# Patient Record
Sex: MALE | Race: WHITE | Employment: OTHER | URBAN - METROPOLITAN AREA
[De-identification: names, ages, dates, MRNs, and addresses within clinical notes are randomized per-mention and may not be internally consistent; named-entity substitution may affect disease eponyms.]

---

## 2018-06-05 ENCOUNTER — APPOINTMENT (OUTPATIENT)
Dept: ULTRASOUND IMAGING | Age: 78
DRG: 554 | End: 2018-06-05
Attending: INTERNAL MEDICINE
Payer: MEDICARE

## 2018-06-05 ENCOUNTER — HOSPITAL ENCOUNTER (INPATIENT)
Age: 78
LOS: 5 days | Discharge: HOME | DRG: 554 | End: 2018-06-10
Attending: EMERGENCY MEDICINE | Admitting: INTERNAL MEDICINE
Payer: MEDICARE

## 2018-06-05 DIAGNOSIS — N17.9 ACUTE RENAL FAILURE, UNSPECIFIED ACUTE RENAL FAILURE TYPE (HCC): Primary | ICD-10-CM

## 2018-06-05 DIAGNOSIS — E86.0 DEHYDRATION: ICD-10-CM

## 2018-06-05 DIAGNOSIS — M10.00 ACUTE IDIOPATHIC GOUT, UNSPECIFIED SITE: ICD-10-CM

## 2018-06-05 DIAGNOSIS — R00.0 TACHYCARDIA: ICD-10-CM

## 2018-06-05 DIAGNOSIS — E87.5 ACUTE HYPERKALEMIA: ICD-10-CM

## 2018-06-05 PROBLEM — M10.9 ACUTE GOUT: Status: ACTIVE | Noted: 2018-06-05

## 2018-06-05 PROBLEM — E87.20 METABOLIC ACIDOSIS: Status: ACTIVE | Noted: 2018-06-05

## 2018-06-05 PROBLEM — E87.29 RESPIRATORY ACIDOSIS: Status: ACTIVE | Noted: 2018-06-05

## 2018-06-05 PROBLEM — N18.4 ACUTE WORSENING OF STAGE 4 CHRONIC KIDNEY DISEASE (HCC): Status: ACTIVE | Noted: 2018-06-05

## 2018-06-05 LAB
ALBUMIN SERPL-MCNC: 3 G/DL (ref 3.5–5)
ALBUMIN/GLOB SERPL: 0.6 {RATIO} (ref 1.1–2.2)
ALP SERPL-CCNC: 72 U/L (ref 45–117)
ALT SERPL-CCNC: 9 U/L (ref 12–78)
ANION GAP SERPL CALC-SCNC: 13 MMOL/L (ref 5–15)
APPEARANCE UR: ABNORMAL
ARTERIAL PATENCY WRIST A: ABNORMAL
AST SERPL-CCNC: 24 U/L (ref 15–37)
BACTERIA URNS QL MICRO: ABNORMAL /HPF
BASE DEFICIT BLD-SCNC: 19 MMOL/L
BASOPHILS # BLD: 0 K/UL (ref 0–0.1)
BASOPHILS NFR BLD: 0 % (ref 0–1)
BDY SITE: ABNORMAL
BILIRUB SERPL-MCNC: 0.8 MG/DL (ref 0.2–1)
BILIRUB UR QL: NEGATIVE
BUN SERPL-MCNC: 50 MG/DL (ref 6–20)
BUN/CREAT SERPL: 13 (ref 12–20)
CALCIUM SERPL-MCNC: 7.2 MG/DL (ref 8.5–10.1)
CHLORIDE SERPL-SCNC: 113 MMOL/L (ref 97–108)
CK SERPL-CCNC: 105 U/L (ref 39–308)
CO2 SERPL-SCNC: 12 MMOL/L (ref 21–32)
COLOR UR: ABNORMAL
CREAT SERPL-MCNC: 3.78 MG/DL (ref 0.7–1.3)
CREAT UR-MCNC: 71.3 MG/DL
DIFFERENTIAL METHOD BLD: ABNORMAL
EOSINOPHIL # BLD: 0.1 K/UL (ref 0–0.4)
EOSINOPHIL #/AREA URNS HPF: 1 /[HPF]
EOSINOPHIL NFR BLD: 1 % (ref 0–7)
EPITH CASTS URNS QL MICRO: ABNORMAL /LPF
ERYTHROCYTE [DISTWIDTH] IN BLOOD BY AUTOMATED COUNT: 15.8 % (ref 11.5–14.5)
GAS FLOW.O2 O2 DELIVERY SYS: ABNORMAL L/MIN
GLOBULIN SER CALC-MCNC: 5 G/DL (ref 2–4)
GLUCOSE BLD STRIP.AUTO-MCNC: 105 MG/DL (ref 65–100)
GLUCOSE BLD STRIP.AUTO-MCNC: 188 MG/DL (ref 65–100)
GLUCOSE SERPL-MCNC: 107 MG/DL (ref 65–100)
GLUCOSE UR STRIP.AUTO-MCNC: NEGATIVE MG/DL
HCO3 BLD-SCNC: 8.4 MMOL/L (ref 22–26)
HCT VFR BLD AUTO: 32.7 % (ref 36.6–50.3)
HGB BLD-MCNC: 10.5 G/DL (ref 12.1–17)
HGB UR QL STRIP: ABNORMAL
IMM GRANULOCYTES # BLD: 0.1 K/UL (ref 0–0.04)
IMM GRANULOCYTES NFR BLD AUTO: 1 % (ref 0–0.5)
KETONES UR QL STRIP.AUTO: NEGATIVE MG/DL
LACTATE SERPL-SCNC: 1.3 MMOL/L (ref 0.4–2)
LEUKOCYTE ESTERASE UR QL STRIP.AUTO: ABNORMAL
LYMPHOCYTES # BLD: 1.3 K/UL (ref 0.8–3.5)
LYMPHOCYTES NFR BLD: 12 % (ref 12–49)
MCH RBC QN AUTO: 32 PG (ref 26–34)
MCHC RBC AUTO-ENTMCNC: 32.1 G/DL (ref 30–36.5)
MCV RBC AUTO: 99.7 FL (ref 80–99)
MONOCYTES # BLD: 1.1 K/UL (ref 0–1)
MONOCYTES NFR BLD: 11 % (ref 5–13)
NEUTS SEG # BLD: 7.9 K/UL (ref 1.8–8)
NEUTS SEG NFR BLD: 76 % (ref 32–75)
NITRITE UR QL STRIP.AUTO: NEGATIVE
NRBC # BLD: 0 K/UL (ref 0–0.01)
NRBC BLD-RTO: 0 PER 100 WBC
PCO2 BLD: 20.3 MMHG (ref 35–45)
PH BLD: 7.23 [PH] (ref 7.35–7.45)
PH UR STRIP: 6 [PH] (ref 5–8)
PLATELET # BLD AUTO: 250 K/UL (ref 150–400)
PMV BLD AUTO: 9.9 FL (ref 8.9–12.9)
PO2 BLD: 101 MMHG (ref 80–100)
POTASSIUM SERPL-SCNC: 5.5 MMOL/L (ref 3.5–5.1)
PROT SERPL-MCNC: 8 G/DL (ref 6.4–8.2)
PROT UR STRIP-MCNC: 100 MG/DL
PROT UR-MCNC: 193 MG/DL (ref 0–11.9)
PROT/CREAT UR-RTO: 2.7
RBC # BLD AUTO: 3.28 M/UL (ref 4.1–5.7)
RBC #/AREA URNS HPF: ABNORMAL /HPF (ref 0–5)
SAO2 % BLD: 97 % (ref 92–97)
SERVICE CMNT-IMP: ABNORMAL
SERVICE CMNT-IMP: ABNORMAL
SODIUM SERPL-SCNC: 138 MMOL/L (ref 136–145)
SODIUM UR-SCNC: 54 MMOL/L
SP GR UR REFRACTOMETRY: 1.01 (ref 1–1.03)
SPECIMEN TYPE: ABNORMAL
TOTAL RESP. RATE, ITRR: 19
UR CULT HOLD, URHOLD: NORMAL
URATE SERPL-MCNC: 7.8 MG/DL (ref 3.5–7.2)
UROBILINOGEN UR QL STRIP.AUTO: 0.2 EU/DL (ref 0.2–1)
WBC # BLD AUTO: 10.4 K/UL (ref 4.1–11.1)
WBC URNS QL MICRO: >100 /HPF (ref 0–4)
YEAST BUDDING URNS QL: PRESENT
YEAST URNS QL MICRO: PRESENT

## 2018-06-05 PROCEDURE — 82962 GLUCOSE BLOOD TEST: CPT

## 2018-06-05 PROCEDURE — 82570 ASSAY OF URINE CREATININE: CPT | Performed by: INTERNAL MEDICINE

## 2018-06-05 PROCEDURE — 85025 COMPLETE CBC W/AUTO DIFF WBC: CPT | Performed by: EMERGENCY MEDICINE

## 2018-06-05 PROCEDURE — 76770 US EXAM ABDO BACK WALL COMP: CPT

## 2018-06-05 PROCEDURE — 36415 COLL VENOUS BLD VENIPUNCTURE: CPT | Performed by: EMERGENCY MEDICINE

## 2018-06-05 PROCEDURE — 99285 EMERGENCY DEPT VISIT HI MDM: CPT

## 2018-06-05 PROCEDURE — 96361 HYDRATE IV INFUSION ADD-ON: CPT

## 2018-06-05 PROCEDURE — 87086 URINE CULTURE/COLONY COUNT: CPT | Performed by: INTERNAL MEDICINE

## 2018-06-05 PROCEDURE — 87186 SC STD MICRODIL/AGAR DIL: CPT | Performed by: INTERNAL MEDICINE

## 2018-06-05 PROCEDURE — 81001 URINALYSIS AUTO W/SCOPE: CPT | Performed by: INTERNAL MEDICINE

## 2018-06-05 PROCEDURE — 82803 BLOOD GASES ANY COMBINATION: CPT

## 2018-06-05 PROCEDURE — 86335 IMMUNFIX E-PHORSIS/URINE/CSF: CPT | Performed by: HOSPITALIST

## 2018-06-05 PROCEDURE — 65660000001 HC RM ICU INTERMED STEPDOWN

## 2018-06-05 PROCEDURE — 36600 WITHDRAWAL OF ARTERIAL BLOOD: CPT

## 2018-06-05 PROCEDURE — 87205 SMEAR GRAM STAIN: CPT | Performed by: INTERNAL MEDICINE

## 2018-06-05 PROCEDURE — 84300 ASSAY OF URINE SODIUM: CPT | Performed by: INTERNAL MEDICINE

## 2018-06-05 PROCEDURE — 74011000250 HC RX REV CODE- 250: Performed by: INTERNAL MEDICINE

## 2018-06-05 PROCEDURE — 93005 ELECTROCARDIOGRAM TRACING: CPT

## 2018-06-05 PROCEDURE — 96375 TX/PRO/DX INJ NEW DRUG ADDON: CPT

## 2018-06-05 PROCEDURE — 96374 THER/PROPH/DIAG INJ IV PUSH: CPT

## 2018-06-05 PROCEDURE — 74011250637 HC RX REV CODE- 250/637: Performed by: EMERGENCY MEDICINE

## 2018-06-05 PROCEDURE — 84550 ASSAY OF BLOOD/URIC ACID: CPT | Performed by: EMERGENCY MEDICINE

## 2018-06-05 PROCEDURE — 83605 ASSAY OF LACTIC ACID: CPT | Performed by: INTERNAL MEDICINE

## 2018-06-05 PROCEDURE — 74011250637 HC RX REV CODE- 250/637: Performed by: INTERNAL MEDICINE

## 2018-06-05 PROCEDURE — 80053 COMPREHEN METABOLIC PANEL: CPT | Performed by: EMERGENCY MEDICINE

## 2018-06-05 PROCEDURE — 84165 PROTEIN E-PHORESIS SERUM: CPT | Performed by: INTERNAL MEDICINE

## 2018-06-05 PROCEDURE — 74011000258 HC RX REV CODE- 258: Performed by: INTERNAL MEDICINE

## 2018-06-05 PROCEDURE — 82550 ASSAY OF CK (CPK): CPT | Performed by: INTERNAL MEDICINE

## 2018-06-05 PROCEDURE — 74011250636 HC RX REV CODE- 250/636: Performed by: INTERNAL MEDICINE

## 2018-06-05 PROCEDURE — 74011250636 HC RX REV CODE- 250/636: Performed by: EMERGENCY MEDICINE

## 2018-06-05 PROCEDURE — 87077 CULTURE AEROBIC IDENTIFY: CPT | Performed by: INTERNAL MEDICINE

## 2018-06-05 RX ORDER — COLCHICINE 0.6 MG/1
0.6 TABLET ORAL DAILY
Status: COMPLETED | OUTPATIENT
Start: 2018-06-06 | End: 2018-06-07

## 2018-06-05 RX ORDER — COLCHICINE 0.6 MG/1
0.6 TABLET ORAL
Status: COMPLETED | OUTPATIENT
Start: 2018-06-05 | End: 2018-06-05

## 2018-06-05 RX ORDER — CALCITRIOL 0.25 UG/1
0.25 CAPSULE ORAL DAILY
Status: DISCONTINUED | OUTPATIENT
Start: 2018-06-06 | End: 2018-06-10 | Stop reason: HOSPADM

## 2018-06-05 RX ORDER — METOPROLOL TARTRATE 50 MG/1
50 TABLET ORAL EVERY 12 HOURS
Status: DISCONTINUED | OUTPATIENT
Start: 2018-06-05 | End: 2018-06-10 | Stop reason: HOSPADM

## 2018-06-05 RX ORDER — KETOROLAC TROMETHAMINE 30 MG/ML
10 INJECTION, SOLUTION INTRAMUSCULAR; INTRAVENOUS
Status: DISCONTINUED | OUTPATIENT
Start: 2018-06-05 | End: 2018-06-05

## 2018-06-05 RX ORDER — METOPROLOL TARTRATE 50 MG/1
50 TABLET ORAL 2 TIMES DAILY
COMMUNITY
End: 2018-08-06 | Stop reason: SDUPTHER

## 2018-06-05 RX ORDER — SODIUM POLYSTYRENE SULFONATE 15 G/60ML
30 SUSPENSION ORAL; RECTAL
Status: COMPLETED | OUTPATIENT
Start: 2018-06-05 | End: 2018-06-05

## 2018-06-05 RX ORDER — LANOLIN ALCOHOL/MO/W.PET/CERES
1000 CREAM (GRAM) TOPICAL DAILY
Status: DISCONTINUED | OUTPATIENT
Start: 2018-06-06 | End: 2018-06-10 | Stop reason: HOSPADM

## 2018-06-05 RX ORDER — ALLOPURINOL 100 MG/1
100 TABLET ORAL
COMMUNITY
End: 2019-02-15 | Stop reason: SDUPTHER

## 2018-06-05 RX ORDER — GUAIFENESIN 100 MG/5ML
81 LIQUID (ML) ORAL DAILY
Status: DISCONTINUED | OUTPATIENT
Start: 2018-06-06 | End: 2018-06-10 | Stop reason: HOSPADM

## 2018-06-05 RX ORDER — ACETAMINOPHEN 325 MG/1
650 TABLET ORAL
Status: DISCONTINUED | OUTPATIENT
Start: 2018-06-05 | End: 2018-06-10 | Stop reason: HOSPADM

## 2018-06-05 RX ORDER — LANOLIN ALCOHOL/MO/W.PET/CERES
325 CREAM (GRAM) TOPICAL
Status: DISCONTINUED | OUTPATIENT
Start: 2018-06-05 | End: 2018-06-10 | Stop reason: HOSPADM

## 2018-06-05 RX ORDER — ALLOPURINOL 100 MG/1
100 TABLET ORAL
Status: DISCONTINUED | OUTPATIENT
Start: 2018-06-05 | End: 2018-06-10 | Stop reason: HOSPADM

## 2018-06-05 RX ORDER — SODIUM CHLORIDE 0.9 % (FLUSH) 0.9 %
5-10 SYRINGE (ML) INJECTION EVERY 8 HOURS
Status: DISCONTINUED | OUTPATIENT
Start: 2018-06-05 | End: 2018-06-10 | Stop reason: HOSPADM

## 2018-06-05 RX ORDER — CALCITRIOL 0.25 UG/1
0.25 CAPSULE ORAL DAILY
COMMUNITY
End: 2018-08-06 | Stop reason: SDUPTHER

## 2018-06-05 RX ORDER — FENTANYL CITRATE 50 UG/ML
50 INJECTION, SOLUTION INTRAMUSCULAR; INTRAVENOUS
Status: COMPLETED | OUTPATIENT
Start: 2018-06-05 | End: 2018-06-05

## 2018-06-05 RX ORDER — SODIUM CHLORIDE 0.9 % (FLUSH) 0.9 %
5-10 SYRINGE (ML) INJECTION AS NEEDED
Status: DISCONTINUED | OUTPATIENT
Start: 2018-06-05 | End: 2018-06-10 | Stop reason: HOSPADM

## 2018-06-05 RX ORDER — LANOLIN ALCOHOL/MO/W.PET/CERES
325 CREAM (GRAM) TOPICAL
COMMUNITY
End: 2018-08-06 | Stop reason: SDUPTHER

## 2018-06-05 RX ADMIN — METHYLPREDNISOLONE SODIUM SUCCINATE 40 MG: 40 INJECTION, POWDER, FOR SOLUTION INTRAMUSCULAR; INTRAVENOUS at 12:09

## 2018-06-05 RX ADMIN — COLCHICINE 0.6 MG: 0.6 TABLET, FILM COATED ORAL at 12:09

## 2018-06-05 RX ADMIN — DEXTROSE MONOHYDRATE: 5 INJECTION, SOLUTION INTRAVENOUS at 14:18

## 2018-06-05 RX ADMIN — FENTANYL CITRATE 50 MCG: 50 INJECTION, SOLUTION INTRAMUSCULAR; INTRAVENOUS at 11:50

## 2018-06-05 RX ADMIN — METHYLPREDNISOLONE SODIUM SUCCINATE 60 MG: 125 INJECTION, POWDER, FOR SOLUTION INTRAMUSCULAR; INTRAVENOUS at 18:06

## 2018-06-05 RX ADMIN — Medication 10 ML: at 18:06

## 2018-06-05 RX ADMIN — FERROUS SULFATE TAB 325 MG (65 MG ELEMENTAL FE) 325 MG: 325 (65 FE) TAB at 18:06

## 2018-06-05 RX ADMIN — METOPROLOL TARTRATE 50 MG: 50 TABLET ORAL at 18:06

## 2018-06-05 RX ADMIN — SODIUM CHLORIDE 1000 ML: 900 INJECTION, SOLUTION INTRAVENOUS at 11:52

## 2018-06-05 RX ADMIN — Medication 10 ML: at 22:00

## 2018-06-05 RX ADMIN — METHYLPREDNISOLONE SODIUM SUCCINATE 60 MG: 125 INJECTION, POWDER, FOR SOLUTION INTRAMUSCULAR; INTRAVENOUS at 23:42

## 2018-06-05 RX ADMIN — CEFTRIAXONE 1 G: 1 INJECTION, POWDER, FOR SOLUTION INTRAMUSCULAR; INTRAVENOUS at 23:43

## 2018-06-05 RX ADMIN — SODIUM POLYSTYRENE SULFONATE 30 G: 15 SUSPENSION ORAL; RECTAL at 12:55

## 2018-06-05 RX ADMIN — ALLOPURINOL 100 MG: 100 TABLET ORAL at 23:41

## 2018-06-05 NOTE — ED TRIAGE NOTES
Pt arrived via EMS from [facility]. Left foot has been hurting since Friday, and right foot pain started last night. His feet are warm and tender, pulses present.

## 2018-06-05 NOTE — PROGRESS NOTES
Admission Medication Reconciliation:    Information obtained from:  Patient/Medication bottles/RxQuery    Comments/Recommendations: Updated PTA meds/reviewed patient's allergies. 1)  Of note, patient was on warfarin for AFib, however patient states this was stopped by his MD, Dr. Andres Aguilar. Also has a history of being on amiodarone. 2)  Medication changes: Added  - Xtandi, allopurinol, ferrous sulfate, calcitriol, B12 1000 mcg/serving drops, \"Neurim\" herbal B-complex -centered supplement)    Adjustment  - Lopressor now 50 mg BID (vs 25 mg BID)    Removed  - Amiodarone, Zofran, Zocor    3)  Colchicine 0.6 mg #30 x 5 days called in to patient's pharmacy however he has not picked up. ADDENDA:    4)  Patient's daughter provided Dexilant 60 mg capsules (physician sample) for inspection. Patient has been taking every AM    5)  Patient on Lupron depot ever 3 months (last injection in May 2018)       Significant PMH/Disease States:   Past Medical History:   Diagnosis Date    Celiac disease     Chronic kidney disease     Hypertension     MI (mitral incompetence)      Chief Complaint for this Admission:    Chief Complaint   Patient presents with    Foot Pain     Allergies:  Morphine    Prior to Admission Medications:   Prior to Admission Medications   Prescriptions Last Dose Informant Patient Reported? Taking? OTHER,NON-FORMULARY, 6/1/2018  Yes Yes   Sig: daily. Neurim  (B-complex with additional supplements) for Memory   allopurinol (ZYLOPRIM) 100 mg tablet 6/4/2018 at HS  Yes Yes   Sig: Take 100 mg by mouth nightly. aspirin 81 mg chewable tablet 6/1/2018  Yes Yes   Sig: Take 81 mg by mouth daily. calcitRIOL (ROCALTROL) 0.25 mcg capsule 6/1/2018  Yes Yes   Sig: Take 0.25 mcg by mouth daily. cyanocobalamin, vitamin B-12, (VITAMIN B-12) 1,000 mcg/mL drop 6/1/2018  Yes Yes   Sig: Take 1,000 mcg by mouth daily.    enzalutamide (XTANDI) 40 mg capsule 6/1/2018 at 2000  Yes Yes   Sig: Take 160 mg by mouth nightly. ferrous sulfate 325 mg (65 mg iron) tablet 6/1/2018 at ACD  Yes Yes   Sig: Take 325 mg by mouth Before breakfast and dinner. metoprolol tartrate (LOPRESSOR) 50 mg tablet 6/1/2018 at PM  Yes Yes   Sig: Take 50 mg by mouth two (2) times a day.       Facility-Administered Medications: None

## 2018-06-05 NOTE — IP AVS SNAPSHOT
[facility] 
[phone] Patient: Joanette Bloch. MRN: [id number] About your hospitalization You were admitted on:  June 5, 2018 You last received care in the:  [id number] You were discharged on:  Jayna 10, 2018 Why you were hospitalized Your primary diagnosis was:  Acute Worsening Of Stage 4 Chronic Kidney Disease (Hcc) Your diagnoses also included:  Metabolic Acidosis, Acute Gout, Hyperkalemia Follow-up Information Follow up With Details Comments Contact Info Amol Balderrama MD On 6/11/2018 Hospital PCP f/u appointment on Monday June 11 @ 1:30 p.m. [address] [facility] [address] [facility] 13 
[phone] Melanie Márquez MD  follow up after your discharge in 1-2 weeks [address] [facility] TREATMENT [facility] [address] [place] [address] 
[phone] Discharge Orders None A check kaylene indicates which time of day the medication should be taken. My Medications START taking these medications Instructions Each Dose to Equal  
 Morning Noon Evening Bedtime  
 calcium carbonate 200 mg calcium (500 mg) Chew Commonly known as:  TUMS Your last dose was: Your next dose is: Take 2 Tabs by mouth three (3) times daily. 400 mg  
    
   
   
   
  
 linezolid 600 mg tablet Commonly known as:  Denise Carrier Your last dose was: Your next dose is: Take 1 Tab by mouth every twelve (12) hours. 600 mg  
    
   
   
   
  
 predniSONE 10 mg tablet Commonly known as:  Amelia [place] Your last dose was: Your next dose is: Take 3 Tabs by mouth daily (with breakfast). 30 mg  
    
   
   
   
  
 sodium bicarbonate 650 mg tablet Your last dose was: Your next dose is: Take 1 Tab by mouth two (2) times a day.   
 650 mg  
    
   
   
   
  
  
 CHANGE how you take these medications Instructions Each Dose to Equal  
 Morning Noon Evening Bedtime  
 metoprolol tartrate 50 mg tablet Commonly known as:  LOPRESSOR What changed:  Another medication with the same name was removed. Continue taking this medication, and follow the directions you see here. Your last dose was: Your next dose is: Take 50 mg by mouth two (2) times a day. 50 mg CONTINUE taking these medications Instructions Each Dose to Equal  
 Morning Noon Evening Bedtime  
 allopurinol 100 mg tablet Commonly known as:  Rosamaria Lalitha Your last dose was: Your next dose is: Take 100 mg by mouth nightly. 100 mg  
    
   
   
   
  
 aspirin 81 mg chewable tablet Your last dose was: Your next dose is: Take 81 mg by mouth daily. 81 mg  
    
   
   
   
  
 calcitRIOL 0.25 mcg capsule Commonly known as:  ROCALTROL Your last dose was: Your next dose is: Take 0.25 mcg by mouth daily. 0.25 mcg  
    
   
   
   
  
 ferrous sulfate 325 mg (65 mg iron) tablet Your last dose was: Your next dose is: Take 325 mg by mouth Before breakfast and dinner. 325 mg  
    
   
   
   
  
 OTHER(NON-FORMULARY) Your last dose was: Your next dose is:    
   
   
 daily. Neurim  (B-complex with additional supplements) for Memory VITAMIN B-12 1,000 mcg/mL Drop Generic drug:  cyanocobalamin (vitamin B-12) Your last dose was: Your next dose is: Take 1,000 mcg by mouth daily. 1000 mcg XTANDI 40 mg capsule Generic drug:  enzalutamide Your last dose was: Your next dose is: Take 160 mg by mouth nightly. 160 mg Where to Get Your Medications Information on where to get these meds will be given to you by the nurse or doctor. ! Ask your nurse or doctor about these medications  
  calcium carbonate 200 mg calcium (500 mg) Chew  
 linezolid 600 mg tablet  
 predniSONE 10 mg tablet  
 sodium bicarbonate 650 mg tablet Discharge Instructions Please bring this form with you to show your primary care provider at your follow-up appointment. Primary care provider:  Dr. Jeanne Wilson MD 
 
Discharging provider:  Jane Torres MD 
 
You have been admitted to the hospital with the following diagnoses: · Acute worsening of stage 4 chronic kidney disease ([facility] [address] FOLLOW-UP CARE RECOMMENDATIONS: 
 
APPOINTMENTS: 
Follow-up Information Follow up With Details Comments Contact Info Jeanne Wilson MD On 6/11/2018 Hospital PCP f/u appointment on Monday June 11 @ 1:30 p.m. [address] [place] [address] 
[phone] Vic Collins MD  follow up after your discharge in 1-2 weeks [address] [place] [address] 
[phone] FOLLOW-UP TESTS recommended:  
Take antibiotics and prednisone as prescribed Follow up with your kidney specialist in 1-2 weeks PENDING TEST RESULTS: 
At the time of your discharge the following test results are still pending: none Please make sure you review these results with your outpatient follow-up provider(s). SYMPTOMS to watch for: chest pain, shortness of breath, fever, chills, nausea, vomiting, diarrhea, change in mentation, falling, weakness, bleeding. DIET/what to eat:  Cardiac Diet ACTIVITY:  Activity as tolerated WOUND CARE: NONE 
 
EQUIPMENT needed:  NONE What to do if new or unexpected symptoms occur? If you experience any of the above symptoms (or should other concerns or questions arise after discharge) please call your primary care physician. Return to the emergency room if you cannot get hold of your doctor. · It is very important that you keep your follow-up appointment(s). · Please bring discharge papers, medication list (and/or medication bottles) to your follow-up appointments for review by your outpatient provider(s). · Please check the list of medications and be sure it includes every medication (even non-prescription medications) that your provider wants you to take. · It is important that you take the medication exactly as they are prescribed. · Keep your medication in the bottles provided by the pharmacist and keep a list of the medication names, dosages, and times to be taken in your wallet. · Do not take other medications without consulting your doctor. · If you have any questions about your medications or other instructions, please talk to your nurse or care provider before you leave the hospital. 
 
I understand that if any problems occur once I am at home I am to contact my physician. These instructions were explained to me and I had the opportunity to ask questions. DISCHARGE SUMMARY from Nurse PATIENT INSTRUCTIONS: 
 
After general anesthesia or intravenous sedation, for 24 hours or while taking prescription Narcotics: · Limit your activities · Do not drive and operate hazardous machinery · Do not make important personal or business decisions · Do  not drink alcoholic beverages · If you have not urinated within 8 hours after discharge, please contact your surgeon on call. Report the following to your surgeon: 
· Excessive pain, swelling, redness or odor of or around the surgical area · Temperature over 100.5 · Nausea and vomiting lasting longer than 4 hours or if unable to take medications · Any signs of decreased circulation or nerve impairment to extremity: change in color, persistent  numbness, tingling, coldness or increase pain · Any questions These are general instructions for a healthy lifestyle: No smoking/ No tobacco products/ Avoid exposure to second hand smoke Surgeon General's Warning:  Quitting smoking now greatly reduces serious risk to your health. Obesity, smoking, and sedentary lifestyle greatly increases your risk for illness A healthy diet, regular physical exercise & weight monitoring are important for maintaining a healthy lifestyle You may be retaining fluid if you have a history of heart failure or if you experience any of the following symptoms:  Weight gain of 3 pounds or more overnight or 5 pounds in a week, increased swelling in our hands or feet or shortness of breath while lying flat in bed. Please call your doctor as soon as you notice any of these symptoms; do not wait until your next office visit. Recognize signs and symptoms of STROKE: 
 
F-face looks uneven A-arms unable to move or move unevenly S-speech slurred or non-existent T-time-call 911 as soon as signs and symptoms begin-DO NOT go Back to bed or wait to see if you get better-TIME IS BRAIN. Warning Signs of HEART ATTACK Call 911 if you have these symptoms: 
? Chest discomfort. Most heart attacks involve discomfort in the center of the chest that lasts more than a few minutes, or that goes away and comes back. It can feel like uncomfortable pressure, squeezing, fullness, or pain. ? Discomfort in other areas of the upper body. Symptoms can include pain or discomfort in one or both arms, the back, neck, jaw, or stomach. ? Shortness of breath with or without chest discomfort. ? Other signs may include breaking out in a cold sweat, nausea, or lightheadedness. Don't wait more than five minutes to call [address]! Fast action can save your life. Calling 911 is almost always the fastest way to get lifesaving treatment.  Emergency Medical Services staff can begin treatment when they arrive  up to an hour sooner than if someone gets to the hospital by car. The discharge information has been reviewed with the patient. The patient verbalized understanding. Discharge medications reviewed with the patient and appropriate educational materials and side effects teaching were provided. ___________________________________________________________________________________________________________________________________ Introducing [facility]! [employer] introduces [employer] patient portal. Now you can access parts of your medical record, email your doctor's office, and request medication refills online. 1. In your internet browser, go to [email or web address] 2. Click on the First Time User? Click Here link in the Sign In box. You will see the New Member Sign Up page. 3. Enter your [employer] Access Code exactly as it appears below. You will not need to use this code after youve completed the sign-up process. If you do not sign up before the expiration date, you must request a new code. · [employer] Access Code: [email or web address] Expires: 9/3/2018 10:48 AM 
 
4. Enter the last four digits of your Social Security Number (xxxx) and Date of Birth (mm/dd/yyyy) as indicated and click Submit. You will be taken to the next sign-up page. 5. Create a Rubysophict ID. This will be your [employer] login ID and cannot be changed, so think of one that is secure and easy to remember. 6. Create a [employer] password. You can change your password at any time. 7. Enter your Password Reset Question and Answer. This can be used at a later time if you forget your password. 8. Enter your e-mail address. You will receive e-mail notification when new information is available in [address] 9. Click Sign Up. You can now view and download portions of your medical record. 10. Click the Download Summary menu link to download a portable copy of your medical information. If you have questions, please visit the Frequently Asked Questions section of the MyChart website. Remember, [employer] is NOT to be used for urgent needs. For medical emergencies, dial 911. Now available from your iPhone and Android! Introducing Chet Montenegro As a [facility] patient, I wanted to make you aware of our electronic visit tool called Chet Montenegro. [employer] allows you to connect within minutes with a medical provider 24 hours a day, seven days a week via a mobile device or tablet or logging into a secure website from your computer. You can access Chet Montenegro from anywhere in the [place]. A virtual visit might be right for you when you have a simple condition and feel like you just dont want to get out of bed, or cant get away from work for an appointment, when your regular [facility] provider is not available (evenings, weekends or holidays), or when youre out of town and need minor care. Electronic visits cost only $49 and if the [employer] provider determines a prescription is needed to treat your condition, one can be electronically transmitted to a nearby pharmacy*. Please take a moment to enroll today if you have not already done so. The enrollment process is free and takes just a few minutes. To enroll, please download the [employer] melquiades to your tablet or phone, or visit [email or web address] to enroll on your computer. And, as an [address] [place] [address] patient with a frintit account, the results of your visits will be scanned into your electronic medical record and your primary care provider will be able to view the scanned results. We urge you to continue to see your regular [facility] provider for your ongoing medical care.   And while your primary care provider may not be the one available when you seek a Chet Montenegro virtual visit, the peace of mind you get from getting a real diagnosis real time can be priceless. For more information on Chet Montenegro, view our Frequently Asked Questions (FAQs) at [email or web address]. Sincerely, 
 
Parveen Finn MD 
Chief Medical Officer [address] *:  certain medications cannot be prescribed via Chet Montenegro Providers Seen During Your Hospitalization Provider Specialty Primary office phone Tamara Pastrana MD Emergency Medicine [phone] Gabriel Kurtz MD Internal Medicine [phone] Rhianna Holm MD Internal Medicine [phone] Your Primary Care Physician (PCP) Primary Care Physician Office Phone Office Fax Emerald Bermudez [phone] You are allergic to the following Allergen Reactions Morphine Other (comments) AMS Recent Documentation Height Weight BMI Smoking Status 1.803 m 108 kg 33.19 kg/m2 Former Smoker Emergency Contacts Name Discharge Info Relation Home Work Mobile Alma Freed [2] [phone] Patient Belongings The following personal items are in your possession at time of discharge: 
  Dental Appliances: None  Visual Aid: Glasses, With patient      Home Medications: None   Jewelry: None  Clothing: At bedside    Other Valuables: At bedside  Personal Items Sent to Safe: none Please provide this summary of care documentation to your next provider. Signatures-by signing, you are acknowledging that this After Visit Summary has been reviewed with you and you have received a copy. Patient Signature:  ____________________________________________________________ Date:  ____________________________________________________________  
  
Kylie Noriega Provider Signature:  ____________________________________________________________ Date:  ____________________________________________________________

## 2018-06-05 NOTE — ED NOTES
Called nutrition services, states should be coming around with tray shortly. Pt ate 2 applesauce containers.   Nephrology at bedside

## 2018-06-05 NOTE — ROUTINE PROCESS
TRANSFER - OUT REPORT:    Verbal report given to Ashlyn(name) on [facility].  being transferred to 4 IMCU(unit) for routine progression of care       Report consisted of patients Situation, Background, Assessment and   Recommendations(SBAR). Information from the following report(s) SBAR and ED Summary was reviewed with the receiving nurse. Lines:   Peripheral IV 06/05/18 Left Antecubital (Active)   Site Assessment Clean, dry, & intact 6/5/2018 11:07 AM   Phlebitis Assessment 0 6/5/2018 11:07 AM   Infiltration Assessment 0 6/5/2018 11:07 AM        Opportunity for questions and clarification was provided.       Patient transported with:   [employer]

## 2018-06-05 NOTE — PROGRESS NOTES
TRANSFER - IN REPORT:    Verbal report received from Helen Salinas RN(name) on [facility].  being received from ED(unit) for routine progression of care      Report consisted of patients Situation, Background, Assessment and   Recommendations(SBAR). Information from the following report(s) SBAR, Kardex, ED Summary, Intake/Output, MAR, Accordion, Recent Results, Med Rec Status, Cardiac Rhythm NSR and Alarm Parameters  was reviewed with the receiving nurse. Opportunity for questions and clarification was provided. Assessment completed upon patients arrival to unit and care assumed. Bedside shift change report given to Eliezer Oliveira RN (oncoming nurse) by Valencia Diaz RN (offgoing nurse). Report included the following information SBAR, Kardex, ED Summary, Procedure Summary, Intake/Output, MAR, Accordion, Recent Results, Med Rec Status, Cardiac Rhythm NSR and Alarm Parameters .

## 2018-06-05 NOTE — IP AVS SNAPSHOT
[address] [place] [address] 
[phone] Patient: Shaila Angeles. MRN: [id number] A check kaylene indicates which time of day the medication should be taken. My Medications START taking these medications Instructions Each Dose to Equal  
 Morning Noon Evening Bedtime  
 calcium carbonate 200 mg calcium (500 mg) Chew Commonly known as:  TUMS Your last dose was: Your next dose is: Take 2 Tabs by mouth three (3) times daily. 400 mg  
    
   
   
   
  
 linezolid 600 mg tablet Commonly known as:  Freedom Colleen Your last dose was: Your next dose is: Take 1 Tab by mouth every twelve (12) hours. 600 mg  
    
   
   
   
  
 predniSONE 10 mg tablet Commonly known as:  Raina Espinosa Your last dose was: Your next dose is: Take 3 Tabs by mouth daily (with breakfast). 30 mg  
    
   
   
   
  
 sodium bicarbonate 650 mg tablet Your last dose was: Your next dose is: Take 1 Tab by mouth two (2) times a day. 650 mg CHANGE how you take these medications Instructions Each Dose to Equal  
 Morning Noon Evening Bedtime  
 metoprolol tartrate 50 mg tablet Commonly known as:  LOPRESSOR What changed:  Another medication with the same name was removed. Continue taking this medication, and follow the directions you see here. Your last dose was: Your next dose is: Take 50 mg by mouth two (2) times a day. 50 mg CONTINUE taking these medications Instructions Each Dose to Equal  
 Morning Noon Evening Bedtime  
 allopurinol 100 mg tablet Commonly known as:  Magalene Oesau Your last dose was: Your next dose is: Take 100 mg by mouth nightly. 100 mg  
    
   
   
   
  
 aspirin 81 mg chewable tablet Your last dose was: Your next dose is: Take 81 mg by mouth daily. 81 mg  
    
   
   
   
  
 calcitRIOL 0.25 mcg capsule Commonly known as:  ROCALTROL Your last dose was: Your next dose is: Take 0.25 mcg by mouth daily. 0.25 mcg  
    
   
   
   
  
 ferrous sulfate 325 mg (65 mg iron) tablet Your last dose was: Your next dose is: Take 325 mg by mouth Before breakfast and dinner. 325 mg  
    
   
   
   
  
 OTHER(NON-FORMULARY) Your last dose was: Your next dose is:    
   
   
 daily. Neurim  (B-complex with additional supplements) for Memory VITAMIN B-12 1,000 mcg/mL Drop Generic drug:  cyanocobalamin (vitamin B-12) Your last dose was: Your next dose is: Take 1,000 mcg by mouth daily. 1000 mcg XTANDI 40 mg capsule Generic drug:  enzalutamide Your last dose was: Your next dose is: Take 160 mg by mouth nightly. 160 mg Where to Get Your Medications Information on where to get these meds will be given to you by the nurse or doctor. ! Ask your nurse or doctor about these medications  
  calcium carbonate 200 mg calcium (500 mg) Chew  
 linezolid 600 mg tablet  
 predniSONE 10 mg tablet  
 sodium bicarbonate 650 mg tablet

## 2018-06-05 NOTE — ED NOTES
Pt originally assigned to occupied room. Notified nursing supervisor who changed rooms, same floor.   Will attempt to call report

## 2018-06-05 NOTE — ED PROVIDER NOTES
HPI Comments: 78 y.o. male with past medical history significant for HTN, CKD, mitral incompetence, and celiac disease who presents via EMS from [facility] with chief complaint of foot pain. Patient reports 5-day history of gradually worsening tenderness over left great toe, progressively extending over left foot, with associated warmth and redness. Yesterday, patient states symptoms spread to right great toe. Patient states symptoms are c/w previous episodes of gout. He regularly takes allopurinol. Patient has been unable to bear weight secondary to pain. Patient states he is tolerating PO fluids, but has been unable to eat. Patient states he lives alone at home, but has daughter and son help him as needed. He is usually independent and ambulatory at baseline. There are no other acute medical concerns at this time. Old Chart Review:  Patient evaluated at [facility] ED on 10/6/2013 with orthostatic hypotension. Social hx: Former tobacco smoker; Denies EtOH use  PCP: Elissa Mcpherson MD    Note written by Alex Braden, as dictated by Samanta Myles MD 11:28 AM         The history is provided by the EMS personnel and the patient. No  was used. Past Medical History:   Diagnosis Date    Celiac disease     Chronic kidney disease     Hypertension     MI (mitral incompetence)        Past Surgical History:   Procedure Laterality Date    CARDIAC SURG PROCEDURE UNLIST      qaudruple bipass         History reviewed. No pertinent family history. Social History     Social History    Marital status:      Spouse name: N/A    Number of children: N/A    Years of education: N/A     Occupational History    Not on file.      Social History Main Topics    Smoking status: Former Smoker    Smokeless tobacco: Never Used    Alcohol use No    Drug use: Not on file    Sexual activity: Not on file     Other Topics Concern    Not on file     Social History Narrative         ALLERGIES: Morphine    Review of Systems   Constitutional: Negative for appetite change, chills and fever. HENT: Negative for rhinorrhea, sore throat and trouble swallowing. Eyes: Negative for photophobia. Respiratory: Negative for cough and shortness of breath. Cardiovascular: Negative for chest pain and palpitations. Gastrointestinal: Negative for abdominal pain, nausea and vomiting. Genitourinary: Negative for dysuria, frequency and hematuria. Musculoskeletal: Negative for arthralgias. +left foot pain  +right great toe pain   Neurological: Negative for dizziness, syncope and weakness. Psychiatric/Behavioral: Negative for behavioral problems. The patient is not nervous/anxious. All other systems reviewed and are negative. There were no vitals filed for this visit. Physical Exam   Constitutional: He appears well-developed and well-nourished. HENT:   Head: Normocephalic and atraumatic. Mouth/Throat: Oropharynx is clear and moist.   Eyes: EOM are normal. Pupils are equal, round, and reactive to light. Neck: Normal range of motion. Neck supple. Cardiovascular: Normal rate, regular rhythm, normal heart sounds and intact distal pulses. Exam reveals no gallop and no friction rub. No murmur heard. Pulmonary/Chest: Effort normal. No respiratory distress. He has no wheezes. He has no rales. Abdominal: Soft. There is no tenderness. There is no rebound. Musculoskeletal: Normal range of motion. Tender, warm, red bilateral great toes. Tenderness, warmth, and redness over left foot and ankle. Neurological: He is alert. No cranial nerve deficit. Motor; symmetric   Skin: No erythema. Psychiatric: He has a normal mood and affect. His behavior is normal.   Nursing note and vitals reviewed.      Note written by Alex Meyer, as dictated by General Urmila MD 11:28 AM    MDM  Number of Diagnoses or Management Options  Acute idiopathic gout, unspecified site:   Acute renal failure, unspecified acute renal failure type [facility]):   Dehydration:   Tachycardia:   Critical Care  Total time providing critical care: 30-74 minutes (40 mins)        ED Course     ED EKG interpretation:  Rhythm: sinus tachycardia; PACsand regular . Rate (approx.): 115; Axis: normal; P wave: normal; QRS interval: normal ; ST/T wave: non-specific changes; in  Lead: ; Other findings: . This EKG was interpreted by Teri Patel MD,ED Provider. 11:52 AM    11:54 AM  Potassium 5.5, Chloride 113  CO2 12  BUN 50, creatinine 3.78  GFR 16  Calcium 7.2  ALT 9  Hgb 12.5, RBC 3.28    12:02 PM  Uric acid 7.8  PO colchicine ordered, solu-medrol ordered  Will consult hospitalist for admission    CONSULT NOTE:  12:03 PM Teri Patel MD spoke with Rodriguez Cade NP, consult for Hospitalist, via [facility]. Discussed available diagnostic tests and clinical findings. Dr. Dulce Maria Garcia will evaluate the patient for possible admission. 12:10 PM  Discussed available lab and imaging results with patient. He verbalizes understanding and agrees with care plan. Will admit patient to hospitalist service for further evaluation and treatment. Patient is being admitted to the hospital.  The results of their tests and reasons for their admission have been discussed with them and/or available family. They convey agreement and understanding for the need to be admitted and for their admission diagnosis. Consultation will be made now with the inpatient physician for hospitalization.     Procedures

## 2018-06-05 NOTE — H&P
History & Physical  Lynne Omalley MD, [facility]    Date of admission: 6/5/2018    Patient name: Torri Rdz MRN: [id number]  YOB: 1940  Age: 78 y.o. Primary care provider:  Og Caballero MD   Renal: Dr Nadine Hughes  Vascular Surgeon: Dr Maria Isidro  Urologist: [facility] [place] [address] of Information: patient, medical records                                Chief complaint: gout flare up in my feet    History of present illness  Torri Rdz is a 78 y.o. male with gout, Stage 4 prostate CA on Lupron q3 months (last taken about 2 weeks ago) and Xtandi, recurrent UTIs, CKD Stage 4, s/p b/l renal stents, CAD s/p CABGx4, and HTN who was BIBEMS to the ED from [facility] with left and right foot pain due to gout flare up. Patient states gout started in the left foot on 5/31/18, traveled up to the ankle and shin then started in the right foot. He takes allopurinol but it was not helping. He has been able to walk and bear weight using a cane. He denies f/c/n/v, CP, SOB, abdominal pain, diarrhea but c/o urine odor. His daughter (who entered during the exam) reported that pt has not been eating and drinking as usual. Of note, pt just recently had renal stents placed by Dr Maria Isidro. ED triage VS were temp 98F, , /92, RR 18, SpO2 99% on RA. In the ED, patient received fentanyl 50mcg IVx1, 1L IVNS bolus x1, Solu-Medrol 40mg IVx1, colchicine 0.6mg po x1, kayexalate 30g po x1. Pt states his feet hurt slightly less than before. Pt also notes that he started having severe tremors after coming to the ED.     Past Medical History:   Diagnosis Date    Celiac disease     Chronic kidney disease     Hypertension     MI (mitral incompetence)       Past Surgical History:   Procedure Laterality Date    CARDIAC SURG PROCEDURE UNLIST      qaudruple bipass     Prior to Admission medications    Medication Sig Start Date End Date Taking? Authorizing Provider   metoprolol tartrate (LOPRESSOR) 50 mg tablet Take 50 mg by mouth two (2) times a day. Yes Historical Provider   enzalutamide Tanya Feller) 40 mg capsule Take 160 mg by mouth nightly. Yes Historical Provider   allopurinol (ZYLOPRIM) 100 mg tablet Take 100 mg by mouth nightly. Yes Historical Provider   ferrous sulfate 325 mg (65 mg iron) tablet Take 325 mg by mouth Before breakfast and dinner. Yes Historical Provider   calcitRIOL (ROCALTROL) 0.25 mcg capsule Take 0.25 mcg by mouth daily. Yes Historical Provider   cyanocobalamin, vitamin B-12, (VITAMIN B-12) 1,000 mcg/mL drop Take 1,000 mcg by mouth daily. Yes Historical Provider   OTHER,NON-FORMULARY, daily. Neurim  (B-complex with additional supplements) for Memory   Yes Historical Provider   aspirin 81 mg chewable tablet Take 81 mg by mouth daily. Yes Phys Other, MD     Allergies   Allergen Reactions    Morphine Other (comments)     AMS      History reviewed. No pertinent family history. Social history  Patient resides  x  [facility]     With family care      Assisted living      SNF    Ambulates    Independently    x  With cane       Assisted walker         Alcohol history   x  None     Social     Chronic   Smoking history    None   x  Former smoker: quit decades ago     Current smoker     Denies illicit drug use. History   Smoking Status    Former Smoker   Smokeless Tobacco    Never Used       Code status  x  Full code     DNR/DNI        Code status discussed with the patient, and he requests Full Code status. Review of systems  A comprehensive review of systems was negative except for that written in the History of Present Illness.      Physical Examination   Visit Vitals    BP (!) 128/92    Pulse (!) 113    Temp 98 °F (36.7 °C)    Resp 18    Ht 5' 11\" (1.803 m)    Wt 101.6 kg (224 lb)    SpO2 98%    BMI 31.24 kg/m2          O2 Device: Room air    Gen: awake, NAD, cooperative, pleasant, tremulous  Head: NCAT  EENT: PERRL, EOMI, MMM, oropharynx benign  Neck: supple, no masses  Lungs: tachypnic, CTAB, no w/r/r  CVS: regular rhythm, normal rate, S1S2, no m/r/g appreciated, left ankle and foot edema, +pulses  Abd: +BS, soft, NT, ND  MSK: moves all extremities  Neuro: AOx3, CN II-XII grossly intact, gross tremors, responds appropriately to questions and commands  Skin: b/l podagra, left foot gout    Data Review    EKG: none    24 Hour Results:  Recent Results (from the past 24 hour(s))   GLUCOSE, POC    Collection Time: 06/05/18 11:00 AM   Result Value Ref Range    Glucose (POC) 105 (H) 65 - 100 mg/dL    Performed by [facility]    METABOLIC PANEL, COMPREHENSIVE    Collection Time: 06/05/18 11:06 AM   Result Value Ref Range    Sodium 138 136 - 145 mmol/L    Potassium 5.5 (H) 3.5 - 5.1 mmol/L    Chloride 113 (H) 97 - 108 mmol/L    CO2 12 (LL) 21 - 32 mmol/L    Anion gap 13 5 - 15 mmol/L    Glucose 107 (H) 65 - 100 mg/dL    BUN 50 (H) 6 - 20 MG/DL    Creatinine 3.78 (H) 0.70 - 1.30 MG/DL    BUN/Creatinine ratio 13 12 - 20      GFR est AA 19 (L) >60 ml/min/1.73m2    GFR est non-AA 16 (L) >60 ml/min/1.73m2    Calcium 7.2 (L) 8.5 - 10.1 MG/DL    Bilirubin, total 0.8 0.2 - 1.0 MG/DL    ALT (SGPT) 9 (L) 12 - 78 U/L    AST (SGOT) 24 15 - 37 U/L    Alk.  phosphatase 72 45 - 117 U/L    Protein, total 8.0 6.4 - 8.2 g/dL    Albumin 3.0 (L) 3.5 - 5.0 g/dL    Globulin 5.0 (H) 2.0 - 4.0 g/dL    A-G Ratio 0.6 (L) 1.1 - 2.2     CBC WITH AUTOMATED DIFF    Collection Time: 06/05/18 11:06 AM   Result Value Ref Range    WBC 10.4 4.1 - 11.1 K/uL    RBC 3.28 (L) 4.10 - 5.70 M/uL    HGB 10.5 (L) 12.1 - 17.0 g/dL    HCT 32.7 (L) 36.6 - 50.3 %    MCV 99.7 (H) 80.0 - 99.0 FL    MCH 32.0 26.0 - 34.0 PG    MCHC 32.1 30.0 - 36.5 g/dL    RDW 15.8 (H) 11.5 - 14.5 %    PLATELET 338 039 - 437 K/uL    MPV 9.9 8.9 - 12.9 FL    NRBC 0.0 0  WBC    ABSOLUTE NRBC 0.00 0.00 - 0.01 K/uL    NEUTROPHILS 76 (H) 32 - 75 %    LYMPHOCYTES 12 12 - 49 %    MONOCYTES 11 5 - 13 %    EOSINOPHILS 1 0 - 7 %    BASOPHILS 0 0 - 1 %    IMMATURE GRANULOCYTES 1 (H) 0.0 - 0.5 %    ABS. NEUTROPHILS 7.9 1.8 - 8.0 K/UL    ABS. LYMPHOCYTES 1.3 0.8 - 3.5 K/UL    ABS. MONOCYTES 1.1 (H) 0.0 - 1.0 K/UL    ABS. EOSINOPHILS 0.1 0.0 - 0.4 K/UL    ABS. BASOPHILS 0.0 0.0 - 0.1 K/UL    ABS. IMM.  GRANS. 0.1 (H) 0.00 - 0.04 K/UL    DF AUTOMATED     URIC ACID    Collection Time: 06/05/18 11:06 AM   Result Value Ref Range    Uric acid 7.8 (H) 3.5 - 7.2 MG/DL     Recent Labs      06/05/18   1106   WBC  10.4   HGB  10.5*   HCT  32.7*   PLT  250     Recent Labs      06/05/18   1106   NA  138   K  5.5*   CL  113*   CO2  12*   GLU  107*   BUN  50*   CREA  3.78*   CA  7.2*   ALB  3.0*   SGOT  24   ALT  9*       Imaging  none    Assessment and Plan   Principal Problem:    Acute worsening of stage 4 chronic kidney disease (HCC) (POA) - admit inpatient IMCU  - likely due to prerenal azotemia from decreased po intake  - continue aggressive IVF with bicarb gtt  - check renal US, urine studies  - consult Renal  - strict I/Os; condom cath if possible    Active Problems:    Metabolic acidosis with respiratory alkalosis (POA) - ABG on RA 7.23/20/101  - start bicarb gtt  - may need to repeat ABG  - pt denies respiratory complaints but pt is tachypnic on exam      Acute gout with hyperuricemia (POA) - serum uric acid not high enough to start febuxostat  - continue IV Solu-medrol and wean to po steroid  - s/p colchicine x1 in ED; will continue colchicine x2 more doses and resume home allopurinol  - PT/OT evals when appropriate  - pain control      Hyperkalemia (POA) - pt denies CP but check ECG  - likely due to JASIEL  - s/p kayexalate   - check ECG    Stage 4 prostate CA - currently on Lupron ([id number] with last injection 2 weeks ago) and My-Hammer for now given JASIEL  - may need to consult Urology    CAD s/p CABG x4 - continue ASA, metoprolol    Anemia - resume home B12 and po iron    Dysuria with h/o recurrent UTI - check UA    HTN - pt on no meds; monitor while on IVF        Diet: renal  Activity: ambulate with assistance  DVT prophylaxis: SCDs  Isolation precautions: none  Consultations: Renal  Anticipated disposition: TBD.  Comes from [address] by: Flower Love MD    June 5, 2018 at 12:49 PM

## 2018-06-05 NOTE — CONSULTS
[facility]   [address] [facility], [address] [place] [address], [facility]  Phone: [phone] NOTE     Patient: Kareem Rae MRN: [id number]  PCP: Zain Hernandez MD   :     1940  Age:   78 y.o. Sex:  male      Referring physician: Vanessa Luther MD  Reason for consultation: 78 y.o. male with Acute worsening of stage 4 chronic kidney disease ([address] complicated by JASIEL   Admission Date: 2018 10:47 AM  LOS: 0 days      ASSESSMENT and PLAN :   JASIEL on CKD :  - etiology : unknown at this time : progression of CKD vs infection   - US showing well positioned ureteral stents and B/L hydro is chronic and unchanged   - start IVF containing Bicarb   - serial labs     CKD Stage IV   - baseline Cr 2.4-2.5 mg/dl     Acute Polyarticular Gout   - Colchicine and Prednisone   - avoid any other NSAIDs    Met Acidosis :  - 2/2 Renal failure   - Bicarb gtt     Recurrent UTI   Metastatic prostate Ca   HTN : stable       Thank you for the consult   Will follow          Subjective:   HPI: Kareem Rae is a 78 y.o.   male who has been admitted to the hospital for B/L foot pain   He has developed gout flare in Left foot and then subsequently R foot   Progressively worse and presented to ER   Got colchicine and started steroids in ER and labs showed Cr of 3.5 up from baseline of 2.4-2.5 mg/dl and we were asked to see him   He has last seen Dr Lexie Woodward in [address] [place] [address] 2017 and has missed appt due to acute illness  [facility] reasons I do not rememeber at this time -- he think possible urosepsis   He had his ureteral stents changed recently by dr Jarrett Callejas   He has been on abx since his d/c in April but does not remember the name of abx   He had whitish looking urine recently and Dr Yan Chiang treated him for UTI and now urine is dark in color   No dysuria   He had GERD and started dexilant   Continues to get Rx for Metastatic prostate ca through Dr Hayley Hinkle and Enrique Garcia      Past Medical Hx:   Past Medical History:   Diagnosis Date    Celiac disease     Chronic kidney disease     Hypertension     MI (mitral incompetence)         Past Surgical Hx:     Past Surgical History:   Procedure Laterality Date    CARDIAC SURG PROCEDURE UNLIST      qaudruple bipass         Allergies   Allergen Reactions    Morphine Other (comments)     AMS       Social Hx:  reports that he has quit smoking. He has never used smokeless tobacco. He reports that he does not drink alcohol. History reviewed. No pertinent family history. Review of Systems:  A twelve point review of system was performed today. Pertinent positives and negatives are mentioned in the HPI. The reminder of the ROS is negative and noncontributory. Objective:    Vitals:    Vitals:    06/05/18 1530 06/05/18 1600 06/05/18 1630 06/05/18 1700   BP: 121/61 127/63 121/90 122/68   Pulse: (!) 113 (!) 119 (!) 109 (!) 103   Resp: 23 (!) 31 19 27   Temp:       SpO2:       Weight:       Height:         I&O's:     Visit Vitals    /68    Pulse (!) 103    Temp 98 °F (36.7 °C)    Resp 27    Ht 5' 11\" (1.803 m)    Wt 101.6 kg (224 lb)    SpO2 98%    BMI 31.24 kg/m2       Physical Exam:  General:Alert, No distress,   HEENT: pale . Kyle Saunas    Neck:Supple,no mass palpable  Lungs : CTA  CVS: RRR, S1 S2 normal, No rub,no LE edema  Abdomen: Soft, NT  Extremities: gout   Skin: redness in both feet   MS: B/L gout in feet (L) worse than R   Neurologic: non focal, AAO x 3      Laboratory Results:    Recent Labs      06/05/18   1106   NA  138   K  5.5*   CL  113*   CO2  12*   GLU  107*   BUN  50*   CREA  3.78*   CA  7.2*   ALB  3.0*   SGOT  24   ALT  9*     Recent Labs      06/05/18   1106   WBC  10.4   HGB  10.5*   HCT  32.7*   PLT  250     Lab Results   Component Value Date/Time    Specimen Description: URINE 10/07/2013 01:36 AM    Specimen Description: BLOOD 10/06/2013 04:13 PM     Lab Results Component Value Date/Time    Culture result:  10/07/2013 01:36 AM     STREPTOCOCCI, BETA HEMOLYTIC GROUP C , Penicillin and ampicillin are drugs of choice for treatment of beta-hemolytic streptococcal infections. Susceptibility testing of penicillins and beta-lactams approved by the FDA for treatment of beta-hemolytic streptococcal infections need not be performed routinely, because nonsusceptible isolates are extremely rare. CLSI 2012    Culture result: NO GROWTH 5 DAYS 10/06/2013 04:13 PM     Recent Results (from the past 24 hour(s))   GLUCOSE, POC    Collection Time: 06/05/18 11:00 AM   Result Value Ref Range    Glucose (POC) 105 (H) 65 - 100 mg/dL    Performed by [address] [place] [address]    Collection Time: 06/05/18 11:06 AM   Result Value Ref Range    Sodium 138 136 - 145 mmol/L    Potassium 5.5 (H) 3.5 - 5.1 mmol/L    Chloride 113 (H) 97 - 108 mmol/L    CO2 12 (LL) 21 - 32 mmol/L    Anion gap 13 5 - 15 mmol/L    Glucose 107 (H) 65 - 100 mg/dL    BUN 50 (H) 6 - 20 MG/DL    Creatinine 3.78 (H) 0.70 - 1.30 MG/DL    BUN/Creatinine ratio 13 12 - 20      GFR est AA 19 (L) >60 ml/min/1.73m2    GFR est non-AA 16 (L) >60 ml/min/1.73m2    Calcium 7.2 (L) 8.5 - 10.1 MG/DL    Bilirubin, total 0.8 0.2 - 1.0 MG/DL    ALT (SGPT) 9 (L) 12 - 78 U/L    AST (SGOT) 24 15 - 37 U/L    Alk.  phosphatase 72 45 - 117 U/L    Protein, total 8.0 6.4 - 8.2 g/dL    Albumin 3.0 (L) 3.5 - 5.0 g/dL    Globulin 5.0 (H) 2.0 - 4.0 g/dL    A-G Ratio 0.6 (L) 1.1 - 2.2     CBC WITH AUTOMATED DIFF    Collection Time: 06/05/18 11:06 AM   Result Value Ref Range    WBC 10.4 4.1 - 11.1 K/uL    RBC 3.28 (L) 4.10 - 5.70 M/uL    HGB 10.5 (L) 12.1 - 17.0 g/dL    HCT 32.7 (L) 36.6 - 50.3 %    MCV 99.7 (H) 80.0 - 99.0 FL    MCH 32.0 26.0 - 34.0 PG    MCHC 32.1 30.0 - 36.5 g/dL    RDW 15.8 (H) 11.5 - 14.5 %    PLATELET 443 143 - 300 K/uL    MPV 9.9 8.9 - 12.9 FL    NRBC 0.0 0  WBC    ABSOLUTE NRBC 0.00 0.00 - 0.01 K/uL NEUTROPHILS 76 (H) 32 - 75 %    LYMPHOCYTES 12 12 - 49 %    MONOCYTES 11 5 - 13 %    EOSINOPHILS 1 0 - 7 %    BASOPHILS 0 0 - 1 %    IMMATURE GRANULOCYTES 1 (H) 0.0 - 0.5 %    ABS. NEUTROPHILS 7.9 1.8 - 8.0 K/UL    ABS. LYMPHOCYTES 1.3 0.8 - 3.5 K/UL    ABS. MONOCYTES 1.1 (H) 0.0 - 1.0 K/UL    ABS. EOSINOPHILS 0.1 0.0 - 0.4 K/UL    ABS. BASOPHILS 0.0 0.0 - 0.1 K/UL    ABS. IMM. GRANS. 0.1 (H) 0.00 - 0.04 K/UL    DF AUTOMATED     URIC ACID    Collection Time: 06/05/18 11:06 AM   Result Value Ref Range    Uric acid 7.8 (H) 3.5 - 7.2 MG/DL   CK    Collection Time: 06/05/18 11:06 AM   Result Value Ref Range     39 - 308 U/L   POC G3 - PUL    Collection Time: 06/05/18  1:14 PM   Result Value Ref Range    pH (POC) 7.227 (LL) 7.35 - 7.45      pCO2 (POC) 20.3 (L) 35.0 - 45.0 MMHG    pO2 (POC) 101 (H) 80 - 100 MMHG    HCO3 (POC) 8.4 (L) 22 - 26 MMOL/L    sO2 (POC) 97 92 - 97 %    Base deficit (POC) 19 mmol/L    Site LEFT RADIAL      Device: ROOM AIR      Allens test (POC) N/A      Specimen type (POC) ARTERIAL      Total resp. rate 19     URINALYSIS W/MICROSCOPIC    Collection Time: 06/05/18  3:22 PM   Result Value Ref Range    Color YELLOW/STRAW      Appearance TURBID (A) CLEAR      Specific gravity 1.011 1.003 - 1.030      pH (UA) 6.0 5.0 - 8.0      Protein 100 (A) NEG mg/dL    Glucose NEGATIVE  NEG mg/dL    Ketone NEGATIVE  NEG mg/dL    Bilirubin NEGATIVE  NEG      Blood LARGE (A) NEG      Urobilinogen 0.2 0.2 - 1.0 EU/dL    Nitrites NEGATIVE  NEG      Leukocyte Esterase LARGE (A) NEG      WBC >100 (H) 0 - 4 /hpf    RBC 5-10 0 - 5 /hpf    Epithelial cells FEW FEW /lpf    Bacteria 3+ (A) NEG /hpf    Budding yeast PRESENT (A) NEG      Yeast w/hyphae PRESENT (A) NEG     URINE CULTURE HOLD SAMPLE    Collection Time: 06/05/18  3:22 PM   Result Value Ref Range    Urine culture hold        URINE ON HOLD IN MICROBIOLOGY DEPT FOR 3 DAYS.  IF UNPRESERVED URINE IS SUBMITTED, IT CANNOT BE USED FOR ADDITIONAL TESTING AFTER 24 HRS, RECOLLECTION WILL BE REQUIRED. PROTEIN/CREATININE RATIO, URINE    Collection Time: 06/05/18  3:22 PM   Result Value Ref Range    Protein, urine random 193 (H) 0.0 - 11.9 mg/dL    Creatinine, urine 71.30 mg/dL    Protein/Creat. urine Ratio 2.7     SODIUM, UR, RANDOM    Collection Time: 06/05/18  3:22 PM   Result Value Ref Range    Sodium urine, random 54 MMOL/L           Urine dipstick:   Lab Results   Component Value Date/Time    Color YELLOW/STRAW 06/05/2018 03:22 PM    Appearance TURBID (A) 06/05/2018 03:22 PM    Specific gravity 1.011 06/05/2018 03:22 PM    pH (UA) 6.0 06/05/2018 03:22 PM    Protein 100 (A) 06/05/2018 03:22 PM    Glucose NEGATIVE  06/05/2018 03:22 PM    Ketone NEGATIVE  06/05/2018 03:22 PM    Bilirubin NEGATIVE  06/05/2018 03:22 PM    Urobilinogen 0.2 06/05/2018 03:22 PM    Nitrites NEGATIVE  06/05/2018 03:22 PM    Leukocyte Esterase LARGE (A) 06/05/2018 03:22 PM    Epithelial cells FEW 06/05/2018 03:22 PM    Bacteria 3+ (A) 06/05/2018 03:22 PM    WBC >100 (H) 06/05/2018 03:22 PM    RBC 5-10 06/05/2018 03:22 PM        Medications list Personally Reviewed   [x]      Yes     []               No       Medications:  Prior to Admission medications    Medication Sig Start Date End Date Taking? Authorizing Provider   metoprolol tartrate (LOPRESSOR) 50 mg tablet Take 50 mg by mouth two (2) times a day. Yes Historical Provider   enzalutamide Estefania Shivers) 40 mg capsule Take 160 mg by mouth nightly. Yes Historical Provider   allopurinol (ZYLOPRIM) 100 mg tablet Take 100 mg by mouth nightly. Yes Historical Provider   ferrous sulfate 325 mg (65 mg iron) tablet Take 325 mg by mouth Before breakfast and dinner. Yes Historical Provider   calcitRIOL (ROCALTROL) 0.25 mcg capsule Take 0.25 mcg by mouth daily. Yes Historical Provider   cyanocobalamin, vitamin B-12, (VITAMIN B-12) 1,000 mcg/mL drop Take 1,000 mcg by mouth daily. Yes Historical Provider   OTHER,NON-FORMULARY, daily.  Neurim  (B-complex with additional supplements) for Memory   Yes Historical Provider   aspirin 81 mg chewable tablet Take 81 mg by mouth daily. Yes Phys Other, MD        Thank you for allowing us to participate in the care of this patient. We will follow patient. Please dont hesitate to call with any questions    Jona Hernandez MD  [facility]phrology [facility]   [address] [facility] [place]  Phone - [phone]   Fax - [phone]  [email or web address]

## 2018-06-06 ENCOUNTER — APPOINTMENT (OUTPATIENT)
Dept: CT IMAGING | Age: 78
DRG: 554 | End: 2018-06-06
Attending: INTERNAL MEDICINE
Payer: MEDICARE

## 2018-06-06 LAB
ALBUMIN SERPL-MCNC: 2.2 G/DL (ref 3.5–5)
ALBUMIN/GLOB SERPL: 0.6 {RATIO} (ref 1.1–2.2)
ALP SERPL-CCNC: 63 U/L (ref 45–117)
ALT SERPL-CCNC: 7 U/L (ref 12–78)
ANION GAP SERPL CALC-SCNC: 13 MMOL/L (ref 5–15)
ARTERIAL PATENCY WRIST A: YES
AST SERPL-CCNC: 8 U/L (ref 15–37)
ATRIAL RATE: 114 BPM
ATRIAL RATE: 60 BPM
BASE DEFICIT BLD-SCNC: 10 MMOL/L
BASOPHILS # BLD: 0 K/UL (ref 0–0.1)
BASOPHILS NFR BLD: 0 % (ref 0–1)
BDY SITE: ABNORMAL
BILIRUB SERPL-MCNC: 0.3 MG/DL (ref 0.2–1)
BUN SERPL-MCNC: 54 MG/DL (ref 6–20)
BUN/CREAT SERPL: 14 (ref 12–20)
CA-I BLD-SCNC: 0.92 MMOL/L (ref 1.12–1.32)
CALCIUM SERPL-MCNC: 6.3 MG/DL (ref 8.5–10.1)
CALCULATED P AXIS, ECG09: -15 DEGREES
CALCULATED P AXIS, ECG09: 29 DEGREES
CALCULATED R AXIS, ECG10: 11 DEGREES
CALCULATED R AXIS, ECG10: 26 DEGREES
CALCULATED T AXIS, ECG11: 40 DEGREES
CALCULATED T AXIS, ECG11: 59 DEGREES
CHLORIDE SERPL-SCNC: 110 MMOL/L (ref 97–108)
CO2 SERPL-SCNC: 15 MMOL/L (ref 21–32)
CREAT SERPL-MCNC: 3.89 MG/DL (ref 0.7–1.3)
DIAGNOSIS, 93000: NORMAL
DIAGNOSIS, 93000: NORMAL
DIFFERENTIAL METHOD BLD: ABNORMAL
EOSINOPHIL # BLD: 0 K/UL (ref 0–0.4)
EOSINOPHIL NFR BLD: 0 % (ref 0–7)
ERYTHROCYTE [DISTWIDTH] IN BLOOD BY AUTOMATED COUNT: 15.2 % (ref 11.5–14.5)
GAS FLOW.O2 O2 DELIVERY SYS: ABNORMAL L/MIN
GLOBULIN SER CALC-MCNC: 4 G/DL (ref 2–4)
GLUCOSE BLD STRIP.AUTO-MCNC: 136 MG/DL (ref 65–100)
GLUCOSE BLD STRIP.AUTO-MCNC: 195 MG/DL (ref 65–100)
GLUCOSE BLD STRIP.AUTO-MCNC: 198 MG/DL (ref 65–100)
GLUCOSE BLD STRIP.AUTO-MCNC: 236 MG/DL (ref 65–100)
GLUCOSE SERPL-MCNC: 188 MG/DL (ref 65–100)
HCO3 BLD-SCNC: 14.9 MMOL/L (ref 22–26)
HCT VFR BLD AUTO: 24.4 % (ref 36.6–50.3)
HGB BLD-MCNC: 8.1 G/DL (ref 12.1–17)
IMM GRANULOCYTES # BLD: 0.1 K/UL (ref 0–0.04)
IMM GRANULOCYTES NFR BLD AUTO: 1 % (ref 0–0.5)
LYMPHOCYTES # BLD: 0.5 K/UL (ref 0.8–3.5)
LYMPHOCYTES NFR BLD: 8 % (ref 12–49)
MAGNESIUM SERPL-MCNC: 1.7 MG/DL (ref 1.6–2.4)
MCH RBC QN AUTO: 32 PG (ref 26–34)
MCHC RBC AUTO-ENTMCNC: 33.2 G/DL (ref 30–36.5)
MCV RBC AUTO: 96.4 FL (ref 80–99)
MONOCYTES # BLD: 0.1 K/UL (ref 0–1)
MONOCYTES NFR BLD: 2 % (ref 5–13)
NEUTS SEG # BLD: 5.3 K/UL (ref 1.8–8)
NEUTS SEG NFR BLD: 89 % (ref 32–75)
NRBC # BLD: 0 K/UL (ref 0–0.01)
NRBC BLD-RTO: 0 PER 100 WBC
P-R INTERVAL, ECG05: 184 MS
P-R INTERVAL, ECG05: 206 MS
PCO2 BLD: 25.5 MMHG (ref 35–45)
PH BLD: 7.38 [PH] (ref 7.35–7.45)
PHOSPHATE SERPL-MCNC: 2.4 MG/DL (ref 2.6–4.7)
PLATELET # BLD AUTO: 183 K/UL (ref 150–400)
PMV BLD AUTO: 9.8 FL (ref 8.9–12.9)
PO2 BLD: 36 MMHG (ref 80–100)
POTASSIUM SERPL-SCNC: 3.9 MMOL/L (ref 3.5–5.1)
PROT SERPL-MCNC: 6.2 G/DL (ref 6.4–8.2)
Q-T INTERVAL, ECG07: 314 MS
Q-T INTERVAL, ECG07: 590 MS
QRS DURATION, ECG06: 70 MS
QRS DURATION, ECG06: 80 MS
QTC CALCULATION (BEZET), ECG08: 432 MS
QTC CALCULATION (BEZET), ECG08: 590 MS
RBC # BLD AUTO: 2.53 M/UL (ref 4.1–5.7)
SAO2 % BLD: 68 % (ref 92–97)
SERVICE CMNT-IMP: ABNORMAL
SODIUM SERPL-SCNC: 138 MMOL/L (ref 136–145)
SPECIMEN TYPE: ABNORMAL
VENTRICULAR RATE, ECG03: 114 BPM
VENTRICULAR RATE, ECG03: 60 BPM
WBC # BLD AUTO: 5.9 K/UL (ref 4.1–11.1)

## 2018-06-06 PROCEDURE — 84100 ASSAY OF PHOSPHORUS: CPT | Performed by: INTERNAL MEDICINE

## 2018-06-06 PROCEDURE — 36415 COLL VENOUS BLD VENIPUNCTURE: CPT | Performed by: INTERNAL MEDICINE

## 2018-06-06 PROCEDURE — 85025 COMPLETE CBC W/AUTO DIFF WBC: CPT | Performed by: INTERNAL MEDICINE

## 2018-06-06 PROCEDURE — 83735 ASSAY OF MAGNESIUM: CPT | Performed by: INTERNAL MEDICINE

## 2018-06-06 PROCEDURE — 80053 COMPREHEN METABOLIC PANEL: CPT | Performed by: INTERNAL MEDICINE

## 2018-06-06 PROCEDURE — 74011250637 HC RX REV CODE- 250/637: Performed by: INTERNAL MEDICINE

## 2018-06-06 PROCEDURE — 97161 PT EVAL LOW COMPLEX 20 MIN: CPT | Performed by: PHYSICAL THERAPIST

## 2018-06-06 PROCEDURE — 74011250636 HC RX REV CODE- 250/636: Performed by: INTERNAL MEDICINE

## 2018-06-06 PROCEDURE — 82962 GLUCOSE BLOOD TEST: CPT

## 2018-06-06 PROCEDURE — 82803 BLOOD GASES ANY COMBINATION: CPT

## 2018-06-06 PROCEDURE — 65660000000 HC RM CCU STEPDOWN

## 2018-06-06 PROCEDURE — 93005 ELECTROCARDIOGRAM TRACING: CPT

## 2018-06-06 PROCEDURE — 74011250637 HC RX REV CODE- 250/637: Performed by: HOSPITALIST

## 2018-06-06 PROCEDURE — 74011000250 HC RX REV CODE- 250: Performed by: INTERNAL MEDICINE

## 2018-06-06 PROCEDURE — 74011000258 HC RX REV CODE- 258: Performed by: INTERNAL MEDICINE

## 2018-06-06 PROCEDURE — 74011636637 HC RX REV CODE- 636/637: Performed by: HOSPITALIST

## 2018-06-06 PROCEDURE — 36600 WITHDRAWAL OF ARTERIAL BLOOD: CPT

## 2018-06-06 PROCEDURE — 74176 CT ABD & PELVIS W/O CONTRAST: CPT

## 2018-06-06 PROCEDURE — 97116 GAIT TRAINING THERAPY: CPT | Performed by: PHYSICAL THERAPIST

## 2018-06-06 RX ORDER — INSULIN LISPRO 100 [IU]/ML
INJECTION, SOLUTION INTRAVENOUS; SUBCUTANEOUS
Status: DISCONTINUED | OUTPATIENT
Start: 2018-06-06 | End: 2018-06-10 | Stop reason: HOSPADM

## 2018-06-06 RX ORDER — PANTOPRAZOLE SODIUM 40 MG/1
40 TABLET, DELAYED RELEASE ORAL
Status: DISCONTINUED | OUTPATIENT
Start: 2018-06-06 | End: 2018-06-10 | Stop reason: HOSPADM

## 2018-06-06 RX ORDER — DEXTROSE 50 % IN WATER (D50W) INTRAVENOUS SYRINGE
12.5-25 AS NEEDED
Status: DISCONTINUED | OUTPATIENT
Start: 2018-06-06 | End: 2018-06-10 | Stop reason: HOSPADM

## 2018-06-06 RX ORDER — MAGNESIUM SULFATE 100 %
4 CRYSTALS MISCELLANEOUS AS NEEDED
Status: DISCONTINUED | OUTPATIENT
Start: 2018-06-06 | End: 2018-06-10 | Stop reason: HOSPADM

## 2018-06-06 RX ADMIN — Medication 10 ML: at 14:43

## 2018-06-06 RX ADMIN — DEXTROSE MONOHYDRATE: 5 INJECTION, SOLUTION INTRAVENOUS at 15:52

## 2018-06-06 RX ADMIN — FERROUS SULFATE TAB 325 MG (65 MG ELEMENTAL FE) 325 MG: 325 (65 FE) TAB at 17:16

## 2018-06-06 RX ADMIN — ALLOPURINOL 100 MG: 100 TABLET ORAL at 21:35

## 2018-06-06 RX ADMIN — Medication 10 ML: at 22:00

## 2018-06-06 RX ADMIN — CALCIUM GLUCONATE 1 G: 94 INJECTION, SOLUTION INTRAVENOUS at 06:17

## 2018-06-06 RX ADMIN — Medication 1000 MCG: at 08:12

## 2018-06-06 RX ADMIN — METHYLPREDNISOLONE SODIUM SUCCINATE 60 MG: 125 INJECTION, POWDER, FOR SOLUTION INTRAMUSCULAR; INTRAVENOUS at 17:16

## 2018-06-06 RX ADMIN — Medication 10 ML: at 06:00

## 2018-06-06 RX ADMIN — METHYLPREDNISOLONE SODIUM SUCCINATE 60 MG: 125 INJECTION, POWDER, FOR SOLUTION INTRAMUSCULAR; INTRAVENOUS at 06:02

## 2018-06-06 RX ADMIN — ASPIRIN 81 MG CHEWABLE TABLET 81 MG: 81 TABLET CHEWABLE at 08:12

## 2018-06-06 RX ADMIN — COLCHICINE 0.6 MG: 0.6 TABLET, FILM COATED ORAL at 08:12

## 2018-06-06 RX ADMIN — FERROUS SULFATE TAB 325 MG (65 MG ELEMENTAL FE) 325 MG: 325 (65 FE) TAB at 06:56

## 2018-06-06 RX ADMIN — CALCITRIOL 0.25 MCG: 0.25 CAPSULE, LIQUID FILLED ORAL at 08:12

## 2018-06-06 RX ADMIN — METHYLPREDNISOLONE SODIUM SUCCINATE 60 MG: 125 INJECTION, POWDER, FOR SOLUTION INTRAMUSCULAR; INTRAVENOUS at 12:28

## 2018-06-06 RX ADMIN — PANTOPRAZOLE SODIUM 40 MG: 40 TABLET, DELAYED RELEASE ORAL at 14:46

## 2018-06-06 RX ADMIN — INSULIN LISPRO 2 UNITS: 100 INJECTION, SOLUTION INTRAVENOUS; SUBCUTANEOUS at 12:28

## 2018-06-06 NOTE — PROGRESS NOTES
[facility]   [address] [facility], [address] [place] [address] Tima Ne, [facility]  Phone: [phone]   [phone]       Nephrology Progress Note  Mamie Mae.     1940     [id number]  Date of Admission : 6/5/2018 06/06/18    CC: Follow up for JASIEL       Assessment and Plan   JASIEL on CKD :  - etiology : progression of CKD  Vs Infection   - Cr still rising   - continue IVF   - CT : No Pyleo and B/L ureteral dilatation is chronic     CKD Stage IV   - baseline Cr 2.4-2.5 mg/dl     Recurrent UTI   - f/ui urine cx and adjust abx     Acute Gout   - steroids and colchicine : improving     Anemia :  - likely 2/2 CKD  - f/u Paraproteinemia screen that was ordered on admission    B/L chronic Hydro  - recent ureteral stent change. F/b Dr Wesly Bermudez     Met Prostate ca :  - Bone mets are old   - f/b Dr Karolina Batista     HTN :  - stable     Met acidosis :  - continue Bicarb gtt  - oral bicarb on d/c      Interval History:  Seen and examined  Foot looks and feels better  Not ambulating yet  Cr still rising   Ct 0- NO PYELO AND stents ok    Review of Systems: Pertinent items are noted in HPI.     Current Medications:   Current Facility-Administered Medications   Medication Dose Route Frequency    aspirin chewable tablet 81 mg  81 mg Oral DAILY    calcitRIOL (ROCALTROL) capsule 0.25 mcg  0.25 mcg Oral DAILY    cyanocobalamin (VITAMIN B12) tablet 1,000 mcg  1,000 mcg Oral DAILY    ferrous sulfate tablet 325 mg  325 mg Oral ACB&D    metoprolol tartrate (LOPRESSOR) tablet 50 mg  50 mg Oral Q12H    sodium chloride (NS) flush 5-10 mL  5-10 mL IntraVENous Q8H    sodium chloride (NS) flush 5-10 mL  5-10 mL IntraVENous PRN    methylPREDNISolone (PF) (SOLU-MEDROL) injection 60 mg  60 mg IntraVENous Q6H    sodium bicarbonate (8.4%) 150 mEq in dextrose 5% 1,000 mL infusion   IntraVENous CONTINUOUS    acetaminophen (TYLENOL) tablet 650 mg  650 mg Oral Q6H PRN    colchicine tablet 0.6 mg  0.6 mg Oral DAILY    allopurinol (ZYLOPRIM) tablet 100 mg  100 mg Oral QHS    cefTRIAXone (ROCEPHIN) 1 g in 0.9% sodium chloride (MBP/ADV) 50 mL  1 g IntraVENous Q24H      Allergies   Allergen Reactions    Morphine Other (comments)     AMS       Objective:  Vitals:    Vitals:    06/06/18 0315 06/06/18 0722 06/06/18 0723 06/06/18 0808   BP:  (!) 87/51 106/62    Pulse:  62  60   Resp:  18     Temp:  98.9 °F (37.2 °C)     SpO2:  98%     Weight: 98.9 kg (218 lb)      Height:         Intake and Output:     06/04 1901 - 06/06 0700  In: 50 [I.V.:50]  Out: 878 [Urine:875]    Physical Examination:  General:Alert, No distress,   HEENT: pale . .   Neck:Supple,no mass palpable  Lungs : CTA  CVS: RRR, S1 S2 normal, No rub,no LE edema  Abdomen: Soft, NT  Extremities: gout   Skin: redness in both feet   MS: B/L gout in feet (L) worse than R   Neurologic: non focal, AAO x 3    []    High complexity decision making was performed  []    Patient is at high-risk of decompensation with multiple organ involvement    Lab Data Personally Reviewed: I have reviewed all the pertinent labs, microbiology data and radiology studies during assessment. Recent Labs      06/06/18   0259  06/05/18   1106   NA  138  138   K  3.9  5.5*   CL  110*  113*   CO2  15*  12*   GLU  188*  107*   BUN  54*  50*   CREA  3.89*  3.78*   CA  6.3*  7.2*   MG  1.7   --    PHOS  2.4*   --    ALB  2.2*  3.0*   SGOT  8*  24   ALT  7*  9*     Recent Labs      06/06/18   0259  06/05/18   1106   WBC  5.9  10.4   HGB  8.1*  10.5*   HCT  24.4*  32.7*   PLT  183  250     Lab Results   Component Value Date/Time    Specimen Description: URINE 10/07/2013 01:36 AM    Specimen Description: BLOOD 10/06/2013 04:13 PM     Lab Results   Component Value Date/Time    Culture result:  10/07/2013 01:36 AM     STREPTOCOCCI, BETA HEMOLYTIC GROUP C , Penicillin and ampicillin are drugs of choice for treatment of beta-hemolytic streptococcal infections.  Susceptibility testing of penicillins and beta-lactams approved by the FDA for treatment of beta-hemolytic streptococcal infections need not be performed routinely, because nonsusceptible isolates are extremely rare. CLSI 2012    Culture result: NO GROWTH 5 DAYS 10/06/2013 04:13 PM     Recent Results (from the past 24 hour(s))   EKG, 12 LEAD, INITIAL    Collection Time: 06/05/18 10:58 AM   Result Value Ref Range    Ventricular Rate 114 BPM    Atrial Rate 114 BPM    P-R Interval 184 ms    QRS Duration 70 ms    Q-T Interval 314 ms    QTC Calculation (Bezet) 432 ms    Calculated P Axis 29 degrees    Calculated R Axis 26 degrees    Calculated T Axis 40 degrees    Diagnosis       Sinus tachycardia  Nonspecific T wave abnormality  When compared with ECG of 06-OCT-2013 16:41,  No significant change  Confirmed by Amanda Tapia M.D., [place] ([id number]) on 6/6/2018 9:58:47 AM     GLUCOSE, POC    Collection Time: 06/05/18 11:00 AM   Result Value Ref Range    Glucose (POC) 105 (H) 65 - 100 mg/dL    Performed by Mickey Kaela    METABOLIC PANEL, COMPREHENSIVE    Collection Time: 06/05/18 11:06 AM   Result Value Ref Range    Sodium 138 136 - 145 mmol/L    Potassium 5.5 (H) 3.5 - 5.1 mmol/L    Chloride 113 (H) 97 - 108 mmol/L    CO2 12 (LL) 21 - 32 mmol/L    Anion gap 13 5 - 15 mmol/L    Glucose 107 (H) 65 - 100 mg/dL    BUN 50 (H) 6 - 20 MG/DL    Creatinine 3.78 (H) 0.70 - 1.30 MG/DL    BUN/Creatinine ratio 13 12 - 20      GFR est AA 19 (L) >60 ml/min/1.73m2    GFR est non-AA 16 (L) >60 ml/min/1.73m2    Calcium 7.2 (L) 8.5 - 10.1 MG/DL    Bilirubin, total 0.8 0.2 - 1.0 MG/DL    ALT (SGPT) 9 (L) 12 - 78 U/L    AST (SGOT) 24 15 - 37 U/L    Alk.  phosphatase 72 45 - 117 U/L    Protein, total 8.0 6.4 - 8.2 g/dL    Albumin 3.0 (L) 3.5 - 5.0 g/dL    Globulin 5.0 (H) 2.0 - 4.0 g/dL    A-G Ratio 0.6 (L) 1.1 - 2.2     CBC WITH AUTOMATED DIFF    Collection Time: 06/05/18 11:06 AM   Result Value Ref Range    WBC 10.4 4.1 - 11.1 K/uL    RBC 3.28 (L) 4.10 - 5.70 M/uL    HGB 10.5 (L) 12.1 - 17.0 g/dL    HCT 32.7 (L) 36.6 - 50.3 %    MCV 99.7 (H) 80.0 - 99.0 FL    MCH 32.0 26.0 - 34.0 PG    MCHC 32.1 30.0 - 36.5 g/dL    RDW 15.8 (H) 11.5 - 14.5 %    PLATELET 318 692 - 569 K/uL    MPV 9.9 8.9 - 12.9 FL    NRBC 0.0 0  WBC    ABSOLUTE NRBC 0.00 0.00 - 0.01 K/uL    NEUTROPHILS 76 (H) 32 - 75 %    LYMPHOCYTES 12 12 - 49 %    MONOCYTES 11 5 - 13 %    EOSINOPHILS 1 0 - 7 %    BASOPHILS 0 0 - 1 %    IMMATURE GRANULOCYTES 1 (H) 0.0 - 0.5 %    ABS. NEUTROPHILS 7.9 1.8 - 8.0 K/UL    ABS. LYMPHOCYTES 1.3 0.8 - 3.5 K/UL    ABS. MONOCYTES 1.1 (H) 0.0 - 1.0 K/UL    ABS. EOSINOPHILS 0.1 0.0 - 0.4 K/UL    ABS. BASOPHILS 0.0 0.0 - 0.1 K/UL    ABS. IMM. GRANS. 0.1 (H) 0.00 - 0.04 K/UL    DF AUTOMATED     URIC ACID    Collection Time: 06/05/18 11:06 AM   Result Value Ref Range    Uric acid 7.8 (H) 3.5 - 7.2 MG/DL   CK    Collection Time: 06/05/18 11:06 AM   Result Value Ref Range     39 - 308 U/L   POC G3 - PUL    Collection Time: 06/05/18  1:14 PM   Result Value Ref Range    pH (POC) 7.227 (LL) 7.35 - 7.45      pCO2 (POC) 20.3 (L) 35.0 - 45.0 MMHG    pO2 (POC) 101 (H) 80 - 100 MMHG    HCO3 (POC) 8.4 (L) 22 - 26 MMOL/L    sO2 (POC) 97 92 - 97 %    Base deficit (POC) 19 mmol/L    Site LEFT RADIAL      Device: ROOM AIR      Allens test (POC) N/A      Specimen type (POC) ARTERIAL      Total resp.  rate 19     URINALYSIS W/MICROSCOPIC    Collection Time: 06/05/18  3:22 PM   Result Value Ref Range    Color YELLOW/STRAW      Appearance TURBID (A) CLEAR      Specific gravity 1.011 1.003 - 1.030      pH (UA) 6.0 5.0 - 8.0      Protein 100 (A) NEG mg/dL    Glucose NEGATIVE  NEG mg/dL    Ketone NEGATIVE  NEG mg/dL    Bilirubin NEGATIVE  NEG      Blood LARGE (A) NEG      Urobilinogen 0.2 0.2 - 1.0 EU/dL    Nitrites NEGATIVE  NEG      Leukocyte Esterase LARGE (A) NEG      WBC >100 (H) 0 - 4 /hpf    RBC 5-10 0 - 5 /hpf    Epithelial cells FEW FEW /lpf    Bacteria 3+ (A) NEG /hpf    Budding yeast PRESENT (A) NEG      Yeast w/hyphae PRESENT (A) NEG     URINE CULTURE HOLD SAMPLE    Collection Time: 06/05/18  3:22 PM   Result Value Ref Range    Urine culture hold        URINE ON HOLD IN MICROBIOLOGY DEPT FOR 3 DAYS. IF UNPRESERVED URINE IS SUBMITTED, IT CANNOT BE USED FOR ADDITIONAL TESTING AFTER 24 HRS, RECOLLECTION WILL BE REQUIRED. EOSINOPHILS, URINE    Collection Time: 06/05/18  3:22 PM   Result Value Ref Range    Eosinophils,urine 1     PROTEIN/CREATININE RATIO, URINE    Collection Time: 06/05/18  3:22 PM   Result Value Ref Range    Protein, urine random 193 (H) 0.0 - 11.9 mg/dL    Creatinine, urine 71.30 mg/dL    Protein/Creat. urine Ratio 2.7     SODIUM, UR, RANDOM    Collection Time: 06/05/18  3:22 PM   Result Value Ref Range    Sodium urine, random 54 MMOL/L   LACTIC ACID    Collection Time: 06/05/18  5:53 PM   Result Value Ref Range    Lactic acid 1.3 0.4 - 2.0 MMOL/L   GLUCOSE, POC    Collection Time: 06/05/18  9:20 PM   Result Value Ref Range    Glucose (POC) 188 (H) 65 - 100 mg/dL    Performed by [facility]    METABOLIC PANEL, COMPREHENSIVE    Collection Time: 06/06/18  2:59 AM   Result Value Ref Range    Sodium 138 136 - 145 mmol/L    Potassium 3.9 3.5 - 5.1 mmol/L    Chloride 110 (H) 97 - 108 mmol/L    CO2 15 (LL) 21 - 32 mmol/L    Anion gap 13 5 - 15 mmol/L    Glucose 188 (H) 65 - 100 mg/dL    BUN 54 (H) 6 - 20 MG/DL    Creatinine 3.89 (H) 0.70 - 1.30 MG/DL    BUN/Creatinine ratio 14 12 - 20      GFR est AA 18 (L) >60 ml/min/1.73m2    GFR est non-AA 15 (L) >60 ml/min/1.73m2    Calcium 6.3 (LL) 8.5 - 10.1 MG/DL    Bilirubin, total 0.3 0.2 - 1.0 MG/DL    ALT (SGPT) 7 (L) 12 - 78 U/L    AST (SGOT) 8 (L) 15 - 37 U/L    Alk.  phosphatase 63 45 - 117 U/L    Protein, total 6.2 (L) 6.4 - 8.2 g/dL    Albumin 2.2 (L) 3.5 - 5.0 g/dL    Globulin 4.0 2.0 - 4.0 g/dL    A-G Ratio 0.6 (L) 1.1 - 2.2     MAGNESIUM    Collection Time: 06/06/18  2:59 AM   Result Value Ref Range    Magnesium 1.7 1.6 - 2.4 mg/dL   PHOSPHORUS    Collection Time: 06/06/18  2:59 AM   Result Value Ref Range    Phosphorus 2.4 (L) 2.6 - 4.7 MG/DL   CBC WITH AUTOMATED DIFF    Collection Time: 06/06/18  2:59 AM   Result Value Ref Range    WBC 5.9 4.1 - 11.1 K/uL    RBC 2.53 (L) 4.10 - 5.70 M/uL    HGB 8.1 (L) 12.1 - 17.0 g/dL    HCT 24.4 (L) 36.6 - 50.3 %    MCV 96.4 80.0 - 99.0 FL    MCH 32.0 26.0 - 34.0 PG    MCHC 33.2 30.0 - 36.5 g/dL    RDW 15.2 (H) 11.5 - 14.5 %    PLATELET 782 876 - 284 K/uL    MPV 9.8 8.9 - 12.9 FL    NRBC 0.0 0  WBC    ABSOLUTE NRBC 0.00 0.00 - 0.01 K/uL    NEUTROPHILS 89 (H) 32 - 75 %    LYMPHOCYTES 8 (L) 12 - 49 %    MONOCYTES 2 (L) 5 - 13 %    EOSINOPHILS 0 0 - 7 %    BASOPHILS 0 0 - 1 %    IMMATURE GRANULOCYTES 1 (H) 0.0 - 0.5 %    ABS. NEUTROPHILS 5.3 1.8 - 8.0 K/UL    ABS. LYMPHOCYTES 0.5 (L) 0.8 - 3.5 K/UL    ABS. MONOCYTES 0.1 0.0 - 1.0 K/UL    ABS. EOSINOPHILS 0.0 0.0 - 0.4 K/UL    ABS. BASOPHILS 0.0 0.0 - 0.1 K/UL    ABS. IMM.  GRANS. 0.1 (H) 0.00 - 0.04 K/UL    DF AUTOMATED     POC EG7    Collection Time: 06/06/18  4:46 AM   Result Value Ref Range    Calcium, ionized (POC) 0.92 (L) 1.12 - 1.32 mmol/L    pH (POC) 7.376 7.35 - 7.45      pCO2 (POC) 25.5 (L) 35.0 - 45.0 MMHG    pO2 (POC) 36 (LL) 80 - 100 MMHG    HCO3 (POC) 14.9 (L) 22 - 26 MMOL/L    Base deficit (POC) 10 mmol/L    sO2 (POC) 68 (L) 92 - 97 %    Site RIGHT BRACHIAL      Device: ROOM AIR      Allens test (POC) YES      Specimen type (POC) VENOUS BLOOD     GLUCOSE, POC    Collection Time: 06/06/18  5:51 AM   Result Value Ref Range    Glucose (POC) 198 (H) 65 - 100 mg/dL    Performed by Tavaers LOPEZ    EKG, 12 LEAD, INITIAL    Collection Time: 06/06/18  6:06 AM   Result Value Ref Range    Ventricular Rate 60 BPM    Atrial Rate 60 BPM    P-R Interval 206 ms    QRS Duration 80 ms    Q-T Interval 590 ms    QTC Calculation (Bezet) 590 ms    Calculated P Axis -15 degrees    Calculated R Axis 11 degrees    Calculated T Axis 59 degrees    Diagnosis       Normal sinus rhythm  Nonspecific T wave abnormality  Prolonged QT  When compared with ECG of 05-JUN-2018 10:58,  premature atrial complexes are no longer present  Vent. rate has decreased BY  54 BPM             I have reviewed the flowsheets. Chart and Pertinent Notes have been reviewed. No change in PMH ,family and social history from Consult note.       Ainsley Sicard, MD

## 2018-06-06 NOTE — WOUND CARE
WOCN Note:   New consult placed by RN for skin assessment; Chart review revealed:   Admitted for CKD, gout; history of hypotension  Admitted from [address]:   Patient is alert, verbal denied pain; Patient up in chair and stood for assessment; patient returned to sitting in chair at completion;  Patient has urinal for voiding within reach  Diet: regular 2gm Na    1. POA gluteal cleft 4cm x 1.5cm x <0.1cm pink, raw, denuded area, barrier cream applied;     Skin Care & Pressure Injury Prevention Recommendations:  1. Minimize layers of linen/pads under patient to optimize support surface. 2.  Encourage patient to reposition approximately every 2 hours;  3. Float heels with pillow under calves. 4.  promote continence;   5. Continue on current surface for pressure redistribution. 6.  Assess/protect skin in contact with medical devices. Keep skin moisturized;   7. Ensure that patient is repositioning in chair shifting weight approximately every 15 minutes and standing up every hour.      Discussed above assessment and recommendations with Katie EDWARD)    Transition of Care: Plan to follow weekly and as needed while admitted to hospital.    Callie Vieira RN  Certified Wound, Ostomy, Continence Nurse  office [phone]  pager [phone]

## 2018-06-06 NOTE — PROGRESS NOTES
Physical Therapy Screening:    [facility] for [facility] therapy based on results obtained during the nursing admission assessment. The patients chart was reviewed and the patient is appropriate for a skilled therapy evaluation if there is a decline in functional mobility from baseline. Please order a consult for physical therapy if you are in agreement and would like an evaluation to be completed. Thank you.     Abad Lopez, PT

## 2018-06-06 NOTE — PROGRESS NOTES
Hospitalist Progress Note  Roverto Godinez MD  Answering service: [phone] from in house phone  Cell: [phone]      Date of Service:  2018  NAME:  Laurie Padgett. :  1940  MRN:  [id number]      Admission Summary:   77 yo male with Gout, Stage 4 Prostate Ca on Lupron q3 months, and Xtandi, CKD 4, s/p b/l ureteral stents, CAD s/p CABG, HTN admitted for bilateral ankle pain and gout flare. In ER found to have elevated Cr and K    Interval history / Subjective:    pt seen and examined  Doing well  Feet pain improved     Assessment & Plan:     Acut Gout Flare  - improved with Steroids and cochicine  - c/w allopurinol    Acute on CKD IV  - on bicarb gtt  - cr rising  - renal following  - CT: stent in place    Metabolic Acidosis due to renal failure  - on bicarb gtt  - monitor     Hyperkalemia - resolved    Asymptomatic Bacteriuria , not convinced he has UTI  - no urinary symptoms, fever  - empirically on rocephin, will d/c if cx negative    Anemia due CKD  - stable    B/L Chronic Hydronephrosis  - s/p ureteral stent change by  at [facility]    CAD  - c/w ASA/Lopressor    Code status: FULL  DVT prophylaxis: Edson Curtis discussed with: Patient/Family, Nurse and Consultant Radha Triplett  Disposition: Home w/Family     Hospital Problems  Date Reviewed: 2018          Codes Class Noted POA    * (Principal)Acute worsening of stage 4 chronic kidney disease ([facility] [address] ICD-10-CM: N28.9, N18.4  ICD-9-CM: 593.9, 585.4  2018 Yes        Metabolic acidosis FNV-98-PRITESH: E87.2  ICD-9-CM: 276.2  2018 Yes        Acute gout ICD-10-CM: M10.9  ICD-9-CM: 274.01  2018 Yes        Hyperkalemia ICD-10-CM: E87.5  ICD-9-CM: 276.7  2018 Yes                Review of Systems:   A comprehensive review of systems was negative except for that written in the HPI. Vital Signs:    Last 24hrs VS reviewed since prior progress note. Most recent are:  Visit Vitals    /66 (BP 1 Location: Left arm, BP Patient Position: At rest)    Pulse 68    Temp 98 °F (36.7 °C)    Resp 18    Ht 5' 11\" (1.803 m)    Wt 98.9 kg (218 lb)    SpO2 98%    BMI 30.4 kg/m2         Intake/Output Summary (Last 24 hours) at 06/06/18 1236  Last data filed at 06/06/18 1150   Gross per 24 hour   Intake               50 ml   Output             1078 ml   Net            -1028 ml        Physical Examination:             Constitutional:  No acute distress, cooperative, pleasant    ENT:  Oral mucous moist, oropharynx benign. Resp:  CTA bilaterally. CV:  Regular rhythm, normal rate,    GI:  Soft, non distended, non tender. normoactive bowel sounds    Musculoskeletal:  nonpitting edema at ankle, mild tenderness warm, 2+ pulses throughout    Neurologic:  Moves all extremities. AAOx3,     Skin:  Good turgor, no rashes or ulcers       Data Review:    Review and/or order of clinical lab test  Review and/or order of tests in the radiology section of CPT  Review and/or order of tests in the medicine section of CPT      Labs:     Recent Labs      06/06/18   0259  06/05/18   1106   WBC  5.9  10.4   HGB  8.1*  10.5*   HCT  24.4*  32.7*   PLT  183  250     Recent Labs      06/06/18   0259  06/05/18   1106   NA  138  138   K  3.9  5.5*   CL  110*  113*   CO2  15*  12*   BUN  54*  50*   CREA  3.89*  3.78*   GLU  188*  107*   CA  6.3*  7.2*   MG  1.7   --    PHOS  2.4*   --    URICA   --   7.8*     Recent Labs      06/06/18   0259  06/05/18   1106   SGOT  8*  24   ALT  7*  9*   AP  63  72   TBILI  0.3  0.8   TP  6.2*  8.0   ALB  2.2*  3.0*   GLOB  4.0  5.0*     No results for input(s): INR, PTP, APTT in the last 72 hours. No lab exists for component: INREXT   No results for input(s): FE, TIBC, PSAT, FERR in the last 72 hours. No results found for: FOL, RBCF   No results for input(s): PH, PCO2, PO2 in the last 72 hours.   Recent Labs      06/05/18   1106   CPK  105     Lab Results   Component Value Date/Time    Cholesterol, total 85 10/07/2013 12:00 AM    HDL Cholesterol 5 10/07/2013 12:00 AM    LDL, calculated 39.6 10/07/2013 12:00 AM    Triglyceride 202 (H) 10/07/2013 12:00 AM    CHOL/HDL Ratio 17.0 (H) 10/07/2013 12:00 AM     Lab Results   Component Value Date/Time    Glucose (POC) 236 (H) 06/06/2018 11:59 AM    Glucose (POC) 198 (H) 06/06/2018 05:51 AM    Glucose (POC) 188 (H) 06/05/2018 09:20 PM    Glucose (POC) 105 (H) 06/05/2018 11:00 AM    Glucose (POC) 99 10/06/2013 11:24 PM     Lab Results   Component Value Date/Time    Color YELLOW/STRAW 06/05/2018 03:22 PM    Appearance TURBID (A) 06/05/2018 03:22 PM    Specific gravity 1.011 06/05/2018 03:22 PM    pH (UA) 6.0 06/05/2018 03:22 PM    Protein 100 (A) 06/05/2018 03:22 PM    Glucose NEGATIVE  06/05/2018 03:22 PM    Ketone NEGATIVE  06/05/2018 03:22 PM    Bilirubin NEGATIVE  06/05/2018 03:22 PM    Urobilinogen 0.2 06/05/2018 03:22 PM    Nitrites NEGATIVE  06/05/2018 03:22 PM    Leukocyte Esterase LARGE (A) 06/05/2018 03:22 PM    Epithelial cells FEW 06/05/2018 03:22 PM    Bacteria 3+ (A) 06/05/2018 03:22 PM    WBC >100 (H) 06/05/2018 03:22 PM    RBC 5-10 06/05/2018 03:22 PM         Medications Reviewed:     Current Facility-Administered Medications   Medication Dose Route Frequency    glucose chewable tablet 16 g  4 Tab Oral PRN    dextrose (D50W) injection syrg 12.5-25 g  12.5-25 g IntraVENous PRN    glucagon (GLUCAGEN) injection 1 mg  1 mg IntraMUSCular PRN    insulin lispro (HUMALOG) injection   SubCUTAneous AC&HS    aspirin chewable tablet 81 mg  81 mg Oral DAILY    calcitRIOL (ROCALTROL) capsule 0.25 mcg  0.25 mcg Oral DAILY    cyanocobalamin (VITAMIN B12) tablet 1,000 mcg  1,000 mcg Oral DAILY    ferrous sulfate tablet 325 mg  325 mg Oral ACB&D    metoprolol tartrate (LOPRESSOR) tablet 50 mg  50 mg Oral Q12H    sodium chloride (NS) flush 5-10 mL  5-10 mL IntraVENous Q8H    sodium chloride (NS) flush 5-10 mL 5-10 mL IntraVENous PRN    methylPREDNISolone (PF) (SOLU-MEDROL) injection 60 mg  60 mg IntraVENous Q6H    sodium bicarbonate (8.4%) 150 mEq in dextrose 5% 1,000 mL infusion   IntraVENous CONTINUOUS    acetaminophen (TYLENOL) tablet 650 mg  650 mg Oral Q6H PRN    colchicine tablet 0.6 mg  0.6 mg Oral DAILY    allopurinol (ZYLOPRIM) tablet 100 mg  100 mg Oral QHS    cefTRIAXone (ROCEPHIN) 1 g in 0.9% sodium chloride (MBP/ADV) 50 mL  1 g IntraVENous Q24H     ______________________________________________________________________  EXPECTED LENGTH OF STAY: 3d 7h  ACTUAL LENGTH OF STAY:          1                 Oly Colby MD

## 2018-06-06 NOTE — ROUTINE PROCESS
Bedside and Verbal shift change report given to Nava Delgado (oncoming nurse) by Janey Sy (offgoing nurse).  Report included the following information SBAR, Kardex, Procedure Summary, Intake/Output, MAR, Recent Results and Cardiac Rhythm NSr.

## 2018-06-06 NOTE — PROGRESS NOTES
Primary Nurse Rickie Ko RN and Rylee Garcia RN performed a dual skin assessment on this patient No impairment noted  bilateral heels red but blanchable, buttocks is red and blanchable  Partha score is charted        Problem: Falls - Risk of  Goal: *Absence of Falls  Document Jonathon Fall Risk and appropriate interventions in the flowsheet.    Outcome: Progressing Towards Goal  Fall Risk Interventions:            Medication Interventions: Evaluate medications/consider consulting pharmacy, Patient to call before getting OOB

## 2018-06-06 NOTE — PROGRESS NOTES
Problem: Mobility Impaired (Adult and Pediatric)  Goal: *Acute Goals and Plan of Care (Insert Text)  Physical Therapy Goals   6/6/2018  1. Patient will transfer from bed to chair and chair to bed with independence using the least restrictive device within 7 day(s). 2.  Patient will perform sit to stand with independence within 7 day(s). 3.  Patient will ambulate with independence for 250 feet with the least restrictive device within 7 day(s). physical Therapy EVALUATION  Patient: Perry Bowers (71 y.o. male)  Date: 6/6/2018  Primary Diagnosis: Acute worsening of stage 4 chronic kidney disease ([facility] [address]        Precautions:   WBAT    ASSESSMENT :  Based on the objective data described below, the patient presents with decreased ambulation ability due to bilat LE gout. Patient is independent with bed mobility. Stood with a RW and ambulated slowly with a wide based gait for 250 feet. Returned to sit in the chair. He lives at [employer] and generally ambulates with no AD but would benefit from having a rollator due to the long distances required to get to the mail box and out to the car. Will order a rollator for home use. No home PT needs. Patient will benefit from skilled intervention to address the above impairments.   Patients rehabilitation potential is considered to be Good  Factors which may influence rehabilitation potential include:   [x]         None noted  []         Mental ability/status  []         Medical condition  []         Home/family situation and support systems  []         Safety awareness  []         Pain tolerance/management  []         Other:      PLAN :  Recommendations and Planned Interventions:  []           Bed Mobility Training             []    Neuromuscular Re-Education  []           Transfer Training                   []    Orthotic/Prosthetic Training  [x]           Gait Training                         []    Modalities  []           Therapeutic Exercises []    Edema Management/Control  []           Therapeutic Activities            [x]    Patient and Family Training/Education  []           Other (comment):    Frequency/Duration: Patient will be followed by physical therapy  5 times a week to address goals. Discharge Recommendations: None  Further Equipment Recommendations for Discharge: rollator     SUBJECTIVE:   Patient stated I think it would help me to get a rollator so I can walk around the facility better.     OBJECTIVE DATA SUMMARY:   HISTORY:    Past Medical History:   Diagnosis Date    Celiac disease     Chronic kidney disease     Hypertension     MI (mitral incompetence)      Past Surgical History:   Procedure Laterality Date    CARDIAC SURG PROCEDURE UNLIST      qaudruple bipass     Prior Level of Function/Home Situation: Independent ambulation with no AD. Drives. Cares for himself. Personal factors and/or comorbidities impacting plan of care: none    Home Situation  Home Environment: Other (comment)  # Steps to Enter: 0 (Sachin & Rosalie)  One/Two Story Residence: One story  Living Alone: Yes  Support Systems: Child(patti)  Patient Expects to be Discharged to[de-identified] Apartment  Current DME Used/Available at Home: Cane, straight  Tub or Shower Type: Tub/Shower combination    EXAMINATION/PRESENTATION/DECISION MAKING:   Critical Behavior:  Neurologic State: Alert  Orientation Level: Appropriate for age, Oriented X4  Cognition: Appropriate decision making, Follows commands  Safety/Judgement: Awareness of environment, Good awareness of safety precautions  Hearing:     Skin:  Per nursing. Edema: per nursing. Range Of Motion:  AROM: Within functional limits           PROM: Within functional limits           Strength:    Strength:  Within functional limits                    Tone & Sensation:   Tone: Normal              Sensation: Intact               Coordination:  Coordination: Within functional limits  Vision:      Functional Mobility:  Bed Mobility:  Rolling: Independent  Supine to Sit: Independent     Scooting: Independent  Transfers:  Sit to Stand: Supervision  Stand to Sit: Supervision                       Balance:   Sitting: Intact  Standing: Intact; With support  Ambulation/Gait Training:  Distance (ft): 250 Feet (ft)  Assistive Device: Walker, rolling;Gait belt  Ambulation - Level of Assistance: Supervision     Gait Description (WDL): Exceptions to WDL  Gait Abnormalities: Decreased step clearance        Base of Support: Widened     Speed/Park: Pace decreased (<100 feet/min)  Step Length: Left shortened;Right shortened                  Wide based slow gait. Steady with a RW. Stairs: Therapeutic Exercises:   Encouraged to do ankle pumps and LAQ while up in the chair. Functional Measure:  Barthel Index:    Bathin  Bladder: 10  Bowels: 10  Groomin  Dressing: 10  Feeding: 10  Mobility: 15  Stairs: 0  Toilet Use: 10  Transfer (Bed to Chair and Back): 10  Total: 85       Barthel and G-code impairment scale:  Percentage of impairment CH  0% CI  1-19% CJ  20-39% CK  40-59% CL  60-79% CM  80-99% CN  100%   Barthel Score 0-100 100 99-80 79-60 59-40 20-39 1-19   0   Barthel Score 0-20 20 17-19 13-16 9-12 5-8 1-4 0      The Barthel ADL Index: Guidelines  1. The index should be used as a record of what a patient does, not as a record of what a patient could do. 2. The main aim is to establish degree of independence from any help, physical or verbal, however minor and for whatever reason. 3. The need for supervision renders the patient not independent. 4. A patient's performance should be established using the best available evidence. Asking the patient, friends/relatives and nurses are the usual sources, but direct observation and common sense are also important. However direct testing is not needed.   5. Usually the patient's performance over the preceding 24-48 hours is important, but occasionally longer periods will be relevant. 6. Middle categories imply that the patient supplies over 50 per cent of the effort. 7. Use of aids to be independent is allowed. Roxanne Zhang., Barthel, DFilipeW. (1959). Functional evaluation: the Barthel Index. [address] (14)2. Cesarma GLEN Osorio, Tila Kinney., [place] S[place].[facility], [facility] (1999). Measuring the change indisability after inpatient rehabilitation; comparison of the responsiveness of the Barthel Index and Functional Freedom Measure. Journal of Neurology, Neurosurgery, and Psychiatry, 66(4), 854-139. RHYS Infante.A, BLAINE Leon, & Shady Jimenez M.A. (2004.) Assessment of post-stroke quality of life in cost-effectiveness studies: The usefulness of the Barthel Index and the EuroQoL-5D. Quality of Life Research, 13, 602-55       G codes: In compliance with CMSs Claims Based Outcome Reporting, the following G-code set was chosen for this patient based on their primary functional limitation being treated: The outcome measure chosen to determine the severity of the functional limitation was the Barthel with a score of 85/100 which was correlated with the impairment scale. ? Mobility - Walking and Moving Around:     - CURRENT STATUS: CI - 1%-19% impaired, limited or restricted    - GOAL STATUS: CH - 0% impaired, limited or restricted    - D/C STATUS:  ---------------To be determined---------------      Pain:  Pain Scale 1: Numeric (0 - 10)  Pain Intensity 1: 0              Activity Tolerance:   Good. Please refer to the flowsheet for vital signs taken during this treatment.   After treatment:   [x]         Patient left in no apparent distress sitting up in chair  []         Patient left in no apparent distress in bed  [x]         Call bell left within reach  [x]         Nursing notified  []         Caregiver present  []         Bed alarm activated    COMMUNICATION/EDUCATION:   The patients plan of care was discussed with: Registered Nurse.  [x]         Fall prevention education was provided and the patient/caregiver indicated understanding. [x]         Patient/family have participated as able in goal setting and plan of care. [x]         Patient/family agree to work toward stated goals and plan of care. []         Patient understands intent and goals of therapy, but is neutral about his/her participation. []         Patient is unable to participate in goal setting and plan of care.     Thank you for this referral.  Tejal Ward, PT   Time Calculation: 26 mins

## 2018-06-06 NOTE — DIABETES MGMT
DTC Progress Note    Recommendations/ Comments:Chart reviewed on Pipo Schuster for hyperglycemia most likely secondary to steroids (Solumedrol 60 mg every 6 hours). If appropriate, please consider:   - adding high sensitivity Lispro correction sclae  Discussed in rounds. Current hospital DM medication: None    Patient is a 78 y.o. male with no noted history of diabetes. A1c:   No results found for: HBA1C, HGBE8, HGY5VAWQ    Recent Glucose Results:   Lab Results   Component Value Date/Time     (H) 06/06/2018 02:59 AM    GLUCPOC 198 (H) 06/06/2018 05:51 AM    GLUCPOC 188 (H) 06/05/2018 09:20 PM        Lab Results   Component Value Date/Time    Creatinine 3.89 (H) 06/06/2018 02:59 AM     Estimated Creatinine Clearance: 19.7 mL/min (based on Cr of 3.89). Active Orders   Diet    DIET REGULAR 2 GM NA (House Low NA); Gluten Free        PO intake: No data found. Will continue to follow as needed.     Thank you  Marquita Horta, MS, RN, CDE

## 2018-06-06 NOTE — PROGRESS NOTES
Care Management Interventions  PCP Verified by CM: Yes (Dr Christianne Leventhal)  Last Visit to PCP: 05/15/18  Palliative Care Criteria Met (RRAT>21 & CHF Dx)?: No  Mode of Transport at Discharge:  (TBD MOST LIKELY DGT )  Transition of Care Consult (CM Consult):  (TBD)  MyChart Signup: No  Physical Therapy Consult: Yes  Occupational Therapy Consult: Yes  Current Support Network: Lives Alone (APPT AT [facility])  Confirm Follow Up Transport:  (SON OR DGT   ValenteFilipe SIMENT[address] DGT [address])  Plan discussed with Pt/Family/Caregiver: Yes (PATIENT HAS AN ADVANCED DIRECTIVE  [address])  [place] of Choice Offered: Yes   Resource Information Provided?: No  Discharge Location  Discharge Placement: Home    notes that patient worked with PT this am and pm.  Son will buy dad a rollatore as he is having problems with navigation due to gout.   Patient also states he has ureteral stints in place and is due to have them changed at [facility] doctors in a few weeks

## 2018-06-06 NOTE — PROGRESS NOTES
I placed an order for a rollator however the patient does not have insurance to cover it so he will have his son get one for him.

## 2018-06-07 LAB
ALBUMIN SERPL ELPH-MCNC: 2.9 G/DL (ref 2.9–4.4)
ALBUMIN/GLOB SERPL: 0.7 {RATIO} (ref 0.7–1.7)
ALPHA1 GLOB SERPL ELPH-MCNC: 0.5 G/DL (ref 0–0.4)
ALPHA2 GLOB SERPL ELPH-MCNC: 1.3 G/DL (ref 0.4–1)
ANION GAP SERPL CALC-SCNC: 13 MMOL/L (ref 5–15)
B-GLOBULIN SERPL ELPH-MCNC: 1 G/DL (ref 0.7–1.3)
BUN SERPL-MCNC: 61 MG/DL (ref 6–20)
BUN/CREAT SERPL: 17 (ref 12–20)
CALCIUM SERPL-MCNC: 5.9 MG/DL (ref 8.5–10.1)
CHLORIDE SERPL-SCNC: 102 MMOL/L (ref 97–108)
CO2 SERPL-SCNC: 20 MMOL/L (ref 21–32)
CREAT SERPL-MCNC: 3.61 MG/DL (ref 0.7–1.3)
GAMMA GLOB SERPL ELPH-MCNC: 1.1 G/DL (ref 0.4–1.8)
GLOBULIN SER CALC-MCNC: 3.9 G/DL (ref 2.2–3.9)
GLUCOSE BLD STRIP.AUTO-MCNC: 117 MG/DL (ref 65–100)
GLUCOSE BLD STRIP.AUTO-MCNC: 151 MG/DL (ref 65–100)
GLUCOSE BLD STRIP.AUTO-MCNC: 179 MG/DL (ref 65–100)
GLUCOSE BLD STRIP.AUTO-MCNC: 210 MG/DL (ref 65–100)
GLUCOSE SERPL-MCNC: 194 MG/DL (ref 65–100)
M PROTEIN SERPL ELPH-MCNC: ABNORMAL G/DL
MAGNESIUM SERPL-MCNC: 1.7 MG/DL (ref 1.6–2.4)
PHOSPHATE SERPL-MCNC: 2.8 MG/DL (ref 2.6–4.7)
POTASSIUM SERPL-SCNC: 3.4 MMOL/L (ref 3.5–5.1)
PROT SERPL-MCNC: 6.8 G/DL (ref 6–8.5)
SERVICE CMNT-IMP: ABNORMAL
SODIUM SERPL-SCNC: 135 MMOL/L (ref 136–145)

## 2018-06-07 PROCEDURE — 36415 COLL VENOUS BLD VENIPUNCTURE: CPT | Performed by: HOSPITALIST

## 2018-06-07 PROCEDURE — 80048 BASIC METABOLIC PNL TOTAL CA: CPT | Performed by: HOSPITALIST

## 2018-06-07 PROCEDURE — 82962 GLUCOSE BLOOD TEST: CPT

## 2018-06-07 PROCEDURE — 74011250636 HC RX REV CODE- 250/636: Performed by: INTERNAL MEDICINE

## 2018-06-07 PROCEDURE — 74011000258 HC RX REV CODE- 258: Performed by: INTERNAL MEDICINE

## 2018-06-07 PROCEDURE — 84100 ASSAY OF PHOSPHORUS: CPT | Performed by: HOSPITALIST

## 2018-06-07 PROCEDURE — 74011250637 HC RX REV CODE- 250/637: Performed by: INTERNAL MEDICINE

## 2018-06-07 PROCEDURE — 83735 ASSAY OF MAGNESIUM: CPT | Performed by: HOSPITALIST

## 2018-06-07 PROCEDURE — 74011636637 HC RX REV CODE- 636/637: Performed by: HOSPITALIST

## 2018-06-07 PROCEDURE — 65270000032 HC RM SEMIPRIVATE

## 2018-06-07 PROCEDURE — 97116 GAIT TRAINING THERAPY: CPT

## 2018-06-07 PROCEDURE — 74011250637 HC RX REV CODE- 250/637: Performed by: HOSPITALIST

## 2018-06-07 PROCEDURE — 74011000250 HC RX REV CODE- 250: Performed by: INTERNAL MEDICINE

## 2018-06-07 RX ADMIN — Medication 10 ML: at 13:30

## 2018-06-07 RX ADMIN — INSULIN LISPRO 2 UNITS: 100 INJECTION, SOLUTION INTRAVENOUS; SUBCUTANEOUS at 06:44

## 2018-06-07 RX ADMIN — METOPROLOL TARTRATE 50 MG: 50 TABLET ORAL at 08:48

## 2018-06-07 RX ADMIN — DEXTROSE MONOHYDRATE: 5 INJECTION, SOLUTION INTRAVENOUS at 13:29

## 2018-06-07 RX ADMIN — CALCITRIOL 0.25 MCG: 0.25 CAPSULE, LIQUID FILLED ORAL at 08:48

## 2018-06-07 RX ADMIN — ASPIRIN 81 MG CHEWABLE TABLET 81 MG: 81 TABLET CHEWABLE at 08:48

## 2018-06-07 RX ADMIN — PANTOPRAZOLE SODIUM 40 MG: 40 TABLET, DELAYED RELEASE ORAL at 06:36

## 2018-06-07 RX ADMIN — ALLOPURINOL 100 MG: 100 TABLET ORAL at 22:32

## 2018-06-07 RX ADMIN — DEXTROSE MONOHYDRATE: 5 INJECTION, SOLUTION INTRAVENOUS at 03:27

## 2018-06-07 RX ADMIN — CALCIUM GLUCONATE 2 G: 98 INJECTION, SOLUTION INTRAVENOUS at 05:15

## 2018-06-07 RX ADMIN — Medication 1000 MCG: at 08:47

## 2018-06-07 RX ADMIN — EPOETIN ALFA 14000 UNITS: 4000 SOLUTION INTRAVENOUS; SUBCUTANEOUS at 19:33

## 2018-06-07 RX ADMIN — CEFTRIAXONE 1 G: 1 INJECTION, POWDER, FOR SOLUTION INTRAMUSCULAR; INTRAVENOUS at 00:03

## 2018-06-07 RX ADMIN — Medication 10 ML: at 22:33

## 2018-06-07 RX ADMIN — Medication 10 ML: at 06:00

## 2018-06-07 RX ADMIN — COLCHICINE 0.6 MG: 0.6 TABLET, FILM COATED ORAL at 08:47

## 2018-06-07 RX ADMIN — CALCIUM GLUCONATE 1 G: 98 INJECTION, SOLUTION INTRAVENOUS at 13:30

## 2018-06-07 RX ADMIN — FERROUS SULFATE TAB 325 MG (65 MG ELEMENTAL FE) 325 MG: 325 (65 FE) TAB at 06:36

## 2018-06-07 RX ADMIN — CEFTRIAXONE 1 G: 1 INJECTION, POWDER, FOR SOLUTION INTRAMUSCULAR; INTRAVENOUS at 22:34

## 2018-06-07 RX ADMIN — METHYLPREDNISOLONE SODIUM SUCCINATE 60 MG: 125 INJECTION, POWDER, FOR SOLUTION INTRAMUSCULAR; INTRAVENOUS at 06:36

## 2018-06-07 RX ADMIN — METHYLPREDNISOLONE SODIUM SUCCINATE 60 MG: 125 INJECTION, POWDER, FOR SOLUTION INTRAMUSCULAR; INTRAVENOUS at 00:03

## 2018-06-07 RX ADMIN — FERROUS SULFATE TAB 325 MG (65 MG ELEMENTAL FE) 325 MG: 325 (65 FE) TAB at 17:17

## 2018-06-07 NOTE — PROGRESS NOTES
Reason for Admission:   CKD Stage 4 worsening disease                   RRAT Score:     12 green                Plan for utilizing home health:      PT evaluation recommending no needs. Likelihood of Readmission:  Low                         Transition of Care Plan:           Patient's insurance will not cover a rollator, so son is buying one for him. Patient has gout. He also has uretal stints to be removed in a few weeks at [facility] per patient. Patient lives in an independent living apartment at [facility]. His son and daughter help him as needed. Patient is . CM to follow.

## 2018-06-07 NOTE — PROGRESS NOTES
Bedside shift change report given to Laura James  (oncoming nurse) by Lindsay Vogt RN  (offgoing nurse). Report included the following information SBAR and MAR.

## 2018-06-07 NOTE — PROGRESS NOTES
Hospitalist Progress Note  Abelardo Oconnor MD  Answering service: [phone] from in house phone  Cell: [phone]      Date of Service:  2018  NAME:  Sebastián Pollock. :  1940  MRN:  [id number]      Admission Summary:   79 yo male with Gout, Stage 4 Prostate Ca on Lupron q3 months, and Xtandi, CKD 4, s/p b/l ureteral stents, CAD s/p CABG, HTN admitted for bilateral ankle pain and gout flare.  In ER found to have elevated Cr and K    Interval history / Subjective:    pt seen and examined  No new issues   Ankle pains resolved     Assessment & Plan:     Acut Gout Flare  - improved with Steroids and cochicine  - c/w allopurinol    Acute on CKD IV  - on bicarb gtt  - cr improved  - renal following  - CT: stent in place    Metabolic Acidosis due to renal failure  - on bicarb gtt  - improving     Hypocalcemia  - replaced    Hyperkalemia - resolved    Asymptomatic Bacteriuria , not convinced he has UTI  - no urinary symptoms, fever  - will follow cx, patient has hx of of prostate ca s/p TURP but having frequent urination      Anemia due CKD  - stable    B/L Chronic Hydronephrosis  - s/p ureteral stent change by  at [facility]    CAD  - c/w ASA/Lopressor    Code status: FULL  DVT prophylaxis: Edson Curtis discussed with: Patient/Family, Nurse and Consultant Che Perales  Disposition: Home w/Family     Hospital Problems  Date Reviewed: 2018          Codes Class Noted POA    * (Principal)Acute worsening of stage 4 chronic kidney disease ([facility] [address] ICD-10-CM: N28.9, N18.4  ICD-9-CM: 593.9, 585.4  2018 Yes        Metabolic acidosis OZJ-41-XK: E87.2  ICD-9-CM: 276.2  2018 Yes        Acute gout ICD-10-CM: M10.9  ICD-9-CM: 274.01  2018 Yes        Hyperkalemia ICD-10-CM: E87.5  ICD-9-CM: 276.7  2018 Yes                Review of Systems:   A comprehensive review of systems was negative except for that written in the HPI.       Vital Signs:    Last 24hrs VS reviewed since prior progress note. Most recent are:  Visit Vitals    /64 (BP 1 Location: Right arm, BP Patient Position: At rest)    Pulse 69    Temp 97.9 °F (36.6 °C)    Resp 18    Ht 5' 11\" (1.803 m)    Wt 102.6 kg (226 lb 4.8 oz)    SpO2 97%    BMI 31.56 kg/m2         Intake/Output Summary (Last 24 hours) at 06/07/18 1109  Last data filed at 06/07/18 8630   Gross per 24 hour   Intake          4063.34 ml   Output             1500 ml   Net          2563.34 ml        Physical Examination:             Constitutional:  No acute distress, cooperative, pleasant    ENT:  Oral mucous moist, oropharynx benign. Resp:  CTA bilaterally. CV:  Regular rhythm, normal rate,    GI:  Soft, non distended, non tender. normoactive bowel sounds    Musculoskeletal:  nonpitting edema at ankle, no tenderness warm, 2+ pulses throughout    Neurologic:  Moves all extremities. AAOx3,     Skin:  Good turgor, no rashes or ulcers       Data Review:    Review and/or order of clinical lab test  Review and/or order of tests in the radiology section of CPT  Review and/or order of tests in the medicine section of CPT      Labs:     Recent Labs      06/06/18   0259  06/05/18   1106   WBC  5.9  10.4   HGB  8.1*  10.5*   HCT  24.4*  32.7*   PLT  183  250     Recent Labs      06/07/18   0335  06/06/18   0259  06/05/18   1106   NA  135*  138  138   K  3.4*  3.9  5.5*   CL  102  110*  113*   CO2  20*  15*  12*   BUN  61*  54*  50*   CREA  3.61*  3.89*  3.78*   GLU  194*  188*  107*   CA  5.9*  6.3*  7.2*   MG  1.7  1.7   --    PHOS  2.8  2.4*   --    URICA   --    --   7.8*     Recent Labs      06/06/18   0259  06/05/18   1106   SGOT  8*  24   ALT  7*  9*   AP  63  72   TBILI  0.3  0.8   TP  6.2*  8.0   ALB  2.2*  3.0*   GLOB  4.0  5.0*     No results for input(s): INR, PTP, APTT in the last 72 hours.     No lab exists for component: INREXT, INREXT   No results for input(s): FE, TIBC, PSAT, FERR in the last 72 hours. No results found for: FOL, RBCF   No results for input(s): PH, PCO2, PO2 in the last 72 hours.   Recent Labs      06/05/18   1106   CPK  105     Lab Results   Component Value Date/Time    Cholesterol, total 85 10/07/2013 12:00 AM    HDL Cholesterol 5 10/07/2013 12:00 AM    LDL, calculated 39.6 10/07/2013 12:00 AM    Triglyceride 202 (H) 10/07/2013 12:00 AM    CHOL/HDL Ratio 17.0 (H) 10/07/2013 12:00 AM     Lab Results   Component Value Date/Time    Glucose (POC) 179 (H) 06/07/2018 10:26 AM    Glucose (POC) 210 (H) 06/07/2018 06:40 AM    Glucose (POC) 195 (H) 06/06/2018 09:22 PM    Glucose (POC) 136 (H) 06/06/2018 04:44 PM    Glucose (POC) 236 (H) 06/06/2018 11:59 AM     Lab Results   Component Value Date/Time    Color YELLOW/STRAW 06/05/2018 03:22 PM    Appearance TURBID (A) 06/05/2018 03:22 PM    Specific gravity 1.011 06/05/2018 03:22 PM    pH (UA) 6.0 06/05/2018 03:22 PM    Protein 100 (A) 06/05/2018 03:22 PM    Glucose NEGATIVE  06/05/2018 03:22 PM    Ketone NEGATIVE  06/05/2018 03:22 PM    Bilirubin NEGATIVE  06/05/2018 03:22 PM    Urobilinogen 0.2 06/05/2018 03:22 PM    Nitrites NEGATIVE  06/05/2018 03:22 PM    Leukocyte Esterase LARGE (A) 06/05/2018 03:22 PM    Epithelial cells FEW 06/05/2018 03:22 PM    Bacteria 3+ (A) 06/05/2018 03:22 PM    WBC >100 (H) 06/05/2018 03:22 PM    RBC 5-10 06/05/2018 03:22 PM         Medications Reviewed:     Current Facility-Administered Medications   Medication Dose Route Frequency    glucose chewable tablet 16 g  4 Tab Oral PRN    dextrose (D50W) injection syrg 12.5-25 g  12.5-25 g IntraVENous PRN    glucagon (GLUCAGEN) injection 1 mg  1 mg IntraMUSCular PRN    insulin lispro (HUMALOG) injection   SubCUTAneous AC&HS    pantoprazole (PROTONIX) tablet 40 mg  40 mg Oral ACB    aspirin chewable tablet 81 mg  81 mg Oral DAILY    calcitRIOL (ROCALTROL) capsule 0.25 mcg  0.25 mcg Oral DAILY    cyanocobalamin (VITAMIN B12) tablet 1,000 mcg  1,000 mcg Oral DAILY    ferrous sulfate tablet 325 mg  325 mg Oral ACB&D    metoprolol tartrate (LOPRESSOR) tablet 50 mg  50 mg Oral Q12H    sodium chloride (NS) flush 5-10 mL  5-10 mL IntraVENous Q8H    sodium chloride (NS) flush 5-10 mL  5-10 mL IntraVENous PRN    methylPREDNISolone (PF) (SOLU-MEDROL) injection 60 mg  60 mg IntraVENous Q6H    sodium bicarbonate (8.4%) 150 mEq in dextrose 5% 1,000 mL infusion   IntraVENous CONTINUOUS    acetaminophen (TYLENOL) tablet 650 mg  650 mg Oral Q6H PRN    allopurinol (ZYLOPRIM) tablet 100 mg  100 mg Oral QHS    cefTRIAXone (ROCEPHIN) 1 g in 0.9% sodium chloride (MBP/ADV) 50 mL  1 g IntraVENous Q24H     ______________________________________________________________________  EXPECTED LENGTH OF STAY: 3d 7h  ACTUAL LENGTH OF STAY:          2                 Ada Becerra MD

## 2018-06-07 NOTE — PROGRESS NOTES
Spiritual Care Assessment/Progress Note  [address]. [address]      NAME: Kareem Rae MRN: [id number]  AGE: 78 y.o.  SEX: male  Orthodoxy Affiliation: Hinduism   Language: English     6/7/2018     Total Time (in minutes): 15     Spiritual Assessment begun in [facility] IMCU through conversation with:         [x]Patient        [] Family    [] Friend(s)        Reason for Consult: Request by patient     Spiritual beliefs: (Please include comment if needed)     [x] Identifies with a lolis tradition:     [] Supported by a lolis community:      [] Claims no spiritual orientation:      [] Seeking spiritual identity:           [] Adheres to an individual form of spirituality:      [] Not able to assess:                     Identified resources for coping:      [x] Prayer                               [] Music                  [] Guided Imagery     [x] Family/friends                 [] Pet visits     [] Devotional reading                         [] Unknown     [x] Other: sacramental care                                              Interventions offered during this visit: (See comments for more details)    Patient Interventions: Prayer (actual), Catharsis/review of pertinent events in supportive environment, Coordination with community clergy, Affirmation of emotions/emotional suffering, Orthodoxy beliefs/image of God discussed, Issues around forgiveness/closure, Normalization of emotional/spiritual concerns           Plan of Care:     [] Support spiritual and/or cultural needs    [] Support AMD and/or advance care planning process      [] Support grieving process   [x] Coordinate Rites and/or Rituals    [] Coordination with community clergy   [] No spiritual needs identified at this time   [] Detailed Plan of Care below (See Comments)  [] Make referral to Music Therapy  [] Make referral to Pet Therapy     [] Make referral to Addiction services  [] Make referral to [facility]  [] Make referral to Spiritual Care Partner  [] No future visits requested        [x] Follow up visits as needed     Comments: Responded to request from nursing staff to address concern of Mr. Elvira Huerta for sacramental care. Arrived to find him alone in room. He has routinely declined offer of communion by [employer]. Welcomed visit and went on to describe his need for [address] confession in order to feel worthy of communion. Provided reflective listening as he described his sense of guilt and desire to be reconciled to God. Offered words of consolation and hope---using imagery from scripture to support the forgiving [employer] of [place]. Agreed to arrange for a visit from [facility]. Anuel today before or after noon [place]. Will revisit as needed. [facility]'s Staff  ([address] Patient Care Specialist)   Paging Service [phone])

## 2018-06-07 NOTE — PROGRESS NOTES
TRANSFER - IN REPORT:    Verbal report received from [address] Scott Mcdonald RN(name) on Ceiba Miles.  being received from [facility]) for routine progression of care      Report consisted of patients Situation, Background, Assessment and   Recommendations(SBAR). Information from the following report(s) SBAR was reviewed with the receiving nurse. Opportunity for questions and clarification was provided. Assessment completed upon patients arrival to unit and care assumed.

## 2018-06-07 NOTE — PROGRESS NOTES
Problem: Mobility Impaired (Adult and Pediatric)  Goal: *Acute Goals and Plan of Care (Insert Text)  Physical Therapy Goals  Revised 6/7/2018  1. met  2. Met  3. Patient will ambulate with independence for 400 feet with the least restrictive device within 7 day(s). Physical Therapy Goals   6/6/2018  1. Patient will transfer from bed to chair and chair to bed with independence using the least restrictive device within 7 day(s). 2.  Patient will perform sit to stand with independence within 7 day(s). 3.  Patient will ambulate with independence for 250 feet with the least restrictive device within 7 day(s). physical Therapy TREATMENT  Patient: Leeann Dickerson. (71 y.o. male)  Date: 6/7/2018  Diagnosis: Acute worsening of stage 4 chronic kidney disease ([address] Acute worsening of stage 4 chronic kidney disease ([address]       Precautions: WBAT  Chart, physical therapy assessment, plan of care and goals were reviewed. ASSESSMENT:  Pt received in supine agreeable to PT reporting no foot pain today, see subjective. He came to sitting at EOB independently, stood independently, and amb 350 feet with no assistive device with contact guard, gait stable but with noted lateral sway, especially to his right. Note some HUSSEIN while amb and post amb but 02 sats stable at 98% and pt remained conversational. Post session he was up to the chair with his lunch. Overall he is doing much better, not requiring use of any assistive device today given that his gouty foot pain is well managed. Progression toward goals:  [x]    Improving appropriately and progressing toward goals, two goals met and amb goal was upgraded/modified. []    Improving slowly and progressing toward goals  []    Not making progress toward goals and plan of care will be adjusted     PLAN:  Patient continues to benefit from skilled intervention to address the above impairments. Continue treatment per established plan of care.   Discharge Recommendations:  None  Further Equipment Recommendations for Discharge:  None from PT, his son is to secure a rollator to use prn. SUBJECTIVE:   Patient stated My feet are feeling better, I don't think I need to use that today, referring to the rolling walker. OBJECTIVE DATA SUMMARY:   Chart checked, pt cleared by nursing  Critical Behavior:  Neurologic State: Alert  Orientation Level: Oriented X4  Cognition: Follows commands  Safety/Judgement: Awareness of environment, Good awareness of safety precautions  Functional Mobility Training:  Bed Mobility:     Supine to Sit: Independent              Transfers:  Sit to Stand: Independent  Stand to Sit: Independent                             Balance:  Sitting: Intact  Standing: Intact; Without support  Ambulation/Gait Training:  Distance (ft): 350 Feet (ft)  Assistive Device: Gait belt  Ambulation - Level of Assistance: Contact guard assistance        Gait Abnormalities: Decreased step clearance;Trunk sway increased        Base of Support: Widened     Speed/Park: Slow  Step Length: Left shortened;Right shortened                    Stairs:              Neuro Re-Education:    Therapeutic Exercises:     Pain:  Pain Scale 1: Numeric (0 - 10)  Pain Intensity 1: 0              Activity Tolerance:   See assessment above  Please refer to the flowsheet for vital signs taken during this treatment.   After treatment:   [x]    Patient left in no apparent distress sitting up in chair  []    Patient left in no apparent distress in bed  [x]    Call bell left within reach  [x]    Nursing notified  []    Caregiver present  []    Bed alarm activated    COMMUNICATION/COLLABORATION:   The patients plan of care was discussed with: Registered Nurse    Raheem Rankin   Time Calculation: 19 mins

## 2018-06-07 NOTE — ROUTINE PROCESS
TRANSFER - OUT REPORT:    Verbal report given to Oly Fleming RN(name) on Floyd Bancroft.  being transferred to (unit) for routine progression of care       Report consisted of patients Situation, Background, Assessment and   Recommendations(SBAR). Information from the following report(s) SBAR, Kardex, Intake/Output, MAR, Recent Results and Cardiac Rhythm NSR/SB was reviewed with the receiving nurse. Lines:   Peripheral IV 06/05/18 Left Antecubital (Active)   Site Assessment Clean, dry, & intact 6/7/2018  9:39 AM   Phlebitis Assessment 0 6/7/2018  9:39 AM   Infiltration Assessment 0 6/7/2018  9:39 AM   Dressing Status Clean, dry, & intact 6/7/2018  9:39 AM   Dressing Type Transparent 6/7/2018  9:39 AM   Hub Color/Line Status Pink 6/7/2018  9:39 AM   Action Taken Open ports on tubing capped 6/7/2018  4:00 AM   Alcohol Cap Used Yes 6/7/2018  4:00 AM        Opportunity for questions and clarification was provided.       Patient transported with:   [employer]

## 2018-06-07 NOTE — PROGRESS NOTES
0430  Lab report critical calcium of 5.9. MD paged. Orders received for 2g calcium gluconate. 0800  Bedside and Verbal shift change report given to Lisa Muñoz RN (oncoming nurse) by [place], RN (offgoing nurse). Report included the following information SBAR, Kardex, ED Summary, Procedure Summary, Intake/Output, MAR, Recent Results and Cardiac Rhythm NSR. Hourly rounding performed. Problem: Falls - Risk of  Goal: *Absence of Falls  Document Jonathon Fall Risk and appropriate interventions in the flowsheet. Outcome: Progressing Towards Goal  Fall Risk Interventions:  Mobility Interventions: Utilize walker, cane, or other assitive device, Strengthening exercises (ROM-active/passive), Assess mobility with egress test         Medication Interventions: Patient to call before getting OOB, Teach patient to arise slowly    Elimination Interventions: Call light in reach         Pt educated on fall prevention. Pt verbalizes understanding. Bedside jonathon tool used with pt. Call bell and frequently used items within reach. Pt utilizes call bell appropriately for assistance. Problem: Pressure Injury - Risk of  Goal: *Prevention of pressure injury  Document Partha Scale and appropriate interventions in the flowsheet. Skin Care & Pressure Injury Prevention Recommendations:  1. Minimize layers of linen/pads under patient to optimize support surface. 2.  Encourage patient to reposition approximately every 2 hours;  3. Float heels with pillow under calves. 4.  promote continence;   5. Continue on current surface for pressure redistribution. 6.  Assess/protect skin in contact with medical devices. Keep skin moisturized;   7. Ensure that patient is repositioning in chair shifting weight approximately every 15 minutes and standing up every hour.         Outcome: Progressing Towards Goal  Pressure Injury Interventions:                 Mobility Interventions: Pressure redistribution bed/mattress (bed type)    Nutrition Interventions: Document food/fluid/supplement intake    Friction and Shear Interventions: Apply protective barrier, creams and emollients, Feet elevated on foot rest, HOB 30 degrees or less, Lift sheet, Minimize layers, Transferring/repositioning devices, Transfer aides:transfer board/Julio Cesar lift/ceiling lift               Problem: General Medical Care Plan  Goal: *Labs within defined limits  Outcome: Progressing Towards Goal  AM labs drawn per orders. Will monitor for those that fall outside of normal limits. Goal: *Optimal pain control at patient's stated goal  Outcome: Progressing Towards Goal  No complaints of pain.  Pain assessed Q4hr with VS.

## 2018-06-07 NOTE — PROGRESS NOTES
Rounded on [facility] patients and provided Communion and Anointing of the Sick at request of patient    Fr. Shashank Naqvi

## 2018-06-07 NOTE — PROGRESS NOTES
[facility]   [address] [address], [facility] [address], [facility]  Phone: [phone]   [phone]       Nephrology Progress Note  Jason Stallings.     1940     [id number]  Date of Admission : 6/5/2018 06/07/18    CC: Follow up forAKI     Assessment and Plan   dillan  on CKD :  - etiology : progression of CKD  Vs Infection   - cr. Improving. Not back to baseline yet   - continue IVF  - bicarb    CKD  Stage IV   - baseline Cr 2.4-2.5 mg/dl     Recurrent UTI   On reocephin    HYPOCALCEMIA  Iv calcium - TOTAL 3 GRAM IV    Acute Gout       Anemia :  - likely 2/2 CKD  - f/u Paraproteinemia screen that was ordered on admission  - Check iron panel  - start epogen    B/L chronic Hydro  - recent ureteral stent change. F/b Dr Martina Elaine     Met Prostate ca :  - Bone mets are old   - f/b Dr Enrique Garcia     HTN :  bp on low side. Not on any meds    Met acidosis:  - continue Bicarb gtt  - he will need po bicarb as out pt     Interval History:  Seen and examined  CR improving  bp low  Ca low  Acidosis improving    No c/o sob, fever. No c/o nausea or vomiting  No c/o chest pain    Review of Systems: Pertinent items are noted in HPI.     Current Medications:   Current Facility-Administered Medications   Medication Dose Route Frequency    [START ON 6/8/2018] predniSONE (DELTASONE) tablet 30 mg  30 mg Oral DAILY WITH BREAKFAST    INV [id number] enzalutamide Chanda Nguyen) investigational chemo capsule 160 mg (Patient Supplied)  160 mg Oral DAILY    calcium gluconate 2 g in 0.9% sodium chloride 100 mL IVPB  2 g IntraVENous ONCE    glucose chewable tablet 16 g  4 Tab Oral PRN    dextrose (D50W) injection syrg 12.5-25 g  12.5-25 g IntraVENous PRN    glucagon (GLUCAGEN) injection 1 mg  1 mg IntraMUSCular PRN    insulin lispro (HUMALOG) injection   SubCUTAneous AC&HS    pantoprazole (PROTONIX) tablet 40 mg  40 mg Oral ACB    aspirin chewable tablet 81 mg  81 mg Oral DAILY    calcitRIOL (ROCALTROL) capsule 0.25 mcg  0.25 mcg Oral DAILY    cyanocobalamin (VITAMIN B12) tablet 1,000 mcg  1,000 mcg Oral DAILY    ferrous sulfate tablet 325 mg  325 mg Oral ACB&D    metoprolol tartrate (LOPRESSOR) tablet 50 mg  50 mg Oral Q12H    sodium chloride (NS) flush 5-10 mL  5-10 mL IntraVENous Q8H    sodium chloride (NS) flush 5-10 mL  5-10 mL IntraVENous PRN    sodium bicarbonate (8.4%) 150 mEq in dextrose 5% 1,000 mL infusion   IntraVENous CONTINUOUS    acetaminophen (TYLENOL) tablet 650 mg  650 mg Oral Q6H PRN    allopurinol (ZYLOPRIM) tablet 100 mg  100 mg Oral QHS    cefTRIAXone (ROCEPHIN) 1 g in 0.9% sodium chloride (MBP/ADV) 50 mL  1 g IntraVENous Q24H      Allergies   Allergen Reactions    Morphine Other (comments)     AMS       Objective:  Vitals:    Vitals:    06/07/18 0350 06/07/18 0747 06/07/18 0847 06/07/18 1106   BP: 103/56 119/70  107/64   Pulse: 78 60 66 69   Resp: 18 18 18   Temp: 98.2 °F (36.8 °C) 97.7 °F (36.5 °C)  97.9 °F (36.6 °C)   SpO2: 97% 97%  97%   Weight: 102.6 kg (226 lb 4.8 oz)      Height:         Intake and Output:  06/07 0701 - 06/07 1900  In: 753.3 [I.V.:753.3]  Out: 300 [Urine:300]  06/05 1901 - 06/07 0700  In: 3548.3 [P.O.:240; I.V.:3308.3]  Out: 5701 [Urine:1650]    Physical Examination:  GEN:  NAD  NECK:  Supple, no thyromegaly  RESP: CTA  b/l, no  wheezing, decreased at base  CVS: RRR,S1,S2   ABDO:  soft , non tender, No hepatosplenomegaly  EXT: Edema +nt  NEURO: non focal, normal speech      []    High complexity decision making was performed  []    Patient is at high-risk of decompensation with multiple organ involvement    Lab Data Personally Reviewed: I have reviewed all the pertinent labs, microbiology data and radiology studies during assessment.     Recent Labs      06/07/18   0335  06/06/18   0259  06/05/18   1106   NA  135*  138  138   K  3.4*  3.9  5.5*   CL  102  110*  113*   CO2  20*  15*  12*   GLU  194*  188*  107*   BUN  61*  54*  50*   CREA  3.61*  3.89*  3.78*   CA 5. 9*  6.3*  7.2*   MG  1.7  1.7   --    PHOS  2.8  2.4*   --    ALB   --   2.2*  3.0*   SGOT   --   8*  24   ALT   --   7*  9*     Recent Labs      06/06/18   0259  06/05/18   1106   WBC  5.9  10.4   HGB  8.1*  10.5*   HCT  24.4*  32.7*   PLT  183  250     Lab Results   Component Value Date/Time    Specimen Description: URINE 10/07/2013 01:36 AM    Specimen Description: BLOOD 10/06/2013 04:13 PM     Lab Results   Component Value Date/Time    Culture result: PROBABLE ENTEROCOCCUS SPECIES (PREDOMINATING) (A) 06/05/2018 03:22 PM    Culture result: YEAST (A) 06/05/2018 03:22 PM    Culture result:  10/07/2013 01:36 AM     STREPTOCOCCI, BETA HEMOLYTIC GROUP C , Penicillin and ampicillin are drugs of choice for treatment of beta-hemolytic streptococcal infections. Susceptibility testing of penicillins and beta-lactams approved by the FDA for treatment of beta-hemolytic streptococcal infections need not be performed routinely, because nonsusceptible isolates are extremely rare.  CLSI 2012    Culture result: NO GROWTH 5 DAYS 10/06/2013 04:13 PM     Recent Results (from the past 24 hour(s))   GLUCOSE, POC    Collection Time: 06/06/18  4:44 PM   Result Value Ref Range    Glucose (POC) 136 (H) 65 - 100 mg/dL    Performed by [address], POC    Collection Time: 06/06/18  9:22 PM   Result Value Ref Range    Glucose (POC) 195 (H) 65 - 100 mg/dL    Performed by Ran Jones    METABOLIC PANEL, BASIC    Collection Time: 06/07/18  3:35 AM   Result Value Ref Range    Sodium 135 (L) 136 - 145 mmol/L    Potassium 3.4 (L) 3.5 - 5.1 mmol/L    Chloride 102 97 - 108 mmol/L    CO2 20 (L) 21 - 32 mmol/L    Anion gap 13 5 - 15 mmol/L    Glucose 194 (H) 65 - 100 mg/dL    BUN 61 (H) 6 - 20 MG/DL    Creatinine 3.61 (H) 0.70 - 1.30 MG/DL    BUN/Creatinine ratio 17 12 - 20      GFR est AA 20 (L) >60 ml/min/1.73m2    GFR est non-AA 17 (L) >60 ml/min/1.73m2    Calcium 5.9 (LL) 8.5 - 10.1 MG/DL   MAGNESIUM    Collection Time: 06/07/18  3:35 AM   Result Value Ref Range    Magnesium 1.7 1.6 - 2.4 mg/dL   PHOSPHORUS    Collection Time: 06/07/18  3:35 AM   Result Value Ref Range    Phosphorus 2.8 2.6 - 4.7 MG/DL   GLUCOSE, POC    Collection Time: 06/07/18  6:40 AM   Result Value Ref Range    Glucose (POC) 210 (H) 65 - 100 mg/dL    Performed by Angie Okee[address] Dr, POC    Collection Time: 06/07/18 10:26 AM   Result Value Ref Range    Glucose (POC) 179 (H) 65 - 100 mg/dL    Performed by Megan Geiger            I have reviewed the flowsheets. Chart and Pertinent Notes have been reviewed. No change in PMH ,family and social history from Consult note.       Zen Delarosa MD

## 2018-06-07 NOTE — DIABETES MGMT
DTC Progress Note    Recommendations/ Comments: Chart reviewed for hyperglycemia, likely due to steroids. Noted Methylprednisolone 60 mg every 6 hours was discontinued and now will start Prednisone 30 mg once daily with breakfast.     If appropriate, please consider:  - Changing correction scale to normal sensitivity scale while pt is on steroids. Current hospital DM medication: Humalog for correction, high sensitivity scale     Chart reviewed on [place]. .    Patient is a 78 y.o. male with hx no noted hx of diabetes. A1c:   No results found for: HBA1C, HGBE8, SKH7FYCH    Recent Glucose Results: Lab Results   Component Value Date/Time     (H) 06/07/2018 03:35 AM    GLUCPOC 179 (H) 06/07/2018 10:26 AM    GLUCPOC 210 (H) 06/07/2018 06:40 AM    GLUCPOC 195 (H) 06/06/2018 09:22 PM        Lab Results   Component Value Date/Time    Creatinine 3.61 (H) 06/07/2018 03:35 AM     Estimated Creatinine Clearance: 21.6 mL/min (based on Cr of 3.61). Active Orders   Diet    DIET REGULAR 2 GM NA (House Low NA); Gluten Free        PO intake: Patient Vitals for the past 72 hrs:   % Diet Eaten   06/06/18 1715 90 %       Will continue to follow as needed.     Thank you  Anthony Rahman RD  [facility]  Pager: [phone]

## 2018-06-08 LAB
ALBUMIN 24H MFR UR ELPH: 36.1 %
ALPHA1 GLOB 24H MFR UR ELPH: 2.8 %
ALPHA2 GLOB 24H MFR UR ELPH: 9 %
ANION GAP SERPL CALC-SCNC: 12 MMOL/L (ref 5–15)
B-GLOBULIN MFR UR ELPH: 27.9 %
BACTERIA SPEC CULT: ABNORMAL
BUN SERPL-MCNC: 63 MG/DL (ref 6–20)
BUN/CREAT SERPL: 20 (ref 12–20)
CALCIUM SERPL-MCNC: 6 MG/DL (ref 8.5–10.1)
CC UR VC: ABNORMAL
CHLORIDE SERPL-SCNC: 98 MMOL/L (ref 97–108)
CO2 SERPL-SCNC: 28 MMOL/L (ref 21–32)
COLLECT DURATION TIME UR: 24 HR
CREAT SERPL-MCNC: 3.17 MG/DL (ref 0.7–1.3)
FERRITIN SERPL-MCNC: 685 NG/ML (ref 26–388)
GAMMA GLOB 24H MFR UR ELPH: 24.1 %
GLUCOSE BLD STRIP.AUTO-MCNC: 119 MG/DL (ref 65–100)
GLUCOSE BLD STRIP.AUTO-MCNC: 124 MG/DL (ref 65–100)
GLUCOSE BLD STRIP.AUTO-MCNC: 136 MG/DL (ref 65–100)
GLUCOSE BLD STRIP.AUTO-MCNC: 145 MG/DL (ref 65–100)
GLUCOSE BLD STRIP.AUTO-MCNC: 149 MG/DL (ref 65–100)
GLUCOSE SERPL-MCNC: 111 MG/DL (ref 65–100)
INTERPRETATION UR IFE-IMP: ABNORMAL
IRON SATN MFR SERPL: 42 % (ref 20–50)
IRON SERPL-MCNC: 61 UG/DL (ref 35–150)
M PROTEIN 24H MFR UR ELPH: 8.9 %
M PROTEIN 24H UR ELPH-MRATE: 72 MG/24 HR
NOTE, 149533: ABNORMAL
POTASSIUM SERPL-SCNC: 3 MMOL/L (ref 3.5–5.1)
PROT 24H UR-MRATE: 814 MG/24 HR (ref 30–150)
PROT UR-MCNC: 90.4 MG/DL
SERVICE CMNT-IMP: ABNORMAL
SODIUM SERPL-SCNC: 138 MMOL/L (ref 136–145)
SPECIMEN VOL ?TM UR: 900 ML
TIBC SERPL-MCNC: 144 UG/DL (ref 250–450)

## 2018-06-08 PROCEDURE — 74011636637 HC RX REV CODE- 636/637: Performed by: HOSPITALIST

## 2018-06-08 PROCEDURE — 82728 ASSAY OF FERRITIN: CPT | Performed by: INTERNAL MEDICINE

## 2018-06-08 PROCEDURE — 74011250637 HC RX REV CODE- 250/637: Performed by: INTERNAL MEDICINE

## 2018-06-08 PROCEDURE — 36415 COLL VENOUS BLD VENIPUNCTURE: CPT | Performed by: HOSPITALIST

## 2018-06-08 PROCEDURE — 65270000032 HC RM SEMIPRIVATE

## 2018-06-08 PROCEDURE — 74011250636 HC RX REV CODE- 250/636: Performed by: INTERNAL MEDICINE

## 2018-06-08 PROCEDURE — 82962 GLUCOSE BLOOD TEST: CPT

## 2018-06-08 PROCEDURE — 74011250636 HC RX REV CODE- 250/636: Performed by: HOSPITALIST

## 2018-06-08 PROCEDURE — 74011000250 HC RX REV CODE- 250: Performed by: INTERNAL MEDICINE

## 2018-06-08 PROCEDURE — 97116 GAIT TRAINING THERAPY: CPT

## 2018-06-08 PROCEDURE — 80048 BASIC METABOLIC PNL TOTAL CA: CPT | Performed by: HOSPITALIST

## 2018-06-08 PROCEDURE — 74011000258 HC RX REV CODE- 258: Performed by: INTERNAL MEDICINE

## 2018-06-08 PROCEDURE — 74011250637 HC RX REV CODE- 250/637: Performed by: HOSPITALIST

## 2018-06-08 PROCEDURE — 83540 ASSAY OF IRON: CPT | Performed by: INTERNAL MEDICINE

## 2018-06-08 RX ORDER — POTASSIUM CHLORIDE 750 MG/1
40 TABLET, FILM COATED, EXTENDED RELEASE ORAL
Status: COMPLETED | OUTPATIENT
Start: 2018-06-08 | End: 2018-06-08

## 2018-06-08 RX ORDER — SODIUM BICARBONATE 650 MG/1
650 TABLET ORAL 2 TIMES DAILY
Status: DISCONTINUED | OUTPATIENT
Start: 2018-06-09 | End: 2018-06-10 | Stop reason: HOSPADM

## 2018-06-08 RX ORDER — LINEZOLID 600 MG/1
600 TABLET, FILM COATED ORAL EVERY 12 HOURS
Status: DISCONTINUED | OUTPATIENT
Start: 2018-06-08 | End: 2018-06-10 | Stop reason: HOSPADM

## 2018-06-08 RX ORDER — CALCIUM GLUCONATE 94 MG/ML
1 INJECTION, SOLUTION INTRAVENOUS ONCE
Status: DISCONTINUED | OUTPATIENT
Start: 2018-06-08 | End: 2018-06-08 | Stop reason: SDUPTHER

## 2018-06-08 RX ORDER — MAGNESIUM SULFATE HEPTAHYDRATE 40 MG/ML
2 INJECTION, SOLUTION INTRAVENOUS ONCE
Status: COMPLETED | OUTPATIENT
Start: 2018-06-08 | End: 2018-06-08

## 2018-06-08 RX ADMIN — PANTOPRAZOLE SODIUM 40 MG: 40 TABLET, DELAYED RELEASE ORAL at 06:59

## 2018-06-08 RX ADMIN — DEXTROSE MONOHYDRATE: 5 INJECTION, SOLUTION INTRAVENOUS at 01:00

## 2018-06-08 RX ADMIN — POTASSIUM CHLORIDE 40 MEQ: 750 TABLET, EXTENDED RELEASE ORAL at 07:36

## 2018-06-08 RX ADMIN — ALLOPURINOL 100 MG: 100 TABLET ORAL at 21:58

## 2018-06-08 RX ADMIN — MAGNESIUM SULFATE HEPTAHYDRATE 2 G: 40 INJECTION, SOLUTION INTRAVENOUS at 10:05

## 2018-06-08 RX ADMIN — PREDNISONE 30 MG: 20 TABLET ORAL at 07:36

## 2018-06-08 RX ADMIN — METOPROLOL TARTRATE 50 MG: 50 TABLET ORAL at 21:59

## 2018-06-08 RX ADMIN — LINEZOLID 600 MG: 600 TABLET, FILM COATED ORAL at 17:00

## 2018-06-08 RX ADMIN — CALCIUM GLUCONATE 1 G: 94 INJECTION, SOLUTION INTRAVENOUS at 07:00

## 2018-06-08 RX ADMIN — FERROUS SULFATE TAB 325 MG (65 MG ELEMENTAL FE) 325 MG: 325 (65 FE) TAB at 06:58

## 2018-06-08 RX ADMIN — CALCITRIOL 0.25 MCG: 0.25 CAPSULE, LIQUID FILLED ORAL at 09:11

## 2018-06-08 RX ADMIN — FERROUS SULFATE TAB 325 MG (65 MG ELEMENTAL FE) 325 MG: 325 (65 FE) TAB at 17:00

## 2018-06-08 RX ADMIN — Medication 10 ML: at 21:58

## 2018-06-08 RX ADMIN — ASPIRIN 81 MG CHEWABLE TABLET 81 MG: 81 TABLET CHEWABLE at 09:11

## 2018-06-08 RX ADMIN — POTASSIUM CHLORIDE: 2 INJECTION, SOLUTION, CONCENTRATE INTRAVENOUS at 21:58

## 2018-06-08 RX ADMIN — Medication 10 ML: at 06:15

## 2018-06-08 RX ADMIN — Medication 1000 MCG: at 09:11

## 2018-06-08 RX ADMIN — POTASSIUM CHLORIDE: 2 INJECTION, SOLUTION, CONCENTRATE INTRAVENOUS at 10:05

## 2018-06-08 NOTE — PROGRESS NOTES
CM following for discharge planning. Per today's nephrology note, potential discharge tomorrow if creatinine stable. Note patient has been cleared by PT with no additional skilled rehab needs. Patient to return to [address] [place] [address] with family to transport. PCP follow-up has been set. Anticipate discharge home with no further CM needs.     ZENIA Vazquez

## 2018-06-08 NOTE — PROGRESS NOTES
Hospitalist Progress Note  Dewain Simmonds, MD  Answering service: [phone] from in house phone  Cell: [phone]      Date of Service:  2018  NAME:  Edie Guadarrama. :  1940  MRN:  [id number]      Admission Summary:   77 yo male with Gout, Stage 4 Prostate Ca on Lupron q3 months, and Xtandi, CKD 4, s/p b/l ureteral stents, CAD s/p CABG, HTN admitted for bilateral ankle pain and gout flare.  In ER found to have elevated Cr and K    Interval history / Subjective:    pt seen and examined  No new issues   Ankle pains resolved     Assessment & Plan:     Acut Gout Flare  - improved with Steroids and cochicine  - c/w allopurinol  - steroids changed to PO     Acute on CKD IV  - on bicarb gtt  - cr improving  - renal following  - CT: stent in place  - c/w IVF    Metabolic Acidosis due to renal failure  - resolved    Hypocalcemia  - replaced    Hyperkalemia - resolved  - now Hypokalemia, replace    Probable UTI  - will follow cx, patient has hx of of prostate ca s/p TURP but having frequent urination  - UCx: VRE   - Zyvox for 5 days    Anemia due CKD  - stable    B/L Chronic Hydronephrosis  - s/p ureteral stent change by  at [facility]    CAD  - c/w ASA/Lopressor    Code status: FULL  DVT prophylaxis: Edson Curtis discussed with: Patient/Family, Nurse and Consultant Shania Burns  Disposition: Home w/Family     Hospital Problems  Date Reviewed: 2018          Codes Class Noted POA    * (Principal)Acute worsening of stage 4 chronic kidney disease ([facility] [address] ICD-10-CM: N28.9, N18.4  ICD-9-CM: 593.9, 585.4  2018 Yes        Metabolic acidosis INS-78-KP: E87.2  ICD-9-CM: 276.2  2018 Yes        Acute gout ICD-10-CM: M10.9  ICD-9-CM: 274.01  2018 Yes        Hyperkalemia ICD-10-CM: E87.5  ICD-9-CM: 276.7  2018 Yes                Review of Systems:   A comprehensive review of systems was negative except for that written in the HPI. Vital Signs:    Last 24hrs VS reviewed since prior progress note. Most recent are:  Visit Vitals    /68 (BP 1 Location: Right arm, BP Patient Position: Head of bed elevated (Comment degrees))    Pulse (!) 57    Temp 97.3 °F (36.3 °C)    Resp 18    Ht 5' 11\" (1.803 m)    Wt 106.1 kg (234 lb)    SpO2 99%    BMI 32.64 kg/m2         Intake/Output Summary (Last 24 hours) at 06/08/18 1504  Last data filed at 06/08/18 1220   Gross per 24 hour   Intake           638.33 ml   Output             1150 ml   Net          -511.67 ml        Physical Examination:             Constitutional:  No acute distress, cooperative, pleasant    ENT:  Oral mucous moist, oropharynx benign. Resp:  CTA bilaterally. CV:  Regular rhythm, normal rate,    GI:  Soft, non distended, non tender. normoactive bowel sounds    Musculoskeletal:  nonpitting edema at ankle improved, no tenderness warm, 2+ pulses throughout    Neurologic:  Moves all extremities. AAOx3,     Skin:  Good turgor, no rashes or ulcers       Data Review:    Review and/or order of clinical lab test  Review and/or order of tests in the radiology section of CPT  Review and/or order of tests in the medicine section of CPT      Labs:     Recent Labs      06/06/18   0259   WBC  5.9   HGB  8.1*   HCT  24.4*   PLT  183     Recent Labs      06/08/18   0430  06/07/18   0335  06/06/18   0259   NA  138  135*  138   K  3.0*  3.4*  3.9   CL  98  102  110*   CO2  28  20*  15*   BUN  63*  61*  54*   CREA  3.17*  3.61*  3.89*   GLU  111*  194*  188*   CA  6.0*  5.9*  6.3*   MG   --   1.7  1.7   PHOS   --   2.8  2.4*     Recent Labs      06/06/18   0259   SGOT  8*   ALT  7*   AP  63   TBILI  0.3   TP  6.2*   ALB  2.2*   GLOB  4.0     No results for input(s): INR, PTP, APTT in the last 72 hours.     No lab exists for component: INREXT, INREXT   Recent Labs      06/08/18   0430   TIBC  144*   PSAT  42   FERR  685*      No results found for: FOL, RBCF   No results for input(s): PH, PCO2, PO2 in the last 72 hours. No results for input(s): CPK, CKNDX, TROIQ in the last 72 hours.     No lab exists for component: CPKMB  Lab Results   Component Value Date/Time    Cholesterol, total 85 10/07/2013 12:00 AM    HDL Cholesterol 5 10/07/2013 12:00 AM    LDL, calculated 39.6 10/07/2013 12:00 AM    Triglyceride 202 (H) 10/07/2013 12:00 AM    CHOL/HDL Ratio 17.0 (H) 10/07/2013 12:00 AM     Lab Results   Component Value Date/Time    Glucose (POC) 136 (H) 06/08/2018 12:01 PM    Glucose (POC) 119 (H) 06/08/2018 10:05 AM    Glucose (POC) 124 (H) 06/08/2018 06:03 AM    Glucose (POC) 151 (H) 06/07/2018 09:18 PM    Glucose (POC) 117 (H) 06/07/2018 05:11 PM     Lab Results   Component Value Date/Time    Color YELLOW/STRAW 06/05/2018 03:22 PM    Appearance TURBID (A) 06/05/2018 03:22 PM    Specific gravity 1.011 06/05/2018 03:22 PM    pH (UA) 6.0 06/05/2018 03:22 PM    Protein 100 (A) 06/05/2018 03:22 PM    Glucose NEGATIVE  06/05/2018 03:22 PM    Ketone NEGATIVE  06/05/2018 03:22 PM    Bilirubin NEGATIVE  06/05/2018 03:22 PM    Urobilinogen 0.2 06/05/2018 03:22 PM    Nitrites NEGATIVE  06/05/2018 03:22 PM    Leukocyte Esterase LARGE (A) 06/05/2018 03:22 PM    Epithelial cells FEW 06/05/2018 03:22 PM    Bacteria 3+ (A) 06/05/2018 03:22 PM    WBC >100 (H) 06/05/2018 03:22 PM    RBC 5-10 06/05/2018 03:22 PM         Medications Reviewed:     Current Facility-Administered Medications   Medication Dose Route Frequency    lactated Ringers 1,000 mL with potassium chloride 30 mEq infusion   IntraVENous CONTINUOUS    [START ON 6/9/2018] sodium bicarbonate tablet 650 mg  650 mg Oral BID    linezolid (ZYVOX) tablet 600 mg  600 mg Oral Q12H    predniSONE (DELTASONE) tablet 30 mg  30 mg Oral DAILY WITH BREAKFAST    INV [id number] enzalutamide (XTANDI) investigational chemo capsule 160 mg (Patient Supplied)  160 mg Oral DAILY    epoetin keyona (EPOGEN;PROCRIT) 14,000 Units  14,000 Units SubCUTAneous Q TUE, THU & SAT    glucose chewable tablet 16 g  4 Tab Oral PRN    dextrose (D50W) injection syrg 12.5-25 g  12.5-25 g IntraVENous PRN    glucagon (GLUCAGEN) injection 1 mg  1 mg IntraMUSCular PRN    insulin lispro (HUMALOG) injection   SubCUTAneous AC&HS    pantoprazole (PROTONIX) tablet 40 mg  40 mg Oral ACB    aspirin chewable tablet 81 mg  81 mg Oral DAILY    calcitRIOL (ROCALTROL) capsule 0.25 mcg  0.25 mcg Oral DAILY    cyanocobalamin (VITAMIN B12) tablet 1,000 mcg  1,000 mcg Oral DAILY    ferrous sulfate tablet 325 mg  325 mg Oral ACB&D    metoprolol tartrate (LOPRESSOR) tablet 50 mg  50 mg Oral Q12H    sodium chloride (NS) flush 5-10 mL  5-10 mL IntraVENous Q8H    sodium chloride (NS) flush 5-10 mL  5-10 mL IntraVENous PRN    acetaminophen (TYLENOL) tablet 650 mg  650 mg Oral Q6H PRN    allopurinol (ZYLOPRIM) tablet 100 mg  100 mg Oral QHS     ______________________________________________________________________  EXPECTED LENGTH OF STAY: 3d 7h  ACTUAL LENGTH OF STAY:          3                 Jeanette Gonzalez MD

## 2018-06-08 NOTE — PROGRESS NOTES
[facility] follow-up appointment on Monday June 11,2018 @ 1:30 p.m. with Dr. Daily Palma  Added to Pat Rosales CM Specialist

## 2018-06-08 NOTE — PROGRESS NOTES
Problem: Mobility Impaired (Adult and Pediatric)  Goal: *Acute Goals and Plan of Care (Insert Text)  Physical Therapy Goals  Revised 6/7/2018  1. met  2. Met  3. Patient will ambulate with independence for 400 feet with the least restrictive device within 7 day(s). Physical Therapy Goals   6/6/2018  1. Patient will transfer from bed to chair and chair to bed with independence using the least restrictive device within 7 day(s). 2.  Patient will perform sit to stand with independence within 7 day(s). 3.  Patient will ambulate with independence for 250 feet with the least restrictive device within 7 day(s). physical Therapy TREATMENT  Patient: Joanette Bloch. (71 y.o. male)  Date: 6/8/2018  Diagnosis: Acute worsening of stage 4 chronic kidney disease ([address] Acute worsening of stage 4 chronic kidney disease ([address]       Precautions: WBAT  Chart, physical therapy assessment, plan of care and goals were reviewed. ASSESSMENT:  Pt reports no pain in LE. Pt was able to mobilize with mod I to supervision level. Pt slightly unsteady gait. Pt utilized wall rail periodic. Pt reporting left Foot felt uncomfortable during gait. Pt requesting to see his sister who is also on the floor. Cleared by nurse. Pt has no concerns with discharge and is hopeful for tomorrow discharge. Progression toward goals:  [x]    Improving appropriately and progressing toward goals  []    Improving slowly and progressing toward goals  []    Not making progress toward goals and plan of care will be adjusted     PLAN:  Patient continues to benefit from skilled intervention to address the above impairments. Continue treatment per established plan of care. Discharge Recommendations:  None  Further Equipment Recommendations for Discharge:  none     SUBJECTIVE:   Patient stated I feel fine.     OBJECTIVE DATA SUMMARY:   Critical Behavior:  Neurologic State: Alert  Orientation Level: Oriented X4  Cognition: Follows commands  Safety/Judgement: Awareness of environment, Good awareness of safety precautions  Functional Mobility Training:  Bed Mobility:     Supine to Sit: Modified independent              Transfers:  Sit to Stand: Modified independent  Stand to Sit: Modified independent                             Balance:  Sitting: Intact  Standing: Intact  Ambulation/Gait Training:  Distance (ft): 250 Feet (ft)     Ambulation - Level of Assistance: Supervision        Gait Abnormalities: Decreased step clearance        Base of Support: Widened     Speed/Park: Pace decreased (<100 feet/min); Slow  Step Length: Left shortened;Right shortened                   Stairs:              Neuro Re-Education:    Therapeutic Exercises:     Pain:                    Activity Tolerance:   Limited   Please refer to the flowsheet for vital signs taken during this treatment. After treatment: pt in sister's room 500B. Nurse is aware. Sister will call out when ready.  Pt in NAD  []    Patient left in no apparent distress sitting up in chair  []    Patient left in no apparent distress in bed  []    Call bell left within reach  []    Nursing notified  []    Caregiver present  []    Bed alarm activated    COMMUNICATION/COLLABORATION:   The patients plan of care was discussed with: Registered Nurse    Isaac Horn PTA   Time Calculation: 12 mins

## 2018-06-08 NOTE — PROGRESS NOTES
[facility]   [address] [address] [facility] [address], [facility] [address], [facility]  Phone: [phone]   [phone]       Nephrology Progress Note  Edie Guadarrama.     1940     [id number]  Date of Admission : 6/5/2018 06/08/18    CC: Follow up for JASIEL       Assessment and Plan   JASIEL on CKD :  - etiology :unclear   - Improving w/ IVF now   - CT : No Pyleo and B/L ureteral dilatation is chronic   - continue IVF for another 24 hrs and d/c tomorrow if Cr < 3    CKD Stage IV   - baseline Cr 2.4-2.5 mg/dl     Enterococcal + Fungal UTI   - per primary team     Hypokalemia :  - noted replacement orders      Acute Gout   - better  - taper off steroids     Anemia :  - likely 2/2 CKD  - SPEP negative, 24 hr results pending   - Hb dropped   - On epogen     B/L chronic Hydro  - recent ureteral stent change. F/b Dr Lorin Rodrigues     Met Prostate ca :  - Bone mets are old   - f/b Dr Aury Maxwell     HTN :  - stable     Met Acidosis :  - oral Bicarb on d/c        Interval History:  Seen and examined  Feels better  Gout resolved   No complaints   Hb dropped     Review of Systems: Pertinent items are noted in HPI.     Current Medications:   Current Facility-Administered Medications   Medication Dose Route Frequency    calcium gluconate 1 g in 0.9% sodium chloride 100 mL IVPB  1 g IntraVENous ONCE    magnesium sulfate 2 g/50 ml IVPB (premix or compounded)  2 g IntraVENous ONCE    predniSONE (DELTASONE) tablet 30 mg  30 mg Oral DAILY WITH BREAKFAST    INV [id number] enzalutamide Seabron Dress) investigational chemo capsule 160 mg (Patient Supplied)  160 mg Oral DAILY    epoetin keyona (EPOGEN;PROCRIT) 14,000 Units  14,000 Units SubCUTAneous Q TUE, THU & SAT    glucose chewable tablet 16 g  4 Tab Oral PRN    dextrose (D50W) injection syrg 12.5-25 g  12.5-25 g IntraVENous PRN    glucagon (GLUCAGEN) injection 1 mg  1 mg IntraMUSCular PRN    insulin lispro (HUMALOG) injection   SubCUTAneous AC&HS    pantoprazole (PROTONIX) tablet 40 mg  40 mg Oral ACB    aspirin chewable tablet 81 mg  81 mg Oral DAILY    calcitRIOL (ROCALTROL) capsule 0.25 mcg  0.25 mcg Oral DAILY    cyanocobalamin (VITAMIN B12) tablet 1,000 mcg  1,000 mcg Oral DAILY    ferrous sulfate tablet 325 mg  325 mg Oral ACB&D    metoprolol tartrate (LOPRESSOR) tablet 50 mg  50 mg Oral Q12H    sodium chloride (NS) flush 5-10 mL  5-10 mL IntraVENous Q8H    sodium chloride (NS) flush 5-10 mL  5-10 mL IntraVENous PRN    sodium bicarbonate (8.4%) 150 mEq in dextrose 5% 1,000 mL infusion   IntraVENous CONTINUOUS    acetaminophen (TYLENOL) tablet 650 mg  650 mg Oral Q6H PRN    allopurinol (ZYLOPRIM) tablet 100 mg  100 mg Oral QHS    cefTRIAXone (ROCEPHIN) 1 g in 0.9% sodium chloride (MBP/ADV) 50 mL  1 g IntraVENous Q24H      Allergies   Allergen Reactions    Morphine Other (comments)     AMS       Objective:  Vitals:    Vitals:    06/07/18 2218 06/08/18 0427 06/08/18 0504 06/08/18 0740   BP: 103/53 112/68  118/68   Pulse: (!) 58 (!) 53  (!) 57   Resp: 18 18 18   Temp: 97.9 °F (36.6 °C) 97.8 °F (36.6 °C)  97.3 °F (36.3 °C)   SpO2: 96% 97%  99%   Weight:   106.1 kg (234 lb)    Height:         Intake and Output:     06/06 1901 - 06/08 0700  In: 2090 [P.O.:240; I.V.:1850]  Out: 200 [Urine:1900]    Physical Examination:  General:Alert, No distress,   HEENT: pale . .   Neck:Supple,no mass palpable  Lungs : CTA  CVS: RRR, S1 S2 normal, No rub,no LE edema  Abdomen: Soft, NT  Extremities: gout   Skin: redness in both feet   MS: B/L gout in feet (L) worse than R   Neurologic: non focal, AAO x 3    []    High complexity decision making was performed  []    Patient is at high-risk of decompensation with multiple organ involvement    Lab Data Personally Reviewed: I have reviewed all the pertinent labs, microbiology data and radiology studies during assessment.     Recent Labs      06/08/18   0430  06/07/18   0335  06/06/18   0259  06/05/18   1106   NA  138  135*  138  138   K  3.0* 3. 4*  3.9  5.5*   CL  98  102  110*  113*   CO2  28  20*  15*  12*   GLU  111*  194*  188*  107*   BUN  63*  61*  54*  50*   CREA  3.17*  3.61*  3.89*  3.78*   CA  6.0*  5.9*  6.3*  7.2*   MG   --   1.7  1.7   --    PHOS   --   2.8  2.4*   --    ALB   --    --   2.2*  3.0*   SGOT   --    --   8*  24   ALT   --    --   7*  9*     Recent Labs      06/06/18   0259  06/05/18   1106   WBC  5.9  10.4   HGB  8.1*  10.5*   HCT  24.4*  32.7*   PLT  183  250     Lab Results   Component Value Date/Time    Specimen Description: URINE 10/07/2013 01:36 AM    Specimen Description: BLOOD 10/06/2013 04:13 PM     Lab Results   Component Value Date/Time    Culture result: PROBABLE ENTEROCOCCUS SPECIES (PREDOMINATING) (A) 06/05/2018 03:22 PM    Culture result: CANDIDA ALBICANS (A) 06/05/2018 03:22 PM    Culture result:  10/07/2013 01:36 AM     STREPTOCOCCI, BETA HEMOLYTIC GROUP C , Penicillin and ampicillin are drugs of choice for treatment of beta-hemolytic streptococcal infections. Susceptibility testing of penicillins and beta-lactams approved by the FDA for treatment of beta-hemolytic streptococcal infections need not be performed routinely, because nonsusceptible isolates are extremely rare.  CLSI 2012    Culture result: NO GROWTH 5 DAYS 10/06/2013 04:13 PM     Recent Results (from the past 24 hour(s))   GLUCOSE, POC    Collection Time: 06/07/18 10:26 AM   Result Value Ref Range    Glucose (POC) 179 (H) 65 - 100 mg/dL    Performed by Teri Hint, POC    Collection Time: 06/07/18  5:11 PM   Result Value Ref Range    Glucose (POC) 117 (H) 65 - 100 mg/dL    Performed by Evan Lopez    GLUCOSE, POC    Collection Time: 06/07/18  9:18 PM   Result Value Ref Range    Glucose (POC) 151 (H) 65 - 100 mg/dL    Performed by Kalee Echeverria (PCT)    METABOLIC PANEL, BASIC    Collection Time: 06/08/18  4:30 AM   Result Value Ref Range    Sodium 138 136 - 145 mmol/L    Potassium 3.0 (L) 3.5 - 5.1 mmol/L    Chloride 98 97 - 108 mmol/L CO2 28 21 - 32 mmol/L    Anion gap 12 5 - 15 mmol/L    Glucose 111 (H) 65 - 100 mg/dL    BUN 63 (H) 6 - 20 MG/DL    Creatinine 3.17 (H) 0.70 - 1.30 MG/DL    BUN/Creatinine ratio 20 12 - 20      GFR est AA 23 (L) >60 ml/min/1.73m2    GFR est non-AA 19 (L) >60 ml/min/1.73m2    Calcium 6.0 (LL) 8.5 - 10.1 MG/DL   FERRITIN    Collection Time: 06/08/18  4:30 AM   Result Value Ref Range    Ferritin 685 (H) 26 - 388 NG/ML   IRON PROFILE    Collection Time: 06/08/18  4:30 AM   Result Value Ref Range    Iron 61 35 - 150 ug/dL    TIBC 144 (L) 250 - 450 ug/dL    Iron % saturation 42 20 - 50 %   GLUCOSE, POC    Collection Time: 06/08/18  6:03 AM   Result Value Ref Range    Glucose (POC) 124 (H) 65 - 100 mg/dL    Performed by Magui Brooks I have reviewed the flowsheets. Chart and Pertinent Notes have been reviewed. No change in PMH ,family and social history from Consult note.       Dee Stearns MD

## 2018-06-09 LAB
ALBUMIN SERPL-MCNC: 2.1 G/DL (ref 3.5–5)
ANION GAP SERPL CALC-SCNC: 12 MMOL/L (ref 5–15)
BUN SERPL-MCNC: 58 MG/DL (ref 6–20)
BUN/CREAT SERPL: 19 (ref 12–20)
CALCIUM SERPL-MCNC: 6.6 MG/DL (ref 8.5–10.1)
CHLORIDE SERPL-SCNC: 103 MMOL/L (ref 97–108)
CO2 SERPL-SCNC: 24 MMOL/L (ref 21–32)
CREAT SERPL-MCNC: 3.06 MG/DL (ref 0.7–1.3)
GLUCOSE BLD STRIP.AUTO-MCNC: 108 MG/DL (ref 65–100)
GLUCOSE BLD STRIP.AUTO-MCNC: 138 MG/DL (ref 65–100)
GLUCOSE BLD STRIP.AUTO-MCNC: 159 MG/DL (ref 65–100)
GLUCOSE BLD STRIP.AUTO-MCNC: 92 MG/DL (ref 65–100)
GLUCOSE SERPL-MCNC: 86 MG/DL (ref 65–100)
PHOSPHATE SERPL-MCNC: 3 MG/DL (ref 2.6–4.7)
POTASSIUM SERPL-SCNC: 4 MMOL/L (ref 3.5–5.1)
SERVICE CMNT-IMP: ABNORMAL
SERVICE CMNT-IMP: NORMAL
SODIUM SERPL-SCNC: 139 MMOL/L (ref 136–145)

## 2018-06-09 PROCEDURE — 36415 COLL VENOUS BLD VENIPUNCTURE: CPT | Performed by: INTERNAL MEDICINE

## 2018-06-09 PROCEDURE — 82962 GLUCOSE BLOOD TEST: CPT

## 2018-06-09 PROCEDURE — 74011250637 HC RX REV CODE- 250/637: Performed by: INTERNAL MEDICINE

## 2018-06-09 PROCEDURE — 74011250636 HC RX REV CODE- 250/636: Performed by: HOSPITALIST

## 2018-06-09 PROCEDURE — 65270000032 HC RM SEMIPRIVATE

## 2018-06-09 PROCEDURE — 74011250637 HC RX REV CODE- 250/637: Performed by: HOSPITALIST

## 2018-06-09 PROCEDURE — 74011636637 HC RX REV CODE- 636/637: Performed by: HOSPITALIST

## 2018-06-09 PROCEDURE — 80069 RENAL FUNCTION PANEL: CPT | Performed by: INTERNAL MEDICINE

## 2018-06-09 PROCEDURE — 74011250636 HC RX REV CODE- 250/636: Performed by: INTERNAL MEDICINE

## 2018-06-09 RX ORDER — CALCIUM CARBONATE 200(500)MG
400 TABLET,CHEWABLE ORAL 3 TIMES DAILY
Status: DISCONTINUED | OUTPATIENT
Start: 2018-06-09 | End: 2018-06-10 | Stop reason: HOSPADM

## 2018-06-09 RX ORDER — ONDANSETRON 2 MG/ML
4 INJECTION INTRAMUSCULAR; INTRAVENOUS
Status: DISCONTINUED | OUTPATIENT
Start: 2018-06-09 | End: 2018-06-10 | Stop reason: HOSPADM

## 2018-06-09 RX ORDER — FLUCONAZOLE 100 MG/1
200 TABLET ORAL DAILY
Status: DISCONTINUED | OUTPATIENT
Start: 2018-06-09 | End: 2018-06-10 | Stop reason: HOSPADM

## 2018-06-09 RX ADMIN — LINEZOLID 600 MG: 600 TABLET, FILM COATED ORAL at 05:40

## 2018-06-09 RX ADMIN — CALCIUM CARBONATE (ANTACID) CHEW TAB 500 MG 400 MG: 500 CHEW TAB at 22:25

## 2018-06-09 RX ADMIN — EPOETIN ALFA 14000 UNITS: 4000 SOLUTION INTRAVENOUS; SUBCUTANEOUS at 18:09

## 2018-06-09 RX ADMIN — Medication 10 ML: at 05:40

## 2018-06-09 RX ADMIN — CALCITRIOL 0.25 MCG: 0.25 CAPSULE, LIQUID FILLED ORAL at 09:39

## 2018-06-09 RX ADMIN — SODIUM BICARBONATE 650 MG: 650 TABLET ORAL at 09:39

## 2018-06-09 RX ADMIN — LINEZOLID 600 MG: 600 TABLET, FILM COATED ORAL at 18:07

## 2018-06-09 RX ADMIN — METOPROLOL TARTRATE 50 MG: 50 TABLET ORAL at 09:40

## 2018-06-09 RX ADMIN — ALUMINUM HYDROXIDE AND MAGNESIUM HYDROXIDE 15 ML: 200; 200 SUSPENSION ORAL at 12:03

## 2018-06-09 RX ADMIN — FERROUS SULFATE TAB 325 MG (65 MG ELEMENTAL FE) 325 MG: 325 (65 FE) TAB at 17:48

## 2018-06-09 RX ADMIN — PREDNISONE 30 MG: 20 TABLET ORAL at 07:27

## 2018-06-09 RX ADMIN — ONDANSETRON 4 MG: 2 INJECTION INTRAMUSCULAR; INTRAVENOUS at 08:40

## 2018-06-09 RX ADMIN — PANTOPRAZOLE SODIUM 40 MG: 40 TABLET, DELAYED RELEASE ORAL at 07:27

## 2018-06-09 RX ADMIN — FERROUS SULFATE TAB 325 MG (65 MG ELEMENTAL FE) 325 MG: 325 (65 FE) TAB at 07:28

## 2018-06-09 RX ADMIN — FLUCONAZOLE 200 MG: 100 TABLET ORAL at 11:55

## 2018-06-09 RX ADMIN — Medication 1000 MCG: at 09:39

## 2018-06-09 RX ADMIN — ASPIRIN 81 MG CHEWABLE TABLET 81 MG: 81 TABLET CHEWABLE at 09:39

## 2018-06-09 RX ADMIN — SODIUM BICARBONATE 650 MG: 650 TABLET ORAL at 17:48

## 2018-06-09 RX ADMIN — CALCIUM CARBONATE (ANTACID) CHEW TAB 500 MG 400 MG: 500 CHEW TAB at 17:48

## 2018-06-09 RX ADMIN — CALCIUM CARBONATE (ANTACID) CHEW TAB 500 MG 400 MG: 500 CHEW TAB at 09:39

## 2018-06-09 RX ADMIN — ALLOPURINOL 100 MG: 100 TABLET ORAL at 22:25

## 2018-06-09 NOTE — PROGRESS NOTES
Received consult from physician regarding likely discharge tomorrow with family transporting to return to independent living. CM called pt's daughter Gayle Smart, phone [phone]. She has concerns about pt's care of his apartment, reports pt's is hoarder most specifically with garbage, not cleaning. Per pt's daughter, this has been an ongoing issue for a while, very frustrating for pt's daughter and son who try to assist.  She expressed frustration about condition of the apartment. She has also been going through the [place] for services, as he is a . He has previously not been connected with [facility]. She has appointment on Tuesday June 12th to get him \"[facility]". Pt needs mostly cleaning assistance in the home. Pt is currently undergoing cancer treatments. Also suggested calling [facility] for assistance with questions, pt has enrolled in COBRA from previous job, has limited money, only recently stopped working. Pt does still drive, sometimes struggles with gout which limits his ability. Pt just stopped working in November, 2017 at [employer], [employer]. Provided support as able. Pt is decisional and alert and oriented. She requested that staff also encourage pt and talk to him about receiving help in his home regarding reported situation at home.      ZENIA Elizabeth

## 2018-06-09 NOTE — PROGRESS NOTES
[facility]   [address] [address], [facility] [address], [place]  Phone: [phone]   [phone]       Nephrology Progress Note  Jase Agrawal.     1940     [id number]  Date of Admission : 6/5/2018 06/09/18    CC: Follow up for JASIEL       Assessment and Plan   JASIEL on CKD :  - etiology :unclear -- infection/ stent occulsion   - Improving w/ IVF now   - CT : No Pyleo and B/L ureteral dilatation is chronic   - not sure if he is ready for d/c today due to nausea, Foot pain, cloudy urine today   - continue IVF     CKD Stage IV   - baseline Cr 2.4-2.5 mg/dl     Enterococcal + Fungal UTI   - per primary team   - add fluconazole     Hypokalemia :  - replete PRN     Acute Gout   -continue steroids     Anemia :  - likely 2/2 CKD  - SPEP negative, 24 hr results pending   - On epogen     B/L chronic Hydro  - recent ureteral stent change. F/b Dr Davy Fuentes     Met Prostate ca :  - Bone mets are old   - f/b Dr Justice Salcido     HTN :  - stable     Met Acidosis :  - oral Bicarb on d/c        Interval History:  Seen and examined  Feels nauseous, ill tofay   Ledft foot pain worse  Per nurse and pt, cloudy and odorous urine     Review of Systems: Pertinent items are noted in HPI.     Current Medications:   Current Facility-Administered Medications   Medication Dose Route Frequency    calcium carbonate (TUMS) chewable tablet 400 mg [elemental]  400 mg Oral TID    ondansetron (ZOFRAN) injection 4 mg  4 mg IntraVENous Q6H PRN    aluminum-magnesium hydroxide (MAALOX) oral suspension 15 mL  15 mL Oral QID PRN    lactated Ringers 1,000 mL with potassium chloride 30 mEq infusion   IntraVENous CONTINUOUS    sodium bicarbonate tablet 650 mg  650 mg Oral BID    linezolid (ZYVOX) tablet 600 mg  600 mg Oral Q12H    predniSONE (DELTASONE) tablet 30 mg  30 mg Oral DAILY WITH BREAKFAST    INV [id number] enzalutamide Sonu Godfrey) investigational chemo capsule 160 mg (Patient Supplied)  160 mg Oral DAILY    epoetin keyona (EPOGEN;PROCRIT) 14,000 Units  14,000 Units SubCUTAneous Q TUE, THU & SAT    glucose chewable tablet 16 g  4 Tab Oral PRN    dextrose (D50W) injection syrg 12.5-25 g  12.5-25 g IntraVENous PRN    glucagon (GLUCAGEN) injection 1 mg  1 mg IntraMUSCular PRN    insulin lispro (HUMALOG) injection   SubCUTAneous AC&HS    pantoprazole (PROTONIX) tablet 40 mg  40 mg Oral ACB    aspirin chewable tablet 81 mg  81 mg Oral DAILY    calcitRIOL (ROCALTROL) capsule 0.25 mcg  0.25 mcg Oral DAILY    cyanocobalamin (VITAMIN B12) tablet 1,000 mcg  1,000 mcg Oral DAILY    ferrous sulfate tablet 325 mg  325 mg Oral ACB&D    metoprolol tartrate (LOPRESSOR) tablet 50 mg  50 mg Oral Q12H    sodium chloride (NS) flush 5-10 mL  5-10 mL IntraVENous Q8H    sodium chloride (NS) flush 5-10 mL  5-10 mL IntraVENous PRN    acetaminophen (TYLENOL) tablet 650 mg  650 mg Oral Q6H PRN    allopurinol (ZYLOPRIM) tablet 100 mg  100 mg Oral QHS      Allergies   Allergen Reactions    Morphine Other (comments)     AMS       Objective:  Vitals:    Vitals:    06/08/18 2159 06/09/18 0110 06/09/18 0510 06/09/18 0915   BP: 119/70 121/71  124/70   Pulse: 72 (!) 58  65   Resp:  16  18   Temp:  98 °F (36.7 °C)  98.2 °F (36.8 °C)   SpO2:  98%  98%   Weight:   108.2 kg (238 lb 9.6 oz)    Height:         Intake and Output:  06/09 0701 - 06/09 1900  In: -   Out: 150 [Urine:150]  06/07 1901 - 06/09 0700  In: 1262.5 [P.O.:300;  I.V.:962.5]  Out: 3500 [Urine:3500]    Physical Examination:  General:Alert, No distress,   HEENT: pale . .   Neck:Supple,no mass palpable  Lungs : CTA  CVS: RRR, S1 S2 normal, No rub,no LE edema  Abdomen: Soft, NT  Extremities: gout   Skin: redness in both feet   MS: B/L gout in feet (L) worse than R   Neurologic: non focal, AAO x 3    []    High complexity decision making was performed  []    Patient is at high-risk of decompensation with multiple organ involvement    Lab Data Personally Reviewed: I have reviewed all the pertinent labs, microbiology data and radiology studies during assessment. Recent Labs      06/09/18   0827  06/08/18   0430  06/07/18   0335   NA  139  138  135*   K  4.0  3.0*  3.4*   CL  103  98  102   CO2  24  28  20*   GLU  86  111*  194*   BUN  58*  63*  61*   CREA  3.06*  3.17*  3.61*   CA  6.6*  6.0*  5.9*   MG   --    --   1.7   PHOS  3.0   --   2.8   ALB  2.1*   --    --      No results for input(s): WBC, HGB, HCT, PLT, HGBEXT, HCTEXT, PLTEXT, HGBEXT, HCTEXT, PLTEXT in the last 72 hours. Lab Results   Component Value Date/Time    Specimen Description: URINE 10/07/2013 01:36 AM    Specimen Description: BLOOD 10/06/2013 04:13 PM     Lab Results   Component Value Date/Time    Culture result: ENTEROCOCCUS FAECIUM GROUP D (A) 06/05/2018 03:22 PM    Culture result: **VANCOMYCIN RESISTANT ENTEROCOCCUS FAECIUM** (A) 06/05/2018 03:22 PM    Culture result: CANDIDA ALBICANS (A) 06/05/2018 03:22 PM    Culture result:  10/07/2013 01:36 AM     STREPTOCOCCI, BETA HEMOLYTIC GROUP C , Penicillin and ampicillin are drugs of choice for treatment of beta-hemolytic streptococcal infections. Susceptibility testing of penicillins and beta-lactams approved by the FDA for treatment of beta-hemolytic streptococcal infections need not be performed routinely, because nonsusceptible isolates are extremely rare.  CLSI 2012    Culture result: NO GROWTH 5 DAYS 10/06/2013 04:13 PM     Recent Results (from the past 24 hour(s))   GLUCOSE, POC    Collection Time: 06/08/18 10:05 AM   Result Value Ref Range    Glucose (POC) 119 (H) 65 - 100 mg/dL    Performed by StrataCloudnita Azuquaia    GLUCOSE, POC    Collection Time: 06/08/18 12:01 PM   Result Value Ref Range    Glucose (POC) 136 (H) 65 - 100 mg/dL    Performed by StrataCloudnita Qualia    GLUCOSE, POC    Collection Time: 06/08/18  4:33 PM   Result Value Ref Range    Glucose (POC) 149 (H) 65 - 100 mg/dL    Performed by [place], POC    Collection Time: 06/08/18  9:20 PM   Result Value Ref Range    Glucose (POC) 145 (H) 65 - 100 mg/dL    Performed by Kyle Reina    GLUCOSE, POC    Collection Time: 06/09/18  7:33 AM   Result Value Ref Range    Glucose (POC) 92 65 - 100 mg/dL    Performed by Amado Fuentes    RENAL FUNCTION PANEL    Collection Time: 06/09/18  8:27 AM   Result Value Ref Range    Sodium 139 136 - 145 mmol/L    Potassium 4.0 3.5 - 5.1 mmol/L    Chloride 103 97 - 108 mmol/L    CO2 24 21 - 32 mmol/L    Anion gap 12 5 - 15 mmol/L    Glucose 86 65 - 100 mg/dL    BUN 58 (H) 6 - 20 MG/DL    Creatinine 3.06 (H) 0.70 - 1.30 MG/DL    BUN/Creatinine ratio 19 12 - 20      GFR est AA 24 (L) >60 ml/min/1.73m2    GFR est non-AA 20 (L) >60 ml/min/1.73m2    Calcium 6.6 (L) 8.5 - 10.1 MG/DL    Phosphorus 3.0 2.6 - 4.7 MG/DL    Albumin 2.1 (L) 3.5 - 5.0 g/dL           I have reviewed the flowsheets. Chart and Pertinent Notes have been reviewed. No change in PMH ,family and social history from Consult note.       Jolly Rodriguez MD

## 2018-06-09 NOTE — PROGRESS NOTES
Awaiting labs   Good UOP   Hopefully d/c today.  Will need tums and sodium bicarb on d/c       Sher EMERSON  [facility]  Office :[phone]  Fax: [phone]

## 2018-06-09 NOTE — PROGRESS NOTES
Hospitalist Progress Note  Abrahan Crain MD  Answering service: [phone] from in house phone  Cell: [phone]      Date of Service:  2018  NAME:  Wendi Kelly :  1940  MRN:  [id number]      Admission Summary:   79 yo male with Gout, Stage 4 Prostate Ca on Lupron q3 months, and Xtandi, CKD 4, s/p b/l ureteral stents, CAD s/p CABG, HTN admitted for bilateral ankle pain and gout flare.  In ER found to have elevated Cr and K    Interval history / Subjective:    pt seen and examined  Feeling nauseous and bloated       Assessment & Plan:     Acut Gout Flare  - improved with Steroids and cochicine  - c/w allopurinol  - steroids changed to PO     Acute on CKD IV  - on bicarb gtt  - cr improving  - renal following  - CT: stent in place  - c/w IVF    Metabolic Acidosis due to renal failure  - resolved    Hypocalcemia  - replaced    Hyperkalemia - resolved  - now Hypokalemia, replace    Probable UTI  - will follow cx, patient has hx of of prostate ca s/p TURP but having frequent urination  - UCx: VRE   - Zyvox for 5 days    Anemia due CKD  - stable    B/L Chronic Hydronephrosis  - s/p ureteral stent change by  at [facility]    CAD  - c/w ASA/Lopressor    Code status: FULL  DVT prophylaxis: Edson Curtis discussed with: Patient/Family, Nurse and Consultant Augie Day  Disposition: Home w/Family     Hospital Problems  Date Reviewed: 2018          Codes Class Noted POA    * (Principal)Acute worsening of stage 4 chronic kidney disease ([facility] [address] ICD-10-CM: N28.9, N18.4  ICD-9-CM: 593.9, 585.4  2018 Yes        Metabolic acidosis QJO-88-EA: E87.2  ICD-9-CM: 276.2  2018 Yes        Acute gout ICD-10-CM: M10.9  ICD-9-CM: 274.01  2018 Yes        Hyperkalemia ICD-10-CM: E87.5  ICD-9-CM: 276.7  2018 Yes                Review of Systems:   A comprehensive review of systems was negative except for that written in the HPI. Vital Signs:    Last 24hrs VS reviewed since prior progress note. Most recent are:  Visit Vitals    /70 (BP 1 Location: Left arm, BP Patient Position: At rest)    Pulse 65    Temp 98.2 °F (36.8 °C)    Resp 18    Ht 5' 11\" (1.803 m)    Wt 108.2 kg (238 lb 9.6 oz)    SpO2 98%    BMI 33.28 kg/m2         Intake/Output Summary (Last 24 hours) at 06/09/18 1228  Last data filed at 06/09/18 1002   Gross per 24 hour   Intake           1502.5 ml   Output             2950 ml   Net          -1447.5 ml        Physical Examination:             Constitutional:  No acute distress, cooperative, pleasant    ENT:  Oral mucous moist, oropharynx benign. Resp:  CTA bilaterally. CV:  Regular rhythm, normal rate,    GI:  Soft, non distended, non tender. normoactive bowel sounds    Musculoskeletal:  nonpitting edema at ankle improved, no tenderness warm, 2+ pulses throughout    Neurologic:  Moves all extremities. AAOx3,     Skin:  Good turgor, no rashes or ulcers       Data Review:    Review and/or order of clinical lab test  Review and/or order of tests in the radiology section of CPT  Review and/or order of tests in the medicine section of CPT      Labs:     No results for input(s): WBC, HGB, HCT, PLT, HGBEXT, HCTEXT, PLTEXT, HGBEXT, HCTEXT, PLTEXT in the last 72 hours. Recent Labs      06/09/18 0827 06/08/18 0430 06/07/18   0335   NA  139  138  135*   K  4.0  3.0*  3.4*   CL  103  98  102   CO2  24  28  20*   BUN  58*  63*  61*   CREA  3.06*  3.17*  3.61*   GLU  86  111*  194*   CA  6.6*  6.0*  5.9*   MG   --    --   1.7   PHOS  3.0   --   2.8     Recent Labs      06/09/18 0827   ALB  2.1*     No results for input(s): INR, PTP, APTT in the last 72 hours. No lab exists for component: INREXT, INREXT   Recent Labs      06/08/18   0430   TIBC  144*   PSAT  42   FERR  685*      No results found for: FOL, RBCF   No results for input(s): PH, PCO2, PO2 in the last 72 hours.   No results for input(s): CPK, CKNDX, TROIQ in the last 72 hours.     No lab exists for component: CPKMB  Lab Results   Component Value Date/Time    Cholesterol, total 85 10/07/2013 12:00 AM    HDL Cholesterol 5 10/07/2013 12:00 AM    LDL, calculated 39.6 10/07/2013 12:00 AM    Triglyceride 202 (H) 10/07/2013 12:00 AM    CHOL/HDL Ratio 17.0 (H) 10/07/2013 12:00 AM     Lab Results   Component Value Date/Time    Glucose (POC) 159 (H) 06/09/2018 11:16 AM    Glucose (POC) 92 06/09/2018 07:33 AM    Glucose (POC) 145 (H) 06/08/2018 09:20 PM    Glucose (POC) 149 (H) 06/08/2018 04:33 PM    Glucose (POC) 136 (H) 06/08/2018 12:01 PM     Lab Results   Component Value Date/Time    Color YELLOW/STRAW 06/05/2018 03:22 PM    Appearance TURBID (A) 06/05/2018 03:22 PM    Specific gravity 1.011 06/05/2018 03:22 PM    pH (UA) 6.0 06/05/2018 03:22 PM    Protein 100 (A) 06/05/2018 03:22 PM    Glucose NEGATIVE  06/05/2018 03:22 PM    Ketone NEGATIVE  06/05/2018 03:22 PM    Bilirubin NEGATIVE  06/05/2018 03:22 PM    Urobilinogen 0.2 06/05/2018 03:22 PM    Nitrites NEGATIVE  06/05/2018 03:22 PM    Leukocyte Esterase LARGE (A) 06/05/2018 03:22 PM    Epithelial cells FEW 06/05/2018 03:22 PM    Bacteria 3+ (A) 06/05/2018 03:22 PM    WBC >100 (H) 06/05/2018 03:22 PM    RBC 5-10 06/05/2018 03:22 PM         Medications Reviewed:     Current Facility-Administered Medications   Medication Dose Route Frequency    calcium carbonate (TUMS) chewable tablet 400 mg [elemental]  400 mg Oral TID    ondansetron (ZOFRAN) injection 4 mg  4 mg IntraVENous Q6H PRN    aluminum-magnesium hydroxide (MAALOX) oral suspension 15 mL  15 mL Oral QID PRN    fluconazole (DIFLUCAN) tablet 200 mg  200 mg Oral DAILY    lactated Ringers 1,000 mL with potassium chloride 30 mEq infusion   IntraVENous CONTINUOUS    sodium bicarbonate tablet 650 mg  650 mg Oral BID    linezolid (ZYVOX) tablet 600 mg  600 mg Oral Q12H    predniSONE (DELTASONE) tablet 30 mg  30 mg Oral DAILY WITH BREAKFAST    INV [id number] enzalutamide Eugenio Hockey) investigational chemo capsule 160 mg (Patient Supplied)  160 mg Oral DAILY    epoetin keyona (EPOGEN;PROCRIT) 14,000 Units  14,000 Units SubCUTAneous Q TUE, THU & SAT    glucose chewable tablet 16 g  4 Tab Oral PRN    dextrose (D50W) injection syrg 12.5-25 g  12.5-25 g IntraVENous PRN    glucagon (GLUCAGEN) injection 1 mg  1 mg IntraMUSCular PRN    insulin lispro (HUMALOG) injection   SubCUTAneous AC&HS    pantoprazole (PROTONIX) tablet 40 mg  40 mg Oral ACB    aspirin chewable tablet 81 mg  81 mg Oral DAILY    calcitRIOL (ROCALTROL) capsule 0.25 mcg  0.25 mcg Oral DAILY    cyanocobalamin (VITAMIN B12) tablet 1,000 mcg  1,000 mcg Oral DAILY    ferrous sulfate tablet 325 mg  325 mg Oral ACB&D    metoprolol tartrate (LOPRESSOR) tablet 50 mg  50 mg Oral Q12H    sodium chloride (NS) flush 5-10 mL  5-10 mL IntraVENous Q8H    sodium chloride (NS) flush 5-10 mL  5-10 mL IntraVENous PRN    acetaminophen (TYLENOL) tablet 650 mg  650 mg Oral Q6H PRN    allopurinol (ZYLOPRIM) tablet 100 mg  100 mg Oral QHS     ______________________________________________________________________  EXPECTED LENGTH OF STAY: 3d 7h  ACTUAL LENGTH OF STAY:          4                 Jennifer Reyes MD

## 2018-06-10 VITALS
BODY MASS INDEX: 33.32 KG/M2 | HEIGHT: 71 IN | HEART RATE: 60 BPM | TEMPERATURE: 98.2 F | OXYGEN SATURATION: 99 % | RESPIRATION RATE: 18 BRPM | DIASTOLIC BLOOD PRESSURE: 58 MMHG | WEIGHT: 238 LBS | SYSTOLIC BLOOD PRESSURE: 118 MMHG

## 2018-06-10 LAB
ANION GAP SERPL CALC-SCNC: 9 MMOL/L (ref 5–15)
BUN SERPL-MCNC: 53 MG/DL (ref 6–20)
BUN/CREAT SERPL: 19 (ref 12–20)
CALCIUM SERPL-MCNC: 7.1 MG/DL (ref 8.5–10.1)
CHLORIDE SERPL-SCNC: 102 MMOL/L (ref 97–108)
CO2 SERPL-SCNC: 26 MMOL/L (ref 21–32)
CREAT SERPL-MCNC: 2.82 MG/DL (ref 0.7–1.3)
GLUCOSE BLD STRIP.AUTO-MCNC: 83 MG/DL (ref 65–100)
GLUCOSE SERPL-MCNC: 95 MG/DL (ref 65–100)
POTASSIUM SERPL-SCNC: 4.2 MMOL/L (ref 3.5–5.1)
SERVICE CMNT-IMP: NORMAL
SODIUM SERPL-SCNC: 137 MMOL/L (ref 136–145)

## 2018-06-10 PROCEDURE — 74011250637 HC RX REV CODE- 250/637: Performed by: INTERNAL MEDICINE

## 2018-06-10 PROCEDURE — 74011250637 HC RX REV CODE- 250/637: Performed by: HOSPITALIST

## 2018-06-10 PROCEDURE — 80048 BASIC METABOLIC PNL TOTAL CA: CPT | Performed by: HOSPITALIST

## 2018-06-10 PROCEDURE — 74011636637 HC RX REV CODE- 636/637: Performed by: HOSPITALIST

## 2018-06-10 PROCEDURE — 74011250636 HC RX REV CODE- 250/636: Performed by: INTERNAL MEDICINE

## 2018-06-10 PROCEDURE — 82962 GLUCOSE BLOOD TEST: CPT

## 2018-06-10 PROCEDURE — 36415 COLL VENOUS BLD VENIPUNCTURE: CPT | Performed by: HOSPITALIST

## 2018-06-10 RX ORDER — LINEZOLID 600 MG/1
600 TABLET, FILM COATED ORAL EVERY 12 HOURS
Qty: 6 TAB | Refills: 0 | Status: SHIPPED | OUTPATIENT
Start: 2018-06-10 | End: 2018-07-16

## 2018-06-10 RX ORDER — PREDNISONE 10 MG/1
30 TABLET ORAL
Qty: 6 TAB | Refills: 0 | Status: SHIPPED | OUTPATIENT
Start: 2018-06-10 | End: 2018-06-20 | Stop reason: CLARIF

## 2018-06-10 RX ORDER — LINEZOLID 600 MG/1
600 TABLET, FILM COATED ORAL EVERY 12 HOURS
Qty: 6 TAB | Refills: 0 | Status: SHIPPED
Start: 2018-06-10 | End: 2018-06-10

## 2018-06-10 RX ORDER — SODIUM BICARBONATE 650 MG/1
650 TABLET ORAL 2 TIMES DAILY
Qty: 60 TAB | Refills: 0 | Status: SHIPPED | OUTPATIENT
Start: 2018-06-10 | End: 2018-08-06 | Stop reason: SDUPTHER

## 2018-06-10 RX ORDER — CALCIUM CARBONATE 200(500)MG
400 TABLET,CHEWABLE ORAL 3 TIMES DAILY
Qty: 180 TAB | Refills: 0 | Status: SHIPPED | OUTPATIENT
Start: 2018-06-10 | End: 2018-06-20 | Stop reason: CLARIF

## 2018-06-10 RX ADMIN — FLUCONAZOLE 200 MG: 100 TABLET ORAL at 09:05

## 2018-06-10 RX ADMIN — FERROUS SULFATE TAB 325 MG (65 MG ELEMENTAL FE) 325 MG: 325 (65 FE) TAB at 06:26

## 2018-06-10 RX ADMIN — METOPROLOL TARTRATE 50 MG: 50 TABLET ORAL at 09:06

## 2018-06-10 RX ADMIN — SODIUM BICARBONATE 650 MG: 650 TABLET ORAL at 09:06

## 2018-06-10 RX ADMIN — PREDNISONE 30 MG: 20 TABLET ORAL at 06:26

## 2018-06-10 RX ADMIN — PANTOPRAZOLE SODIUM 40 MG: 40 TABLET, DELAYED RELEASE ORAL at 06:26

## 2018-06-10 RX ADMIN — ASPIRIN 81 MG CHEWABLE TABLET 81 MG: 81 TABLET CHEWABLE at 09:06

## 2018-06-10 RX ADMIN — CALCITRIOL 0.25 MCG: 0.25 CAPSULE, LIQUID FILLED ORAL at 09:06

## 2018-06-10 RX ADMIN — POTASSIUM CHLORIDE: 2 INJECTION, SOLUTION, CONCENTRATE INTRAVENOUS at 03:16

## 2018-06-10 RX ADMIN — LINEZOLID 600 MG: 600 TABLET, FILM COATED ORAL at 06:36

## 2018-06-10 RX ADMIN — Medication 1000 MCG: at 09:00

## 2018-06-10 RX ADMIN — CALCIUM CARBONATE (ANTACID) CHEW TAB 500 MG 400 MG: 500 CHEW TAB at 09:05

## 2018-06-10 NOTE — DISCHARGE INSTRUCTIONS
Please bring this form with you to show your primary care provider at your follow-up appointment. Primary care provider:  Dr. Hanane Lozano MD    Discharging provider:  Marj Olivares MD    You have been admitted to the hospital with the following diagnoses:  · Acute worsening of stage 4 chronic kidney disease [facility])    FOLLOW-UP CARE RECOMMENDATIONS:    APPOINTMENTS:  Follow-up Information     Follow up With Details Comments Contact Info    Hanane Lozano MD On 6/11/2018 Hospital PCP f/u appointment on Monday June 11 @ 1:30 p.m. [address] [facility]      Jazz Painting MD  follow up after your discharge in 1-2 weeks [address]  [phone]              FOLLOW-UP TESTS recommended:   Take antibiotics and prednisone as prescribed  Follow up with your kidney specialist in 1-2 weeks    PENDING TEST RESULTS:  At the time of your discharge the following test results are still pending: none  Please make sure you review these results with your outpatient follow-up provider(s). SYMPTOMS to watch for: chest pain, shortness of breath, fever, chills, nausea, vomiting, diarrhea, change in mentation, falling, weakness, bleeding. DIET/what to eat:  Cardiac Diet    ACTIVITY:  Activity as tolerated    WOUND CARE: NONE    EQUIPMENT needed:  NONE      What to do if new or unexpected symptoms occur? If you experience any of the above symptoms (or should other concerns or questions arise after discharge) please call your primary care physician. Return to the emergency room if you cannot get hold of your doctor. · It is very important that you keep your follow-up appointment(s). · Please bring discharge papers, medication list (and/or medication bottles) to your follow-up appointments for review by your outpatient provider(s).   · Please check the list of medications and be sure it includes every medication (even non-prescription medications) that your provider wants you to take. · It is important that you take the medication exactly as they are prescribed. · Keep your medication in the bottles provided by the pharmacist and keep a list of the medication names, dosages, and times to be taken in your wallet. · Do not take other medications without consulting your doctor. · If you have any questions about your medications or other instructions, please talk to your nurse or care provider before you leave the hospital.    I understand that if any problems occur once I am at home I am to contact my physician. These instructions were explained to me and I had the opportunity to ask questions. DISCHARGE SUMMARY from Nurse    PATIENT INSTRUCTIONS:    After general anesthesia or intravenous sedation, for 24 hours or while taking prescription Narcotics:  · Limit your activities  · Do not drive and operate hazardous machinery  · Do not make important personal or business decisions  · Do  not drink alcoholic beverages  · If you have not urinated within 8 hours after discharge, please contact your surgeon on call. Report the following to your surgeon:  · Excessive pain, swelling, redness or odor of or around the surgical area  · Temperature over 100.5  · Nausea and vomiting lasting longer than 4 hours or if unable to take medications  · Any signs of decreased circulation or nerve impairment to extremity: change in color, persistent  numbness, tingling, coldness or increase pain  · Any questions    These are general instructions for a healthy lifestyle:    No smoking/ No tobacco products/ Avoid exposure to second hand smoke  Surgeon General's Warning:  Quitting smoking now greatly reduces serious risk to your health.     Obesity, smoking, and sedentary lifestyle greatly increases your risk for illness    A healthy diet, regular physical exercise & weight monitoring are important for maintaining a healthy lifestyle    You may be retaining fluid if you have a history of heart failure or if you experience any of the following symptoms:  Weight gain of 3 pounds or more overnight or 5 pounds in a week, increased swelling in our hands or feet or shortness of breath while lying flat in bed. Please call your doctor as soon as you notice any of these symptoms; do not wait until your next office visit. Recognize signs and symptoms of STROKE:    F-face looks uneven    A-arms unable to move or move unevenly    S-speech slurred or non-existent    T-time-call 911 as soon as signs and symptoms begin-DO NOT go       Back to bed or wait to see if you get better-TIME IS BRAIN. Warning Signs of HEART ATTACK     Call 911 if you have these symptoms:   Chest discomfort. Most heart attacks involve discomfort in the center of the chest that lasts more than a few minutes, or that goes away and comes back. It can feel like uncomfortable pressure, squeezing, fullness, or pain.  Discomfort in other areas of the upper body. Symptoms can include pain or discomfort in one or both arms, the back, neck, jaw, or stomach.  Shortness of breath with or without chest discomfort.  Other signs may include breaking out in a cold sweat, nausea, or lightheadedness. Don't wait more than five minutes to call 911 - MINUTES MATTER! Fast action can save your life. Calling 911 is almost always the fastest way to get lifesaving treatment. Emergency Medical Services staff can begin treatment when they arrive -- up to an hour sooner than if someone gets to the hospital by car. The discharge information has been reviewed with the patient. The patient verbalized understanding. Discharge medications reviewed with the patient and appropriate educational materials and side effects teaching were provided.   ___________________________________________________________________________________________________________________________________

## 2018-06-10 NOTE — PROGRESS NOTES
[facility]   [address] [facility], [facility] [address], [facility]  Phone: [phone]   [phone]       Nephrology Progress Note  Tyson Clayton.     1940     [id number]  Date of Admission : 6/5/2018  06/10/18    CC: Follow up for JASIEL       Assessment and Plan   JASIEL on CKD :  - resolving   - f/u with Dr Manoj Gipson     CKD Stage IV   - baseline Cr 2.4-2.5 mg/dl     Enterococcal + Fungal UTI     Hypokalemia :  - replete PRN     Acute Gout   -continue steroids     Anemia :  - likely 2/2 CKD  - SPEP negative, 24 hr results pending   - On epogen     B/L chronic Hydro  - recent ureteral stent change. F/b Dr Radha Frances     Met Prostate ca :  - Bone mets are old   - f/b Dr Citlalli Ceja     HTN :  - stable     Met Acidosis :  - oral Bicarb on d/c        Interval History:  Seen and examined  Has an appt w/ PCP tomorrow   Cr better  Diarrhea better     Review of Systems: Pertinent items are noted in HPI.     Current Medications:   Current Facility-Administered Medications   Medication Dose Route Frequency    calcium carbonate (TUMS) chewable tablet 400 mg [elemental]  400 mg Oral TID    ondansetron (ZOFRAN) injection 4 mg  4 mg IntraVENous Q6H PRN    aluminum-magnesium hydroxide (MAALOX) oral suspension 15 mL  15 mL Oral QID PRN    fluconazole (DIFLUCAN) tablet 200 mg  200 mg Oral DAILY    lactated Ringers 1,000 mL with potassium chloride 30 mEq infusion   IntraVENous CONTINUOUS    sodium bicarbonate tablet 650 mg  650 mg Oral BID    linezolid (ZYVOX) tablet 600 mg  600 mg Oral Q12H    predniSONE (DELTASONE) tablet 30 mg  30 mg Oral DAILY WITH BREAKFAST    INV [id number] enzalutamide Tryone Schwab) investigational chemo capsule 160 mg (Patient Supplied)  160 mg Oral DAILY    epoetin keyona (EPOGEN;PROCRIT) 14,000 Units  14,000 Units SubCUTAneous Q TUE, THU & SAT    glucose chewable tablet 16 g  4 Tab Oral PRN    dextrose (D50W) injection syrg 12.5-25 g  12.5-25 g IntraVENous PRN    glucagon (GLUCAGEN) injection 1 mg  1 mg IntraMUSCular PRN    insulin lispro (HUMALOG) injection   SubCUTAneous AC&HS    pantoprazole (PROTONIX) tablet 40 mg  40 mg Oral ACB    aspirin chewable tablet 81 mg  81 mg Oral DAILY    calcitRIOL (ROCALTROL) capsule 0.25 mcg  0.25 mcg Oral DAILY    cyanocobalamin (VITAMIN B12) tablet 1,000 mcg  1,000 mcg Oral DAILY    ferrous sulfate tablet 325 mg  325 mg Oral ACB&D    metoprolol tartrate (LOPRESSOR) tablet 50 mg  50 mg Oral Q12H    sodium chloride (NS) flush 5-10 mL  5-10 mL IntraVENous Q8H    sodium chloride (NS) flush 5-10 mL  5-10 mL IntraVENous PRN    acetaminophen (TYLENOL) tablet 650 mg  650 mg Oral Q6H PRN    allopurinol (ZYLOPRIM) tablet 100 mg  100 mg Oral QHS      Allergies   Allergen Reactions    Morphine Other (comments)     AMS       Objective:  Vitals:    Vitals:    06/09/18 2217 06/09/18 2315 06/10/18 0633 06/10/18 0810   BP: 148/76 120/61  118/58   Pulse: (!) 57 (!) 58  60   Resp: 18 18 18   Temp: 98.2 °F (36.8 °C) 98.1 °F (36.7 °C)  98.2 °F (36.8 °C)   SpO2: 98% 98%  99%   Weight:   108 kg (238 lb)    Height:         Intake and Output:  06/10 0701 - 06/10 1900  In: 350 [P.O.:350]  Out: -   06/08 1901 - 06/10 0700  In: 2592.5 [P.O.:1630; I.V.:962.5]  Out: 4300 [Urine:4300]    Physical Examination:  General:Alert, No distress,   HEENT: pale . .   Neck:Supple,no mass palpable  Lungs : CTA  CVS: RRR, S1 S2 normal, No rub,no LE edema  Abdomen: Soft, NT  Extremities: gout   Skin: redness in both feet   MS: B/L gout in feet (L) worse than R   Neurologic: non focal, AAO x 3    []    High complexity decision making was performed  []    Patient is at high-risk of decompensation with multiple organ involvement    Lab Data Personally Reviewed: I have reviewed all the pertinent labs, microbiology data and radiology studies during assessment.     Recent Labs      06/10/18   0324  06/09/18   0827  06/08/18   0430   NA  137  139  138   K  4.2  4.0  3.0*   CL  102  103 98   CO2  26  24  28   GLU  95  86  111*   BUN  53*  58*  63*   CREA  2.82*  3.06*  3.17*   CA  7.1*  6.6*  6.0*   PHOS   --   3.0   --    ALB   --   2.1*   --      No results for input(s): WBC, HGB, HCT, PLT, HGBEXT, HCTEXT, PLTEXT, HGBEXT, HCTEXT, PLTEXT in the last 72 hours. Lab Results   Component Value Date/Time    Specimen Description: URINE 10/07/2013 01:36 AM    Specimen Description: BLOOD 10/06/2013 04:13 PM     Lab Results   Component Value Date/Time    Culture result: ENTEROCOCCUS FAECIUM GROUP D (A) 06/05/2018 03:22 PM    Culture result: **VANCOMYCIN RESISTANT ENTEROCOCCUS FAECIUM** (A) 06/05/2018 03:22 PM    Culture result: CANDIDA ALBICANS (A) 06/05/2018 03:22 PM    Culture result:  10/07/2013 01:36 AM     STREPTOCOCCI, BETA HEMOLYTIC GROUP C , Penicillin and ampicillin are drugs of choice for treatment of beta-hemolytic streptococcal infections. Susceptibility testing of penicillins and beta-lactams approved by the FDA for treatment of beta-hemolytic streptococcal infections need not be performed routinely, because nonsusceptible isolates are extremely rare.  CLSI 2012    Culture result: NO GROWTH 5 DAYS 10/06/2013 04:13 PM     Recent Results (from the past 24 hour(s))   GLUCOSE, POC    Collection Time: 06/09/18  6:30 PM   Result Value Ref Range    Glucose (POC) 138 (H) 65 - 100 mg/dL    Performed by Augustine Lusascha    GLUCOSE, POC    Collection Time: 06/09/18 10:08 PM   Result Value Ref Range    Glucose (POC) 108 (H) 65 - 100 mg/dL    Performed by Augustine Salmon    METABOLIC PANEL, BASIC    Collection Time: 06/10/18  3:24 AM   Result Value Ref Range    Sodium 137 136 - 145 mmol/L    Potassium 4.2 3.5 - 5.1 mmol/L    Chloride 102 97 - 108 mmol/L    CO2 26 21 - 32 mmol/L    Anion gap 9 5 - 15 mmol/L    Glucose 95 65 - 100 mg/dL    BUN 53 (H) 6 - 20 MG/DL    Creatinine 2.82 (H) 0.70 - 1.30 MG/DL    BUN/Creatinine ratio 19 12 - 20      GFR est AA 27 (L) >60 ml/min/1.73m2    GFR est non-AA 22 (L) >60 ml/min/1.73m2 Calcium 7.1 (L) 8.5 - 10.1 MG/DL   GLUCOSE, POC    Collection Time: 06/10/18  6:26 AM   Result Value Ref Range    Glucose (POC) 83 65 - 100 mg/dL    Performed by Byron Jenkins            I have reviewed the flowsheets. Chart and Pertinent Notes have been reviewed. No change in PMH ,family and social history from Consult note.       Lebron Jacob MD

## 2018-06-10 NOTE — PROGRESS NOTES
1130 Patient educated with discharge instructions and Rx. Patient  Verbalized understanding with adequate teach back. Patient signed copy of the discharge instructions that were then placed on the hard chart.

## 2018-06-14 NOTE — DISCHARGE SUMMARY
Discharge Summary     Patient:  Bronwyn Jones MRN: [id number]       YOB: 1940       Age: 78 y.o. Date of admission:  6/5/2018    Date of discharge:  6/14/2018    Primary care provider: Dr. Trish Retana MD    Admitting provider:  Ye Rincon MD    Discharging provider:  Ralph Root MD - [phone]  If unavailable, call [phone] and ask the  to page the triage hospitalist.    Consultations  · IP CONSULT TO NEPHROLOGY    Procedures  · * No surgery found *    Discharge destination: HOME. The patient is stable for discharge. Admission diagnosis  · Acute worsening of stage 4 chronic kidney disease ([facility] [address]    Discharge Medication List as of 6/10/2018  8:35 AM      START taking these medications    Details   calcium carbonate (TUMS) 200 mg calcium (500 mg) chew Take 2 Tabs by mouth three (3) times daily. , Print, Disp-180 Tab, R-0      predniSONE (DELTASONE) 10 mg tablet Take 3 Tabs by mouth daily (with breakfast). , Print, Disp-6 Tab, R-0      sodium bicarbonate 650 mg tablet Take 1 Tab by mouth two (2) times a day., Print, Disp-60 Tab, R-0         CONTINUE these medications which have CHANGED    Details   linezolid (ZYVOX) 600 mg tablet Take 1 Tab by mouth every twelve (12) hours. , Print, Disp-6 Tab, R-0         CONTINUE these medications which have NOT CHANGED    Details   metoprolol tartrate (LOPRESSOR) 50 mg tablet Take 50 mg by mouth two (2) times a day., Historical Med      enzalutamide (XTANDI) 40 mg capsule Take 160 mg by mouth nightly., Historical Med      allopurinol (ZYLOPRIM) 100 mg tablet Take 100 mg by mouth nightly., Historical Med      ferrous sulfate 325 mg (65 mg iron) tablet Take 325 mg by mouth Before breakfast and dinner., Historical Med      calcitRIOL (ROCALTROL) 0.25 mcg capsule Take 0.25 mcg by mouth daily. , Historical Med      cyanocobalamin, vitamin B-12, (VITAMIN B-12) 1,000 mcg/mL drop Take 1,000 mcg by mouth daily. , Historical Med      OTHER,NON-FORMULARY, daily. Neurim  (B-complex with additional supplements) for Memory, Historical Med      aspirin 81 mg chewable tablet Take 81 mg by mouth daily. Historical Med, 81 mg             Follow-up Information     Follow up With Details Comments Susanna Cisneros MD On 6/11/2018 Hospital PCP f/u appointment on Monday June 11 @ 1:30 p.m. [facility] [address] [facility]      Ginny Ambrosio MD  follow up after your discharge in 1-2 weeks [facility]  [address] [place] [address]  [phone]            Final discharge diagnoses and brief hospital course  Please also refer to the admission H&P for details on the presenting problem. 77 yo male with Gout, Stage 4 Prostate Ca on Lupron q3 months, and Xtandi, CKD 4, s/p b/l ureteral stents, CAD s/p CABG, HTN admitted for bilateral ankle pain and gout flare.  In ER found to have elevated Cr and K     Acut Gout Flare  - improved with Steroids and cochicine  - c/w allopurinol  - steroids changed to PO 6/7  - resolved     Acute on CKD IV, improved  - on bicarb gtt  - cr improving  - renal following  - CT: stent in place  - outpatient follow up      Metabolic Acidosis due to renal failure  - resolved     Hypocalcemia  - replaced     Hyperkalemia - resolved  - now Hypokalemia, replace     Probable UTI  - will follow cx, patient has hx of of prostate ca s/p TURP but having frequent urination  - UCx: VRE   - Zyvox for 3 more days to complete 5 days     Anemia due CKD  - stable     B/L Chronic Hydronephrosis  - s/p ureteral stent change by  at [facility]           Physical examination at discharge  Visit Vitals    /58 (BP 1 Location: Right arm, BP Patient Position: At rest)    Pulse 60    Temp 98.2 °F (36.8 °C)    Resp 18    Ht 5' 11\" (1.803 m)    Wt 108 kg (238 lb)    SpO2 99%    BMI 33.19 kg/m2 Ao3  PERRLA  EOMI  Lung: CTA  CVS: RRR  Abd: soft NT ND  Ext: no edema    Pertinent imaging studies:      No results for input(s): WBC, HGB, HCT, PLT, HGBEXT, HCTEXT, PLTEXT in the last 72 hours. No results for input(s): NA, K, CL, CO2, BUN, CREA, GLU, CA, MG, PHOS, URICA in the last 72 hours. No results for input(s): SGOT, GPT, AP, TBIL, TP, ALB, GLOB, GGT, AML, LPSE in the last 72 hours. No lab exists for component: AMYP, HLPSE  No results for input(s): INR, PTP, APTT in the last 72 hours. No lab exists for component: INREXT   No results for input(s): FE, TIBC, PSAT, FERR in the last 72 hours. No results for input(s): PH, PCO2, PO2 in the last 72 hours. No results for input(s): CPK, CKMB in the last 72 hours. No lab exists for component: TROPONINI  No components found for: [place]    Chronic Diagnoses:    Problem List as of 6/10/2018  Date Reviewed: 6/5/2018          Codes Class Noted - Resolved    * (Principal)Acute worsening of stage 4 chronic kidney disease ([facility] [address] ICD-10-CM: N28.9, N18.4  ICD-9-CM: 593.9, 585.4  6/5/2018 - Present        Metabolic acidosis NQO-44-FN: E87.2  ICD-9-CM: 276.2  6/5/2018 - Present        Acute gout ICD-10-CM: M10.9  ICD-9-CM: 274.01  6/5/2018 - Present        Hyperkalemia ICD-10-CM: E87.5  ICD-9-CM: 276.7  6/5/2018 - Present        RESOLVED: Orthostatic hypotension ICD-10-CM: I95.1  ICD-9-CM: 458.0  10/6/2013 - 10/14/2013              Time spent on discharge related activities today greater than 30 minutes.       Signed:  Eliana Maurice MD                 Hospitalist, Internal Medicine      Cc: Darlene Guerrero MD

## 2018-06-20 ENCOUNTER — APPOINTMENT (OUTPATIENT)
Dept: GENERAL RADIOLOGY | Age: 78
End: 2018-06-20
Attending: EMERGENCY MEDICINE
Payer: MEDICARE

## 2018-06-20 ENCOUNTER — APPOINTMENT (OUTPATIENT)
Dept: CT IMAGING | Age: 78
End: 2018-06-20
Attending: EMERGENCY MEDICINE
Payer: MEDICARE

## 2018-06-20 ENCOUNTER — HOSPITAL ENCOUNTER (OUTPATIENT)
Age: 78
Setting detail: OBSERVATION
Discharge: HOME | End: 2018-06-21
Attending: EMERGENCY MEDICINE | Admitting: HOSPITALIST
Payer: MEDICARE

## 2018-06-20 DIAGNOSIS — E87.20 METABOLIC ACIDOSIS: ICD-10-CM

## 2018-06-20 DIAGNOSIS — E87.5 HYPERKALEMIA: ICD-10-CM

## 2018-06-20 DIAGNOSIS — N18.4 ACUTE WORSENING OF STAGE 4 CHRONIC KIDNEY DISEASE (HCC): Primary | ICD-10-CM

## 2018-06-20 LAB
ALBUMIN SERPL-MCNC: 3.2 G/DL (ref 3.5–5)
ALBUMIN/GLOB SERPL: 0.8 {RATIO} (ref 1.1–2.2)
ALP SERPL-CCNC: 66 U/L (ref 45–117)
ALT SERPL-CCNC: 17 U/L (ref 12–78)
ANION GAP BLD CALC-SCNC: 15 MMOL/L (ref 10–20)
ANION GAP BLD CALC-SCNC: 15 MMOL/L (ref 10–20)
ANION GAP SERPL CALC-SCNC: 7 MMOL/L (ref 5–15)
APPEARANCE UR: ABNORMAL
AST SERPL-CCNC: 27 U/L (ref 15–37)
ATRIAL RATE: 64 BPM
BACTERIA URNS QL MICRO: NEGATIVE /HPF
BASOPHILS # BLD: 0 K/UL (ref 0–0.1)
BASOPHILS NFR BLD: 0 % (ref 0–1)
BILIRUB SERPL-MCNC: 0.7 MG/DL (ref 0.2–1)
BILIRUB UR QL: NEGATIVE
BUN BLD-MCNC: 30 MG/DL (ref 9–20)
BUN BLD-MCNC: 30 MG/DL (ref 9–20)
BUN SERPL-MCNC: 30 MG/DL (ref 6–20)
BUN/CREAT SERPL: 13 (ref 12–20)
CA-I BLD-MCNC: 1.09 MMOL/L (ref 1.12–1.32)
CA-I BLD-MCNC: 1.37 MMOL/L (ref 1.12–1.32)
CALCIUM SERPL-MCNC: 8.2 MG/DL (ref 8.5–10.1)
CALCULATED P AXIS, ECG09: -2 DEGREES
CALCULATED R AXIS, ECG10: 10 DEGREES
CALCULATED T AXIS, ECG11: 49 DEGREES
CHLORIDE BLD-SCNC: 112 MMOL/L (ref 98–107)
CHLORIDE BLD-SCNC: 112 MMOL/L (ref 98–107)
CHLORIDE SERPL-SCNC: 114 MMOL/L (ref 97–108)
CO2 BLD-SCNC: 20 MMOL/L (ref 21–32)
CO2 BLD-SCNC: 21 MMOL/L (ref 21–32)
CO2 SERPL-SCNC: 19 MMOL/L (ref 21–32)
COLOR UR: ABNORMAL
CREAT BLD-MCNC: 2.3 MG/DL (ref 0.6–1.3)
CREAT BLD-MCNC: 2.3 MG/DL (ref 0.6–1.3)
CREAT SERPL-MCNC: 2.36 MG/DL (ref 0.7–1.3)
DIAGNOSIS, 93000: NORMAL
DIFFERENTIAL METHOD BLD: ABNORMAL
EOSINOPHIL # BLD: 0.1 K/UL (ref 0–0.4)
EOSINOPHIL NFR BLD: 1 % (ref 0–7)
EPITH CASTS URNS QL MICRO: ABNORMAL /LPF
ERYTHROCYTE [DISTWIDTH] IN BLOOD BY AUTOMATED COUNT: 17.4 % (ref 11.5–14.5)
GLOBULIN SER CALC-MCNC: 4.1 G/DL (ref 2–4)
GLUCOSE BLD-MCNC: 101 MG/DL (ref 65–100)
GLUCOSE BLD-MCNC: 59 MG/DL (ref 65–100)
GLUCOSE SERPL-MCNC: 121 MG/DL (ref 65–100)
GLUCOSE UR STRIP.AUTO-MCNC: NEGATIVE MG/DL
HCT VFR BLD AUTO: 34 % (ref 36.6–50.3)
HCT VFR BLD CALC: 28 % (ref 36.6–50.3)
HCT VFR BLD CALC: 30 % (ref 36.6–50.3)
HGB BLD-MCNC: 10.3 G/DL (ref 12.1–17)
HGB UR QL STRIP: ABNORMAL
HYALINE CASTS URNS QL MICRO: ABNORMAL /LPF (ref 0–5)
IMM GRANULOCYTES # BLD: 0.2 K/UL (ref 0–0.04)
IMM GRANULOCYTES NFR BLD AUTO: 2 % (ref 0–0.5)
KETONES UR QL STRIP.AUTO: NEGATIVE MG/DL
LEUKOCYTE ESTERASE UR QL STRIP.AUTO: ABNORMAL
LYMPHOCYTES # BLD: 0.7 K/UL (ref 0.8–3.5)
LYMPHOCYTES NFR BLD: 9 % (ref 12–49)
MCH RBC QN AUTO: 32.8 PG (ref 26–34)
MCHC RBC AUTO-ENTMCNC: 30.3 G/DL (ref 30–36.5)
MCV RBC AUTO: 108.3 FL (ref 80–99)
MONOCYTES # BLD: 0.4 K/UL (ref 0–1)
MONOCYTES NFR BLD: 5 % (ref 5–13)
NEUTS SEG # BLD: 6.6 K/UL (ref 1.8–8)
NEUTS SEG NFR BLD: 83 % (ref 32–75)
NITRITE UR QL STRIP.AUTO: NEGATIVE
NRBC # BLD: 0 K/UL (ref 0–0.01)
NRBC BLD-RTO: 0 PER 100 WBC
P-R INTERVAL, ECG05: 218 MS
PH UR STRIP: 6 [PH] (ref 5–8)
PLATELET # BLD AUTO: 221 K/UL (ref 150–400)
PMV BLD AUTO: 9.5 FL (ref 8.9–12.9)
POTASSIUM BLD-SCNC: 5.9 MMOL/L (ref 3.5–5.1)
POTASSIUM BLD-SCNC: 6.5 MMOL/L (ref 3.5–5.1)
POTASSIUM SERPL-SCNC: 7.6 MMOL/L (ref 3.5–5.1)
PROT SERPL-MCNC: 7.3 G/DL (ref 6.4–8.2)
PROT UR STRIP-MCNC: 100 MG/DL
Q-T INTERVAL, ECG07: 408 MS
QRS DURATION, ECG06: 80 MS
QTC CALCULATION (BEZET), ECG08: 420 MS
RBC # BLD AUTO: 3.14 M/UL (ref 4.1–5.7)
RBC #/AREA URNS HPF: ABNORMAL /HPF (ref 0–5)
RBC MORPH BLD: ABNORMAL
SERVICE CMNT-IMP: ABNORMAL
SERVICE CMNT-IMP: ABNORMAL
SODIUM BLD-SCNC: 139 MMOL/L (ref 136–145)
SODIUM BLD-SCNC: 141 MMOL/L (ref 136–145)
SODIUM SERPL-SCNC: 140 MMOL/L (ref 136–145)
SP GR UR REFRACTOMETRY: 1.01 (ref 1–1.03)
UR CULT HOLD, URHOLD: NORMAL
UROBILINOGEN UR QL STRIP.AUTO: 0.2 EU/DL (ref 0.2–1)
VENTRICULAR RATE, ECG03: 64 BPM
WBC # BLD AUTO: 8 K/UL (ref 4.1–11.1)
WBC URNS QL MICRO: >100 /HPF (ref 0–4)

## 2018-06-20 PROCEDURE — 74176 CT ABD & PELVIS W/O CONTRAST: CPT

## 2018-06-20 PROCEDURE — 81001 URINALYSIS AUTO W/SCOPE: CPT | Performed by: INTERNAL MEDICINE

## 2018-06-20 PROCEDURE — 99285 EMERGENCY DEPT VISIT HI MDM: CPT

## 2018-06-20 PROCEDURE — 96365 THER/PROPH/DIAG IV INF INIT: CPT

## 2018-06-20 PROCEDURE — 80047 BASIC METABLC PNL IONIZED CA: CPT

## 2018-06-20 PROCEDURE — 99218 HC RM OBSERVATION: CPT

## 2018-06-20 PROCEDURE — 80053 COMPREHEN METABOLIC PANEL: CPT | Performed by: PHYSICIAN ASSISTANT

## 2018-06-20 PROCEDURE — 74011636637 HC RX REV CODE- 636/637: Performed by: EMERGENCY MEDICINE

## 2018-06-20 PROCEDURE — 74011250637 HC RX REV CODE- 250/637: Performed by: INTERNAL MEDICINE

## 2018-06-20 PROCEDURE — 65390000012 HC CONDITION CODE 44 OBSERVATION

## 2018-06-20 PROCEDURE — 74011000258 HC RX REV CODE- 258: Performed by: EMERGENCY MEDICINE

## 2018-06-20 PROCEDURE — 71045 X-RAY EXAM CHEST 1 VIEW: CPT

## 2018-06-20 PROCEDURE — 74011250636 HC RX REV CODE- 250/636: Performed by: INTERNAL MEDICINE

## 2018-06-20 PROCEDURE — 74011250636 HC RX REV CODE- 250/636: Performed by: EMERGENCY MEDICINE

## 2018-06-20 PROCEDURE — 93005 ELECTROCARDIOGRAM TRACING: CPT

## 2018-06-20 PROCEDURE — 96375 TX/PRO/DX INJ NEW DRUG ADDON: CPT

## 2018-06-20 PROCEDURE — 74011000250 HC RX REV CODE- 250: Performed by: EMERGENCY MEDICINE

## 2018-06-20 PROCEDURE — 65660000001 HC RM ICU INTERMED STEPDOWN

## 2018-06-20 PROCEDURE — 85025 COMPLETE CBC W/AUTO DIFF WBC: CPT | Performed by: PHYSICIAN ASSISTANT

## 2018-06-20 PROCEDURE — 74011250637 HC RX REV CODE- 250/637: Performed by: EMERGENCY MEDICINE

## 2018-06-20 PROCEDURE — 36415 COLL VENOUS BLD VENIPUNCTURE: CPT | Performed by: PHYSICIAN ASSISTANT

## 2018-06-20 RX ORDER — PREDNISONE 10 MG/1
10 TABLET ORAL
COMMUNITY
Start: 2018-06-20 | End: 2018-06-23

## 2018-06-20 RX ORDER — GUAIFENESIN 100 MG/5ML
81 LIQUID (ML) ORAL DAILY
Status: DISCONTINUED | OUTPATIENT
Start: 2018-06-21 | End: 2018-06-21 | Stop reason: HOSPADM

## 2018-06-20 RX ORDER — HEPARIN SODIUM 5000 [USP'U]/ML
5000 INJECTION, SOLUTION INTRAVENOUS; SUBCUTANEOUS EVERY 8 HOURS
Status: DISCONTINUED | OUTPATIENT
Start: 2018-06-20 | End: 2018-06-21 | Stop reason: HOSPADM

## 2018-06-20 RX ORDER — SODIUM BICARBONATE 1 MEQ/ML
50 SYRINGE (ML) INTRAVENOUS
Status: COMPLETED | OUTPATIENT
Start: 2018-06-20 | End: 2018-06-20

## 2018-06-20 RX ORDER — LANOLIN ALCOHOL/MO/W.PET/CERES
325 CREAM (GRAM) TOPICAL
Status: DISCONTINUED | OUTPATIENT
Start: 2018-06-21 | End: 2018-06-21 | Stop reason: HOSPADM

## 2018-06-20 RX ORDER — COLCHICINE 0.6 MG/1
0.6 TABLET ORAL DAILY
COMMUNITY
End: 2018-07-16

## 2018-06-20 RX ORDER — CALCIUM CARBONATE 200(500)MG
400 TABLET,CHEWABLE ORAL 3 TIMES DAILY
Status: DISCONTINUED | OUTPATIENT
Start: 2018-06-20 | End: 2018-06-21 | Stop reason: HOSPADM

## 2018-06-20 RX ORDER — METOPROLOL TARTRATE 50 MG/1
50 TABLET ORAL 2 TIMES DAILY
Status: DISCONTINUED | OUTPATIENT
Start: 2018-06-20 | End: 2018-06-21 | Stop reason: HOSPADM

## 2018-06-20 RX ORDER — LANOLIN ALCOHOL/MO/W.PET/CERES
1000 CREAM (GRAM) TOPICAL DAILY
Status: DISCONTINUED | OUTPATIENT
Start: 2018-06-21 | End: 2018-06-21 | Stop reason: HOSPADM

## 2018-06-20 RX ORDER — DEXTROSE 50 % IN WATER (D50W) INTRAVENOUS SYRINGE
25
Status: COMPLETED | OUTPATIENT
Start: 2018-06-20 | End: 2018-06-20

## 2018-06-20 RX ORDER — SODIUM BICARBONATE 650 MG/1
650 TABLET ORAL 2 TIMES DAILY
Status: DISCONTINUED | OUTPATIENT
Start: 2018-06-20 | End: 2018-06-21 | Stop reason: HOSPADM

## 2018-06-20 RX ORDER — DEXLANSOPRAZOLE 60 MG/1
60 CAPSULE, DELAYED RELEASE ORAL DAILY
COMMUNITY
End: 2018-07-16

## 2018-06-20 RX ORDER — PANTOPRAZOLE SODIUM 40 MG/1
40 TABLET, DELAYED RELEASE ORAL 2 TIMES DAILY
Status: DISCONTINUED | OUTPATIENT
Start: 2018-06-20 | End: 2018-06-21 | Stop reason: HOSPADM

## 2018-06-20 RX ORDER — SODIUM POLYSTYRENE SULFONATE 15 G/60ML
45 SUSPENSION ORAL; RECTAL
Status: COMPLETED | OUTPATIENT
Start: 2018-06-20 | End: 2018-06-20

## 2018-06-20 RX ORDER — SODIUM CHLORIDE 0.9 % (FLUSH) 0.9 %
5-10 SYRINGE (ML) INJECTION EVERY 8 HOURS
Status: DISCONTINUED | OUTPATIENT
Start: 2018-06-20 | End: 2018-06-21 | Stop reason: HOSPADM

## 2018-06-20 RX ORDER — CALCIUM GLUCONATE 94 MG/ML
1 INJECTION, SOLUTION INTRAVENOUS
Status: DISCONTINUED | OUTPATIENT
Start: 2018-06-20 | End: 2018-06-20 | Stop reason: SDUPTHER

## 2018-06-20 RX ADMIN — PANTOPRAZOLE SODIUM 40 MG: 40 TABLET, DELAYED RELEASE ORAL at 19:49

## 2018-06-20 RX ADMIN — CALCIUM CARBONATE (ANTACID) CHEW TAB 500 MG 400 MG: 500 CHEW TAB at 22:02

## 2018-06-20 RX ADMIN — DEXTROSE MONOHYDRATE 25 G: 25 INJECTION, SOLUTION INTRAVENOUS at 16:04

## 2018-06-20 RX ADMIN — HEPARIN SODIUM 5000 UNITS: 5000 INJECTION, SOLUTION INTRAVENOUS; SUBCUTANEOUS at 19:50

## 2018-06-20 RX ADMIN — CALCIUM GLUCONATE 1 G: 98 INJECTION, SOLUTION INTRAVENOUS at 16:11

## 2018-06-20 RX ADMIN — SODIUM POLYSTYRENE SULFONATE 45 G: 15 SUSPENSION ORAL; RECTAL at 16:09

## 2018-06-20 RX ADMIN — SODIUM BICARBONATE 650 MG: 650 TABLET ORAL at 19:49

## 2018-06-20 RX ADMIN — METOPROLOL TARTRATE 50 MG: 50 TABLET ORAL at 19:49

## 2018-06-20 RX ADMIN — HUMAN INSULIN 5 UNITS: 100 INJECTION, SOLUTION SUBCUTANEOUS at 16:06

## 2018-06-20 RX ADMIN — SODIUM BICARBONATE 50 MEQ: 84 INJECTION, SOLUTION INTRAVENOUS at 16:07

## 2018-06-20 RX ADMIN — Medication 10 ML: at 22:02

## 2018-06-20 NOTE — PROGRESS NOTES
Admission Medication Reconciliation:    Information obtained from: Patient, daughter    Significant PMH/Disease States:   Past Medical History:   Diagnosis Date    Celiac disease     Chronic kidney disease     Hypertension     MI (mitral incompetence)     Prostate cancer [facility])        Chief Complaint for this Admission:  Hyperkalemia  Allergies:  Morphine    Prior to Admission Medications:   Prior to Admission Medications   Prescriptions Last Dose Informant Patient Reported? Taking? Dexlansoprazole (DEXILANT) 60 mg CpDB   Yes Yes   Sig: Take 60 mg by mouth daily. OTHER,NON-FORMULARY,   Yes Yes   Sig: daily. Neurim  (B-complex with additional supplements) for Memory   allopurinol (ZYLOPRIM) 100 mg tablet 6/19/2018 at Unknown time  Yes Yes   Sig: Take 100 mg by mouth nightly. aspirin 81 mg chewable tablet 6/20/2018 at Unknown time  Yes Yes   Sig: Take 81 mg by mouth daily. calcitRIOL (ROCALTROL) 0.25 mcg capsule 6/20/2018 at Unknown time  Yes Yes   Sig: Take 0.25 mcg by mouth daily. colchicine 0.6 mg tablet   Yes Yes   Sig: Take 0.6 mg by mouth daily. cyanocobalamin, vitamin B-12, (VITAMIN B-12) 1,000 mcg/mL drop 6/20/2018 at Unknown time  Yes Yes   Sig: Take 1,000 mcg by mouth daily. enzalutamide (XTANDI) 40 mg capsule 6/19/2018 at Unknown time  Yes Yes   Sig: Take 160 mg by mouth nightly. ferrous sulfate 325 mg (65 mg iron) tablet 6/20/2018 at Unknown time  Yes Yes   Sig: Take 325 mg by mouth Before breakfast and dinner. linezolid (ZYVOX) 600 mg tablet 6/10/2018  No Yes   Sig: Take 1 Tab by mouth every twelve (12) hours. metoprolol tartrate (LOPRESSOR) 50 mg tablet 6/20/2018 at Unknown time  Yes Yes   Sig: Take 50 mg by mouth two (2) times a day. predniSONE (DELTASONE) 10 mg tablet   Yes Yes   Sig: Take 10 mg by mouth daily (with breakfast). sodium bicarbonate 650 mg tablet   No No   Sig: Take 1 Tab by mouth two (2) times a day.       Facility-Administered Medications: None Comments/Recommendations: Med rec completed after speaking with the patient's daughter who brought in his medications from home. He was recently discharged from this facility on 6/10. Of note, he was discharged on linezolid for a 3 day course for VRE in the urine. He took one dose and did not remember to take the rest.  There were 5 tablets in the bottle remaining from home.       The following changes were made to his list:    Add: Colchicine, Dexilant    Changed Prednisone to 10 mg daily, he has 3 days remaining after today    Benigno Nix, KennedyD

## 2018-06-20 NOTE — PROGRESS NOTES
Date of previous inpatient admission/ ED visit? Pt was hospitalized from 6/05/18-6/10/18. What brought the patient back to ED? Abnormal labs, called by nephrologist as K+ over 6, advised to come to ED. Did patient decline recommended services during last admission/ ED visit (if yes, what)? No pt was cleared by rehab services for return to independent living and family transport. This CM did speak with daughter who expressed concern/frustration over pt's hoarding and inability or unwillingness to clean trash/dishes etc in maintaining his apartment. Of note, pt just recently stopped working, still drives and daughter was investigating him receiving services through [facility]. She had an appointment the week following his discharge from [facility] on 6/10/18. Has patient seen a provider since their last inpatient admission/ED visit (if yes, when)? Pt was sent by nephrologist for abnormal labs. CM Interventions:  From previous inpatient admission/ED visit:  Discussion with daughter regarding calling [facility] as pt can maintain himself but housecleaning of his apartment was an issue. She was also contacting [place]. From current inpatient admission/ED visit:  Pending medical evaluation in ED. Unsure of any new transitions needs at this time.     ZENIA Valencia

## 2018-06-20 NOTE — ED PROVIDER NOTES
HPI Comments: 78 y.o. male with past medical history significant for Prostate Cancer, Chronic Kidney disease, Hypertension, Celiac disease, and Mitral incompetence who presents from home with chief complaint of abnormal lab results. Patient states yesterday completing blood work at [facility] for upcoming appointment with his Nephrologist Dr. Shari Jasso MD. Patient states prior to arrival, Dr. Priya Villar called saying elevated potassium (over 6, per patient). Patient presents to [facility] ED with no current pain or discomfort, but notes feeling \"hard to focus. \" Patient states SOB at baseline. Patent was admitted to [facility] on 06/05/2018 for acute renal failure, bilateral ankle pain and gout. Patient underwent blood work up and was also found to have elevated Cr and K., and patient was discharged on 06/14/2018. Patient also has bilateral renal stents. Pt denies fever, chills, cough, congestion, chest pain, abdominal pain, nausea, vomiting, diarrhea, difficulty with urination or dysuria. There are no other acute medical concerns at this time. PCP: Wander Sparks MD    Note written by Alex Sibley, as dictated by Mandie Walters MD 4:01 PM      The history is provided by the patient and a relative. Past Medical History:   Diagnosis Date    Celiac disease     Chronic kidney disease     Hypertension     MI (mitral incompetence)     Prostate cancer ([facility] [address]        Past Surgical History:   Procedure Laterality Date    CARDIAC SURG PROCEDURE UNLIST      qaudruple bipass         History reviewed. No pertinent family history. Social History     Social History    Marital status:      Spouse name: N/A    Number of children: N/A    Years of education: N/A     Occupational History    Not on file.      Social History Main Topics    Smoking status: Former Smoker    Smokeless tobacco: Never Used    Alcohol use No    Drug use: Not on file    Sexual activity: Not on file     Other Topics Concern    Not on file     Social History Narrative         ALLERGIES: Morphine    Review of Systems   Constitutional: Negative for chills and fever. HENT: Negative for congestion. Respiratory: Positive for shortness of breath. Negative for cough. Cardiovascular: Negative for chest pain. Gastrointestinal: Negative for abdominal pain, diarrhea, nausea and vomiting. Genitourinary: Negative for difficulty urinating and dysuria. Psychiatric/Behavioral: Positive for decreased concentration. All other systems reviewed and are negative. Vitals:    06/20/18 1439   BP: 151/82   Pulse: 67   Resp: 24   Temp: 97.6 °F (36.4 °C)   SpO2: 99%   Weight: 105 kg (231 lb 6.4 oz)   Height: 5' 11\" (1.803 m)            Physical Exam   Constitutional: He is oriented to person, place, and time. He appears well-developed and well-nourished. No distress. Pale, chronically ill, somewhat debilitated   HENT:   Head: Normocephalic and atraumatic. Eyes: Conjunctivae are normal. No scleral icterus. Neck: Neck supple. No tracheal deviation present. Cardiovascular: Normal rate, regular rhythm, normal heart sounds and intact distal pulses. Exam reveals no gallop and no friction rub. No murmur heard. Pulmonary/Chest: Effort normal and breath sounds normal. He has no wheezes. He has no rales. Abdominal: Soft. He exhibits no distension. There is no tenderness. There is no rebound and no guarding. Musculoskeletal: He exhibits no edema. Neurological: He is alert and oriented to person, place, and time. Skin: Skin is warm and dry. No rash noted. Psychiatric: He has a normal mood and affect. Nursing note and vitals reviewed.   Note written by Alex Anand, as dictated by Kurtis Ochoa MD 4:03 PM      MDM  Number of Diagnoses or Management Options     Amount and/or Complexity of Data Reviewed  Clinical lab tests: ordered and reviewed  Tests in the radiology section of CPT®: ordered and reviewed  Tests in the medicine section of CPT®: ordered and reviewed  Discussion of test results with the performing providers: yes  Obtain history from someone other than the patient: yes  Discuss the patient with other providers: yes    Total critical care time spent exclusive of procedures:  34  minutes      ED Course       Procedures  CONSULT NOTE:  3:57 PM Marisela Archibald MD spoke with Dr. Adriel Thompson, Consult for Hospitalist.  Discussed available diagnostic tests and clinical findings. Dr. Adriel Thompson will evaluate patient for admission. ED EKG interpretation:  Rhythm: normal sinus rhythm with 1st degree AV block with premature atrial complexes; and regular . Rate (approx.): 64 bpm; Axis: normal; ST/T wave: Slight peak T waves. Note written by Alex Garcia, as dictated by Marisela Archibald MD 2:43 PM    CONSULT NOTE:  5:16 PM Marisela Archibald MD spoke with the doctor on call, Consult for Nephrology. Discussed available diagnostic tests and clinical findings. On call doctor is aware of the case and will make recommendations, he is unsure is dialysis is required.

## 2018-06-20 NOTE — ROUTINE PROCESS
TRANSFER - OUT REPORT:    Verbal report given to Adina Shah RN (name) on Kent Westbrook.  being transferred to [facility] (unit) for routine progression of care       Report consisted of patients Situation, Background, Assessment and   Recommendations(SBAR). Information from the following report(s) SBAR, ED Summary and Intake/Output, MAR, Cardiac Rhythm NSR was reviewed with the receiving nurse. Lines:   Peripheral IV 06/20/18 Right Antecubital (Active)   Site Assessment Clean, dry, & intact 6/20/2018  2:48 PM   Phlebitis Assessment 0 6/20/2018  2:48 PM   Infiltration Assessment 0 6/20/2018  2:48 PM   Dressing Status Clean, dry, & intact 6/20/2018  2:48 PM   Dressing Type Tape;Transparent 6/20/2018  2:48 PM   Hub Color/Line Status Green;Capped;Flushed;Patent 6/20/2018  2:48 PM   Action Taken Blood drawn 6/20/2018  2:48 PM        Opportunity for questions and clarification was provided.       Patient transported with:   Engana Pty

## 2018-06-20 NOTE — PROGRESS NOTES
[facility]. YOB: 1940          Assessment & Plan:   Hyperkalemia  · Chronic  · Has previously been on Veltassa but did not tolerate due to GI side efx  · Was previously on SPS but has been off it for some time and is not sure why  · Is on low K diet and reports compliance  · No EKG changes, making urine, Cr at baseline or better  · Should be able to treat medically  · Give several doses SPS and check repeat labs. Has received temporizing tx in ED. · Long term, likely needs to be on some maintenance dose of SPS    CKD 4  · Stable    HTN  · OK  · Avoid RAAS inhibitors due to chronic hyperkalemia    SHPT  · Calcitriol       Subjective:   CC: Hyperkalemia  HPI: Patient of Dr. Wade Chairez sent to ER for high K. K here over 7. Treated medically. No EKG change. Has h/o chronic hyperkalemia on low K diet. Has been on SPS in the past but not recently and he is not sure why. Tolerated SPS as he recalls but not veltassa. HTN is controlled. CKD is stable. ROS: no cp/sob/n/v  No current facility-administered medications for this encounter. Current Outpatient Prescriptions   Medication Sig    predniSONE (DELTASONE) 10 mg tablet Take 10 mg by mouth daily (with breakfast).  colchicine 0.6 mg tablet Take 0.6 mg by mouth daily.  Dexlansoprazole (DEXILANT) 60 mg CpDB Take 60 mg by mouth daily.  linezolid (ZYVOX) 600 mg tablet Take 1 Tab by mouth every twelve (12) hours.  metoprolol tartrate (LOPRESSOR) 50 mg tablet Take 50 mg by mouth two (2) times a day.  enzalutamide (XTANDI) 40 mg capsule Take 160 mg by mouth nightly.  allopurinol (ZYLOPRIM) 100 mg tablet Take 100 mg by mouth nightly.  ferrous sulfate 325 mg (65 mg iron) tablet Take 325 mg by mouth Before breakfast and dinner.  calcitRIOL (ROCALTROL) 0.25 mcg capsule Take 0.25 mcg by mouth daily.     cyanocobalamin, vitamin B-12, (VITAMIN B-12) 1,000 mcg/mL drop Take 1,000 mcg by mouth daily.  OTHER,NON-FORMULARY, daily. Neurim  (B-complex with additional supplements) for Memory    aspirin 81 mg chewable tablet Take 81 mg by mouth daily.  sodium bicarbonate 650 mg tablet Take 1 Tab by mouth two (2) times a day. Objective:     Vitals:  Blood pressure 140/76, pulse 67, temperature 97.6 °F (36.4 °C), resp. rate 20, height 5' 11\" (1.803 m), weight 105 kg (231 lb 6.4 oz), SpO2 98 %. Temp (24hrs), Av.6 °F (36.4 °C), Min:97.6 °F (36.4 °C), Max:97.6 °F (36.4 °C)      Intake and Output:          Physical Exam:               GENERAL ASSESSMENT: NAD  CHEST: CTA  HEART: S1S2  ABDOMEN: Soft,NT  EXTREMITY: no EDEMA          ECG/rhythm:    Data Review      No results for input(s): TNIPOC in the last 72 hours. No lab exists for component: ITNL   No results for input(s): CPK, CKMB, TROIQ in the last 72 hours. Recent Labs      18   1447   NA  140   K  7.6*   CL  114*   CO2  19*   BUN  30*   CREA  2.36*   GLU  121*   CA  8.2*   ALB  3.2*   WBC  8.0   HGB  10.3*   HCT  34.0*   PLT  221      No results for input(s): INR, PTP, APTT in the last 72 hours. No lab exists for component: INREXT  Needs: urine analysis, urine sodium, protein and creatinine  Lab Results   Component Value Date/Time    Sodium urine, random 54 2018 03:22 PM    Creatinine, urine 71.30 2018 03:22 PM           : Ronaldo Krabbe, MD  2018        [facility]:  [email or web address]  Irais Nunez office:  [address] [place] [address], [address]  [place], [address] [facility]  Phone: [phone]  Fax :     [phone]    [facility] office:  [address] [facility], [address]  Phone - [phone]  Fax - [phone] verbalization

## 2018-06-20 NOTE — ED NOTES
BSC at bedside for patient's convenience. Bed locked & low, call bell within reach. Daughter at bedside.

## 2018-06-20 NOTE — H&P
History and Physical  Primary Care Provider: Masood Ornelas MD    Subjective:     Ten Alves is a 78 y.o. male with a history of Stage 4 prostate CA, gout, CKD Stage 4, s/p b/l renal stents, CAD s/p CABGx4, and HTN who presents with hyperkalemia found on routine labs done yesterday. His K was above 6, per his report. He notes that he has been feeling dizzy since Monday and has had some SOB with exertion for \"weeks. \" He does not think he is dehydrated, but he does note being \"in the heat\" for a long time on Monday. No CP, palpitations, current SOB, leg swelling, or change in urine output. States urine may be more \"cloudy. \"    POC at 6.5, Chemistry K at 7.6 (but was hemolyzed). Nephrology contacted by the ED and will see the patient. Patient was given calcium gluconate, insulin, and Kayexalate in the ED. Of note, patient was recently hospitalized here from 6/5/18 to 6/14/18 for an acute gout flare, hyperkalemia, and elevated creatinine from his baseline. Review of Systems:  Further 10 point review of systems is benign. Pertinent items are noted in the History of Present Illness. Past Medical History:   Diagnosis Date    Celiac disease     Chronic kidney disease     Hypertension     MI (mitral incompetence)     Prostate cancer ([facility] [address]       Past Surgical History:   Procedure Laterality Date    CARDIAC SURG PROCEDURE UNLIST      qaudruple bipass     Prior to Admission medications    Medication Sig Start Date End Date Taking? Authorizing Provider   calcium carbonate (TUMS) 200 mg calcium (500 mg) chew Take 2 Tabs by mouth three (3) times daily. 6/10/18   Jaime Melendez MD   predniSONE (DELTASONE) 10 mg tablet Take 3 Tabs by mouth daily (with breakfast). 6/10/18   Jaime Melendez MD   sodium bicarbonate 650 mg tablet Take 1 Tab by mouth two (2) times a day. 6/10/18   Jaime Melendez MD   linezolid (ZYVOX) 600 mg tablet Take 1 Tab by mouth every twelve (12) hours.  6/10/18 Dewain Simmonds, MD   metoprolol tartrate (LOPRESSOR) 50 mg tablet Take 50 mg by mouth two (2) times a day. Historical Provider   enzalutamide Seabron Dress) 40 mg capsule Take 160 mg by mouth nightly. Historical Provider   allopurinol (ZYLOPRIM) 100 mg tablet Take 100 mg by mouth nightly. Historical Provider   ferrous sulfate 325 mg (65 mg iron) tablet Take 325 mg by mouth Before breakfast and dinner. Historical Provider   calcitRIOL (ROCALTROL) 0.25 mcg capsule Take 0.25 mcg by mouth daily. Historical Provider   cyanocobalamin, vitamin B-12, (VITAMIN B-12) 1,000 mcg/mL drop Take 1,000 mcg by mouth daily. Historical Provider   OTHER,NON-FORMULARY, daily. Neurim  (B-complex with additional supplements) for Memory    Historical Provider   aspirin 81 mg chewable tablet Take 81 mg by mouth daily. Phys MD Lara     Allergies   Allergen Reactions    Morphine Other (comments)     AMS      History reviewed. No pertinent family history. SOCIAL HISTORY:  Patient resides at home. Smoking history: Quit 40 years ago. Alcohol history: Rare alcohol use. Objective:       Physical Exam:   Visit Vitals    /78 (BP 1 Location: Left arm, BP Patient Position: At rest)    Pulse 67    Temp 97.6 °F (36.4 °C)    Resp 18    Ht 5' 11\" (1.803 m)    Wt 105 kg (231 lb 6.4 oz)    SpO2 100%    BMI 32.27 kg/m2     General:  Alert, cooperative, no distress. Head:  Normocephalic. Eyes:  Conjunctivae/corneas clear. Nose: Nares normal. Septum midline. Neck: Supple, symmetrical, trachea midline. Lungs:   Clear to auscultation bilaterally. Heart:  Regular rate and rhythm, S1, S2 normal, no murmur. Abdomen:   Soft, non-tender. Bowel sounds normal.    Extremities: Extremities normal, atraumatic, no cyanosis or edema. Pulses: 2+ and symmetric all extremities. Skin: No rashes or lesions. Neurologic: No focal deficit. ECG reviewed, no peaked t waves. PACs    Data Review:    All diagnostic labs and studies have been reviewed. Assessment:     Active Problems:    Hyperkalemia (6/5/2018)      Plan:     Hyperkalemia: POC at 6.5, Chemistry K at 7.6 (but was hemolyzed). Nephrology contacted by the ED and will see the patient. Patient was given calcium gluconate, insulin, and Kayexalate in the ED.  -admit to the [facility]  -monitor on telemetry  -serial bmps and EKGs q8H for now  -Renal to evaluate  -appreciate further Nephrology recs    CKD stage 4: Creatinine not elevated from level seen on last set of discharge labs. -consult to Nephrology  -CT scan to evaluate stents   -appreciate further recs    Metabolic acidosis: Given sodium bicarb in the ED. Continue home sodium bicarb.     Stage 4 prostate CA: currently on Lupron and Xtandi.  Outpatient follow-up with Heme/Onc.     CAD s/p CABG x4: continue home ASA, metoprolol.     Macrocytic Anemia: resume home PO iron and B12 supplements.      Cloudy Urine: check UA     FULL Code  Heparin TID    Signed By: Kayleigh Hernandez MD     June 20, 2018

## 2018-06-20 NOTE — PROGRESS NOTES
TRANSFER - IN REPORT:    Verbal report received from [facility] RN(name) on [facility].  being received from ED(unit) for routine progression of care      Report consisted of patients Situation, Background, Assessment and   Recommendations(SBAR). Information from the following report(s) SBAR, MAR, Accordion, Recent Results, Med Rec Status and Cardiac Rhythm NSR was reviewed with the receiving nurse. Opportunity for questions and clarification was provided. Assessment completed upon patients arrival to unit and care assumed. Primary Nurse Sloane Guerrero and Ama Angelo RN performed a dual skin assessment on this patient No impairment noted  Partha score is 23    Bedside shift change report given to [address] (oncoming nurse) by Ayla Rodriguez RN (offgoing nurse). Report included the following information SBAR, Intake/Output, MAR, Accordion, Recent Results, Med Rec Status, Cardiac Rhythm NSR and Alarm Parameters .

## 2018-06-20 NOTE — IP AVS SNAPSHOT
[facility] [address] [place] [address] 
[phone] Patient: Laurie Padgett. MRN: [id number]:[id number] About your hospitalization You were admitted on:  June 20, 2018 You last received care in the:  [address] You were discharged on:  June 21, 2018 Why you were hospitalized Your primary diagnosis was:  Not on File Your diagnoses also included:  Hyperkalemia Follow-up Information Follow up With Details Comments Contact Info Kota Jacinto MD   [facility] [address] [place] [address] 
[phone] Discharge Orders None A check kaylene indicates which time of day the medication should be taken. My Medications START taking these medications Instructions Each Dose to Equal  
 Morning Noon Evening Bedtime  
 sodium polystyrene powder Commonly known as:  KAYEXALATE Start taking on:  6/24/2018 Your last dose was: Your next dose is: Take 15 g by mouth Every Mon, Wed & Sun for 30 days. 15 g CONTINUE taking these medications Instructions Each Dose to Equal  
 Morning Noon Evening Bedtime  
 allopurinol 100 mg tablet Commonly known as:  Ardia Erps Your last dose was: Your next dose is: Take 100 mg by mouth nightly. 100 mg  
    
   
   
   
  
 aspirin 81 mg chewable tablet Your last dose was: Your next dose is: Take 81 mg by mouth daily. 81 mg  
    
   
   
   
  
 calcitRIOL 0.25 mcg capsule Commonly known as:  ROCALTROL Your last dose was: Your next dose is: Take 0.25 mcg by mouth daily. 0.25 mcg  
    
   
   
   
  
 colchicine 0.6 mg tablet Your last dose was: Your next dose is: Take 0.6 mg by mouth daily. 0.6 mg  
    
   
   
   
  
 DEXILANT 60 mg Cpdb Generic drug:  Dexlansoprazole Your last dose was: Your next dose is: Take 60 mg by mouth daily. 60 mg  
    
   
   
   
  
 ferrous sulfate 325 mg (65 mg iron) tablet Your last dose was: Your next dose is: Take 325 mg by mouth Before breakfast and dinner. 325 mg  
    
   
   
   
  
 linezolid 600 mg tablet Commonly known as:  Connor Trejo Your last dose was: Your next dose is: Take 1 Tab by mouth every twelve (12) hours. 600 mg  
    
   
   
   
  
 metoprolol tartrate 50 mg tablet Commonly known as:  LOPRESSOR Your last dose was: Your next dose is: Take 50 mg by mouth two (2) times a day. 50 mg  
    
   
   
   
  
 OTHER(NON-FORMULARY) Your last dose was: Your next dose is:    
   
   
 daily. Neurim  (B-complex with additional supplements) for Memory  
     
   
   
   
  
 predniSONE 10 mg tablet Commonly known as:  Tootie Kyle Your last dose was: Your next dose is: Take 10 mg by mouth daily (with breakfast). 10 mg  
    
   
   
   
  
 sodium bicarbonate 650 mg tablet Your last dose was: Your next dose is: Take 1 Tab by mouth two (2) times a day. 650 mg  
    
   
   
   
  
 VITAMIN B-12 1,000 mcg/mL Drop Generic drug:  cyanocobalamin (vitamin B-12) Your last dose was: Your next dose is: Take 1,000 mcg by mouth daily. 1000 mcg XTANDI 40 mg capsule Generic drug:  enzalutamide Your last dose was: Your next dose is: Take 160 mg by mouth nightly. 160 mg Where to Get Your Medications These medications were sent to [address] [place] [address] [address], [address] [address] Phone:  [phone]  
  sodium polystyrene powder Discharge Instructions Discharge Instructions PATIENT ID: Wei Haji MRN: [id number] YOB: 1940 DATE OF ADMISSION: 6/20/2018  3:30 PM   
DATE OF DISCHARGE: 6/21/2018 PRIMARY CARE PROVIDER: Joanne James MD  
 
ATTENDING PHYSICIAN: Sheri Quinn MD 
DISCHARGING PROVIDER: Sheri Quinn MD   
To contact this individual call [phone] and ask the  to page. If unavailable ask to be transferred the Adult Hospitalist Department. DISCHARGE DIAGNOSES Hyperkalemia CKD CONSULTATIONS: IP CONSULT TO NEPHROLOGY PROCEDURES/SURGERIES: * No surgery found * PENDING TEST RESULTS:  
At the time of discharge the following test results are still pending: None FOLLOW UP APPOINTMENTS:  
Follow-up Information Follow up With Details Comments Contact Info Joanne James MD   [facility] [address] [place] [address] 
[phone] Follow with your kidney doctor early next week, get your potassium checked by your family doctor on coming Monday. Hyperkalemia: Care Instructions Your Care Instructions Hyperkalemia is too much potassium in the blood. Potassium helps keep the right mix of fluids in your body. It also helps your nerves and muscles work as they should. And it keeps your heartbeat in a normal rhythm. Some things can raise potassium levels. These include some health problems, medicines, and kidney problems. (Normally, your kidneys remove extra potassium.) Too much potassium can cause nausea. It also can cause a heartbeat that isn't normal. But you may not have any symptoms. Too much potassium can be dangerous. That's why it's important to treat it. If you are taking any of the medicines that can raise your levels, your doctor will ask you to stop. You may get medicines to lower your levels. And you may have to limit or not eat foods that have a lot of potassium. Follow-up care is a key part of your treatment and safety.  Be sure to make and go to all appointments, and call your doctor if you are having problems. It's also a good idea to know your test results and keep a list of the medicines you take. How can you care for yourself at home? · Take your medicines exactly as prescribed. Call your doctor if you think you are having a problem with your medicine. · Stop taking certain medicines if your doctor asks you to. They may be causing your high potassium levels. If you have concerns about stopping medicine, talk with your doctor. · If you have kidney, heart, or liver disease and have to limit fluids, talk with your doctor before you increase the amount of fluids you drink. If the doctor says it's okay, drink plenty of fluids. This means drinking enough so that your urine is light yellow or clear like water. · Avoid strenuous exercise until your doctor tells you it is okay. · Potassium is in many foods, including vegetables, fruits, and milk products. Foods high in potassium include bananas, cantaloupe, broccoli, milk, potatoes, and tomatoes. · Low potassium foods include blueberries, raspberries, cucumber, white or brown rice, spaghetti, and macaroni. · Do not use a salt substitute without talking to your doctor first. Most of these are very high in potassium. · Be sure to tell your doctor about any prescription, over-the-counter, or herbal medicines you take. Some of these can raise potassium. When should you call for help? Call 911 anytime you think you may need emergency care. For example, call if: 
? · You passed out (lost consciousness). ? · You have an unusual heartbeat. Your heart may beat fast or skip beats. ?Call your doctor now or seek immediate medical care if: 
? · You have muscle aches. ? · You feel very weak. ? Watch closely for changes in your health, and be sure to contact your doctor if: 
? · You do not get better as expected. Where can you learn more? Go to http://sushil-tray.info/. Enter N142 in the search box to learn more about \"Hyperkalemia: Care Instructions. \" Current as of: May 12, 2017 Content Version: 11.4 © 5183-6486 [employer]. Care instructions adapted under license by [employer] (which disclaims liability or warranty for this information). If you have questions about a medical condition or this instruction, always ask your healthcare professional. [place] [address] any warranty or liability for your use of this information. ADDITIONAL CARE RECOMMENDATIONS: None DIET: Renal Diet, Low potassium diet Oral Nutritional Supplements: No Oral Supplement prescribed . ACTIVITY: Activity as tolerated DISCHARGE MEDICATIONS: 
 See Medication Reconciliation Form · It is important that you take the medication exactly as they are prescribed. · Keep your medication in the bottles provided by the pharmacist and keep a list of the medication names, dosages, and times to be taken in your wallet. · Do not take other medications without consulting your doctor. NOTIFY YOUR PHYSICIAN FOR ANY OF THE FOLLOWING:  
Fever over 101 degrees for 24 hours. Chest pain, shortness of breath, fever, chills, nausea, vomiting, diarrhea, change in mentation, falling, weakness, bleeding. Severe pain or pain not relieved by medications. Or, any other signs or symptoms that you may have questions about. DISPOSITION: 
x  Home With: 
 OT  PT  [facility]  RN  
  
 SNF/Inpatient Rehab/LTAC Independent/assisted living Hospice Other: CDMP Checked:  
Yes x PROBLEM LIST Updated: 
Yes x Signed:  
Adelia Lozoya MD 
6/21/2018 11:18 AM 
 
  
  
  
[employer] [facility] Announcement We are excited to announce that we are making your provider's discharge notes available to you in [employer] [facility].   You will see these notes when they are completed and signed by the physician that discharged you from your recent hospital stay. If you have any questions or concerns about any information you see in [employer], please call the Health Information Department where you were seen or reach out to your Primary Care Provider for more information about your plan of care. Introducing hospitals & HEALTH SERVICES! Edelmira James introduces [employer] patient portal. Now you can access parts of your medical record, email your doctor's office, and request medication refills online. 1. In your internet browser, go to [email or web address] 2. Click on the First Time User? Click Here link in the Sign In box. You will see the New Member Sign Up page. 3. Enter your LoopNet Access Code exactly as it appears below. You will not need to use this code after youve completed the sign-up process. If you do not sign up before the expiration date, you must request a new code. · LoopNet Access Code: [email or web address] Expires: 9/3/2018 10:48 AM 
 
4. Enter the last four digits of your Social Security Number (xxxx) and Date of Birth (mm/dd/yyyy) as indicated and click Submit. You will be taken to the next sign-up page. 5. Create a LoopNet ID. This will be your LoopNet login ID and cannot be changed, so think of one that is secure and easy to remember. 6. Create a LoopNet password. You can change your password at any time. 7. Enter your Password Reset Question and Answer. This can be used at a later time if you forget your password. 8. Enter your e-mail address. You will receive e-mail notification when new information is available in [address] 9. Click Sign Up. You can now view and download portions of your medical record. 10. Click the Download Summary menu link to download a portable copy of your medical information. If you have questions, please visit the Frequently Asked Questions section of the LoopNet website. Remember, LoopNet is NOT to be used for urgent needs. For medical emergencies, dial 911. Now available from your iPhone and Android! Introducing Chet Montenegro As a [employer] patient, I wanted to make you aware of our electronic visit tool called Chet Montenegro. [employer] 24/7 allows you to connect within minutes with a medical provider 24 hours a day, seven days a week via a mobile device or tablet or logging into a secure website from your computer. You can access Chet Montenegro from anywhere in the [place]. A virtual visit might be right for you when you have a simple condition and feel like you just dont want to get out of bed, or cant get away from work for an appointment, when your regular [employer] provider is not available (evenings, weekends or holidays), or when youre out of town and need minor care. Electronic visits cost only $49 and if the [employer] 24/7 provider determines a prescription is needed to treat your condition, one can be electronically transmitted to a nearby pharmacy*. Please take a moment to enroll today if you have not already done so. The enrollment process is free and takes just a few minutes. To enroll, please download the [employer] 24/7 melquiades to your tablet or phone, or visit [email or web address] to enroll on your computer. And, as an [address] [place] [address] patient with a [employer] account, the results of your visits will be scanned into your electronic medical record and your primary care provider will be able to view the scanned results. We urge you to continue to see your regular [employer] provider for your ongoing medical care. And while your primary care provider may not be the one available when you seek a Chet Montenegro virtual visit, the peace of mind you get from getting a real diagnosis real time can be priceless. For more information on Chet Montenegro, view our Frequently Asked Questions (FAQs) at [email or web address]. Sincerely, 
 
Vidhi Brar MD 
Chief Medical Officer [address] *:  certain medications cannot be prescribed via Chet Montenegro Unresulted tests-please follow up with your PCP on these results Procedure/Test Authorizing Provider CBC W/O DIFF Eliezer Mendoza MD  
 CBC WITH AUTOMATED DIFF BENY Alcaraz  
 CT ABD PELV WO CONT Iva Scott MD  
 EKG, 12 LEAD, INITIAL Eliezer Mendoza MD  
 EKG, 12 LEAD, [address] [place]  
 METABOLIC PANEL, BASIC Josselyn Banda MD  
 METABOLIC PANEL, Trish Moffett MD  
 METABOLIC PANEL, Trish Moffett MD  
 METABOLIC PANEL, COMPREHENSIVE BENY Alcaraz  
 SAMPLES BEING HELD BENY Alcaraz  
 URINALYSIS W/ RFLX Yary Garcia MD  
 URINE CULTURE HOLD SAMPLE Josselyn Banda MD  
 XR CHEST PORT Iva Scott MD  
  
Providers Seen During Your Hospitalization Provider Specialty Primary office phone Iva Scott MD Emergency Medicine [phone] Josselyn Banda MD Hospitalist [phone] Adelia Lozoya MD Internal Medicine [phone] Your Primary Care Physician (PCP) Primary Care Physician Office Phone Office Fax Candice Rodrigues [phone] You are allergic to the following Allergen Reactions Morphine Other (comments) AMS Recent Documentation Height Weight BMI Smoking Status 1.803 m 102.6 kg 31.55 kg/m2 Former Smoker Emergency Contacts Name Discharge Info Relation Home Work Mobile Alma Freed  Child [2] [phone] Patient Belongings The following personal items are in your possession at time of discharge: 
     Visual Aid: Glasses Please provide this summary of care documentation to your next provider. Signatures-by signing, you are acknowledging that this After Visit Summary has been reviewed with you and you have received a copy. Patient Signature:  ____________________________________________________________ Date:  ____________________________________________________________  
  
Claudene Born Provider Signature:  ____________________________________________________________ Date:  ____________________________________________________________

## 2018-06-20 NOTE — ED TRIAGE NOTES
TRIAGE NOTE: patient arrived from home with c/o abnormal blood work per nephrologist. K+ was high over 6 per patient. Denies any chest pain.  +SOB

## 2018-06-20 NOTE — ED NOTES
2:36 PM  I have evaluated the patient as the Provider in Triage. I have reviewed His vital signs and the triage nurse assessment. I have talked with the patient and any available family and advised that I am the provider in triage and have ordered the appropriate study to initiate their work up based on the clinical presentation during my assessment. I have advised that the patient will be accommodated in the Main ED as soon as possible. I have also requested to contact the triage nurse or myself immediately if the patient experiences any changes in their condition during this brief waiting period. Patient states that his nephrologist called him today and told him his potassium was elevated, advised him to present to the ED for an EKG.  Endorses baseline shortness of breath, denies chest pain  BENY Ball

## 2018-06-20 NOTE — IP AVS SNAPSHOT
[address] 
[phone] Patient: Renaldo Parham MRN: [id number] ABK:[id number]/17/[id number] A check kaylene indicates which time of day the medication should be taken. My Medications START taking these medications Instructions Each Dose to Equal  
 Morning Noon Evening Bedtime  
 sodium polystyrene powder Commonly known as:  KAYEXALATE Start taking on:  6/24/2018 Your last dose was: Your next dose is: Take 15 g by mouth Every Mon, Wed & Sun for 30 days. 15 g CONTINUE taking these medications Instructions Each Dose to Equal  
 Morning Noon Evening Bedtime  
 allopurinol 100 mg tablet Commonly known as:  Giorgio Preston Your last dose was: Your next dose is: Take 100 mg by mouth nightly. 100 mg  
    
   
   
   
  
 aspirin 81 mg chewable tablet Your last dose was: Your next dose is: Take 81 mg by mouth daily. 81 mg  
    
   
   
   
  
 calcitRIOL 0.25 mcg capsule Commonly known as:  ROCALTROL Your last dose was: Your next dose is: Take 0.25 mcg by mouth daily. 0.25 mcg  
    
   
   
   
  
 colchicine 0.6 mg tablet Your last dose was: Your next dose is: Take 0.6 mg by mouth daily. 0.6 mg  
    
   
   
   
  
 DEXILANT 60 mg Cpdb Generic drug:  Dexlansoprazole Your last dose was: Your next dose is: Take 60 mg by mouth daily. 60 mg  
    
   
   
   
  
 ferrous sulfate 325 mg (65 mg iron) tablet Your last dose was: Your next dose is: Take 325 mg by mouth Before breakfast and dinner. 325 mg  
    
   
   
   
  
 linezolid 600 mg tablet Commonly known as:  Anola Giuseppe Your last dose was: Your next dose is: Take 1 Tab by mouth every twelve (12) hours. 600 mg  
    
   
   
   
  
 metoprolol tartrate 50 mg tablet Commonly known as:  LOPRESSOR Your last dose was: Your next dose is: Take 50 mg by mouth two (2) times a day. 50 mg  
    
   
   
   
  
 OTHER(NON-FORMULARY) Your last dose was: Your next dose is:    
   
   
 daily. Neurim  (B-complex with additional supplements) for Memory  
     
   
   
   
  
 predniSONE 10 mg tablet Commonly known as:  Gerard Rosales Your last dose was: Your next dose is: Take 10 mg by mouth daily (with breakfast). 10 mg  
    
   
   
   
  
 sodium bicarbonate 650 mg tablet Your last dose was: Your next dose is: Take 1 Tab by mouth two (2) times a day. 650 mg  
    
   
   
   
  
 VITAMIN B-12 1,000 mcg/mL Drop Generic drug:  cyanocobalamin (vitamin B-12) Your last dose was: Your next dose is: Take 1,000 mcg by mouth daily. 1000 mcg XTANDI 40 mg capsule Generic drug:  enzalutamide Your last dose was: Your next dose is: Take 160 mg by mouth nightly. 160 mg Where to Get Your Medications These medications were sent to [address] [place] [address]  [facility] [address] [address] Phone:  [phone]  
  sodium polystyrene powder

## 2018-06-21 VITALS
HEIGHT: 71 IN | DIASTOLIC BLOOD PRESSURE: 70 MMHG | SYSTOLIC BLOOD PRESSURE: 141 MMHG | OXYGEN SATURATION: 99 % | RESPIRATION RATE: 18 BRPM | TEMPERATURE: 97.8 F | WEIGHT: 226.19 LBS | BODY MASS INDEX: 31.67 KG/M2 | HEART RATE: 62 BPM

## 2018-06-21 LAB
ANION GAP SERPL CALC-SCNC: 7 MMOL/L (ref 5–15)
ANION GAP SERPL CALC-SCNC: 8 MMOL/L (ref 5–15)
BUN SERPL-MCNC: 28 MG/DL (ref 6–20)
BUN SERPL-MCNC: 30 MG/DL (ref 6–20)
BUN/CREAT SERPL: 12 (ref 12–20)
BUN/CREAT SERPL: 14 (ref 12–20)
CALCIUM SERPL-MCNC: 7.9 MG/DL (ref 8.5–10.1)
CALCIUM SERPL-MCNC: 8.1 MG/DL (ref 8.5–10.1)
CHLORIDE SERPL-SCNC: 110 MMOL/L (ref 97–108)
CHLORIDE SERPL-SCNC: 114 MMOL/L (ref 97–108)
CO2 SERPL-SCNC: 21 MMOL/L (ref 21–32)
CO2 SERPL-SCNC: 23 MMOL/L (ref 21–32)
CREAT SERPL-MCNC: 2.18 MG/DL (ref 0.7–1.3)
CREAT SERPL-MCNC: 2.26 MG/DL (ref 0.7–1.3)
ERYTHROCYTE [DISTWIDTH] IN BLOOD BY AUTOMATED COUNT: 17.2 % (ref 11.5–14.5)
GLUCOSE SERPL-MCNC: 81 MG/DL (ref 65–100)
GLUCOSE SERPL-MCNC: 87 MG/DL (ref 65–100)
HCT VFR BLD AUTO: 28.8 % (ref 36.6–50.3)
HGB BLD-MCNC: 9.1 G/DL (ref 12.1–17)
MCH RBC QN AUTO: 32.9 PG (ref 26–34)
MCHC RBC AUTO-ENTMCNC: 31.6 G/DL (ref 30–36.5)
MCV RBC AUTO: 104 FL (ref 80–99)
NRBC # BLD: 0 K/UL (ref 0–0.01)
NRBC BLD-RTO: 0 PER 100 WBC
PLATELET # BLD AUTO: 164 K/UL (ref 150–400)
PMV BLD AUTO: 9.8 FL (ref 8.9–12.9)
POTASSIUM SERPL-SCNC: 5.1 MMOL/L (ref 3.5–5.1)
POTASSIUM SERPL-SCNC: 5.7 MMOL/L (ref 3.5–5.1)
RBC # BLD AUTO: 2.77 M/UL (ref 4.1–5.7)
SODIUM SERPL-SCNC: 141 MMOL/L (ref 136–145)
SODIUM SERPL-SCNC: 142 MMOL/L (ref 136–145)
WBC # BLD AUTO: 5.6 K/UL (ref 4.1–11.1)

## 2018-06-21 PROCEDURE — 74011250636 HC RX REV CODE- 250/636: Performed by: INTERNAL MEDICINE

## 2018-06-21 PROCEDURE — 85027 COMPLETE CBC AUTOMATED: CPT | Performed by: INTERNAL MEDICINE

## 2018-06-21 PROCEDURE — 65390000012 HC CONDITION CODE 44 OBSERVATION

## 2018-06-21 PROCEDURE — 74011250637 HC RX REV CODE- 250/637: Performed by: INTERNAL MEDICINE

## 2018-06-21 PROCEDURE — 99218 HC RM OBSERVATION: CPT

## 2018-06-21 PROCEDURE — 80048 BASIC METABOLIC PNL TOTAL CA: CPT | Performed by: INTERNAL MEDICINE

## 2018-06-21 PROCEDURE — 36415 COLL VENOUS BLD VENIPUNCTURE: CPT | Performed by: INTERNAL MEDICINE

## 2018-06-21 PROCEDURE — 96372 THER/PROPH/DIAG INJ SC/IM: CPT

## 2018-06-21 RX ORDER — SODIUM POLYSTYRENE SULFONATE 4.1 MEQ/G
15 POWDER, FOR SUSPENSION ORAL; RECTAL
Qty: 180 G | Refills: 2 | Status: SHIPPED | OUTPATIENT
Start: 2018-06-24 | End: 2018-07-16

## 2018-06-21 RX ADMIN — CALCIUM CARBONATE (ANTACID) CHEW TAB 500 MG 200 MG: 500 CHEW TAB at 09:42

## 2018-06-21 RX ADMIN — SODIUM BICARBONATE 650 MG: 650 TABLET ORAL at 09:42

## 2018-06-21 RX ADMIN — PANTOPRAZOLE SODIUM 40 MG: 40 TABLET, DELAYED RELEASE ORAL at 09:42

## 2018-06-21 RX ADMIN — FERROUS SULFATE TAB 325 MG (65 MG ELEMENTAL FE) 325 MG: 325 (65 FE) TAB at 06:37

## 2018-06-21 RX ADMIN — Medication 1000 MCG: at 09:42

## 2018-06-21 RX ADMIN — HEPARIN SODIUM 5000 UNITS: 5000 INJECTION, SOLUTION INTRAVENOUS; SUBCUTANEOUS at 02:17

## 2018-06-21 RX ADMIN — METOPROLOL TARTRATE 50 MG: 50 TABLET ORAL at 09:42

## 2018-06-21 RX ADMIN — Medication 10 ML: at 06:00

## 2018-06-21 RX ADMIN — ASPIRIN 81 MG 81 MG: 81 TABLET ORAL at 09:42

## 2018-06-21 NOTE — ROUTINE PROCESS
I have reviewed discharge instructions with the patient. The patient verbalized understanding. The patient was provided education by the dietician on low potassium foods and received a printout to take home. The patient's IV was removed.

## 2018-06-21 NOTE — DISCHARGE SUMMARY
Discharge Summary       PATIENT ID: Usman Balderrama. MRN: [id number]   YOB: 1940    DATE OF ADMISSION: 6/20/2018  3:30 PM    DATE OF DISCHARGE: 6/21/2018    PRIMARY CARE PROVIDER: Julio Cesar Hebert MD       DISCHARGING PROVIDER: Thu Jimenez MD    To contact this individual call [phone] and ask the  to page. If unavailable ask to be transferred the Adult Hospitalist Department. CONSULTATIONS: IP CONSULT TO NEPHROLOGY    PROCEDURES/SURGERIES: * No surgery found *    ADMITTING DIAGNOSES & HOSPITAL COURSE:   Patient of Dr. Vern Barrios sent to ER for high K. K here over 7. Treated medically. No EKG change. Has h/o chronic hyperkalemia on low K diet. Has been on SPS in the past but not recently and he is not sure why. Tolerated SPS as he recalls but not veltassa. HTN is controlled. CKD is stable. DISCHARGE DIAGNOSES / PLAN:      Hyperkalemia: Resolved now, discharge on kayexalate as discussed with nephrology on call. He should adhere to low K diet     CKD stage 4: Creatinine not elevated from level seen on last set of discharge labs.       Metabolic acidosis: Continue home sodium bicarb.      Stage 4 prostate CA: currently on Lupron and Xtandi. Outpatient follow-up with Heme/Onc.      CAD s/p CABG x4: continue home ASA, metoprolol.      Macrocytic Anemia: resume home PO iron and B12 supplements. Asymptomatic pyuria, no clinical evidence of infection       PENDING TEST RESULTS:   At the time of discharge the following test results are still pending: None    FOLLOW UP APPOINTMENTS:    Follow-up Information     Follow up With Details Comments Susanna Cisneros MD   [address] [place] [address] Avenue  [address] [place] [address]  [phone]           See your kidney doctor early next week.         DIET: Renal Diet, low potassium diet      ACTIVITY: Activity as tolerated          DISCHARGE MEDICATIONS:  Current Discharge Medication List      START taking these medications    Details   sodium polystyrene (KAYEXALATE) powder Take 15 g by mouth Every Mon, Wed & Sun for 30 days. Qty: 180 g, Refills: 2         CONTINUE these medications which have NOT CHANGED    Details   predniSONE (DELTASONE) 10 mg tablet Take 10 mg by mouth daily (with breakfast). colchicine 0.6 mg tablet Take 0.6 mg by mouth daily. Dexlansoprazole (DEXILANT) 60 mg CpDB Take 60 mg by mouth daily. linezolid (ZYVOX) 600 mg tablet Take 1 Tab by mouth every twelve (12) hours. Qty: 6 Tab, Refills: 0      metoprolol tartrate (LOPRESSOR) 50 mg tablet Take 50 mg by mouth two (2) times a day. enzalutamide (XTANDI) 40 mg capsule Take 160 mg by mouth nightly. allopurinol (ZYLOPRIM) 100 mg tablet Take 100 mg by mouth nightly. ferrous sulfate 325 mg (65 mg iron) tablet Take 325 mg by mouth Before breakfast and dinner. calcitRIOL (ROCALTROL) 0.25 mcg capsule Take 0.25 mcg by mouth daily. cyanocobalamin, vitamin B-12, (VITAMIN B-12) 1,000 mcg/mL drop Take 1,000 mcg by mouth daily. OTHER,NON-FORMULARY, daily. Neurim  (B-complex with additional supplements) for Memory      aspirin 81 mg chewable tablet Take 81 mg by mouth daily. sodium bicarbonate 650 mg tablet Take 1 Tab by mouth two (2) times a day. Qty: 60 Tab, Refills: 0         STOP taking these medications       calcium carbonate (TUMS) 200 mg calcium (500 mg) chew Comments:   Reason for Stopping:                 NOTIFY YOUR PHYSICIAN FOR ANY OF THE FOLLOWING:   Fever over 101 degrees for 24 hours. Chest pain, shortness of breath, fever, chills, nausea, vomiting, diarrhea, change in mentation, falling, weakness, bleeding. Severe pain or pain not relieved by medications. Or, any other signs or symptoms that you may have questions about.     DISPOSITION:  x  Home With:   OT  PT  CARLOS  RN       Long term SNF/Inpatient Rehab    Independent/assisted living    Hospice    Other:       PATIENT CONDITION AT DISCHARGE: Functional status    Poor     Deconditioned     Independent      Cognition     Lucid     Forgetful     Dementia      Catheters/lines (plus indication)    Cespedes     PICC     PEG     None      Code status     Full code     DNR      PHYSICAL EXAMINATION AT DISCHARGE:  Patient seen and examined, lungs clears, stable for disscharge      CHRONIC MEDICAL DIAGNOSES:  Problem List as of 6/21/2018  Date Reviewed: 6/21/2018          Codes Class Noted - Resolved    Acute worsening of stage 4 chronic kidney disease ([address] ICD-10-CM: N28.9, N18.4  ICD-9-CM: 593.9, 585.4  6/5/2018 - Present        Metabolic acidosis [facility]: E87.2  ICD-9-CM: 276.2  6/5/2018 - Present        Acute gout ICD-10-CM: M10.9  ICD-9-CM: 274.01  6/5/2018 - Present        Hyperkalemia ICD-10-CM: E87.5  ICD-9-CM: 276.7  6/5/2018 - Present        RESOLVED: Orthostatic hypotension ICD-10-CM: I95.1  ICD-9-CM: 458.0  10/6/2013 - 10/14/2013              Greater than 30 minutes were spent with the patient on counseling and coordination of care    Signed:   Sherley Mancia MD  6/21/2018  11:21 AM

## 2018-06-21 NOTE — PROGRESS NOTES
Problem: Patient Education: Go to Patient Education Activity  Goal: Patient/Family Education  Outcome: Resolved/Met Date Met: 06/21/18  NUTRITION       Consult received for low potassium diet education. Chart reviewed, discussed with RN. Pt admitted with hyperkalemia. Provided low potassium handout/food list (high, medium, low content of foods). Daughter also assists with grocery shopping and meal prep (daughter not present). No further questions at this time. Will rescreen as indicated.     [address]

## 2018-06-21 NOTE — PROGRESS NOTES
0800  Bedside and Verbal shift change report given to Glenna Sandhoff, RN (oncoming nurse) by Tj Au RN (offgoing nurse). Report included the following information SBAR, Kardex, ED Summary, Procedure Summary, Intake/Output, MAR, Recent Results and Cardiac Rhythm NSR. Hourly rounding performed. Problem: Falls - Risk of  Goal: *Absence of Falls  Document Jonathon Fall Risk and appropriate interventions in the flowsheet. Outcome: Progressing Towards Goal  Fall Risk Interventions:            Medication Interventions: Teach patient to arise slowly       Pt educated on fall prevention. Pt verbalizes understanding. Bedside jonathon tool used with pt. Call bell and frequently used items within reach. Pt utilizes call bell appropriately for assistance.

## 2018-06-21 NOTE — PROGRESS NOTES
Hospital follow-up PCP transitional care appointment has been scheduled with Dr. Jose Uriostegui for Tuesday, 6/26/18 at 10:15 a.m. Pending patient discharge.   Kwadwo Segovia, Care Management Specialist.

## 2018-06-21 NOTE — PROGRESS NOTES
[email or web address]                  Phone - [phone]   Renal Progress Note    Subjective:   Patient: Ej Worrell YOB: 1940               Date- 6/21/2018          Reason for visit: hyperkalemia; cri    Admission Date: 6/20/2018            Assessment & Plan:   Hyperkalemia  · Chronic  · Has previously been on Veltassa but did not tolerate due to GI side efx  · Was previously on SPS but has been off it for some time and is not sure why  · Is on low K diet and reports compliance. However, he lost his diet sheet and may do better with more education. · Long term, may need to be on some maintenance dose of SPS  · -suggest discharge on 15 gm MWF. However, will also ask dietary to see him prior to discharge. He will get labs via Dr. Brock Dahl office tomorrow (already scheduled)  · Told him that if he can do better with his diet, he may not need to continue Kayexalate. He will get labs and further instruction with Dr. Les Neal. · Long d/w the patient regarding his diet     CKD 4  · Stable    Metabolic acidosis  --continue sodium bicarb     HTN  · OK  · Avoid RAAS inhibitors due to chronic hyperkalemia     SHPT  · Calcitriol           Subjective:   CC: Hyperkalemia  HPI:   6/20: Patient of Dr. Les Neal sent to ER for high K. K here over 7. Treated medically. No EKG change. Has h/o chronic hyperkalemia on low K diet. Has been on SPS in the past but not recently and he is not sure why. Tolerated SPS as he recalls but not veltassa. HTN is controlled. CKD is stable. 6/21:  The patient felt a little dizzy earlier but better after a bowel movement. He denies any other complaints. Review of Systems  A comprehensive review of systems was negative except for that written in the HPI.     Objective:     Visit Vitals    /70 (BP 1 Location: Left arm, BP Patient Position: At rest)    Pulse 62    Temp 97.8 °F (36.6 °C)    Resp 18    Ht 5' 11\" (1.803 m)    Wt 102.6 kg (226 lb 3.1 oz)    SpO2 99%    BMI 31.55 kg/m2     Temp (24hrs), Av.9 °F (36.6 °C), Min:97.6 °F (36.4 °C), Max:98.2 °F (36.8 °C)          1901 -  0700  In: -   Out: 250 [Urine:250]    Physical Exam:  General:  alert, cooperative, no distress  Eye:  conjunctivae/corneas clear. EOM's intact. Neurologic:  grossly non-focal  Neck:  supple; no JVD  Lungs:  clear to auscultation bilaterally  Heart:  regular rate and rhythm  Skin:  no rash or abnormalities  Psych: normal affect and mood  Abdomen:  soft, non-tender. No masses,  no organomegaly   Extremities/Edema: no edema    Data Review:     Recent Labs      18   0955  18   0115  18   1447   WBC   --   5.6  8.0   HGB   --   9.1*  10.3*   NA  141  142  140   K  5.1  5.7*  7.6*   CL  110*  114*  114*   CO2  23  21  19*   BUN  28*  30*  30*   CREA  2.26*  2.18*  2.36*   CA  8.1*  7.9*  8.2*   ALB   --    --   3.2*       Assessment:     Active Problems:    Hyperkalemia (2018)      Chart reviewed. Pertinent Notes Reviewed. Medications list Personally Reviewed. Doni Cardona, [address] [facility]  [facility] [address], [address], [address]  Phone - [phone]    Fax - [phone]  [email or web address]                                         [facility]   [address] [facility] [facility] , [facility]  Phone - [phone]  Fax - [phone]

## 2018-06-21 NOTE — DISCHARGE INSTRUCTIONS
Discharge Instructions       PATIENT ID: Laurie Padgett. MRN: [id number]   YOB: 1940    DATE OF ADMISSION: 6/20/2018  3:30 PM    DATE OF DISCHARGE: 6/21/2018    PRIMARY CARE PROVIDER: Kota Jacinto MD     ATTENDING PHYSICIAN: Taylor Godfrey MD  DISCHARGING PROVIDER: Taylor Godfrey MD    To contact this individual call [phone] and ask the  to page. If unavailable ask to be transferred the Adult Hospitalist Department. DISCHARGE DIAGNOSES   Hyperkalemia  CKD      CONSULTATIONS: IP CONSULT TO NEPHROLOGY    PROCEDURES/SURGERIES: * No surgery found *    PENDING TEST RESULTS:   At the time of discharge the following test results are still pending: None    FOLLOW UP APPOINTMENTS:   Follow-up Information     Follow up With Details Comments Contact [address], [place]   [facility] [address]  [phone]           Follow with your kidney doctor early next week, get your potassium checked by your family doctor on coming Monday. Hyperkalemia: Care Instructions  Your Care Instructions    Hyperkalemia is too much potassium in the blood. Potassium helps keep the right mix of fluids in your body. It also helps your nerves and muscles work as they should. And it keeps your heartbeat in a normal rhythm. Some things can raise potassium levels. These include some health problems, medicines, and kidney problems. (Normally, your kidneys remove extra potassium.)  Too much potassium can cause nausea. It also can cause a heartbeat that isn't normal. But you may not have any symptoms. Too much potassium can be dangerous. That's why it's important to treat it. If you are taking any of the medicines that can raise your levels, your doctor will ask you to stop. You may get medicines to lower your levels. And you may have to limit or not eat foods that have a lot of potassium. Follow-up care is a key part of your treatment and safety.  Be sure to make and go to all appointments, and call your doctor if you are having problems. It's also a good idea to know your test results and keep a list of the medicines you take. How can you care for yourself at home? · Take your medicines exactly as prescribed. Call your doctor if you think you are having a problem with your medicine. · Stop taking certain medicines if your doctor asks you to. They may be causing your high potassium levels. If you have concerns about stopping medicine, talk with your doctor. · If you have kidney, heart, or liver disease and have to limit fluids, talk with your doctor before you increase the amount of fluids you drink. If the doctor says it's okay, drink plenty of fluids. This means drinking enough so that your urine is light yellow or clear like water. · Avoid strenuous exercise until your doctor tells you it is okay. · Potassium is in many foods, including vegetables, fruits, and milk products. Foods high in potassium include bananas, cantaloupe, broccoli, milk, potatoes, and tomatoes. · Low potassium foods include blueberries, raspberries, cucumber, white or brown rice, spaghetti, and macaroni. · Do not use a salt substitute without talking to your doctor first. Most of these are very high in potassium. · Be sure to tell your doctor about any prescription, over-the-counter, or herbal medicines you take. Some of these can raise potassium. When should you call for help? Call 911 anytime you think you may need emergency care. For example, call if:  ? · You passed out (lost consciousness). ? · You have an unusual heartbeat. Your heart may beat fast or skip beats. ?Call your doctor now or seek immediate medical care if:  ? · You have muscle aches. ? · You feel very weak. ? Watch closely for changes in your health, and be sure to contact your doctor if:  ? · You do not get better as expected. Where can you learn more? Go to http://berta.info/.   Enter G087 in the search box to learn more about \"Hyperkalemia: Care Instructions. \"  Current as of: May 12, 2017  Content Version: 11.4  © 9282-2693 "[employer] Care instructions adapted under license by [employer] (which disclaims liability or warranty for this information). If you have questions about a medical condition or this instruction, always ask your healthcare professional. [place] [address] any warranty or liability for your use of this information. ADDITIONAL CARE RECOMMENDATIONS: None    DIET: Renal Diet, Low potassium diet  Oral Nutritional Supplements: No Oral Supplement prescribed . ACTIVITY: Activity as tolerated          DISCHARGE MEDICATIONS:   See Medication Reconciliation Form    · It is important that you take the medication exactly as they are prescribed. · Keep your medication in the bottles provided by the pharmacist and keep a list of the medication names, dosages, and times to be taken in your wallet. · Do not take other medications without consulting your doctor. NOTIFY YOUR PHYSICIAN FOR ANY OF THE FOLLOWING:   Fever over 101 degrees for 24 hours. Chest pain, shortness of breath, fever, chills, nausea, vomiting, diarrhea, change in mentation, falling, weakness, bleeding. Severe pain or pain not relieved by medications. Or, any other signs or symptoms that you may have questions about.       DISPOSITION:  x  Home With:   OT  PT  HH  RN       SNF/Inpatient Rehab/LTAC    Independent/assisted living    Hospice    Other:     CDMP Checked:   Yes x     PROBLEM LIST Updated:  Yes x       Signed:   Pieter Leiva MD  6/21/2018  11:18 AM

## 2018-06-21 NOTE — PHYSICIAN ADVISORY
Short Stay Review       Pt Name:  Joanette Bloch. MR#  [id number]   CSN#   [id number]   Room and Hospital  403/01  @ [facility] [place]   Hospitalization date  6/20/2018  3:30 PM  No discharge date for patient encounter. Current Attending Physician  Lucas Gold MD     A discharge order has been placed for this episode of hospital care for Mr. Joanette Bloch.; since this hospital stay is less than two midnights, I reviewed Mr. Celsa Choe Jr.'s chart. Mr. Celsa Choe Jr.'s healthcare insurance/benefit include:  Payor: Reynaldo Fraction / Plan: [place] MEDICARE PART A / Product Type: Medicare /     Utilization Review related case summary:   Age  78 y.o.   BMI  Body mass index is 31.55 kg/(m^2). PMHx includes  CKD, chronic hyperkalemia, HTN, secondary hyperparathyroidism, CAD etc   Hospital course  The pt was hospitalized after OP report showed elevated K with no significant hemodynamic, EKG changes. He remained hemodynamically stable.       Risk of deterioration at the time He was hospitalized     Moderate           On the basis of chart review, this patient's hospitalization status         Should be changed to [address] MD MPH FACP   Physician [address] [place] [address]    [facility]   Utilization Review, Care Management         CSN:  [id number]   DEBBIE:   [id number]  Admitted on :  6/20/2018   Discharge order

## 2018-06-23 ENCOUNTER — HOSPITAL ENCOUNTER (EMERGENCY)
Age: 78
Discharge: HOME | End: 2018-06-23
Attending: EMERGENCY MEDICINE
Payer: MEDICARE

## 2018-06-23 VITALS
TEMPERATURE: 98.2 F | WEIGHT: 225 LBS | RESPIRATION RATE: 20 BRPM | DIASTOLIC BLOOD PRESSURE: 66 MMHG | HEART RATE: 71 BPM | HEIGHT: 71 IN | OXYGEN SATURATION: 96 % | BODY MASS INDEX: 31.5 KG/M2 | SYSTOLIC BLOOD PRESSURE: 107 MMHG

## 2018-06-23 DIAGNOSIS — N28.9 RENAL INSUFFICIENCY: Primary | ICD-10-CM

## 2018-06-23 LAB
ALBUMIN SERPL-MCNC: 3.2 G/DL (ref 3.5–5)
ALBUMIN/GLOB SERPL: 0.9 {RATIO} (ref 1.1–2.2)
ALP SERPL-CCNC: 57 U/L (ref 45–117)
ALT SERPL-CCNC: 14 U/L (ref 12–78)
ANION GAP SERPL CALC-SCNC: 11 MMOL/L (ref 5–15)
AST SERPL-CCNC: 12 U/L (ref 15–37)
ATRIAL RATE: 77 BPM
BASOPHILS # BLD: 0 K/UL (ref 0–0.1)
BASOPHILS NFR BLD: 1 % (ref 0–1)
BILIRUB SERPL-MCNC: 0.5 MG/DL (ref 0.2–1)
BUN SERPL-MCNC: 31 MG/DL (ref 6–20)
BUN/CREAT SERPL: 13 (ref 12–20)
CALCIUM SERPL-MCNC: 7.9 MG/DL (ref 8.5–10.1)
CALCULATED P AXIS, ECG09: 20 DEGREES
CALCULATED R AXIS, ECG10: 69 DEGREES
CALCULATED T AXIS, ECG11: 26 DEGREES
CHLORIDE SERPL-SCNC: 111 MMOL/L (ref 97–108)
CO2 SERPL-SCNC: 17 MMOL/L (ref 21–32)
CREAT SERPL-MCNC: 2.32 MG/DL (ref 0.7–1.3)
DIAGNOSIS, 93000: NORMAL
DIFFERENTIAL METHOD BLD: ABNORMAL
EOSINOPHIL # BLD: 0.2 K/UL (ref 0–0.4)
EOSINOPHIL NFR BLD: 3 % (ref 0–7)
ERYTHROCYTE [DISTWIDTH] IN BLOOD BY AUTOMATED COUNT: 16.5 % (ref 11.5–14.5)
GLOBULIN SER CALC-MCNC: 3.7 G/DL (ref 2–4)
GLUCOSE SERPL-MCNC: 87 MG/DL (ref 65–100)
HCT VFR BLD AUTO: 31.2 % (ref 36.6–50.3)
HGB BLD-MCNC: 10 G/DL (ref 12.1–17)
IMM GRANULOCYTES # BLD: 0.1 K/UL (ref 0–0.04)
IMM GRANULOCYTES NFR BLD AUTO: 1 % (ref 0–0.5)
LYMPHOCYTES # BLD: 1.3 K/UL (ref 0.8–3.5)
LYMPHOCYTES NFR BLD: 23 % (ref 12–49)
MCH RBC QN AUTO: 32.9 PG (ref 26–34)
MCHC RBC AUTO-ENTMCNC: 32.1 G/DL (ref 30–36.5)
MCV RBC AUTO: 102.6 FL (ref 80–99)
MONOCYTES # BLD: 0.6 K/UL (ref 0–1)
MONOCYTES NFR BLD: 11 % (ref 5–13)
NEUTS SEG # BLD: 3.5 K/UL (ref 1.8–8)
NEUTS SEG NFR BLD: 61 % (ref 32–75)
NRBC # BLD: 0 K/UL (ref 0–0.01)
NRBC BLD-RTO: 0 PER 100 WBC
P-R INTERVAL, ECG05: 192 MS
PLATELET # BLD AUTO: 167 K/UL (ref 150–400)
PMV BLD AUTO: 10.4 FL (ref 8.9–12.9)
POTASSIUM SERPL-SCNC: 5.3 MMOL/L (ref 3.5–5.1)
PROT SERPL-MCNC: 6.9 G/DL (ref 6.4–8.2)
Q-T INTERVAL, ECG07: 372 MS
QRS DURATION, ECG06: 68 MS
QTC CALCULATION (BEZET), ECG08: 420 MS
RBC # BLD AUTO: 3.04 M/UL (ref 4.1–5.7)
SODIUM SERPL-SCNC: 139 MMOL/L (ref 136–145)
TROPONIN I SERPL-MCNC: <0.05 NG/ML
VENTRICULAR RATE, ECG03: 77 BPM
WBC # BLD AUTO: 5.7 K/UL (ref 4.1–11.1)

## 2018-06-23 PROCEDURE — 80053 COMPREHEN METABOLIC PANEL: CPT | Performed by: EMERGENCY MEDICINE

## 2018-06-23 PROCEDURE — 99284 EMERGENCY DEPT VISIT MOD MDM: CPT

## 2018-06-23 PROCEDURE — 93005 ELECTROCARDIOGRAM TRACING: CPT

## 2018-06-23 PROCEDURE — 84484 ASSAY OF TROPONIN QUANT: CPT | Performed by: EMERGENCY MEDICINE

## 2018-06-23 PROCEDURE — 36415 COLL VENOUS BLD VENIPUNCTURE: CPT | Performed by: EMERGENCY MEDICINE

## 2018-06-23 PROCEDURE — 85025 COMPLETE CBC W/AUTO DIFF WBC: CPT | Performed by: EMERGENCY MEDICINE

## 2018-06-23 NOTE — ED PROVIDER NOTES
HPI Comments: 78 y.o. male with past medical history significant for CKD, mitral incompetence, HTN, celiac disease, and prostate cancer who presents from home for evaluation of abnormal lab results. Pt had blood work yesterday and states that he was called today by his PCP and informed that his potassium was elevated at 6.9. He reports that he has one kidney that \"does not function\" and the other functions at \"14-16%. \" No dialysis. Pt is asymptomatic at this time. There are no other acute medical concerns at this time. Social hx: former tobacco smoker; no EtOH use  PCP: Trish Retana MD    Note written by Alex Guerra, as dictated by Shu Nance MD 1:10 PM    The history is provided by the patient. No  was used. Past Medical History:   Diagnosis Date    Celiac disease     Chronic kidney disease     Hypertension     MI (mitral incompetence)     Prostate cancer ([address]        Past Surgical History:   Procedure Laterality Date    CARDIAC SURG PROCEDURE UNLIST      qaudruple bipass         No family history on file. Social History     Social History    Marital status:      Spouse name: N/A    Number of children: N/A    Years of education: N/A     Occupational History    Not on file. Social History Main Topics    Smoking status: Former Smoker    Smokeless tobacco: Never Used    Alcohol use No    Drug use: Not on file    Sexual activity: Not on file     Other Topics Concern    Not on file     Social History Narrative         ALLERGIES: Morphine    Review of Systems   Constitutional: Negative for chills, diaphoresis and fever. HENT: Negative for congestion, postnasal drip, rhinorrhea and sore throat. Eyes: Negative for photophobia, discharge, redness and visual disturbance. Respiratory: Negative for cough, chest tightness, shortness of breath and wheezing. Cardiovascular: Negative for chest pain, palpitations and leg swelling. Gastrointestinal: Negative for abdominal distention, abdominal pain, blood in stool, constipation, diarrhea, nausea and vomiting. Genitourinary: Negative for difficulty urinating, dysuria, frequency, hematuria and urgency. Musculoskeletal: Negative for arthralgias, back pain, joint swelling and myalgias. Skin: Negative for color change and rash. Neurological: Negative for dizziness, speech difficulty, weakness, light-headedness, numbness and headaches. Psychiatric/Behavioral: Negative for confusion. The patient is not nervous/anxious. All other systems reviewed and are negative. Vitals:    06/23/18 1251   BP: (!) 153/91   Pulse: 85   Resp: 16   Temp: 98.2 °F (36.8 °C)   SpO2: 96%   Weight: 102.1 kg (225 lb)   Height: 5' 11\" (1.803 m)            Physical Exam   Constitutional: He is oriented to person, place, and time. He appears well-developed and well-nourished. No distress. HENT:   Head: Normocephalic and atraumatic. Right Ear: External ear normal.   Left Ear: External ear normal.   Nose: Nose normal.   Mouth/Throat: Oropharynx is clear and moist.   Eyes: Conjunctivae and EOM are normal. Pupils are equal, round, and reactive to light. No scleral icterus. Neck: Normal range of motion. Neck supple. No JVD present. No tracheal deviation present. No thyromegaly present. Cardiovascular: Normal rate, regular rhythm and normal heart sounds. Exam reveals no gallop and no friction rub. No murmur heard. No abnormal heart sounds. Pulmonary/Chest: Effort normal and breath sounds normal. No respiratory distress. He has no wheezes. He has no rales. He exhibits no tenderness. Clear lungs. Abdominal: Soft. Bowel sounds are normal. He exhibits no distension and no mass. There is no tenderness. There is no rebound and no guarding. Abd soft and nontender. Musculoskeletal: Normal range of motion. He exhibits edema (trace pretibial edema). He exhibits no tenderness.    Lymphadenopathy: He has no cervical adenopathy. Neurological: He is alert and oriented to person, place, and time. He has normal strength. He displays no atrophy and no tremor. No cranial nerve deficit. He exhibits normal muscle tone. Coordination and gait normal.   Skin: Skin is warm and dry. No rash noted. He is not diaphoretic. No erythema. Psychiatric: He has a normal mood and affect. His behavior is normal. Judgment and thought content normal.   Nursing note and vitals reviewed. Note written by Alex Cooper, as dictated by Lexie Milan MD 1:40 PM    MDM  Number of Diagnoses or Management Options  Renal insufficiency:   Diagnosis management comments:   Impression: 17-year-old male referred to the emergency department by his physician for evaluation of an abnormal potassium level which was drawn yesterday. That result was 7.0. Plan of care be baseline labs EKG and we'll treat accordingly. His EKG shows no evidence for hyperkalemia. Potassium level came back at 5.3 discussed this with the patient he will be discharged home followup. ED Course       Procedures    ED EKG interpretation:  Rhythm: normal sinus rhythm; and regular . Rate (approx.): 77. Note written by Alex Cooper, as dictated by Lexie Milan MD 12:55 PM    PROGRESS NOTE:  2:23 PM  Pt has a potassium of 5.3 in the ED.

## 2018-06-23 NOTE — ED NOTES
Pt sent from primary care for evaluation of elevated potassium level from blood work drawn yesterday. Pt denies any complaints at this time. Pillow and water given. Call light within reach. Will continue to monitor.

## 2018-06-23 NOTE — DISCHARGE INSTRUCTIONS
Hyperkalemia: Care Instructions  Your Care Instructions    Hyperkalemia is too much potassium in the blood. Potassium helps keep the right mix of fluids in your body. It also helps your nerves and muscles work as they should. And it keeps your heartbeat in a normal rhythm. Some things can raise potassium levels. These include some health problems, medicines, and kidney problems. (Normally, your kidneys remove extra potassium.)  Too much potassium can cause nausea. It also can cause a heartbeat that isn't normal. But you may not have any symptoms. Too much potassium can be dangerous. That's why it's important to treat it. If you are taking any of the medicines that can raise your levels, your doctor will ask you to stop. You may get medicines to lower your levels. And you may have to limit or not eat foods that have a lot of potassium. Follow-up care is a key part of your treatment and safety. Be sure to make and go to all appointments, and call your doctor if you are having problems. It's also a good idea to know your test results and keep a list of the medicines you take. How can you care for yourself at home? · Take your medicines exactly as prescribed. Call your doctor if you think you are having a problem with your medicine. · Stop taking certain medicines if your doctor asks you to. They may be causing your high potassium levels. If you have concerns about stopping medicine, talk with your doctor. · If you have kidney, heart, or liver disease and have to limit fluids, talk with your doctor before you increase the amount of fluids you drink. If the doctor says it's okay, drink plenty of fluids. This means drinking enough so that your urine is light yellow or clear like water. · Avoid strenuous exercise until your doctor tells you it is okay. · Potassium is in many foods, including vegetables, fruits, and milk products.  Foods high in potassium include bananas, cantaloupe, broccoli, milk, potatoes, and tomatoes. · Low potassium foods include blueberries, raspberries, cucumber, white or brown rice, spaghetti, and macaroni. · Do not use a salt substitute without talking to your doctor first. Most of these are very high in potassium. · Be sure to tell your doctor about any prescription, over-the-counter, or herbal medicines you take. Some of these can raise potassium. When should you call for help? Call 911 anytime you think you may need emergency care. For example, call if:  ? · You passed out (lost consciousness). ? · You have an unusual heartbeat. Your heart may beat fast or skip beats. ?Call your doctor now or seek immediate medical care if:  ? · You have muscle aches. ? · You feel very weak. ? Watch closely for changes in your health, and be sure to contact your doctor if:  ? · You do not get better as expected. Where can you learn more? Go to http://sushilrevoPTtray.info/. Enter P898 in the search box to learn more about \"Hyperkalemia: Care Instructions. \"  Current as of: May 12, 2017  Content Version: 11.4  © 1899-5659 [employer]. Care instructions adapted under license by [employer] (which disclaims liability or warranty for this information). If you have questions about a medical condition or this instruction, always ask your healthcare professional. [address] any warranty or liability for your use of this information. Chronic Kidney Disease: Care Instructions  Your Care Instructions    Chronic kidney disease happens when your kidneys don't work as well as they should. Your kidneys have a few important jobs. They remove waste from your blood. This waste leaves your body in your urine. They also balance your body's fluids and chemicals. When your kidneys don't work well, extra waste and fluid can build up. This can poison the body and sometimes cause death.   The most common causes of this disease are diabetes and high blood pressure. In some cases, the disease develops in 2 to 3 months. But it usually develops over many years. If you take medicine and make healthy changes to your lifestyle, you may be able to prevent the disease from getting worse. But if your kidney damage gets worse, you may need dialysis or a kidney transplant. Dialysis uses a machine to filter waste from the blood. A transplant is surgery to give you a healthy kidney from another person. Follow-up care is a key part of your treatment and safety. Be sure to make and go to all appointments, and call your doctor if you are having problems. It's also a good idea to know your test results and keep a list of the medicines you take. How can you care for yourself at home? ? Treatments and appointments  ? · Be safe with medicines. Take your medicines exactly as prescribed. Call your doctor if you have any problems with your medicine. You also may take medicine to control your blood pressure or to treat diabetes. Many people who have diabetes take blood pressure medicine. ? · If you have diabetes, do your best to keep your blood sugar in your target range. You may do this by eating healthy food and exercising. You may also take medicines. ? · Go to your dialysis appointments if you have this treatment. ? · Do not take ibuprofen (Advil, Motrin), naproxen (Aleve), or similar medicines, unless your doctor tells you to. These may make the disease worse. ? · Do not take any vitamins, over-the-counter medicines, or herbal products without talking to your doctor first.   ? · Do not smoke or use other tobacco products. Smoking can reduce blood flow to the kidneys. If you need help quitting, talk to your doctor about stop-smoking programs and medicines. These can increase your chances of quitting for good. ? · Do not drink alcohol or use illegal drugs. ? · Talk to your doctor about an exercise plan. Exercise helps lower your blood pressure.  It also makes you feel better. ? · If you have an advance directive, let your doctor know. It may include a living will and a durable power of  for health care. If you don't have one, you may want to prepare one. It lets your doctor and loved ones know your health care wishes if you become unable to speak for yourself. Diet  ? · Talk to a registered dietitian. He or she can help you make a meal plan that is right for you. Most people with kidney disease need to limit salt (sodium), fluids, and protein. Some also have to limit potassium and phosphorus. ? · You may have to give up many foods you like. But try to focus on the fact that this will help you stay healthy for as long as possible. ? · If you have a hard time eating enough, talk to your doctor or dietitian about ways to add calories to your diet. ? · Your diet may change as your disease changes. See your doctor for regular testing. And work with a dietitian to change your diet as needed. When should you call for help? Call 911 anytime you think you may need emergency care. For example, call if:  ? · You passed out (lost consciousness). ?Call your doctor now or seek immediate medical care if:  ? · You have less urine than normal or no urine. ? · You have trouble urinating or can urinate only very small amounts. ? · You are confused or have trouble thinking clearly. ? · You feel weaker or more tired than usual.   ? · You are very thirsty, lightheaded, or dizzy. ? · You have nausea and vomiting. ? · You have new swelling of your arms or feet, or your swelling is worse. ? · You have blood in your urine. ? · You have new or worse trouble breathing. ? Watch closely for changes in your health, and be sure to contact your doctor if:  ? · You have any problems with your medicine or other treatment. Where can you learn more? Go to http://sushil-tray.info/.   Enter N276 in the search box to learn more about \"Chronic Kidney Disease: Care Instructions. \"  Current as of: May 12, 2017  Content Version: 11.4  © 7259-0079 [employer], [employer]. Care instructions adapted under license by [employer] (which disclaims liability or warranty for this information). If you have questions about a medical condition or this instruction, always ask your healthcare professional. [place] [address] any warranty or liability for your use of this information.

## 2018-06-23 NOTE — ED NOTES
12:53 PM  I have evaluated the patient as the Provider in Triage. I have reviewed His vital signs and the triage nurse assessment. I have talked with the patient and any available family and advised that I am the provider in triage and have ordered the appropriate study to initiate their work up based on the clinical presentation during my assessment. I have advised that the patient will be accommodated in the Main ED as soon as possible. I have also requested to contact the triage nurse or myself immediately if the patient experiences any changes in their condition during this brief waiting period.   Tanisha Castanon NP    Pt was told to come to the ED by his PCP; yesterday's lab draw was K+6.9

## 2018-06-23 NOTE — ED TRIAGE NOTES
Pt reports he was sent here by Dr Ernie Gilmore for further eval due to elevated potassium of 6.9. Pt states his blood was drawn yesterday. Pt also reports he was just discharged form here for same complaint. Pt denies having chest pain or SOB.

## 2018-07-16 ENCOUNTER — APPOINTMENT (OUTPATIENT)
Dept: ULTRASOUND IMAGING | Age: 78
DRG: 694 | End: 2018-07-16
Attending: FAMILY MEDICINE
Payer: MEDICARE

## 2018-07-16 ENCOUNTER — APPOINTMENT (OUTPATIENT)
Dept: CT IMAGING | Age: 78
DRG: 694 | End: 2018-07-16
Attending: FAMILY MEDICINE
Payer: MEDICARE

## 2018-07-16 ENCOUNTER — HOSPITAL ENCOUNTER (INPATIENT)
Age: 78
LOS: 5 days | Discharge: NURSING, REHAB OR LONG-TERM CARE | DRG: 694 | End: 2018-07-21
Attending: EMERGENCY MEDICINE | Admitting: FAMILY MEDICINE
Payer: MEDICARE

## 2018-07-16 ENCOUNTER — APPOINTMENT (OUTPATIENT)
Dept: CT IMAGING | Age: 78
DRG: 694 | End: 2018-07-16
Attending: EMERGENCY MEDICINE
Payer: MEDICARE

## 2018-07-16 DIAGNOSIS — H81.393 PERIPHERAL VERTIGO OF BOTH EARS: ICD-10-CM

## 2018-07-16 DIAGNOSIS — E87.20 METABOLIC ACIDOSIS: ICD-10-CM

## 2018-07-16 DIAGNOSIS — R53.83 FATIGUE, UNSPECIFIED TYPE: ICD-10-CM

## 2018-07-16 DIAGNOSIS — N13.30 HYDRONEPHROSIS, UNSPECIFIED HYDRONEPHROSIS TYPE: ICD-10-CM

## 2018-07-16 DIAGNOSIS — N17.9 ACUTE RENAL FAILURE, UNSPECIFIED ACUTE RENAL FAILURE TYPE (HCC): Primary | ICD-10-CM

## 2018-07-16 LAB
ALBUMIN SERPL-MCNC: 3.2 G/DL (ref 3.5–5)
ALBUMIN/GLOB SERPL: 0.7 {RATIO} (ref 1.1–2.2)
ALP SERPL-CCNC: 86 U/L (ref 45–117)
ALT SERPL-CCNC: 11 U/L (ref 12–78)
ANION GAP SERPL CALC-SCNC: 13 MMOL/L (ref 5–15)
APPEARANCE UR: ABNORMAL
AST SERPL-CCNC: 6 U/L (ref 15–37)
BACTERIA URNS QL MICRO: ABNORMAL /HPF
BASOPHILS # BLD: 0 K/UL (ref 0–0.1)
BASOPHILS NFR BLD: 0 % (ref 0–1)
BILIRUB DIRECT SERPL-MCNC: 0.1 MG/DL (ref 0–0.2)
BILIRUB SERPL-MCNC: 0.5 MG/DL (ref 0.2–1)
BILIRUB UR QL: NEGATIVE
BUN SERPL-MCNC: 54 MG/DL (ref 6–20)
BUN/CREAT SERPL: 12 (ref 12–20)
CALCIUM SERPL-MCNC: 9.3 MG/DL (ref 8.5–10.1)
CHLORIDE SERPL-SCNC: 112 MMOL/L (ref 97–108)
CK SERPL-CCNC: 18 U/L (ref 39–308)
CO2 SERPL-SCNC: 15 MMOL/L (ref 21–32)
COLOR UR: ABNORMAL
CREAT SERPL-MCNC: 4.58 MG/DL (ref 0.7–1.3)
DIFFERENTIAL METHOD BLD: ABNORMAL
EOSINOPHIL # BLD: 0.1 K/UL (ref 0–0.4)
EOSINOPHIL NFR BLD: 1 % (ref 0–7)
EPITH CASTS URNS QL MICRO: ABNORMAL /LPF
ERYTHROCYTE [DISTWIDTH] IN BLOOD BY AUTOMATED COUNT: 15.6 % (ref 11.5–14.5)
GLOBULIN SER CALC-MCNC: 4.5 G/DL (ref 2–4)
GLUCOSE SERPL-MCNC: 104 MG/DL (ref 65–100)
GLUCOSE UR STRIP.AUTO-MCNC: NEGATIVE MG/DL
HCT VFR BLD AUTO: 34.9 % (ref 36.6–50.3)
HGB BLD-MCNC: 11.4 G/DL (ref 12.1–17)
HGB UR QL STRIP: ABNORMAL
IMM GRANULOCYTES # BLD: 0.1 K/UL (ref 0–0.04)
IMM GRANULOCYTES NFR BLD AUTO: 1 % (ref 0–0.5)
KETONES UR QL STRIP.AUTO: NEGATIVE MG/DL
LEUKOCYTE ESTERASE UR QL STRIP.AUTO: ABNORMAL
LYMPHOCYTES # BLD: 1.1 K/UL (ref 0.8–3.5)
LYMPHOCYTES NFR BLD: 17 % (ref 12–49)
MAGNESIUM SERPL-MCNC: 2.1 MG/DL (ref 1.6–2.4)
MCH RBC QN AUTO: 32.7 PG (ref 26–34)
MCHC RBC AUTO-ENTMCNC: 32.7 G/DL (ref 30–36.5)
MCV RBC AUTO: 100 FL (ref 80–99)
MONOCYTES # BLD: 0.5 K/UL (ref 0–1)
MONOCYTES NFR BLD: 7 % (ref 5–13)
NEUTS SEG # BLD: 4.6 K/UL (ref 1.8–8)
NEUTS SEG NFR BLD: 73 % (ref 32–75)
NITRITE UR QL STRIP.AUTO: NEGATIVE
NRBC # BLD: 0 K/UL (ref 0–0.01)
NRBC BLD-RTO: 0 PER 100 WBC
PH UR STRIP: 6 [PH] (ref 5–8)
PLATELET # BLD AUTO: 290 K/UL (ref 150–400)
PMV BLD AUTO: 10 FL (ref 8.9–12.9)
POTASSIUM SERPL-SCNC: 5.2 MMOL/L (ref 3.5–5.1)
PROT SERPL-MCNC: 7.7 G/DL (ref 6.4–8.2)
PROT UR STRIP-MCNC: 100 MG/DL
RBC # BLD AUTO: 3.49 M/UL (ref 4.1–5.7)
RBC #/AREA URNS HPF: ABNORMAL /HPF (ref 0–5)
SODIUM SERPL-SCNC: 140 MMOL/L (ref 136–145)
SP GR UR REFRACTOMETRY: 1.01 (ref 1–1.03)
TROPONIN I SERPL-MCNC: <0.05 NG/ML
UA: UC IF INDICATED,UAUC: ABNORMAL
UROBILINOGEN UR QL STRIP.AUTO: 0.2 EU/DL (ref 0.2–1)
WBC # BLD AUTO: 6.4 K/UL (ref 4.1–11.1)
WBC URNS QL MICRO: >100 /HPF (ref 0–4)
YEAST URNS QL MICRO: PRESENT

## 2018-07-16 PROCEDURE — 99284 EMERGENCY DEPT VISIT MOD MDM: CPT

## 2018-07-16 PROCEDURE — 94762 N-INVAS EAR/PLS OXIMTRY CONT: CPT

## 2018-07-16 PROCEDURE — 99218 HC RM OBSERVATION: CPT

## 2018-07-16 PROCEDURE — 96365 THER/PROPH/DIAG IV INF INIT: CPT

## 2018-07-16 PROCEDURE — 74011000250 HC RX REV CODE- 250: Performed by: FAMILY MEDICINE

## 2018-07-16 PROCEDURE — 87086 URINE CULTURE/COLONY COUNT: CPT | Performed by: EMERGENCY MEDICINE

## 2018-07-16 PROCEDURE — 93005 ELECTROCARDIOGRAM TRACING: CPT

## 2018-07-16 PROCEDURE — 83605 ASSAY OF LACTIC ACID: CPT | Performed by: FAMILY MEDICINE

## 2018-07-16 PROCEDURE — 70450 CT HEAD/BRAIN W/O DYE: CPT

## 2018-07-16 PROCEDURE — 74011250636 HC RX REV CODE- 250/636: Performed by: EMERGENCY MEDICINE

## 2018-07-16 PROCEDURE — 85025 COMPLETE CBC W/AUTO DIFF WBC: CPT | Performed by: EMERGENCY MEDICINE

## 2018-07-16 PROCEDURE — 84484 ASSAY OF TROPONIN QUANT: CPT | Performed by: EMERGENCY MEDICINE

## 2018-07-16 PROCEDURE — 74011250636 HC RX REV CODE- 250/636: Performed by: FAMILY MEDICINE

## 2018-07-16 PROCEDURE — 76770 US EXAM ABDO BACK WALL COMP: CPT

## 2018-07-16 PROCEDURE — 74176 CT ABD & PELVIS W/O CONTRAST: CPT

## 2018-07-16 PROCEDURE — 82550 ASSAY OF CK (CPK): CPT | Performed by: EMERGENCY MEDICINE

## 2018-07-16 PROCEDURE — 74011250637 HC RX REV CODE- 250/637: Performed by: FAMILY MEDICINE

## 2018-07-16 PROCEDURE — 65660000000 HC RM CCU STEPDOWN

## 2018-07-16 PROCEDURE — 80048 BASIC METABOLIC PNL TOTAL CA: CPT | Performed by: EMERGENCY MEDICINE

## 2018-07-16 PROCEDURE — 65270000029 HC RM PRIVATE

## 2018-07-16 PROCEDURE — 36415 COLL VENOUS BLD VENIPUNCTURE: CPT | Performed by: EMERGENCY MEDICINE

## 2018-07-16 PROCEDURE — 83735 ASSAY OF MAGNESIUM: CPT | Performed by: EMERGENCY MEDICINE

## 2018-07-16 PROCEDURE — 81001 URINALYSIS AUTO W/SCOPE: CPT | Performed by: EMERGENCY MEDICINE

## 2018-07-16 PROCEDURE — 74011000258 HC RX REV CODE- 258: Performed by: FAMILY MEDICINE

## 2018-07-16 PROCEDURE — 80076 HEPATIC FUNCTION PANEL: CPT | Performed by: EMERGENCY MEDICINE

## 2018-07-16 RX ORDER — SODIUM CHLORIDE 0.9 % (FLUSH) 0.9 %
5-10 SYRINGE (ML) INJECTION EVERY 8 HOURS
Status: DISCONTINUED | OUTPATIENT
Start: 2018-07-16 | End: 2018-07-22 | Stop reason: HOSPADM

## 2018-07-16 RX ORDER — MECLIZINE HCL 12.5 MG 12.5 MG/1
25 TABLET ORAL
Status: COMPLETED | OUTPATIENT
Start: 2018-07-16 | End: 2018-07-16

## 2018-07-16 RX ORDER — ALBUTEROL SULFATE 0.83 MG/ML
10 SOLUTION RESPIRATORY (INHALATION)
Status: ACTIVE | OUTPATIENT
Start: 2018-07-16 | End: 2018-07-17

## 2018-07-16 RX ORDER — LANOLIN ALCOHOL/MO/W.PET/CERES
325 CREAM (GRAM) TOPICAL
Status: DISCONTINUED | OUTPATIENT
Start: 2018-07-17 | End: 2018-07-22 | Stop reason: HOSPADM

## 2018-07-16 RX ORDER — METOPROLOL TARTRATE 50 MG/1
50 TABLET ORAL 2 TIMES DAILY
Status: CANCELLED | OUTPATIENT
Start: 2018-07-16

## 2018-07-16 RX ORDER — CALCITRIOL 0.25 UG/1
0.25 CAPSULE ORAL DAILY
Status: DISCONTINUED | OUTPATIENT
Start: 2018-07-17 | End: 2018-07-22 | Stop reason: HOSPADM

## 2018-07-16 RX ORDER — SODIUM BICARBONATE 650 MG/1
650 TABLET ORAL 2 TIMES DAILY
Status: DISCONTINUED | OUTPATIENT
Start: 2018-07-16 | End: 2018-07-16 | Stop reason: ALTCHOICE

## 2018-07-16 RX ORDER — GUAIFENESIN 100 MG/5ML
81 LIQUID (ML) ORAL DAILY
Status: DISCONTINUED | OUTPATIENT
Start: 2018-07-17 | End: 2018-07-22 | Stop reason: HOSPADM

## 2018-07-16 RX ORDER — SODIUM POLYSTYRENE SULFONATE 15 G/60ML
15 SUSPENSION ORAL; RECTAL
Status: COMPLETED | OUTPATIENT
Start: 2018-07-16 | End: 2018-07-16

## 2018-07-16 RX ORDER — CALCIUM GLUCONATE 94 MG/ML
1 INJECTION, SOLUTION INTRAVENOUS ONCE
Status: DISCONTINUED | OUTPATIENT
Start: 2018-07-16 | End: 2018-07-16 | Stop reason: SDUPTHER

## 2018-07-16 RX ORDER — SODIUM CHLORIDE 0.9 % (FLUSH) 0.9 %
5-10 SYRINGE (ML) INJECTION AS NEEDED
Status: DISCONTINUED | OUTPATIENT
Start: 2018-07-16 | End: 2018-07-22 | Stop reason: HOSPADM

## 2018-07-16 RX ORDER — LIDOCAINE HYDROCHLORIDE 20 MG/ML
JELLY TOPICAL ONCE
Status: DISPENSED | OUTPATIENT
Start: 2018-07-16 | End: 2018-07-17

## 2018-07-16 RX ADMIN — MECLIZINE 25 MG: 12.5 TABLET ORAL at 14:54

## 2018-07-16 RX ADMIN — SODIUM CHLORIDE 500 ML: 900 INJECTION, SOLUTION INTRAVENOUS at 14:57

## 2018-07-16 RX ADMIN — SODIUM CHLORIDE: 450 INJECTION, SOLUTION INTRAVENOUS at 23:07

## 2018-07-16 RX ADMIN — CEFTRIAXONE 1 G: 1 INJECTION, POWDER, FOR SOLUTION INTRAMUSCULAR; INTRAVENOUS at 18:23

## 2018-07-16 RX ADMIN — CALCIUM GLUCONATE 1 G: 94 INJECTION, SOLUTION INTRAVENOUS at 21:57

## 2018-07-16 RX ADMIN — Medication 10 ML: at 22:01

## 2018-07-16 RX ADMIN — SODIUM POLYSTYRENE SULFONATE 15 G: 15 SUSPENSION ORAL; RECTAL at 20:52

## 2018-07-16 NOTE — IP AVS SNAPSHOT
[address] [facility] [address] 
[phone] Patient: Onofre Reyes. MRN: [id number] LAURY:[birth year]4 About your hospitalization You were admitted on:  July 16, 2018 You last received care in the:  [address] [place] [address] You were discharged on:  July 20, 2018 Why you were hospitalized Your primary diagnosis was:  Arf (Acute Renal Failure) (Hcc) Follow-up Information Follow up With Details Comments Contact Info Giselle Warren MD  Practice will call patient with appointment  date and time. [address] [facility] [address] [facility] 
[phone] Lydia Aldridge MD In 1 month  [facility] [address] [facility] 57 
[phone] Andres Lozada MD In 2 weeks Practice will call the patient for appointment date and time. [phone] Discharge Orders None A check kaylene indicates which time of day the medication should be taken. My Medications CONTINUE taking these medications Instructions Each Dose to Equal  
 Morning Noon Evening Bedtime  
 allopurinol 100 mg tablet Commonly known as:  Vanessa Huh Your last dose was: Your next dose is: Take 100 mg by mouth nightly. 100 mg  
    
   
   
   
  
 aspirin 81 mg chewable tablet Your last dose was: Your next dose is: Take 81 mg by mouth daily. 81 mg  
    
   
   
   
  
 calcitRIOL 0.25 mcg capsule Commonly known as:  ROCALTROL Your last dose was: Your next dose is: Take 0.25 mcg by mouth daily. 0.25 mcg  
    
   
   
   
  
 ferrous sulfate 325 mg (65 mg iron) tablet Your last dose was: Your next dose is: Take 325 mg by mouth Before breakfast and dinner. 325 mg  
    
   
   
   
  
 metoprolol tartrate 50 mg tablet Commonly known as:  LOPRESSOR Your last dose was: Your next dose is: Take 50 mg by mouth two (2) times a day. 50 mg  
    
   
   
   
  
 OTHER(NON-FORMULARY) Your last dose was: Your next dose is:    
   
   
 daily. Neurim  (B-complex with additional supplements) for Memory  
     
   
   
   
  
 sodium bicarbonate 650 mg tablet Your last dose was: Your next dose is: Take 1 Tab by mouth two (2) times a day. 650 mg  
    
   
   
   
  
 VELTASSA 8.4 gram powder Generic drug:  patiromer calcium sorbitex Your last dose was: Your next dose is: Take 8.4 g by mouth daily. SAMPLES FROM MD   Indications: hyperkalemia 8.4 g  
    
   
   
   
  
 VITAMIN B-12 1,000 mcg/mL Drop Generic drug:  cyanocobalamin (vitamin B-12) Your last dose was: Your next dose is: Take 1,000 mcg by mouth daily. 1000 mcg XTANDI 40 mg capsule Generic drug:  enzalutamide Your last dose was: Your next dose is: Take 160 mg by mouth nightly. 160 mg Discharge Instructions Follow up with pcp Monitor your blood pressure Don't do sudden movement when changing position. Be compliant with the medication Clar[facility] Announcement We are excited to announce that we are making your provider's discharge notes available to you in Clariture. You will see these notes when they are completed and signed by the physician that discharged you from your recent hospital stay. If you have any questions or concerns about any information you see in Clariture, please call the Health Information Department where you were seen or reach out to your Primary Care Provider for more information about your plan of care. Introducing [facility]!    
 Kendell Farrar introduces Clariture patient portal. Now you can access parts of your medical record, email your doctor's office, and request medication refills online. 1. In your internet browser, go to [email or web address] News Funnel 2. Click on the First Time User? Click Here link in the Sign In box. You will see the New Member Sign Up page. 3. Enter your [employer] Access Code exactly as it appears below. You will not need to use this code after youve completed the sign-up process. If you do not sign up before the expiration date, you must request a new code. · [employer] Access Code: [email or web address] Expires: 9/3/2018 10:48 AM 
 
4. Enter the last four digits of your Social Security Number (xxxx) and Date of Birth (mm/dd/yyyy) as indicated and click Submit. You will be taken to the next sign-up page. 5. Create a [employer] ID. This will be your [employer] login ID and cannot be changed, so think of one that is secure and easy to remember. 6. Create a [employer] password. You can change your password at any time. 7. Enter your Password Reset Question and Answer. This can be used at a later time if you forget your password. 8. Enter your e-mail address. You will receive e-mail notification when new information is available in [address] 9. Click Sign Up. You can now view and download portions of your medical record. 10. Click the Download Summary menu link to download a portable copy of your medical information. If you have questions, please visit the Frequently Asked Questions section of the [employer] website. Remember, [facility] is NOT to be used for urgent needs. For medical emergencies, dial 911. Now available from your iPhone and Android! Introducing Chet Montenegro As a [facility] patient, I wanted to make you aware of our electronic visit tool called Chet Montenegro. [facility] 24/7 allows you to connect within minutes with a medical provider 24 hours a day, seven days a week via a mobile device or tablet or logging into a secure website from your computer. You can access Percolate from anywhere in the [place]. A virtual visit might be right for you when you have a simple condition and feel like you just dont want to get out of bed, or cant get away from work for an appointment, when your regular [facility] provider is not available (evenings, weekends or holidays), or when youre out of town and need minor care. Electronic visits cost only $49 and if the [employer] 24/TIME PLUS Q provider determines a prescription is needed to treat your condition, one can be electronically transmitted to a nearby pharmacy*. Please take a moment to enroll today if you have not already done so. The enrollment process is free and takes just a few minutes. To enroll, please download the [employer] melquiades to your tablet or phone, or visit [email or web address] to enroll on your computer. And, as an [address] [place] [address] patient with a [employer] account, the results of your visits will be scanned into your electronic medical record and your primary care provider will be able to view the scanned results. We urge you to continue to see your regular [facility] provider for your ongoing medical care. And while your primary care provider may not be the one available when you seek a Ech[employer]jhonny[employer] virtual visit, the peace of mind you get from getting a real diagnosis real time can be priceless. For more information on Percolate, view our Frequently Asked Questions (FAQs) at [email or web address]. Sincerely, 
 
Ivan Betancur MD 
Chief Medical Officer Marciano Buitrago *:  certain medications cannot be prescribed via Percolate Unresulted Labs-Please follow up with your PCP about these lab tests Order Current Status CULTURE, BLOOD, PAIRED Preliminary result Providers Seen During Your Hospitalization Provider Specialty Primary office phone Dominique Castillo MD Emergency Medicine [phone] Samuel Loredo MD Hospitalist [phone] Thelma Graham MD Internal Medicine [phone] Darlene Ruiz MD Internal Medicine [phone] Your Primary Care Physician (PCP) Primary Care Physician Office Phone Office Fax Laya Jacinto [phone] You are allergic to the following Allergen Reactions Morphine Other (comments) AMS Recent Documentation Height Weight BMI Smoking Status 1.803 m 102 kg 31.35 kg/m2 Former Smoker Emergency Contacts Name Discharge Info Relation Home Work Mobile Alma Freed DISCHARGE CAREGIVER [3] Child [2] [phone] Patient Belongings The following personal items are in your possession at time of discharge: 
  Dental Appliances: None  Visual Aid: Glasses Please provide this summary of care documentation to your next provider. Signatures-by signing, you are acknowledging that this After Visit Summary has been reviewed with you and you have received a copy. Patient Signature:  ____________________________________________________________ Date:  ____________________________________________________________  
  
[facility] Provider Signature:  ____________________________________________________________ Date:  ____________________________________________________________

## 2018-07-16 NOTE — PROGRESS NOTES
Admission Medication Reconciliation:    Information obtained from: Patient and daughter provided pill bottles    Significant PMH/Disease States:   Past Medical History:   Diagnosis Date    Celiac disease     Chronic kidney disease     Hypertension     MI (mitral incompetence)     Prostate cancer [facility])        Chief Complaint for this Admission:  Fatigue and weakness    Allergies:  Morphine    Prior to Admission Medications:   Prior to Admission Medications   Prescriptions Last Dose Informant Patient Reported? Taking? OTHER,NON-FORMULARY, 7/16/2018 at Unknown time  Yes Yes   Sig: daily. Neurim  (B-complex with additional supplements) for Memory   allopurinol (ZYLOPRIM) 100 mg tablet 7/15/2018 at Unknown time  Yes Yes   Sig: Take 100 mg by mouth nightly. aspirin 81 mg chewable tablet 7/16/2018 at Unknown time  Yes Yes   Sig: Take 81 mg by mouth daily. calcitRIOL (ROCALTROL) 0.25 mcg capsule 7/16/2018 at Unknown time  Yes Yes   Sig: Take 0.25 mcg by mouth daily. cyanocobalamin, vitamin B-12, (VITAMIN B-12) 1,000 mcg/mL drop 7/16/2018 at Unknown time  Yes Yes   Sig: Take 1,000 mcg by mouth daily. enzalutamide (XTANDI) 40 mg capsule 7/15/2018 at Unknown time  Yes Yes   Sig: Take 160 mg by mouth nightly. ferrous sulfate 325 mg (65 mg iron) tablet 7/16/2018 at Unknown time  Yes Yes   Sig: Take 325 mg by mouth Before breakfast and dinner. metoprolol tartrate (LOPRESSOR) 50 mg tablet 7/16/2018 at Unknown time  Yes Yes   Sig: Take 50 mg by mouth two (2) times a day. patiromer calcium sorbitex (VELTASSA) 8.4 gram powder 7/15/2018 at Unknown time  Yes Yes   Sig: Take 8.4 g by mouth daily. SAMPLES FROM MD   Indications: hyperkalemia   sodium bicarbonate 650 mg tablet 7/16/2018 at Unknown time  No Yes   Sig: Take 1 Tab by mouth two (2) times a day.       Facility-Administered Medications: None         Comments/Recommendations: Daughter is excellent historian, provided pill bottles for patient (he lives in [facility]). He is currently taking Veltassa provided as samples from MD--there is no accompanying prescription. Patient can supply XTANDI. Allergies were reviewed, no changes. Deleted:  1. Colchicine  2. Dexlansoprazole  3. Linezolid  4. Dorenda Nettle    Thank you for allowing me to participate in the care of your patient.     Tanisha Conteh PharmD, RN #[id number]

## 2018-07-16 NOTE — IP AVS SNAPSHOT
1111  13 
[phone] Patient: Jaime Ayala MRN: [id number] FRO:45/88/1935 A check kaylene indicates which time of day the medication should be taken. My Medications CONTINUE taking these medications Instructions Each Dose to Equal  
 Morning Noon Evening Bedtime  
 allopurinol 100 mg tablet Commonly known as:  Khan Better Your last dose was: Your next dose is: Take 100 mg by mouth nightly. 100 mg  
    
   
   
   
  
 aspirin 81 mg chewable tablet Your last dose was: Your next dose is: Take 81 mg by mouth daily. 81 mg  
    
   
   
   
  
 calcitRIOL 0.25 mcg capsule Commonly known as:  ROCALTROL Your last dose was: Your next dose is: Take 0.25 mcg by mouth daily. 0.25 mcg  
    
   
   
   
  
 ferrous sulfate 325 mg (65 mg iron) tablet Your last dose was: Your next dose is: Take 325 mg by mouth Before breakfast and dinner. 325 mg  
    
   
   
   
  
 metoprolol tartrate 50 mg tablet Commonly known as:  LOPRESSOR Your last dose was: Your next dose is: Take 50 mg by mouth two (2) times a day. 50 mg  
    
   
   
   
  
 OTHER(NON-FORMULARY) Your last dose was: Your next dose is:    
   
   
 daily. Neurim  (B-complex with additional supplements) for Memory  
     
   
   
   
  
 sodium bicarbonate 650 mg tablet Your last dose was: Your next dose is: Take 1 Tab by mouth two (2) times a day. 650 mg  
    
   
   
   
  
 VELTASSA 8.4 gram powder Generic drug:  patiromer calcium sorbitex Your last dose was: Your next dose is: Take 8.4 g by mouth daily. SAMPLES FROM MD   Indications: hyperkalemia 8.4 g  
    
   
   
   
  
 VITAMIN B-12 1,000 mcg/mL Drop Generic drug:  cyanocobalamin (vitamin B-12) Your last dose was: Your next dose is: Take 1,000 mcg by mouth daily. 1000 mcg XTANDI 40 mg capsule Generic drug:  enzalutamide Your last dose was: Your next dose is: Take 160 mg by mouth nightly.   
 160 mg

## 2018-07-16 NOTE — ED PROVIDER NOTES
HPI Comments: 78 y.o. male with past medical history significant for CKD, MI, HTN and prostate CA who presents from [facility] via EMS with chief complaint of fatigue. Pt reports for 1 week he has experienced progressively worsening fatigue, lightheaded dizziness upon standing, generalized weakness upon ambulating, HUSSEIN and decreased appetite (~.25 meals/ day). Pt explains he has been eating so little because he is not hungry and upon eating \"feels full\" quickly. Pt states currently his left kidney is working at 16% and his right at 0%. Pt denies CP, weakness, numbness, slurred speech and difficulty speaking. There are no other acute medical concerns at this time. Social hx: resides at [facility], former smoker  PCP: Terese Taylor MD  Nephrologist: Dr. Mc Rojas MD    Old Chart Review: Pt had 2 admissions in 6/17/2018 for elevated potassium. Pt also seen in ED ~3 weeks ago for elevated potassium. Note written by Alex Barclay, as dictated by Janene Gamez MD 2:18 PM       The history is provided by the patient and the EMS personnel. Past Medical History:   Diagnosis Date    Celiac disease     Chronic kidney disease     Hypertension     MI (mitral incompetence)     Prostate cancer ([facility] [address]        Past Surgical History:   Procedure Laterality Date    CARDIAC SURG PROCEDURE UNLIST      qaudruple bipass         History reviewed. No pertinent family history. Social History     Social History    Marital status:      Spouse name: N/A    Number of children: N/A    Years of education: N/A     Occupational History    Not on file.      Social History Main Topics    Smoking status: Former Smoker    Smokeless tobacco: Never Used    Alcohol use No    Drug use: Not on file    Sexual activity: Not on file     Other Topics Concern    Not on file     Social History Narrative         ALLERGIES: Morphine    Review of Systems   Constitutional: Positive for appetite change (decrease) and fatigue. Negative for fever. Eyes: Negative for pain. Respiratory: Positive for shortness of breath. Negative for cough. Cardiovascular: Negative for chest pain and leg swelling. Gastrointestinal: Negative for abdominal pain. Genitourinary: Negative for flank pain and hematuria. Musculoskeletal: Negative for gait problem, neck pain and neck stiffness. Skin: Negative for color change. Neurological: Positive for weakness (generalized) and light-headedness. Negative for speech difficulty, numbness and headaches. Hematological: Does not bruise/bleed easily. Psychiatric/Behavioral: Negative for confusion. All other systems reviewed and are negative. There were no vitals filed for this visit. Physical Exam   Constitutional: He is oriented to person, place, and time. No distress. Elderly and frail   HENT:   Head: Normocephalic and atraumatic. Right Ear: External ear normal.   Left Ear: External ear normal.   Mouth/Throat: Mucous membranes are dry. Eyes: EOM are normal. Pupils are equal, round, and reactive to light. Neck: Normal range of motion. Neck supple. No JVD present. No tracheal deviation present. Cardiovascular: Normal rate, regular rhythm and normal heart sounds. Exam reveals no gallop and no friction rub. No murmur heard. Pulmonary/Chest: Effort normal and breath sounds normal. No stridor. No respiratory distress. He has no wheezes. He has no rales. Abdominal: Soft. Bowel sounds are normal. He exhibits no distension. There is no tenderness. There is no rebound and no guarding. Musculoskeletal: Normal range of motion. He exhibits no edema or tenderness. Neurological: He is alert and oriented to person, place, and time. He has normal reflexes. No cranial nerve deficit. Coordination normal.   No facial droop, 5/5 hand and plantar and dorsiflexion of feet   Skin: Skin is warm and dry. No rash noted. He is not diaphoretic. No erythema. Psychiatric: He has a normal mood and affect. His behavior is normal. Judgment and thought content normal.   Nursing note and vitals reviewed. Note written by Alex Brown, as dictated by Laura Aranda MD 2:18 PM     MDM  Number of Diagnoses or Management Options  Acute renal failure, unspecified acute renal failure type [facility]): Fatigue, unspecified type:   Metabolic acidosis:   Diagnosis management comments: 61-year-old white male presents with fatigue, dyspnea, not feeling well. Patient has chronic renal insufficiency. Will check kidney function, urinalysis, CT of the head. Patient also feels dizzy with standing. We'll give him meclizine and a small IV fluid bolus. We'll reassess when testing and imaging her back. Patient agrees with this plan. Amount and/or Complexity of Data Reviewed  Clinical lab tests: ordered and reviewed  Tests in the radiology section of CPT®: ordered and reviewed  Tests in the medicine section of CPT®: ordered and reviewed  Decide to obtain previous medical records or to obtain history from someone other than the patient: yes  Review and summarize past medical records: yes [facility] records from June)  Independent visualization of images, tracings, or specimens: yes          ED Course       Procedures    PROGRESS NOTE:  3:18 PM  Pt's kidney function is worse. Will discuss with nephrology, discuss with hospitalist for admission and give another IV fluid bolus. Creat is up from 2 to 4.5  CO2 is 15, acidotic  Trop is negative    UA is pending    IVF are running    CONSULT NOTE:  3:29 PM Laura Aranda MD spoke with Dr. Alberto Reyna, Consult for Hospitalist.  Discussed available diagnostic tests and clinical findings. He will see and admit. 3:31 PM  Laura Aranda MD have spent 30 minutes of critical care time involved in lab review, consultations with specialist, family decision-making, and documentation.   During this entire length of time I was immediately available to the patient. Critical Care: The reason for providing this level of medical care for this critically ill patient was due a critical illness that impaired one or more vital organ systems such that there was a high probability of imminent or life threatening deterioration in the patients condition. This care involved high complexity decision making to assess, manipulate, and support vital system functions, to treat this degreee vital organ system failure and to prevent further life threatening deterioration of the patients condition.

## 2018-07-16 NOTE — ROUTINE PROCESS
TRANSFER - OUT REPORT:    Verbal report given to DaniellaRN(name) on [place].  being transferred to [facility](unit) for routine progression of care       Report consisted of patients Situation, Background, Assessment and   Recommendations(SBAR). Information from the following report(s) SBAR, Kardex, [facility] and Recent Results was reviewed with the receiving nurse. Lines:       Opportunity for questions and clarification was provided.       Patient transported with:   [employer]

## 2018-07-16 NOTE — ED NOTES
ED EKG interpretation:  Rhythm: normal sinus rhythm with first degree AV block; and regular . Rate (approx.): 76; ST/T wave: No ST elevations or depressions; Note written by Alex Ye, as dictated by Christian Diggs MD 4:21 PM    CONSULT NOTE:  5:33 PM Christian Diggs MD spoke with Dr. Austin Laughlin MD, Consult for Nephrology. Discussed available diagnostic tests and clinical findings. He recommends yusuf and states he will come see pt.

## 2018-07-16 NOTE — H&P
[address]  HISTORY AND PHYSICAL      Elder Naresh.Stephenie  MR#: [id number]  : 1940  ACCOUNT #: [de-identified]   ADMIT DATE: 2018    CHIEF COMPLAINT:  Fatigue. HISTORY OF PRESENT ILLNESS:  The patient is a 61-year-old gentleman with past medical history significant for chronic kidney disease stage IV, history of prostate cancer, history of bilateral chronic hydronephrosis, hyperkalemia, metabolic acidosis, coronary artery disease status post CABG x4, macrocytic anemia, history of gout, hypocalcemia, hyperkalemia, who presents to the hospital with the above-mentioned symptoms. Patient reports that for about a week or so, he has been feeling quite fatigued. Patient reports that he has also been lightheaded, dizzy, has not been eating and drinking well, has had poor appetite and also had some pronounced fatigue when he walked or exerted himself. Patient reports that he had some shortness of breath which he attributed to \"just not being in shape and fatigue\" when he exerted himself. The patient reports that he feels that his [place] is full\" and does not have an appetite. The patient was brought to the hospital and was found to be in acute renal failure and was requested to be admitted under the hospitalist service. Patient reports that increasingly he has felt that he has not been able to void as well. He reports that he has a history of prostate cancer and for the past few days it has been increasingly hard for him to urinate. Patient reports that he has become more incontinent and does not feel that he is able to void his bladder completely. The patient denies any other complaints or problems.   Denies any headache, blurry vision, sore throat, trouble swallowing, trouble with speech, denies any chest pain, denies any abdominal pain, constipation, diarrhea, focal or generalized neurological weakness, any falls, injuries, hematemesis, melena, hemoptysis, or any other concerns or problems. PAST MEDICAL HISTORY:  See above. HOME MEDICATIONS:  Currently the patient is on Veltassa  mg daily, sodium bicarbonate 650 mg b.i.d., metoprolol 50 mg b.i.d., Xtandi 0.16 mg nightly, allopurinol 100 mg nightly, ferrous sulfate 325 mg daily, Rocaltrol 0.25 mg daily, cyanocobalamin, and aspirin 81 mg daily. SOCIAL HISTORY:  Former smoker. No alcohol. No IV drug abuse. Lives at home. ALLERGIES:  MORPHINE. FAMILY HISTORY:  Discussed and found to be noncontributory. PHYSICAL EXAMINATION:  VITAL SIGNS:  Vital signs have not been recorded in the chart, but on the monitor the patient has a heart rate of 62, blood pressure of 106/68, pulse oximetry was 99% on room air. GENERAL:  Alert and oriented x3, awake, mildly distressed, pleasant male, appears to be stated age. HEENT:  Pupils equal, reactive to light. Dry mucous membranes. Tympanic membranes clear. NECK:  Supple. LUNGS:  Decreased basilar sounds. HEART:  S1, S2 heard. ABDOMEN:  Soft, nontender, nondistended. Bowel sounds are physiologic. EXTREMITIES:  No clubbing, no cyanosis, no edema. NEUROPSYCHIATRIC:  Pleasant mood and affect. Cranial nerves II-XII grossly intact. Sensory grossly within normal limits. DTRs 2+/4. Strength 5/5. SKIN:  There is a small area of irregular crusted lesion on the middle back. No bleeding. No signs of infection. LABORATORY DATA:  White count 6.4, hemoglobin 11.4, hematocrit 34.9, platelets 802,121. Urine shows large amount of blood, positive for leukocyte esterase, greater than 100 WBC, 1+ bacteria. Sodium 140, potassium 5.2, chloride 112, bicarbonate 15, anion gap 13, glucose 104, BUN 54, creatinine 4.58, calcium 9.3, bilirubin total 0.5, ALT 11, AST 6, alkaline phosphatase 86, CK 18. Troponin less than 0.05. Sputum culture is pending. IMAGING:  CT of the head shows no acute abnormality. EKG is pending. ASSESSMENT AND PLAN:  1.   Acute on chronic renal failure. Patient will be admitted on a telemetry bed, appears to be prerenal, but I am concerned that this may be obstructive. We will start patient on gentle IV hydration and put patient on a bicarbonate drip. We will get a CT of the abdomen and pelvis. I requested the nurse to get a bladder scan and may consider putting a Cespedes in. Nephrology has been consulted. We will work towards reducing the potassium level and continue to closely monitor. Renal ultrasound has been ordered. Avoid nephrotoxic medications. Patient mildly hypotensive; that makes me more concerned about a prerenal etiology. We will discuss with nephrology and consider further diagnostic evaluation. Continue to closely monitor. 2.  Hyperkalemia. Patient will be provided Kayexalate. We will give patient 1 dose of calcium gluconate and albuterol. Repeat calcium levels. IV fluids should help. We will continue to closely monitor. Further intervention will be per hospital course. Reassess as needed. EKG was done in the ER and that has been ordered. Will reassess as needed. 3.  Urinary tract infection. We will start patient on IV antibiotics. We will send urine for culture. CT of the abdomen and pelvis is pending. Continue to closely monitor. Further intervention will be per hospital course. 4.  History of prostate cancer. May consider getting a urology consult if concern for  etiology. Continue to closely monitor. 5.  Metabolic acidosis, most likely secondary to #1. Patient on bicarbonate drip. Repeat CMP in the morning. Continue to monitor. Reassess as needed. 6.  History of coronary artery disease, stable. Continue to monitor. 7.  Crusted lesion on the back. The patient may need a dermatology followup on an outpatient basis. Continue to monitor. 8.  Gastrointestinal and deep vein thrombosis prophylaxis. Patient will be on sequential compression devices. 9.  Dizziness, most likely secondary to #1.   We will provide neurovascular checks. Continue patient on aspirin. Gentle IV hydration. Fall precautions. May consider getting an MRI and carotid duplex if symptoms persist in the morning. May also consider a neurology consult. Continue to monitor.       Semaj To MD       MM/HN  D: 07/16/2018 17:01     T: 07/16/2018 18:01  JOB #: [id number]

## 2018-07-16 NOTE — ED TRIAGE NOTES
Patient arrives via EMS from [address] [place] [address] Dr Maier for generalized fatigue and weakness. Patient reports not eating due to lack of appetite. Patient lives alone in his appartment. Denies chest pain or shortness of breath. Alert and oriented x4.

## 2018-07-17 ENCOUNTER — ANESTHESIA EVENT (OUTPATIENT)
Dept: SURGERY | Age: 78
DRG: 694 | End: 2018-07-17
Payer: MEDICARE

## 2018-07-17 LAB
ANION GAP SERPL CALC-SCNC: 14 MMOL/L (ref 5–15)
ATRIAL RATE: 76 BPM
BACTERIA SPEC CULT: ABNORMAL
BUN SERPL-MCNC: 54 MG/DL (ref 6–20)
BUN/CREAT SERPL: 12 (ref 12–20)
CALCIUM SERPL-MCNC: 8.7 MG/DL (ref 8.5–10.1)
CALCULATED P AXIS, ECG09: -10 DEGREES
CALCULATED R AXIS, ECG10: 1 DEGREES
CALCULATED T AXIS, ECG11: 15 DEGREES
CC UR VC: ABNORMAL
CHLORIDE SERPL-SCNC: 113 MMOL/L (ref 97–108)
CK SERPL-CCNC: 18 U/L (ref 39–308)
CO2 SERPL-SCNC: 14 MMOL/L (ref 21–32)
CREAT SERPL-MCNC: 4.4 MG/DL (ref 0.7–1.3)
DIAGNOSIS, 93000: NORMAL
ERYTHROCYTE [DISTWIDTH] IN BLOOD BY AUTOMATED COUNT: 15.4 % (ref 11.5–14.5)
GLUCOSE BLD STRIP.AUTO-MCNC: 100 MG/DL (ref 65–100)
GLUCOSE SERPL-MCNC: 122 MG/DL (ref 65–100)
HCT VFR BLD AUTO: 30.6 % (ref 36.6–50.3)
HGB BLD-MCNC: 10.2 G/DL (ref 12.1–17)
LACTATE SERPL-SCNC: 1.8 MMOL/L (ref 0.4–2)
MAGNESIUM SERPL-MCNC: 1.9 MG/DL (ref 1.6–2.4)
MCH RBC QN AUTO: 32.9 PG (ref 26–34)
MCHC RBC AUTO-ENTMCNC: 33.3 G/DL (ref 30–36.5)
MCV RBC AUTO: 98.7 FL (ref 80–99)
NRBC # BLD: 0 K/UL (ref 0–0.01)
NRBC BLD-RTO: 0 PER 100 WBC
P-R INTERVAL, ECG05: 222 MS
PHOSPHATE SERPL-MCNC: 4.3 MG/DL (ref 2.6–4.7)
PLATELET # BLD AUTO: 242 K/UL (ref 150–400)
PMV BLD AUTO: 9.9 FL (ref 8.9–12.9)
POTASSIUM SERPL-SCNC: 4.6 MMOL/L (ref 3.5–5.1)
Q-T INTERVAL, ECG07: 388 MS
QRS DURATION, ECG06: 72 MS
QTC CALCULATION (BEZET), ECG08: 436 MS
RBC # BLD AUTO: 3.1 M/UL (ref 4.1–5.7)
SERVICE CMNT-IMP: ABNORMAL
SERVICE CMNT-IMP: NORMAL
SODIUM SERPL-SCNC: 141 MMOL/L (ref 136–145)
VENTRICULAR RATE, ECG03: 76 BPM
WBC # BLD AUTO: 5.4 K/UL (ref 4.1–11.1)

## 2018-07-17 PROCEDURE — 74011250636 HC RX REV CODE- 250/636: Performed by: INTERNAL MEDICINE

## 2018-07-17 PROCEDURE — 36600 WITHDRAWAL OF ARTERIAL BLOOD: CPT

## 2018-07-17 PROCEDURE — 65660000000 HC RM CCU STEPDOWN

## 2018-07-17 PROCEDURE — 94760 N-INVAS EAR/PLS OXIMETRY 1: CPT

## 2018-07-17 PROCEDURE — 80048 BASIC METABOLIC PNL TOTAL CA: CPT | Performed by: FAMILY MEDICINE

## 2018-07-17 PROCEDURE — 87040 BLOOD CULTURE FOR BACTERIA: CPT | Performed by: FAMILY MEDICINE

## 2018-07-17 PROCEDURE — 83735 ASSAY OF MAGNESIUM: CPT | Performed by: FAMILY MEDICINE

## 2018-07-17 PROCEDURE — 74011000250 HC RX REV CODE- 250: Performed by: FAMILY MEDICINE

## 2018-07-17 PROCEDURE — 74011250637 HC RX REV CODE- 250/637: Performed by: FAMILY MEDICINE

## 2018-07-17 PROCEDURE — 82550 ASSAY OF CK (CPK): CPT | Performed by: FAMILY MEDICINE

## 2018-07-17 PROCEDURE — 36415 COLL VENOUS BLD VENIPUNCTURE: CPT | Performed by: FAMILY MEDICINE

## 2018-07-17 PROCEDURE — 74011000258 HC RX REV CODE- 258: Performed by: FAMILY MEDICINE

## 2018-07-17 PROCEDURE — 51798 US URINE CAPACITY MEASURE: CPT

## 2018-07-17 PROCEDURE — 82962 GLUCOSE BLOOD TEST: CPT

## 2018-07-17 PROCEDURE — 74011250637 HC RX REV CODE- 250/637: Performed by: INTERNAL MEDICINE

## 2018-07-17 PROCEDURE — 85027 COMPLETE CBC AUTOMATED: CPT | Performed by: FAMILY MEDICINE

## 2018-07-17 PROCEDURE — 84100 ASSAY OF PHOSPHORUS: CPT | Performed by: FAMILY MEDICINE

## 2018-07-17 PROCEDURE — 74011000250 HC RX REV CODE- 250: Performed by: INTERNAL MEDICINE

## 2018-07-17 PROCEDURE — 74011250636 HC RX REV CODE- 250/636: Performed by: FAMILY MEDICINE

## 2018-07-17 RX ORDER — FENTANYL CITRATE 50 UG/ML
50 INJECTION, SOLUTION INTRAMUSCULAR; INTRAVENOUS AS NEEDED
Status: CANCELLED | OUTPATIENT
Start: 2018-07-17

## 2018-07-17 RX ORDER — FAMOTIDINE 20 MG/1
20 TABLET, FILM COATED ORAL EVERY EVENING
Status: DISCONTINUED | OUTPATIENT
Start: 2018-07-17 | End: 2018-07-22 | Stop reason: HOSPADM

## 2018-07-17 RX ORDER — SODIUM CHLORIDE 0.9 % (FLUSH) 0.9 %
5-10 SYRINGE (ML) INJECTION AS NEEDED
Status: CANCELLED | OUTPATIENT
Start: 2018-07-17

## 2018-07-17 RX ORDER — LIDOCAINE HYDROCHLORIDE 10 MG/ML
0.5 INJECTION, SOLUTION EPIDURAL; INFILTRATION; INTRACAUDAL; PERINEURAL AS NEEDED
Status: CANCELLED | OUTPATIENT
Start: 2018-07-17

## 2018-07-17 RX ORDER — SODIUM CHLORIDE 0.9 % (FLUSH) 0.9 %
5-10 SYRINGE (ML) INJECTION EVERY 8 HOURS
Status: CANCELLED | OUTPATIENT
Start: 2018-07-17

## 2018-07-17 RX ORDER — MIDAZOLAM HYDROCHLORIDE 1 MG/ML
1 INJECTION, SOLUTION INTRAMUSCULAR; INTRAVENOUS AS NEEDED
Status: CANCELLED | OUTPATIENT
Start: 2018-07-17

## 2018-07-17 RX ORDER — DEXTROSE, SODIUM CHLORIDE, SODIUM LACTATE, POTASSIUM CHLORIDE, AND CALCIUM CHLORIDE 5; .6; .31; .03; .02 G/100ML; G/100ML; G/100ML; G/100ML; G/100ML
125 INJECTION, SOLUTION INTRAVENOUS CONTINUOUS
Status: CANCELLED | OUTPATIENT
Start: 2018-07-17 | End: 2018-07-18

## 2018-07-17 RX ORDER — FAMOTIDINE 20 MG/1
20 TABLET, FILM COATED ORAL 2 TIMES DAILY
Status: DISCONTINUED | OUTPATIENT
Start: 2018-07-17 | End: 2018-07-17 | Stop reason: DRUGHIGH

## 2018-07-17 RX ORDER — SODIUM CHLORIDE, SODIUM LACTATE, POTASSIUM CHLORIDE, CALCIUM CHLORIDE 600; 310; 30; 20 MG/100ML; MG/100ML; MG/100ML; MG/100ML
125 INJECTION, SOLUTION INTRAVENOUS CONTINUOUS
Status: CANCELLED | OUTPATIENT
Start: 2018-07-17 | End: 2018-07-18

## 2018-07-17 RX ADMIN — CEFTRIAXONE 1 G: 1 INJECTION, POWDER, FOR SOLUTION INTRAMUSCULAR; INTRAVENOUS at 17:37

## 2018-07-17 RX ADMIN — PATIROMER 8.4 G: 8.4 POWDER, FOR SUSPENSION ORAL at 12:51

## 2018-07-17 RX ADMIN — FAMOTIDINE 20 MG: 20 TABLET ORAL at 17:37

## 2018-07-17 RX ADMIN — FERROUS SULFATE TAB 325 MG (65 MG ELEMENTAL FE) 325 MG: 325 (65 FE) TAB at 17:37

## 2018-07-17 RX ADMIN — Medication 10 ML: at 22:00

## 2018-07-17 RX ADMIN — DEXTROSE MONOHYDRATE: 5 INJECTION, SOLUTION INTRAVENOUS at 12:54

## 2018-07-17 RX ADMIN — Medication 10 ML: at 07:11

## 2018-07-17 RX ADMIN — ASPIRIN 81 MG 81 MG: 81 TABLET ORAL at 10:33

## 2018-07-17 RX ADMIN — FERROUS SULFATE TAB 325 MG (65 MG ELEMENTAL FE) 325 MG: 325 (65 FE) TAB at 07:12

## 2018-07-17 RX ADMIN — SODIUM CHLORIDE: 450 INJECTION, SOLUTION INTRAVENOUS at 10:36

## 2018-07-17 RX ADMIN — CALCITRIOL 0.25 MCG: 0.25 CAPSULE, LIQUID FILLED ORAL at 10:33

## 2018-07-17 NOTE — PROGRESS NOTES
Hospitalist Progress Note Berry Mora MD 
Answering service: [phone] OR [phone] from in house phone Date of Service:  2018 NAME:  Frederick Renee. :  1940 MRN:  [id number] Admission Summary:  
74-year-old gentleman with past medical history significant for chronic kidney disease stage IV, history of prostate cancer, history of bilateral chronic hydronephrosis, hyperkalemia, metabolic acidosis, coronary artery disease status post CABG x4, macrocytic anemia, history of gout, hypocalcemia, hyperkalemia, who presents to the hospital with the above-mentioned symptoms. Interval history / Subjective:  
  
Feels ok , doesnot have a great appetite Is making urine Assessment & Plan: 1. JASIEL on CKD : We will start patient on gentle IV hydration and put patient on a bicarbonate drip. CT of the abdomen and pelvis 1. Severe bilateral hydronephrosis, despite appropriately positioned bilateral 
ureteral stents. 2. Bilateral renal cortical atrophy, consistent with chronic obstruction. 3. Mild thickening of the left superior aspect of the bladder wall. 4. Diffuse sclerotic osseous metastases. Nephrology on board and urology has been consulted to see if MEDINA is etiology Also PVR daily for 3 days 2. Hyperkalemia. Patient will be provided Kayexalate. We will give patient 1 dose of calcium gluconate and albuterol. now stable 3. Urinary tract infection. We will start patient on IV antibiotics. Urine c/s NGTD . 4.   stage 4  prostate cancer. on lupron and xtandi , follows oncology outpatient , Dr Jerry Shen 5. Metabolic acidosis, most likely secondary to #1. Patient on bicarbonate drip. Repeat CMP in the morning. Continue to monitor. Reassess as needed. 6.  History of coronary artery disease, stable. Continue to monitor. asa and metoprolol 7. Crusted lesion on the back.   The patient may need a dermatology followup on an outpatient basis. Continue to monitor. Code status:  Full DVT prophylaxis: none Care Plan discussed with: Patient/Family Disposition: TBD Hospital Problems  Date Reviewed: 7/16/2018 Codes Class Noted POA * (Principal)ARF (acute renal failure) (HCC) ICD-10-CM: N17.9 ICD-9-CM: 584.9  7/16/2018 Unknown Review of Systems: A comprehensive review of systems was negative except for that written in the HPI. Vital Signs:  
 Last 24hrs VS reviewed since prior progress note. Most recent are: 
Visit Vitals  /74  Pulse 75  Temp 98.1 °F (36.7 °C)  Resp 16  Wt 103.6 kg (228 lb 6.3 oz)  SpO2 100%  BMI 31.85 kg/m2 Intake/Output Summary (Last 24 hours) at 07/17/18 1442 Last data filed at 07/17/18 1308 Gross per 24 hour Intake                0 ml Output              500 ml Net             -500 ml Physical Examination:  
 
 
     
Constitutional:  No acute distress, cooperative, pleasant   
ENT:  Oral mucous moist, oropharynx benign. Neck supple, Resp:  CTA bilaterally. No wheezing/rhonchi/rales. No accessory muscle use CV:  Regular rhythm, normal rate, no murmurs, gallops, rubs GI:  Soft, non distended, non tender. normoactive bowel sounds, no hepatosplenomegaly Musculoskeletal:  No edema, warm, 2+ pulses throughout Neurologic:  Moves all extremities. AAOx3, CN II-XII reviewed Data Review:  
 Review and/or order of clinical lab test 
 
 
Labs:  
 
Recent Labs  
   07/17/18 
 0125  07/16/18 
 1429 WBC  5.4  6.4 HGB  10.2*  11.4* HCT  30.6*  34.9*  
PLT  242  290 Recent Labs  
   07/17/18 
 0125  07/16/18 
 1429 NA  141  140  
K  4.6  5.2*  
CL  113*  112* CO2  14*  15* BUN  54*  54* CREA  4.40*  4.58* GLU  122*  104* CA  8.7  9.3 MG  1.9  2.1 PHOS  4.3   --   
 
Recent Labs  
   07/16/18 
 1429 SGOT  6* ALT  11* AP  86 TBILI  0.5 TP  7.7 ALB  3.2* GLOB  4.5* No results for input(s): INR, PTP, APTT in the last 72 hours. No lab exists for component: INREXT No results for input(s): FE, TIBC, PSAT, FERR in the last 72 hours. No results found for: FOL, RBCF No results for input(s): PH, PCO2, PO2 in the last 72 hours. Recent Labs  
   07/17/18 
 0125  07/16/18 
 1429 CPK  18*   --   
TROIQ   --   <0.05 Lab Results Component Value Date/Time Cholesterol, total 85 10/07/2013 12:00 AM  
 HDL Cholesterol 5 10/07/2013 12:00 AM  
 LDL, calculated 39.6 10/07/2013 12:00 AM  
 Triglyceride 202 (H) 10/07/2013 12:00 AM  
 CHOL/HDL Ratio 17.0 (H) 10/07/2013 12:00 AM  
 
Lab Results Component Value Date/Time Glucose (POC) 100 07/17/2018 06:07 AM  
 Glucose (POC) 59 (L) 06/20/2018 05:03 PM  
 Glucose (POC) 101 (H) 06/20/2018 03:14 PM  
 Glucose (POC) 83 06/10/2018 06:26 AM  
 Glucose (POC) 108 (H) 06/09/2018 10:08 PM  
 Glucose (POC) 138 (H) 06/09/2018 06:30 PM  
 Glucose (POC) 159 (H) 06/09/2018 11:16 AM  
 
Lab Results Component Value Date/Time Color YELLOW/STRAW 07/16/2018 04:00 PM  
 Appearance TURBID (A) 07/16/2018 04:00 PM  
 Specific gravity 1.012 07/16/2018 04:00 PM  
 pH (UA) 6.0 07/16/2018 04:00 PM  
 Protein 100 (A) 07/16/2018 04:00 PM  
 Glucose NEGATIVE  07/16/2018 04:00 PM  
 Ketone NEGATIVE  07/16/2018 04:00 PM  
 Bilirubin NEGATIVE  07/16/2018 04:00 PM  
 Urobilinogen 0.2 07/16/2018 04:00 PM  
 Nitrites NEGATIVE  07/16/2018 04:00 PM  
 Leukocyte Esterase LARGE (A) 07/16/2018 04:00 PM  
 Epithelial cells FEW 07/16/2018 04:00 PM  
 Bacteria 1+ (A) 07/16/2018 04:00 PM  
 WBC >100 (H) 07/16/2018 04:00 PM  
 RBC 20-50 07/16/2018 04:00 PM  
 
 
 
Medications Reviewed:  
 
Current Facility-Administered Medications Medication Dose Route Frequency  sodium bicarbonate (8.4%) 150 mEq in dextrose 5% 1,000 mL infusion   IntraVENous CONTINUOUS  
 aspirin chewable tablet 81 mg  81 mg Oral DAILY  calcitRIOL (ROCALTROL) capsule 0.25 mcg  0.25 mcg Oral DAILY  ferrous sulfate tablet 325 mg  325 mg Oral ACB&D  patiromer calcium sorbitex (VELTASSA) powder 8.4 g  8.4 g Oral DAILY  sodium chloride (NS) flush 5-10 mL  5-10 mL IntraVENous Q8H  
 sodium chloride (NS) flush 5-10 mL  5-10 mL IntraVENous PRN  
 cefTRIAXone (ROCEPHIN) 1 g in 0.9% sodium chloride (MBP/ADV) 50 mL  1 g IntraVENous Q24H  
 
______________________________________________________________________ EXPECTED LENGTH OF STAY: - - - 
ACTUAL LENGTH OF STAY:          1 Parminder Patel MD

## 2018-07-17 NOTE — CONSULTS
Nephrology Progress Note  Sammy Johnson. Date of Admission : 7/16/2018    CC: Follow up for JASIEL on CKD       Assessment and Plan     JASIEL on CKD IV:  - 2/2 MEDINA with b/l hydro  - keep IVF going for now  - would have urology evaluate him today    Hx of MEDINA with ongoing b/l hydro despite stent placement:  - would have urology evaluate    CKD IV:  - baseline Cr 2.4  - f/b Dr. Jhon Alvares as an outpt    Chronic hyperkalemia:  - stable  - on veltassa at home    Metabolic Acidosis:  - cont bicarb drip    Anemia of CKD:  - hgb stable    CAD s/p CABG    Hx of prostate cancer       Interval History:  Seen and examined. Not much UOP recorded. Says he is urinating,  Cr only slightly better on IVF. Lytes stable otherwise. No cp, sob, n/v/d reported at this time. Current Medications: all current  Medications have been eviewed in EPIC  Review of Systems: Pertinent items are noted in HPI. Objective:  Vitals:    Vitals:    07/16/18 1839 07/16/18 1857 07/17/18 0245 07/17/18 0430   BP: 118/68 (!) 147/91 129/80    Pulse: 78 81 79    Resp: 18 16 17    Temp: 98.9 °F (37.2 °C) 97.4 °F (36.3 °C) 98.3 °F (36.8 °C)    SpO2: 99% 100% 98%    Weight:    103.6 kg (228 lb 6.3 oz)     Intake and Output:  07/17 0701 - 07/17 1900  In: -   Out: 100 [Urine:100]  07/15 1901 - 07/17 0700  In: -   Out: 300 [Urine:300]    Physical Examination:  General: NAD,Conversant   Neck:  Supple, no mass  Resp:  Lungs CTA B/L, no wheezing , normal respiratory effort  CV:  RRR,  no murmur or rub, no LE edema  GI:  Soft, NT, + Bowel sounds, no hepatosplenomegaly  Neurologic:  Non focal  Psych:             AAO x 3 appropriate affect   Skin:  No Rash  :  No goldberg in place    []    High complexity decision making was performed  []    Patient is at high-risk of decompensation with multiple organ involvement    Lab Data Personally Reviewed: I have reviewed all the pertinent labs, microbiology data and radiology studies during assessment.     Recent Labs 07/17/18   0125  07/16/18   1429   NA  141  140   K  4.6  5.2*   CL  113*  112*   CO2  14*  15*   GLU  122*  104*   BUN  54*  54*   CREA  4.40*  4.58*   CA  8.7  9.3   MG  1.9  2.1   PHOS  4.3   --    ALB   --   3.2*   SGOT   --   6*   ALT   --   11*     Recent Labs      07/17/18   0125  07/16/18   1429   WBC  5.4  6.4   HGB  10.2*  11.4*   HCT  30.6*  34.9*   PLT  242  290     Lab Results   Component Value Date/Time    Specimen Description: URINE 10/07/2013 01:36 AM    Specimen Description: BLOOD 10/06/2013 04:13 PM     Lab Results   Component Value Date/Time    Culture result: NO GROWTH AFTER 3 HOURS 07/17/2018 01:25 AM    Culture result: ENTEROCOCCUS FAECIUM GROUP D (A) 06/05/2018 03:22 PM    Culture result: **VANCOMYCIN RESISTANT ENTEROCOCCUS FAECIUM** (A) 06/05/2018 03:22 PM    Culture result: CANDIDA ALBICANS (A) 06/05/2018 03:22 PM     Recent Results (from the past 24 hour(s))   CBC WITH AUTOMATED DIFF    Collection Time: 07/16/18  2:29 PM   Result Value Ref Range    WBC 6.4 4.1 - 11.1 K/uL    RBC 3.49 (L) 4.10 - 5.70 M/uL    HGB 11.4 (L) 12.1 - 17.0 g/dL    HCT 34.9 (L) 36.6 - 50.3 %    .0 (H) 80.0 - 99.0 FL    MCH 32.7 26.0 - 34.0 PG    MCHC 32.7 30.0 - 36.5 g/dL    RDW 15.6 (H) 11.5 - 14.5 %    PLATELET 945 862 - 294 K/uL    MPV 10.0 8.9 - 12.9 FL    NRBC 0.0 0  WBC    ABSOLUTE NRBC 0.00 0.00 - 0.01 K/uL    NEUTROPHILS 73 32 - 75 %    LYMPHOCYTES 17 12 - 49 %    MONOCYTES 7 5 - 13 %    EOSINOPHILS 1 0 - 7 %    BASOPHILS 0 0 - 1 %    IMMATURE GRANULOCYTES 1 (H) 0.0 - 0.5 %    ABS. NEUTROPHILS 4.6 1.8 - 8.0 K/UL    ABS. LYMPHOCYTES 1.1 0.8 - 3.5 K/UL    ABS. MONOCYTES 0.5 0.0 - 1.0 K/UL    ABS. EOSINOPHILS 0.1 0.0 - 0.4 K/UL    ABS. BASOPHILS 0.0 0.0 - 0.1 K/UL    ABS. IMM.  GRANS. 0.1 (H) 0.00 - 0.04 K/UL    DF AUTOMATED     METABOLIC PANEL, BASIC    Collection Time: 07/16/18  2:29 PM   Result Value Ref Range    Sodium 140 136 - 145 mmol/L    Potassium 5.2 (H) 3.5 - 5.1 mmol/L    Chloride 112 (H) 97 - 108 mmol/L    CO2 15 (LL) 21 - 32 mmol/L    Anion gap 13 5 - 15 mmol/L    Glucose 104 (H) 65 - 100 mg/dL    BUN 54 (H) 6 - 20 MG/DL    Creatinine 4.58 (H) 0.70 - 1.30 MG/DL    BUN/Creatinine ratio 12 12 - 20      GFR est AA 15 (L) >60 ml/min/1.73m2    GFR est non-AA 13 (L) >60 ml/min/1.73m2    Calcium 9.3 8.5 - 10.1 MG/DL   CK W/ REFLX CKMB    Collection Time: 07/16/18  2:29 PM   Result Value Ref Range    CK 18 (L) 39 - 308 U/L   MAGNESIUM    Collection Time: 07/16/18  2:29 PM   Result Value Ref Range    Magnesium 2.1 1.6 - 2.4 mg/dL   TROPONIN I    Collection Time: 07/16/18  2:29 PM   Result Value Ref Range    Troponin-I, Qt. <0.05 <0.05 ng/mL   HEPATIC FUNCTION PANEL    Collection Time: 07/16/18  2:29 PM   Result Value Ref Range    Protein, total 7.7 6.4 - 8.2 g/dL    Albumin 3.2 (L) 3.5 - 5.0 g/dL    Globulin 4.5 (H) 2.0 - 4.0 g/dL    A-G Ratio 0.7 (L) 1.1 - 2.2      Bilirubin, total 0.5 0.2 - 1.0 MG/DL    Bilirubin, direct 0.1 0.0 - 0.2 MG/DL    Alk.  phosphatase 86 45 - 117 U/L    AST (SGOT) 6 (L) 15 - 37 U/L    ALT (SGPT) 11 (L) 12 - 78 U/L   EKG, 12 LEAD, INITIAL    Collection Time: 07/16/18  2:52 PM   Result Value Ref Range    Ventricular Rate 76 BPM    Atrial Rate 76 BPM    P-R Interval 222 ms    QRS Duration 72 ms    Q-T Interval 388 ms    QTC Calculation (Bezet) 436 ms    Calculated P Axis -10 degrees    Calculated R Axis 1 degrees    Calculated T Axis 15 degrees    Diagnosis       Sinus rhythm with 1st degree AV block  Inferior infarct , age undetermined  When compared with ECG of 23-JUN-2018 12:55,  NC interval has increased  Inferior infarct is now present  Confirmed by Nishant Potts MD, Ana Gamez ([id number]) on 7/17/2018 9:37:31 AM     URINALYSIS W/ REFLEX CULTURE    Collection Time: 07/16/18  4:00 PM   Result Value Ref Range    Color YELLOW/STRAW      Appearance TURBID (A) CLEAR      Specific gravity 1.012 1.003 - 1.030      pH (UA) 6.0 5.0 - 8.0      Protein 100 (A) NEG mg/dL    Glucose NEGATIVE  NEG mg/dL    Ketone NEGATIVE  NEG mg/dL    Bilirubin NEGATIVE  NEG      Blood LARGE (A) NEG      Urobilinogen 0.2 0.2 - 1.0 EU/dL    Nitrites NEGATIVE  NEG      Leukocyte Esterase LARGE (A) NEG      WBC >100 (H) 0 - 4 /hpf    RBC 20-50 0 - 5 /hpf    Epithelial cells FEW FEW /lpf    Bacteria 1+ (A) NEG /hpf    UA:UC IF INDICATED URINE CULTURE ORDERED (A) CNI      Yeast PRESENT (A) NEG     CK    Collection Time: 07/17/18  1:25 AM   Result Value Ref Range    CK 18 (L) 39 - 308 U/L   MAGNESIUM    Collection Time: 07/17/18  1:25 AM   Result Value Ref Range    Magnesium 1.9 1.6 - 2.4 mg/dL   PHOSPHORUS    Collection Time: 07/17/18  1:25 AM   Result Value Ref Range    Phosphorus 4.3 2.6 - 4.7 MG/DL   CULTURE, BLOOD, PAIRED    Collection Time: 07/17/18  1:25 AM   Result Value Ref Range    Special Requests: NO SPECIAL REQUESTS      Culture result: NO GROWTH AFTER 3 HOURS     CBC W/O DIFF    Collection Time: 07/17/18  1:25 AM   Result Value Ref Range    WBC 5.4 4.1 - 11.1 K/uL    RBC 3.10 (L) 4.10 - 5.70 M/uL    HGB 10.2 (L) 12.1 - 17.0 g/dL    HCT 30.6 (L) 36.6 - 50.3 %    MCV 98.7 80.0 - 99.0 FL    MCH 32.9 26.0 - 34.0 PG    MCHC 33.3 30.0 - 36.5 g/dL    RDW 15.4 (H) 11.5 - 14.5 %    PLATELET 000 277 - 480 K/uL    MPV 9.9 8.9 - 12.9 FL    NRBC 0.0 0  WBC    ABSOLUTE NRBC 0.00 0.00 - 0.33 K/uL   METABOLIC PANEL, BASIC    Collection Time: 07/17/18  1:25 AM   Result Value Ref Range    Sodium 141 136 - 145 mmol/L    Potassium 4.6 3.5 - 5.1 mmol/L    Chloride 113 (H) 97 - 108 mmol/L    CO2 14 (LL) 21 - 32 mmol/L    Anion gap 14 5 - 15 mmol/L    Glucose 122 (H) 65 - 100 mg/dL    BUN 54 (H) 6 - 20 MG/DL    Creatinine 4.40 (H) 0.70 - 1.30 MG/DL    BUN/Creatinine ratio 12 12 - 20      GFR est AA 16 (L) >60 ml/min/1.73m2    GFR est non-AA 13 (L) >60 ml/min/1.73m2    Calcium 8.7 8.5 - 10.1 MG/DL   GLUCOSE, POC    Collection Time: 07/17/18  6:07 AM   Result Value Ref Range    Glucose (POC) 100 65 - 100 mg/dL    Performed by Gloria Hale [id number]  Maik BECERRIL MD  [address] [place] [address]  Phone - [phone]   Fax - [phone]  [email or web address]

## 2018-07-17 NOTE — PROGRESS NOTES
Famotidine - Renal dosing by Pharmacy  Current regimen:  20 mg PO Q 12 hr  Recent Labs      07/17/18   0125  07/16/18   1429   CREA  4.40*  4.58*   BUN  54*  54*     Estimated CrCl:  17.8 ml/min    Change to 20 mg PO Q24hrs for CrCl <50 ml/min    Famotidine (PO/IV) Renal Dosing:      >50 mL/min - 20 mg Q 12hrs      <50 mL/min - 20 mg Q 24hrs                    HD - 20 mg Q 24hrs               CRRT - n/a

## 2018-07-17 NOTE — CONSULTS
[address]    Calvin Catalan  MR#: [id number]  : 1940  ACCOUNT #: [de-identified]   DATE OF SERVICE: 2018    RENAL CONSULTATION     REASON FOR CONSULTATION:  Acute and chronic kidney disease. HISTORY OF PRESENT ILLNESS:  The patient is a 77-year-old white male with the following medical problems:  1. History of bladder outlet obstruction. 2.  Chronic kidney disease with baseline creatinine around 2.4 mg/dL. 3.  History of bilateral chronic hydronephrosis (status post bilateral stenting). 4.  History of metabolic acidosis. 5.  Coronary artery disease status post CABG. 6.  History of anemia. 7.  Prostate cancer. 8.  Gout. 9.  Urinary incontinence. This is a 77-year-old white male recently discharged from the hospital (approximately 3 weeks ago), at that time, serum creatinine was measured at 2.32 mg/dL (2018). He presented to the hospital today with complaints of fatigue, serum creatinine was found to be elevated at 4.58 mg/dL. He has had difficulty initiating and maintaining a stream of urine. He is followed by Dr. Army Pacheco, undergoes routine ureteral stent changes, approximately every 4 months or so by his description. His last ureteral stent change was approximately a month ago by his report. He has had diminished appetite, some nausea, some alteration in sense of taste. He has had no diarrhea, however. He tells me that he had imaging of his bladder performed in the emergency room, no Cespedes catheter has been placed. CURRENT MEDICATIONS:  Include albuterol nebulizer solution, aspirin 81 mg daily, calcitriol 0.25 mcg daily, calcium gluconate 1 gram IV, ceftriaxone, ferrous sulfate 325 mg daily, Veltassa powder 8.4 grams daily, Kayexalate (single dose of 15 grams earlier today), 0.5 normal saline solution with 50 mEq sodium bicarbonate at 84 mL per hour.     PHYSICAL EXAMINATION:  GENERAL:  A pleasant elderly appearing white male in bed. VITAL SIGNS:  Most recent blood pressure documented is 147/91, pulse 81, temperature 97.4, respirations 16. HEENT:  Head is normocephalic. Eyes show no scleral icterus. NECK:  Appears supple. LUNGS:  Clear to auscultation. CARDIOVASCULAR:  Shows no pericardial friction rub. ABDOMEN:  Obese. The dome of the bladder appears to be nonpalpable. EXTREMITIES:  Show no thigh edema. SKIN:  Warm to touch. NEUROLOGIC:  He is awake, alert, answers questions appropriately. There is no neural asterixis noted. LABORATORY DATA:  Of note, white blood cell count 6.4, hematocrit 35%, platelet count [phone]. Urinalysis shows a pH of 6, specific gravity 1.012, positive for blood, positive for leukocyte esterase, 20-50 red blood cells, greater than 100 white blood cells, positive yeast and bacteria. Chemistry shows sodium 140, potassium 5.2, chloride and bicarbonate of 112 and 15 with anion gap of 13, BUN and creatinine are 54 and 4.6, calcium is 9.3, magnesium 2.1, albumin 3.2. CT scan shows severe bilateral hydronephrosis, bilateral cortical atrophy, some thickening of the left superior aspect of the bladder wall, and diffuse sclerotic osseous metastasis. ASSESSMENT AND PLAN:  The patient has symptoms to suggest bladder outlet obstruction, but apparently his bladder was imaged, and no bladder is palpable on physical exam.  Given the strong history of obstruction noted currently, however, I still would remain suspicious that he is indeed obstructed. I have ordered postvoid bladder scans. We probably will need urology to see him, he tells me he did not see urology during his last hospitalization, 3 weeks ago. Certainly hypovolemia may be playing a course in his elevation over baseline serum creatinine. He is going to get some intravenous fluids, as well. He remains mildly hyperkalemic, and this is being treated, as well. His calcium appears to be normal currently.     At this point, we will do the followin. Postvoid bladder scan daily. 2.  Intravenous fluids as noted (this is a hypotonic solution, watch for the development of hyponatremia). 3.  Consider urologic evaluation, as he is having ongoing hydronephrosis, but this may be chronic, but his renal function certainly has changed. My partners will follow up in a.m.       Robinson Mtz MD CGA / DN  D: 2018 20:23     T: 2018 20:59  JOB #: [id number]  CC: Joaquín Piedra MD

## 2018-07-17 NOTE — PROGRESS NOTES
TRANSFER - IN REPORT:    Verbal report received from Ryan urbina RN(name) on [place].  being received from ED(unit) for routine progression of care      Report consisted of patients Situation, Background, Assessment and   Recommendations(SBAR). Information from the following report(s) SBAR, Kardex, Intake/Output, MAR and Accordion was reviewed with the receiving nurse. Opportunity for questions and clarification was provided. Assessment completed upon patients arrival to unit and care assumed. Primary Nurse Maryana Garrido RN and RN, RN performed a dual skin assessment on this patient Impairment noted- see wound doc flow sheet. Patient with redness in groin and abdominal fold and small tear at gluteal cleft.    Partha score is 20

## 2018-07-17 NOTE — PROGRESS NOTES
Multiple attempts were made to obtain patients labs with no success.  On-call hospitalist paged and gave orders for an arterial stick

## 2018-07-17 NOTE — PROGRESS NOTES
Critical CO2 value called in by [facility] in the lab. This RN made patients primary nurse aware for further assessment.

## 2018-07-17 NOTE — PROGRESS NOTES
Dictated  ivf  Consider uro eval - sean if creat not returning to baseline.  He certainly has sxs of MEDINA but no palpable bladder on PE    - pvr every day x3d  - chems  - hypotonic solutrion ordered, watch for hypoNa developing and will need to adj accordingly    Partners will f/u am

## 2018-07-17 NOTE — PROGRESS NOTES
0225 call out to Dr Kosta Cespedes regarding Pt's critical CO2 level via labs. Orders given for labs at 0830. Pt is a very difficult stick it took numerous tries to get an arterial stick after numerous tries via venous access.

## 2018-07-17 NOTE — CONSULTS
Urology Consult      Principal Problem:    ARF (acute renal failure) ([address] (7/16/2018)        Admitting MD (and procedure) = Kaye Saldana MD -    [facility]  Primary care MD: Mar Saini MD  - [phone]  Code state: Full Code    ____________________________________________________________________________  ____________________________________________________________________________  ASSESSMENT/PLAN:   Metastatic prostate cancer -- on chronic Lupron. Oncology treatment noted as well (Dr. Orlando Gonzalez). Bilateral hydronephrosis with JASIEL on CKD - stents last changed by Dr. Bryson Coyne on 5/2/2018 per patient. Options noted including stent exchange versus bilateral PCNs. Patient wishes to consider stent exchange. He understands risks of not functioning and need for future procedures. Patient will have stent exchange tomorrow 10:15 AM with Dr. Beni Hernandez. NPO p MN. Consent ordered. ____________________________________________________________________________  ____________________________________________________________________________    SUBJECTIVE:  Date of Consultation:  July 17, 2018  Referring Physician: Kaye Saldana MD  Reason for Consultation:   Prostate cancer, bilateral hydro, JASIEL on CKD    Established Urologist: Rosi Vigil ([facility])    Primary care MD: Mar Saini MD  - [phone]  Code state: Full Code    History of Present Illness:   78y.o. year old male - He was admitted to the hospital for ARF (acute renal failure) ([facility] [address]  ARF (acute renal failure) ([facility] [address] Admitted for fatigue. Long term prostate cancer care by Dr. Bryson Coyne. Chronic ureteral stents. Prior [place] TURP remotely. No hematuria. Voiding okay, just some frequency. Denies dysuria. Stents last changed 5/2/2018 by Dr. Bryson Coyne. Baseline Cr around 2.5 or so. Otherwise, eating. No flank pain. No acute concerns.       Past Medical History:   Diagnosis Date    Celiac disease     Chronic kidney disease     Hypertension     MI (mitral incompetence)     Prostate cancer ([facility] [address]       Past Surgical History:   Procedure Laterality Date    CARDIAC SURG PROCEDURE UNLIST      qaudruple bipass      History reviewed. No pertinent family history. Social History   Substance Use Topics    Smoking status: Former Smoker    Smokeless tobacco: Never Used    Alcohol use No     Allergies   Allergen Reactions    Morphine Other (comments)     AMS      Prior to Admission medications    Medication Sig Start Date End Date Taking? Authorizing Provider   patiromer calcium sorbitex (VELTASSA) 8.4 gram powder Take 8.4 g by mouth daily. SAMPLES FROM MD   Indications: hyperkalemia   Yes Historical Provider   sodium bicarbonate 650 mg tablet Take 1 Tab by mouth two (2) times a day. 6/10/18  Yes Monae Jarvis MD   metoprolol tartrate (LOPRESSOR) 50 mg tablet Take 50 mg by mouth two (2) times a day. Yes Historical Provider   enzalutamide Sowmya Gore) 40 mg capsule Take 160 mg by mouth nightly. Yes Historical Provider   allopurinol (ZYLOPRIM) 100 mg tablet Take 100 mg by mouth nightly. Yes Historical Provider   ferrous sulfate 325 mg (65 mg iron) tablet Take 325 mg by mouth Before breakfast and dinner. Yes Historical Provider   calcitRIOL (ROCALTROL) 0.25 mcg capsule Take 0.25 mcg by mouth daily. Yes Historical Provider   cyanocobalamin, vitamin B-12, (VITAMIN B-12) 1,000 mcg/mL drop Take 1,000 mcg by mouth daily. Yes Historical Provider   OTHER,NON-FORMULARY, daily. Neurim  (B-complex with additional supplements) for Memory   Yes Historical Provider   aspirin 81 mg chewable tablet Take 81 mg by mouth daily.    Yes Neville Bermduez MD         Review of Systems: (positive-bolded)   Unexplained weight loss, Dry eyes, Dry mouth, Chest pain, SOB, Constipation, Extremity weakness, Pallor, TIA symptoms, Confusion, Easy bruising    OBJECTIVE:  Patient Vitals for the past 8 hrs:   BP Temp Pulse Resp SpO2   18 1331 138/74 98.1 °F (36.7 °C) 75 16 100 %   18 1019 131/79 97.8 °F (36.6 °C) 79 16 100 %     Temp (24hrs), Av.1 °F (36.7 °C), Min:97.4 °F (36.3 °C), Max:98.9 °F (37.2 °C)    Intake and Output:   07/15 1901 -  0700  In: -   Out: 300 [Urine:300]  Physical Exam:  Appearance: Well-developed no acute distress  Conjunctiva: WNL  Pupil/iris: WNL  External ears/nose: Normal no lesions or deformities  Hearing:  WNL  Neck: Supple without masses  Thyroid: WNL  Respiratory effort: Breathing easily  Chest palpation: No tactile fremitus  Aortic: No enlargement or bruits  Lower extremity: No edema  Abdomen/flank: Soft nontender without masses no CVA tenderness  Liver/spleen: No organomegaly  Hernia: No ing/ventral hernia  Lymph: No adenopathy  Digits/nails: No clubbing cyanosis or petechiae  Skin inspection: Warm and dry  Skin palpation: No subcutaneous nodularity  Sensation: No sensory deficits  Judgment/Insight: intact  Mood/Affect: normal    Recent Results (from the past 24 hour(s))   CK    Collection Time: 18  1:25 AM   Result Value Ref Range    CK 18 (L) 39 - 308 U/L   MAGNESIUM    Collection Time: 18  1:25 AM   Result Value Ref Range    Magnesium 1.9 1.6 - 2.4 mg/dL   PHOSPHORUS    Collection Time: 18  1:25 AM   Result Value Ref Range    Phosphorus 4.3 2.6 - 4.7 MG/DL   CULTURE, BLOOD, PAIRED    Collection Time: 18  1:25 AM   Result Value Ref Range    Special Requests: NO SPECIAL REQUESTS      Culture result: NO GROWTH AFTER 3 HOURS     CBC W/O DIFF    Collection Time: 18  1:25 AM   Result Value Ref Range    WBC 5.4 4.1 - 11.1 K/uL    RBC 3.10 (L) 4.10 - 5.70 M/uL    HGB 10.2 (L) 12.1 - 17.0 g/dL    HCT 30.6 (L) 36.6 - 50.3 %    MCV 98.7 80.0 - 99.0 FL    MCH 32.9 26.0 - 34.0 PG    MCHC 33.3 30.0 - 36.5 g/dL    RDW 15.4 (H) 11.5 - 14.5 %    PLATELET 842 098 - 947 K/uL    MPV 9.9 8.9 - 12.9 FL    NRBC 0.0 0  WBC    ABSOLUTE NRBC 0.00 0.00 - 0.01 K/uL METABOLIC PANEL, BASIC    Collection Time: 07/17/18  1:25 AM   Result Value Ref Range    Sodium 141 136 - 145 mmol/L    Potassium 4.6 3.5 - 5.1 mmol/L    Chloride 113 (H) 97 - 108 mmol/L    CO2 14 (LL) 21 - 32 mmol/L    Anion gap 14 5 - 15 mmol/L    Glucose 122 (H) 65 - 100 mg/dL    BUN 54 (H) 6 - 20 MG/DL    Creatinine 4.40 (H) 0.70 - 1.30 MG/DL    BUN/Creatinine ratio 12 12 - 20      GFR est AA 16 (L) >60 ml/min/1.73m2    GFR est non-AA 13 (L) >60 ml/min/1.73m2    Calcium 8.7 8.5 - 10.1 MG/DL   GLUCOSE, POC    Collection Time: 07/17/18  6:07 AM   Result Value Ref Range    Glucose (POC) 100 65 - 100 mg/dL    Performed by Ender Cobb        Current Antimicrobial Therapy (168h ago through future)    Ordered     Start Stop    07/16/18 1643  cefTRIAXone (ROCEPHIN) 1 g in 0.9% sodium chloride (MBP/ADV) 50 mL  1 g,   IntraVENous,   EVERY 24 HOURS      07/16/18 1648 07/21/18 1646        Cultures:    All Micro Results     Procedure Component Value Units Date/Time    CULTURE, BLOOD, PAIRED [[id number]] Collected:  07/17/18 0125    Order Status:  Completed Specimen:  Blood Updated:  07/17/18 0942     Special Requests: NO SPECIAL REQUESTS        Culture result: NO GROWTH AFTER 3 HOURS       CULTURE, URINE [[id number]] Collected:  07/16/18 1600    Order Status:  Completed Updated:  07/16/18 1731    CULTURE, URINE [[id number]] Collected:  07/16/18 1730    Order Status:  Canceled Specimen:  Urine from Clean catch     CULTURE, URINE [[id number]]     Order Status:  Canceled Specimen:  Urine from [address] [place] [address] [[id number]]     Order Status:  Canceled Specimen:  Urine           Signed By: Yael Quevedo MD                         July 17, 2018

## 2018-07-18 ENCOUNTER — ANESTHESIA (OUTPATIENT)
Dept: SURGERY | Age: 78
DRG: 694 | End: 2018-07-18
Payer: MEDICARE

## 2018-07-18 ENCOUNTER — APPOINTMENT (OUTPATIENT)
Dept: GENERAL RADIOLOGY | Age: 78
DRG: 694 | End: 2018-07-18
Attending: UROLOGY
Payer: MEDICARE

## 2018-07-18 LAB
ANION GAP SERPL CALC-SCNC: 15 MMOL/L (ref 5–15)
BUN SERPL-MCNC: 47 MG/DL (ref 6–20)
BUN/CREAT SERPL: 12 (ref 12–20)
CALCIUM SERPL-MCNC: 7.9 MG/DL (ref 8.5–10.1)
CHLORIDE SERPL-SCNC: 107 MMOL/L (ref 97–108)
CO2 SERPL-SCNC: 16 MMOL/L (ref 21–32)
CREAT SERPL-MCNC: 3.91 MG/DL (ref 0.7–1.3)
GLUCOSE BLD STRIP.AUTO-MCNC: 137 MG/DL (ref 65–100)
GLUCOSE SERPL-MCNC: 112 MG/DL (ref 65–100)
POTASSIUM SERPL-SCNC: 4.2 MMOL/L (ref 3.5–5.1)
SERVICE CMNT-IMP: ABNORMAL
SODIUM SERPL-SCNC: 138 MMOL/L (ref 136–145)

## 2018-07-18 PROCEDURE — 0TP98DZ REMOVAL OF INTRALUMINAL DEVICE FROM URETER, VIA NATURAL OR ARTIFICIAL OPENING ENDOSCOPIC: ICD-10-PCS | Performed by: UROLOGY

## 2018-07-18 PROCEDURE — 74011250636 HC RX REV CODE- 250/636

## 2018-07-18 PROCEDURE — 0T788DZ DILATION OF BILATERAL URETERS WITH INTRALUMINAL DEVICE, VIA NATURAL OR ARTIFICIAL OPENING ENDOSCOPIC: ICD-10-PCS | Performed by: UROLOGY

## 2018-07-18 PROCEDURE — 65660000000 HC RM CCU STEPDOWN

## 2018-07-18 PROCEDURE — C2617 STENT, NON-COR, TEM W/O DEL: HCPCS | Performed by: UROLOGY

## 2018-07-18 PROCEDURE — 51798 US URINE CAPACITY MEASURE: CPT

## 2018-07-18 PROCEDURE — 74011250636 HC RX REV CODE- 250/636: Performed by: INTERNAL MEDICINE

## 2018-07-18 PROCEDURE — 74011636320 HC RX REV CODE- 636/320: Performed by: UROLOGY

## 2018-07-18 PROCEDURE — 74011000250 HC RX REV CODE- 250

## 2018-07-18 PROCEDURE — 36415 COLL VENOUS BLD VENIPUNCTURE: CPT | Performed by: INTERNAL MEDICINE

## 2018-07-18 PROCEDURE — 76210000016 HC OR PH I REC 1 TO 1.5 HR: Performed by: UROLOGY

## 2018-07-18 PROCEDURE — 80048 BASIC METABOLIC PNL TOTAL CA: CPT | Performed by: INTERNAL MEDICINE

## 2018-07-18 PROCEDURE — 74420 UROGRAPHY RTRGR +-KUB: CPT

## 2018-07-18 PROCEDURE — BT141ZZ FLUOROSCOPY OF KIDNEYS, URETERS AND BLADDER USING LOW OSMOLAR CONTRAST: ICD-10-PCS | Performed by: UROLOGY

## 2018-07-18 PROCEDURE — 74011250637 HC RX REV CODE- 250/637: Performed by: FAMILY MEDICINE

## 2018-07-18 PROCEDURE — 82962 GLUCOSE BLOOD TEST: CPT

## 2018-07-18 PROCEDURE — 74011000258 HC RX REV CODE- 258

## 2018-07-18 PROCEDURE — 77030010509 HC AIRWY LMA MSK TELE -A: Performed by: ANESTHESIOLOGY

## 2018-07-18 PROCEDURE — 74011000250 HC RX REV CODE- 250: Performed by: INTERNAL MEDICINE

## 2018-07-18 PROCEDURE — 77030019927 HC TBNG IRR CYSTO BAXT -A: Performed by: UROLOGY

## 2018-07-18 PROCEDURE — 76010000138 HC OR TIME 0.5 TO 1 HR: Performed by: UROLOGY

## 2018-07-18 PROCEDURE — 76060000032 HC ANESTHESIA 0.5 TO 1 HR: Performed by: UROLOGY

## 2018-07-18 PROCEDURE — 74011250637 HC RX REV CODE- 250/637: Performed by: INTERNAL MEDICINE

## 2018-07-18 PROCEDURE — C1769 GUIDE WIRE: HCPCS | Performed by: UROLOGY

## 2018-07-18 PROCEDURE — C1758 CATHETER, URETERAL: HCPCS | Performed by: UROLOGY

## 2018-07-18 PROCEDURE — 74011250637 HC RX REV CODE- 250/637: Performed by: UROLOGY

## 2018-07-18 PROCEDURE — 94762 N-INVAS EAR/PLS OXIMTRY CONT: CPT

## 2018-07-18 RX ORDER — LIDOCAINE HYDROCHLORIDE 20 MG/ML
INJECTION, SOLUTION EPIDURAL; INFILTRATION; INTRACAUDAL; PERINEURAL AS NEEDED
Status: DISCONTINUED | OUTPATIENT
Start: 2018-07-18 | End: 2018-07-18 | Stop reason: HOSPADM

## 2018-07-18 RX ORDER — MIDAZOLAM HYDROCHLORIDE 1 MG/ML
1 INJECTION, SOLUTION INTRAMUSCULAR; INTRAVENOUS
Status: DISCONTINUED | OUTPATIENT
Start: 2018-07-18 | End: 2018-07-18 | Stop reason: HOSPADM

## 2018-07-18 RX ORDER — MIDAZOLAM HYDROCHLORIDE 1 MG/ML
INJECTION, SOLUTION INTRAMUSCULAR; INTRAVENOUS AS NEEDED
Status: DISCONTINUED | OUTPATIENT
Start: 2018-07-18 | End: 2018-07-18 | Stop reason: HOSPADM

## 2018-07-18 RX ORDER — ONDANSETRON 2 MG/ML
4 INJECTION INTRAMUSCULAR; INTRAVENOUS AS NEEDED
Status: DISCONTINUED | OUTPATIENT
Start: 2018-07-18 | End: 2018-07-18 | Stop reason: HOSPADM

## 2018-07-18 RX ORDER — DEXAMETHASONE SODIUM PHOSPHATE 4 MG/ML
INJECTION, SOLUTION INTRA-ARTICULAR; INTRALESIONAL; INTRAMUSCULAR; INTRAVENOUS; SOFT TISSUE AS NEEDED
Status: DISCONTINUED | OUTPATIENT
Start: 2018-07-18 | End: 2018-07-18 | Stop reason: HOSPADM

## 2018-07-18 RX ORDER — DIPHENHYDRAMINE HYDROCHLORIDE 50 MG/ML
12.5 INJECTION, SOLUTION INTRAMUSCULAR; INTRAVENOUS AS NEEDED
Status: DISCONTINUED | OUTPATIENT
Start: 2018-07-18 | End: 2018-07-18 | Stop reason: HOSPADM

## 2018-07-18 RX ORDER — FENTANYL CITRATE 50 UG/ML
INJECTION, SOLUTION INTRAMUSCULAR; INTRAVENOUS AS NEEDED
Status: DISCONTINUED | OUTPATIENT
Start: 2018-07-18 | End: 2018-07-18 | Stop reason: HOSPADM

## 2018-07-18 RX ORDER — FENTANYL CITRATE 50 UG/ML
25 INJECTION, SOLUTION INTRAMUSCULAR; INTRAVENOUS
Status: DISCONTINUED | OUTPATIENT
Start: 2018-07-18 | End: 2018-07-18 | Stop reason: HOSPADM

## 2018-07-18 RX ORDER — SODIUM CHLORIDE, SODIUM LACTATE, POTASSIUM CHLORIDE, CALCIUM CHLORIDE 600; 310; 30; 20 MG/100ML; MG/100ML; MG/100ML; MG/100ML
125 INJECTION, SOLUTION INTRAVENOUS CONTINUOUS
Status: DISCONTINUED | OUTPATIENT
Start: 2018-07-18 | End: 2018-07-18 | Stop reason: HOSPADM

## 2018-07-18 RX ORDER — OXYCODONE HYDROCHLORIDE 5 MG/1
5 TABLET ORAL AS NEEDED
Status: DISCONTINUED | OUTPATIENT
Start: 2018-07-18 | End: 2018-07-18 | Stop reason: HOSPADM

## 2018-07-18 RX ORDER — ALLOPURINOL 100 MG/1
100 TABLET ORAL DAILY
Status: DISCONTINUED | OUTPATIENT
Start: 2018-07-19 | End: 2018-07-20

## 2018-07-18 RX ORDER — SODIUM CHLORIDE 0.9 % (FLUSH) 0.9 %
5-10 SYRINGE (ML) INJECTION AS NEEDED
Status: DISCONTINUED | OUTPATIENT
Start: 2018-07-18 | End: 2018-07-18 | Stop reason: HOSPADM

## 2018-07-18 RX ORDER — ESMOLOL HYDROCHLORIDE 10 MG/ML
INJECTION INTRAVENOUS AS NEEDED
Status: DISCONTINUED | OUTPATIENT
Start: 2018-07-18 | End: 2018-07-18 | Stop reason: HOSPADM

## 2018-07-18 RX ORDER — PROPOFOL 10 MG/ML
INJECTION, EMULSION INTRAVENOUS AS NEEDED
Status: DISCONTINUED | OUTPATIENT
Start: 2018-07-18 | End: 2018-07-18 | Stop reason: HOSPADM

## 2018-07-18 RX ORDER — SODIUM CHLORIDE, SODIUM LACTATE, POTASSIUM CHLORIDE, CALCIUM CHLORIDE 600; 310; 30; 20 MG/100ML; MG/100ML; MG/100ML; MG/100ML
INJECTION, SOLUTION INTRAVENOUS
Status: DISCONTINUED | OUTPATIENT
Start: 2018-07-18 | End: 2018-07-18

## 2018-07-18 RX ORDER — SODIUM CHLORIDE 9 MG/ML
INJECTION, SOLUTION INTRAVENOUS
Status: DISCONTINUED | OUTPATIENT
Start: 2018-07-18 | End: 2018-07-18 | Stop reason: HOSPADM

## 2018-07-18 RX ORDER — FLUCONAZOLE 100 MG/1
200 TABLET ORAL DAILY
Status: DISCONTINUED | OUTPATIENT
Start: 2018-07-18 | End: 2018-07-22 | Stop reason: HOSPADM

## 2018-07-18 RX ORDER — ONDANSETRON 2 MG/ML
INJECTION INTRAMUSCULAR; INTRAVENOUS AS NEEDED
Status: DISCONTINUED | OUTPATIENT
Start: 2018-07-18 | End: 2018-07-18 | Stop reason: HOSPADM

## 2018-07-18 RX ORDER — PHENYLEPHRINE HCL IN 0.9% NACL 0.4MG/10ML
SYRINGE (ML) INTRAVENOUS AS NEEDED
Status: DISCONTINUED | OUTPATIENT
Start: 2018-07-18 | End: 2018-07-18 | Stop reason: HOSPADM

## 2018-07-18 RX ADMIN — DEXTROSE MONOHYDRATE: 5 INJECTION, SOLUTION INTRAVENOUS at 19:28

## 2018-07-18 RX ADMIN — MIDAZOLAM HYDROCHLORIDE 1 MG: 1 INJECTION, SOLUTION INTRAMUSCULAR; INTRAVENOUS at 10:03

## 2018-07-18 RX ADMIN — FERROUS SULFATE TAB 325 MG (65 MG ELEMENTAL FE) 325 MG: 325 (65 FE) TAB at 17:03

## 2018-07-18 RX ADMIN — DEXAMETHASONE SODIUM PHOSPHATE 4 MG: 4 INJECTION, SOLUTION INTRA-ARTICULAR; INTRALESIONAL; INTRAMUSCULAR; INTRAVENOUS; SOFT TISSUE at 10:15

## 2018-07-18 RX ADMIN — Medication 80 MCG: at 10:26

## 2018-07-18 RX ADMIN — FAMOTIDINE 20 MG: 20 TABLET ORAL at 17:03

## 2018-07-18 RX ADMIN — CEFTRIAXONE 1 G: 1 INJECTION, POWDER, FOR SOLUTION INTRAMUSCULAR; INTRAVENOUS at 10:13

## 2018-07-18 RX ADMIN — SODIUM CHLORIDE: 9 INJECTION, SOLUTION INTRAVENOUS at 09:50

## 2018-07-18 RX ADMIN — MIDAZOLAM HYDROCHLORIDE 1 MG: 1 INJECTION, SOLUTION INTRAMUSCULAR; INTRAVENOUS at 10:07

## 2018-07-18 RX ADMIN — ESMOLOL HYDROCHLORIDE 10 MG: 10 INJECTION INTRAVENOUS at 10:07

## 2018-07-18 RX ADMIN — Medication 10 ML: at 13:04

## 2018-07-18 RX ADMIN — FENTANYL CITRATE 25 MCG: 50 INJECTION, SOLUTION INTRAMUSCULAR; INTRAVENOUS at 10:16

## 2018-07-18 RX ADMIN — LIDOCAINE HYDROCHLORIDE 100 MG: 20 INJECTION, SOLUTION EPIDURAL; INFILTRATION; INTRACAUDAL; PERINEURAL at 10:09

## 2018-07-18 RX ADMIN — FENTANYL CITRATE 25 MCG: 50 INJECTION, SOLUTION INTRAMUSCULAR; INTRAVENOUS at 10:12

## 2018-07-18 RX ADMIN — FENTANYL CITRATE 25 MCG: 50 INJECTION, SOLUTION INTRAMUSCULAR; INTRAVENOUS at 10:09

## 2018-07-18 RX ADMIN — ONDANSETRON 4 MG: 2 INJECTION INTRAMUSCULAR; INTRAVENOUS at 10:15

## 2018-07-18 RX ADMIN — FLUCONAZOLE 200 MG: 100 TABLET ORAL at 13:02

## 2018-07-18 RX ADMIN — PATIROMER 8.4 G: 8.4 POWDER, FOR SUSPENSION ORAL at 13:02

## 2018-07-18 RX ADMIN — PROPOFOL 150 MG: 10 INJECTION, EMULSION INTRAVENOUS at 10:09

## 2018-07-18 RX ADMIN — FENTANYL CITRATE 25 MCG: 50 INJECTION, SOLUTION INTRAMUSCULAR; INTRAVENOUS at 10:14

## 2018-07-18 NOTE — ANESTHESIA PREPROCEDURE EVALUATION
Anesthetic History   No history of anesthetic complications            Review of Systems / Medical History  Patient summary reviewed, nursing notes reviewed and pertinent labs reviewed    Pulmonary  Within defined limits                 Neuro/Psych   Within defined limits           Cardiovascular    Hypertension  Valvular problems/murmurs: mitral insufficiency        CABG         GI/Hepatic/Renal         Renal disease       Endo/Other        Cancer     Other Findings            Physical Exam    Airway  Mallampati: II  TM Distance: > 6 cm  Neck ROM: normal range of motion   Mouth opening: Normal     Cardiovascular  Regular rate and rhythm,  S1 and S2 normal,  no murmur, click, rub, or gallop             Dental  No notable dental hx       Pulmonary  Breath sounds clear to auscultation               Abdominal  GI exam deferred       Other Findings            Anesthetic Plan    ASA: 3  Anesthesia type: general          Induction: Intravenous  Anesthetic plan and risks discussed with: Patient

## 2018-07-18 NOTE — PROGRESS NOTES
TRANSFER - IN REPORT:    Verbal report received from FLOYDRN(name) on [facility].  being received from 5W(unit) for ordered procedure      Report consisted of patients Situation, Background, Assessment and   Recommendations(SBAR). Information from the following report(s) SBAR, Kardex, Intake/Output, MAR and Recent Results was reviewed with the receiving nurse. Opportunity for questions and clarification was provided. Assessment completed upon patients arrival to unit and care assumed.

## 2018-07-18 NOTE — ROUTINE PROCESS
Patient: Holly Pierce. MRN: [id number]  SSN: [id number]   YOB: 1940  Age: 78 y.o. Sex: male     Patient is status post Procedure(s):  CYSTOSCOPY; BILATERAL STENT EXCHANGE/ RM. 541.     Surgeon(s) and Role:     * Red Jenkins MD - Primary    Local/Dose/Irrigation:                    Peripheral IV 07/16/18 Left Antecubital (Active)   Site Assessment Clean, dry, & intact 7/17/2018 10:19 AM   Phlebitis Assessment 0 7/17/2018 10:19 AM   Infiltration Assessment 0 7/17/2018 10:19 AM   Dressing Status Clean, dry, & intact 7/17/2018 10:19 AM   Dressing Type Transparent 7/17/2018 10:19 AM   Hub Color/Line Status Infusing 7/17/2018 10:19 AM   Action Taken Open ports on tubing capped 7/17/2018  5:40 AM   Alcohol Cap Used Yes 7/17/2018  5:40 AM                           Dressing/Packing:     Splint/Cast:  ]    Other:

## 2018-07-18 NOTE — PERIOP NOTES
TRANSFER - OUT REPORT:    Verbal report given to Ashia Eubanks on [place].  being transferred to [facility] [id number] for routine post - op       Report consisted of patients Situation, Background, Assessment and   Recommendations(SBAR). Time Pre op antibiotic given:1013  Anesthesia Stop time: 3859  Cespedes Present on Transfer to floor:no  Order for Cespedes on Chart:no  Discharge Prescriptions with Chart:no    Information from the following report(s) SBAR, OR Summary, Intake/Output and MAR was reviewed with the receiving nurse. Opportunity for questions and clarification was provided. Is the patient on 02? NO        Is the patient on a monitor? NO    Is the nurse transporting with the patient? NO    Surgical Waiting Area notified of patient's transfer from PACU?  YES      The following personal items collected during your admission accompanied patient upon transfer:   Dental Appliance: Dental Appliances: None  Vision: Visual Aid: None  Hearing Aid:    Jewelry:    Clothing:    Other Valuables:    Valuables sent to safe:

## 2018-07-18 NOTE — PROGRESS NOTES
Nephrology Progress Note Jaxon Jones. Date of Admission : 7/16/2018 CC: Follow up for JASIEL on CKD Assessment and Plan JASIEL on CKD IV: 
- 2/2 MEDINA with b/l hydro - s/p b/l stent exchange this AM 
- cont IVF for now 
- daily labs Hx of MEDINA with ongoing b/l hydro despite stent placement: 
- b/l stent obstruction 
- exchanged today CKD IV: 
- baseline Cr 2.4 
- f/b Dr. Macrina Stock as an outpt Chronic hyperkalemia: 
- stable 
- continue veltassa Metabolic Acidosis: 
- cont bicarb drip for now Anemia of CKD: 
- hgb stable CAD s/p CABG Hx of prostate cancer Interval History: 
Seen and examined. S/p b/l stent exchange this AM.  Voided post op x 2. Not recorded. No cp or sob, no gross hematuria. Current Medications: all current  Medications have been eviewed in [facility] Review of Systems: Pertinent items are noted in HPI. Objective: 
Vitals:   
Vitals:  
 07/18/18 1115 07/18/18 1139 07/18/18 1145 07/18/18 1215 BP: 131/71 106/76 149/72 143/86 Pulse: 77 77 78 76 Resp:  16 11 16 Temp:    98.3 °F (36.8 °C) SpO2: 100% 100% 100% 97% Weight:      
 
Intake and Output: 
07/18 0701 - 07/18 1900 In: 510 [I.V.:510] Out: 200 [Urine:200] 07/16 1901 - 07/18 0700 In: -  
Out: 4793 [XKELU:7350] Physical Examination: 
General: NAD,Conversant Neck:  Supple, no mass Resp:  Lungs CTA B/L, no wheezing , normal respiratory effort CV:  RRR,  no murmur or rub, no LE edema GI:  Soft, NT, + Bowel sounds, no hepatosplenomegaly Neurologic:  Non focal 
Psych:             AAO x 3 appropriate affect Skin:  No Rash :  No goldberg in place 
 
[]    High complexity decision making was performed 
[]    Patient is at high-risk of decompensation with multiple organ involvement Lab Data Personally Reviewed: I have reviewed all the pertinent labs, microbiology data and radiology studies during assessment.  
 
Recent Labs  
   07/18/18 
 0403  07/17/18 
 0125  07/16/18 
 142 NA  138  141  140  
K  4.2  4.6  5.2*  
CL  107  113*  112* CO2  16*  14*  15* GLU  112*  122*  104* BUN  47*  54*  54* CREA  3.91*  4.40*  4.58* CA  7.9*  8.7  9.3 MG   --   1.9  2.1 PHOS   --   4.3   --   
ALB   --    --   3.2* SGOT   --    --   6* ALT   --    --   11* Recent Labs  
   07/17/18 
 0125  07/16/18 
 1429 WBC  5.4  6.4 HGB  10.2*  11.4* HCT  30.6*  34.9*  
PLT  242  290 Lab Results Component Value Date/Time Specimen Description: URINE 10/07/2013 01:36 AM  
 Specimen Description: BLOOD 10/06/2013 04:13 PM  
 
Lab Results Component Value Date/Time Culture result: NO GROWTH 1 DAY 07/17/2018 01:25 AM  
 Culture result: (A) 07/16/2018 04:00 PM  
  PROBABLE ALPHA STREPTOCOCCUS (>100,000 COLONIES/mL) Culture result: YEAST (>100,000 COLONIES/mL) (A) 07/16/2018 04:00 PM  
 Culture result: GRAM NEGATIVE RODS (2,000 COLONIES/mL) (A) 07/16/2018 04:00 PM  
 
Recent Results (from the past 24 hour(s)) METABOLIC PANEL, BASIC Collection Time: 07/18/18  4:03 AM  
Result Value Ref Range Sodium 138 136 - 145 mmol/L Potassium 4.2 3.5 - 5.1 mmol/L Chloride 107 97 - 108 mmol/L  
 CO2 16 (L) 21 - 32 mmol/L Anion gap 15 5 - 15 mmol/L Glucose 112 (H) 65 - 100 mg/dL BUN 47 (H) 6 - 20 MG/DL Creatinine 3.91 (H) 0.70 - 1.30 MG/DL  
 BUN/Creatinine ratio 12 12 - 20 GFR est AA 18 (L) >60 ml/min/1.73m2 GFR est non-AA 15 (L) >60 ml/min/1.73m2 Calcium 7.9 (L) 8.5 - 10.1 MG/DL Veronica Benton MD 
[address] [place] [address], [address] [facility] Phone - [phone] Fax - [phone] 
[email or web address]

## 2018-07-18 NOTE — OP NOTES
[address]   OPERATIVE REPORT    Chinmay Ruiz  MR#: [id number]  : 1940  ACCOUNT #: [de-identified]   DATE OF SERVICE: 2018    PREOPERATIVE DIAGNOSIS:  Bilateral hydronephrosis from prostate cancer with stent not functioning well. POSTOPERATIVE DIAGNOSIS:  Bilateral hydronephrosis from prostate cancer with stent not functioning well. PROCEDURE PERFORMED:  Cystoscopy, bilateral retrograde pyelograms, bilateral double-J stent exchange. SURGEON:   Shanice Pyle MD    ASSISTANT:  None      ANESTHESIA:  General.    ESTIMATED BLOOD LOSS:  0.    COMPLICATIONS:  None. FINDINGS:  Severe bilateral hydronephrosis with obstruction of the left ureter proximally and also down at the UVJ. Right ureter appeared to be obstructed at the L4-L5 level. Bilateral 8-Hungarian 26 cm stents were placed. IMPLANTS:  None. SPECIMENS REMOVED:  none     HISTORY OF PRESENT ILLNESS:  The patient is a 53-year-old man with metastatic prostate cancer who was managed by Dr. Sheri Conde, as well as his oncologist.  He is on Lupron and Xtandi. He presents now with worsening renal insufficiency and noted to have severe hydronephrosis on the followup CT scan. Stents were recently exchanged, but they appear to be nonfunctional, so he requested we go ahead and put stents in today. PROCEDURE:  He was brought to the operating room, given general anesthetic, placed in lithotomy position, prepped and draped in usual sterile fashion. Cystoscope inserted per urethra. Anterior urethra normal.  Prostatic urethra was notable for a small nodule of prostate tissue in the urethra, but overall a tiny prostate with no obstruction consistent with his treatment history. Entered the bladder, which was fairly full, with somewhat cloudy urine. I drained this out. I withdrew the left double-J stent and pulled it out the urethral meatus, cut off the curl threaded a wire.   The wire easily passed up to the level of the kidney. The open-ended catheter went over the wire easily and then shot a retrograde pyelogram from a position about midway up the ureter. This showed me contrast pooling in the ureter down to the level of the UVJ, where it stopped, indicating obstruction there and then also evidence of narrowing, fairly severe, at the level of the most proximal ureter, just before the UPJ. Contrast did sneak through there, but it was very narrow and then I could see the contrast filling a very dilated left kidney. I put the wire into the kidney and then over the wire, passed an 8 x 26 double-J stent with fluoroscopic and visual guidance. I was able to form a curl in the upper part of the kidney and a short curl formed in the bladder. No string was left on the stent and the stent was seen to be in good position and began to drain. I then turned my attention to the right side. On the right side, again I withdrew the stent and put the wire up. The wire did not easily go past the L4-L5 level and so I put an open-ended over it and then shot a retrograde pyelogram, showing the severe narrowing at the L4-L5 level with some tortuosity and hydronephrosis above it. Then, I put a Glidewire through the open-ended, and this did get through the narrowed area without incident and then put my open-ended up into the renal pelvis fluoroscopically and then put a Bentson wire back in, removing the glide. Then over the Bentson wire, I passed an 8 x 26 double-J stent and again a curl was formed in the renal pelvis and I got a short curl in the bladder. This went up quite easily. Then I went ahead and shot some final x-rays and then removed the scope after draining the bladder. The patient was awakened and brought to recovery in good condition. He did get an extra dose of Rocephin preoperatively.       MD PIERRE Reyna / Katiuska Clement  D: 07/18/2018 10:41     T: 07/18/2018 11:17  JOB #: [id number]

## 2018-07-18 NOTE — ROUTINE PROCESS
TRANSFER - IN REPORT:    Verbal report received from Renata Diez RN(name) on [facility].  being received from [employer]) for routine post - op      Report consisted of patients Situation, Background, Assessment and   Recommendations(SBAR). Information from the following report(s) SBAR, OR Summary, Procedure Summary, Intake/Output, MAR and Alarm Parameters  was reviewed with the receiving nurse. Opportunity for questions and clarification was provided. Assessment completed upon patients arrival to unit and care assumed.

## 2018-07-18 NOTE — PROGRESS NOTES
Hospitalist Progress Note Rut Francois MD 
Office: [phone] Cell:   
  
Date of Service:  2018 NAME:  Liza Overton. :  1940 MRN:  [id number] Admission Summary:  
patient is a 72-year-old gentleman with past medical history significant for chronic kidney disease stage IV, history of prostate cancer, history of bilateral chronic hydronephrosis, hyperkalemia, metabolic acidosis, coronary artery disease status post CABG x4, macrocytic anemia, history of gout, hypocalcemia, hyperkalemia, who presents to the hospital with the above-mentioned symptoms. Patient reports that for about a week or so, he has been feeling quite fatigued. Patient reports that he has also been lightheaded, dizzy, has not been eating and drinking well, has had poor appetite and also had some pronounced fatigue when he walked or exerted himself. Interval history / Subjective:  
  Patient feels fine post procedure, c/o pain in his right foot Assessment & Plan:  
 
1. S/p Bilateral stent placement. Stable post procedure Will continue to monitor 2. Bilateral Hydronephrosis/Prostate cancer 3. CKD. IV. 
   Multifactorial  
 4. Metabolic Acidosis. Related to his renal failure 5. CAD. S/p CABG. Stable, no chest pain or SOB 6. Gout. Re start home medication Anemia of Chronic Disease Multifactorial 
 
Code status: full DVT prophylaxis: scd Care Plan discussed with: Patient/Family and Nurse Disposition: TBD Hospital Problems  Date Reviewed: 2018 Codes Class Noted POA * (Principal)ARF (acute renal failure) (HCC) ICD-10-CM: N17.9 ICD-9-CM: 584.9  2018 Unknown Review of Systems:  
Pertinent items are noted in HPI. Vital Signs:  
 Last 24hrs VS reviewed since prior progress note. Most recent are: 
Visit Vitals  /86  Pulse 76  Temp 98.3 °F (36.8 °C)  Resp 16  Wt 99.6 kg (219 lb 9.6 oz)  SpO2 97%  BMI 30.63 kg/m2 Intake/Output Summary (Last 24 hours) at 07/18/18 1322 Last data filed at 07/18/18 1310 Gross per 24 hour Intake              510 ml Output              725 ml Net             -215 ml Physical Examination:  
 
 
     
Constitutional:  No acute distress, cooperative, pleasant   
ENT:  Oral mucous moist, oropharynx benign. Neck supple, Resp:  CTA bilaterally. No wheezing/rhonchi/rales. No accessory muscle use CV:  Regular rhythm, normal rate, no murmurs, gallops, rubs GI:  Soft, non distended, non tender. normoactive bowel sounds, no hepatosplenomegaly Musculoskeletal:  No edema, warm, 2+ pulses throughout Neurologic:  Moves all extremities. AAOx3, CN II-XII reviewed Data Review:  
 Review and/or order of clinical lab test 
 
 
Labs:  
 
Recent Labs  
   07/17/18 
 0125  07/16/18 
 1429 WBC  5.4  6.4 HGB  10.2*  11.4* HCT  30.6*  34.9*  
PLT  242  290 Recent Labs  
   07/18/18 
 0403  07/17/18 
 0125  07/16/18 
 1429 NA  138  141  140  
K  4.2  4.6  5.2*  
CL  107  113*  112* CO2  16*  14*  15* BUN  47*  54*  54* CREA  3.91*  4.40*  4.58* GLU  112*  122*  104* CA  7.9*  8.7  9.3 MG   --   1.9  2.1 PHOS   --   4.3   --   
 
Recent Labs  
   07/16/18 
 1429 SGOT  6* ALT  11* AP  86 TBILI  0.5 TP  7.7 ALB  3.2*  
GLOB  4.5* No results for input(s): INR, PTP, APTT in the last 72 hours. No lab exists for component: INREXT No results for input(s): FE, TIBC, PSAT, FERR in the last 72 hours. No results found for: FOL, RBCF No results for input(s): PH, PCO2, PO2 in the last 72 hours. Recent Labs  
   07/17/18 
 0125  07/16/18 
 1429 CPK  18*   --   
TROIQ   --   <0.05 Lab Results Component Value Date/Time  Cholesterol, total 85 10/07/2013 12:00 AM  
 HDL Cholesterol 5 10/07/2013 12:00 AM  
 LDL, calculated 39.6 10/07/2013 12:00 AM  
 Triglyceride 202 (H) 10/07/2013 12:00 AM  
 CHOL/HDL Ratio 17.0 (H) 10/07/2013 12:00 AM  
 
Lab Results Component Value Date/Time Glucose (POC) 100 07/17/2018 06:07 AM  
 Glucose (POC) 59 (L) 06/20/2018 05:03 PM  
 Glucose (POC) 101 (H) 06/20/2018 03:14 PM  
 Glucose (POC) 83 06/10/2018 06:26 AM  
 Glucose (POC) 108 (H) 06/09/2018 10:08 PM  
 Glucose (POC) 138 (H) 06/09/2018 06:30 PM  
 Glucose (POC) 159 (H) 06/09/2018 11:16 AM  
 
Lab Results Component Value Date/Time Color YELLOW/STRAW 07/16/2018 04:00 PM  
 Appearance TURBID (A) 07/16/2018 04:00 PM  
 Specific gravity 1.012 07/16/2018 04:00 PM  
 pH (UA) 6.0 07/16/2018 04:00 PM  
 Protein 100 (A) 07/16/2018 04:00 PM  
 Glucose NEGATIVE  07/16/2018 04:00 PM  
 Ketone NEGATIVE  07/16/2018 04:00 PM  
 Bilirubin NEGATIVE  07/16/2018 04:00 PM  
 Urobilinogen 0.2 07/16/2018 04:00 PM  
 Nitrites NEGATIVE  07/16/2018 04:00 PM  
 Leukocyte Esterase LARGE (A) 07/16/2018 04:00 PM  
 Epithelial cells FEW 07/16/2018 04:00 PM  
 Bacteria 1+ (A) 07/16/2018 04:00 PM  
 WBC >100 (H) 07/16/2018 04:00 PM  
 RBC 20-50 07/16/2018 04:00 PM  
 
 
 
Medications Reviewed:  
 
Current Facility-Administered Medications Medication Dose Route Frequency  fluconazole (DIFLUCAN) tablet 200 mg  200 mg Oral DAILY  [START ON 7/19/2018] allopurinol (ZYLOPRIM) tablet 100 mg  100 mg Oral DAILY  sodium bicarbonate (8.4%) 150 mEq in dextrose 5% 1,000 mL infusion   IntraVENous CONTINUOUS  
 famotidine (PEPCID) tablet 20 mg  20 mg Oral QPM  
 aspirin chewable tablet 81 mg  81 mg Oral DAILY  calcitRIOL (ROCALTROL) capsule 0.25 mcg  0.25 mcg Oral DAILY  ferrous sulfate tablet 325 mg  325 mg Oral ACB&D  patiromer calcium sorbitex (VELTASSA) powder 8.4 g  8.4 g Oral DAILY  sodium chloride (NS) flush 5-10 mL  5-10 mL IntraVENous Q8H  
 sodium chloride (NS) flush 5-10 mL  5-10 mL IntraVENous PRN  
 cefTRIAXone (ROCEPHIN) 1 g in 0.9% sodium chloride (MBP/ADV) 50 mL  1 g IntraVENous Q24H  
 
______________________________________________________________________ EXPECTED LENGTH OF STAY: 3d 7h 
ACTUAL LENGTH OF STAY:          2 Caryl Ortiz MD

## 2018-07-18 NOTE — BRIEF OP NOTE
BRIEF OPERATIVE NOTE    Date of Procedure: 7/18/2018   Preoperative Diagnosis: bilateral hydronephrosis from prostate cancer, stents not functioning well  Postoperative Diagnosis: same    Procedure(s):  CYSTOSCOPY; BILATERAL RETROGRADE PYELOGRAM, BILATERAL STENT EXCHANGE/ RM. 541  Surgeon(s) and Role:     * Yemi Mitchell MD - Primary         Surgical Assistant:     Surgical Staff:  Circ-1: Yesi العلي. Scrub RN-1: Jani Sandhu RN  Event Time In   Incision Start 1017   Incision Close 1033     Anesthesia: General   Estimated Blood Loss: 0  Specimens: * No specimens in log *   Findings: severe bilateral hydro, left obstructed at proximal ureter and at UVJ. Right obstructed at L4 L5 level. 8 Fr x 26 cm stents placed.    Complications: none  Implants: * No implants in log *

## 2018-07-18 NOTE — ANESTHESIA POSTPROCEDURE EVALUATION
Post-Anesthesia Evaluation and Assessment    Patient: [place]. MRN: [id number]  SSN: [id number]    YOB: 1940  Age: 78 y.o. Sex: male       Cardiovascular Function/Vital Signs  Visit Vitals    /58    Pulse 78    Temp 36.6 °C (97.8 °F)    Resp 15    Wt 99.6 kg (219 lb 9.6 oz)    SpO2 98%    BMI 30.63 kg/m2       Patient is status post general anesthesia for Procedure(s):  CYSTOSCOPY; BILATERAL RETROGRADE PYELOGRAM, BILATERAL STENT EXCHANGE/ RM. 541. Nausea/Vomiting: None    Postoperative hydration reviewed and adequate. Pain:  Pain Scale 1: Numeric (0 - 10) (07/18/18 1047)  Pain Intensity 1: 0 (07/18/18 1047)   Managed    Neurological Status:   Neuro (WDL): Exceptions to WDL (07/18/18 1047)  Neuro  Neurologic State: Anesthetized (07/18/18 1047)   At baseline    Mental Status and Level of Consciousness: Arousable    Pulmonary Status:   O2 Device: CO2 nasal cannula (07/18/18 1052)   Adequate oxygenation and airway patent    Complications related to anesthesia: None    Post-anesthesia assessment completed.  No concerns    Signed By: Carmella Figueroa MD     July 18, 2018

## 2018-07-18 NOTE — PROGRESS NOTES
This nurse assisting Rema Watson RN in caring for pt this morning. Pre-op checklist and CHG bath completed prior to scheduled procedure. All jewelry and belongings removed. Informed consent signed and in chart.

## 2018-07-19 LAB
ANION GAP SERPL CALC-SCNC: 13 MMOL/L (ref 5–15)
BUN SERPL-MCNC: 48 MG/DL (ref 6–20)
BUN/CREAT SERPL: 13 (ref 12–20)
CALCIUM SERPL-MCNC: 7.3 MG/DL (ref 8.5–10.1)
CHLORIDE SERPL-SCNC: 105 MMOL/L (ref 97–108)
CO2 SERPL-SCNC: 20 MMOL/L (ref 21–32)
CREAT SERPL-MCNC: 3.68 MG/DL (ref 0.7–1.3)
GLUCOSE SERPL-MCNC: 109 MG/DL (ref 65–100)
POTASSIUM SERPL-SCNC: 4.1 MMOL/L (ref 3.5–5.1)
SODIUM SERPL-SCNC: 138 MMOL/L (ref 136–145)

## 2018-07-19 PROCEDURE — 94760 N-INVAS EAR/PLS OXIMETRY 1: CPT

## 2018-07-19 PROCEDURE — 80048 BASIC METABOLIC PNL TOTAL CA: CPT | Performed by: INTERNAL MEDICINE

## 2018-07-19 PROCEDURE — 74011000250 HC RX REV CODE- 250: Performed by: INTERNAL MEDICINE

## 2018-07-19 PROCEDURE — 36415 COLL VENOUS BLD VENIPUNCTURE: CPT | Performed by: INTERNAL MEDICINE

## 2018-07-19 PROCEDURE — 74011250636 HC RX REV CODE- 250/636: Performed by: FAMILY MEDICINE

## 2018-07-19 PROCEDURE — 74011250637 HC RX REV CODE- 250/637: Performed by: UROLOGY

## 2018-07-19 PROCEDURE — 65660000000 HC RM CCU STEPDOWN

## 2018-07-19 PROCEDURE — 77010033678 HC OXYGEN DAILY

## 2018-07-19 PROCEDURE — 74011250637 HC RX REV CODE- 250/637: Performed by: FAMILY MEDICINE

## 2018-07-19 PROCEDURE — 74011250637 HC RX REV CODE- 250/637: Performed by: INTERNAL MEDICINE

## 2018-07-19 PROCEDURE — 74011250636 HC RX REV CODE- 250/636: Performed by: INTERNAL MEDICINE

## 2018-07-19 PROCEDURE — 74011000258 HC RX REV CODE- 258: Performed by: FAMILY MEDICINE

## 2018-07-19 RX ORDER — DOCUSATE SODIUM 100 MG/1
100 CAPSULE, LIQUID FILLED ORAL DAILY
Status: DISCONTINUED | OUTPATIENT
Start: 2018-07-20 | End: 2018-07-19

## 2018-07-19 RX ORDER — DOCUSATE SODIUM 100 MG/1
100 CAPSULE, LIQUID FILLED ORAL DAILY
Status: DISCONTINUED | OUTPATIENT
Start: 2018-07-19 | End: 2018-07-22 | Stop reason: HOSPADM

## 2018-07-19 RX ADMIN — PATIROMER 8.4 G: 8.4 POWDER, FOR SUSPENSION ORAL at 13:04

## 2018-07-19 RX ADMIN — ASPIRIN 81 MG 81 MG: 81 TABLET ORAL at 09:28

## 2018-07-19 RX ADMIN — DOCUSATE SODIUM 100 MG: 100 CAPSULE, LIQUID FILLED ORAL at 15:16

## 2018-07-19 RX ADMIN — Medication 10 ML: at 13:48

## 2018-07-19 RX ADMIN — Medication 5 ML: at 22:00

## 2018-07-19 RX ADMIN — FERROUS SULFATE TAB 325 MG (65 MG ELEMENTAL FE) 325 MG: 325 (65 FE) TAB at 06:40

## 2018-07-19 RX ADMIN — FERROUS SULFATE TAB 325 MG (65 MG ELEMENTAL FE) 325 MG: 325 (65 FE) TAB at 16:09

## 2018-07-19 RX ADMIN — CEFTRIAXONE 1 G: 1 INJECTION, POWDER, FOR SOLUTION INTRAMUSCULAR; INTRAVENOUS at 09:29

## 2018-07-19 RX ADMIN — ALLOPURINOL 100 MG: 100 TABLET ORAL at 09:28

## 2018-07-19 RX ADMIN — FAMOTIDINE 20 MG: 20 TABLET ORAL at 17:06

## 2018-07-19 RX ADMIN — CALCITRIOL 0.25 MCG: 0.25 CAPSULE, LIQUID FILLED ORAL at 09:28

## 2018-07-19 RX ADMIN — DEXTROSE MONOHYDRATE: 5 INJECTION, SOLUTION INTRAVENOUS at 18:44

## 2018-07-19 RX ADMIN — FLUCONAZOLE 200 MG: 100 TABLET ORAL at 09:28

## 2018-07-19 NOTE — PROGRESS NOTES
Rounded on [employer] patients and provided Anointing of the Sick and Communion at request of patient     Williams Soraida.

## 2018-07-19 NOTE — PROGRESS NOTES
Hospitalist Progress Note Anahi Pulido MD 
Office: [phone] Cell:   
  
Date of Service:  2018 NAME:  Fernanda Mariano. :  1940 MRN:  [id number] Admission Summary:  
82-year-old gentleman with past medical history significant for chronic kidney disease stage IV, history of prostate cancer, history of bilateral chronic hydronephrosis, hyperkalemia, metabolic acidosis, coronary artery disease status post CABG x4, macrocytic anemia, history of gout, hypocalcemia, hyperkalemia, who presents to the hospital with the above-mentioned symptoms.  Patient reports that for about a week or so, he has been feeling quite fatigued.  Patient reports that he has also been lightheaded, dizzy, has not been eating and drinking well, has had poor appetite and also had some pronounced fatigue when he walked or exerted himself. Interval history / Subjective:  
 Patient does not voice any concern. Refer also feeling better after the procedure, no pain, Assessment & Plan:  
 
1. S/p Bilateral Stent placement Remain stable. 2.CKD./ 
    Renal function little improvement 3. CAD. Stable. 4. Anemia of chronic Ds. Multifactorial 
5. Gout. No further complaint of foot pain Dischargeable if Urology and nephrology agree Code status: full DVT prophylaxis: scd Care Plan discussed with: Patient/Family and Nurse Disposition: TBD Hospital Problems  Date Reviewed: 2018 Codes Class Noted POA * (Principal)ARF (acute renal failure) (HCC) ICD-10-CM: N17.9 ICD-9-CM: 584.9  2018 Unknown Review of Systems:  
Pertinent items are noted in HPI. Vital Signs:  
 Last 24hrs VS reviewed since prior progress note. Most recent are: 
Visit Vitals  /75 (BP 1 Location: Right arm, BP Patient Position: At rest)  Pulse 70  Temp 98.5 °F (36.9 °C)  Resp 16  
 Ht 5' 11\" (1.803 m)  Wt 101.1 kg (222 lb 14.4 oz)  SpO2 98%  BMI 31.09 kg/m2 Intake/Output Summary (Last 24 hours) at 07/19/18 1002 Last data filed at 07/19/18 1418 Gross per 24 hour Intake              510 ml Output             1600 ml Net            -1090 ml Physical Examination:  
 
 
     
Constitutional:  No acute distress, cooperative, pleasant   
ENT:  Oral mucous moist, oropharynx benign. Neck supple, Resp:  CTA bilaterally. No wheezing/rhonchi/rales. No accessory muscle use CV:  Regular rhythm, normal rate, no murmurs, gallops, rubs GI:  Soft, non distended, non tender. normoactive bowel sounds, no hepatosplenomegaly Musculoskeletal:  No edema, warm, 2+ pulses throughout Neurologic:  Moves all extremities. AAOx3, CN II-XII reviewed Data Review:  
 Review and/or order of clinical lab test 
 
 
Labs:  
 
Recent Labs  
   07/17/18 
 0125  07/16/18 
 1429 WBC  5.4  6.4 HGB  10.2*  11.4* HCT  30.6*  34.9*  
PLT  242  290 Recent Labs  
   07/19/18 
 0230  07/18/18 
 0403  07/17/18 
 0125  07/16/18 
 1429 NA  138  138  141  140  
K  4.1  4.2  4.6  5.2*  
CL  105  107  113*  112* CO2  20*  16*  14*  15* BUN  48*  47*  54*  54* CREA  3.68*  3.91*  4.40*  4.58* GLU  109*  112*  122*  104* CA  7.3*  7.9*  8.7  9.3 MG   --    --   1.9  2.1 PHOS   --    --   4.3   --   
 
Recent Labs  
   07/16/18 
 1429 SGOT  6* ALT  11* AP  86 TBILI  0.5 TP  7.7 ALB  3.2*  
GLOB  4.5* No results for input(s): INR, PTP, APTT in the last 72 hours. No lab exists for component: INREXT No results for input(s): FE, TIBC, PSAT, FERR in the last 72 hours. No results found for: FOL, RBCF No results for input(s): PH, PCO2, PO2 in the last 72 hours. Recent Labs  
   07/17/18 
 0125  07/16/18 
 1429 CPK  18*   --   
TROIQ   --   <0.05 Lab Results Component Value Date/Time  Cholesterol, total 85 10/07/2013 12:00 AM  
 HDL Cholesterol 5 10/07/2013 12:00 AM  
 LDL, calculated 39.6 10/07/2013 12:00 AM  
 Triglyceride 202 (H) 10/07/2013 12:00 AM  
 CHOL/HDL Ratio 17.0 (H) 10/07/2013 12:00 AM  
 
Lab Results Component Value Date/Time Glucose (POC) 137 (H) 07/18/2018 05:06 PM  
 Glucose (POC) 100 07/17/2018 06:07 AM  
 Glucose (POC) 59 (L) 06/20/2018 05:03 PM  
 Glucose (POC) 101 (H) 06/20/2018 03:14 PM  
 Glucose (POC) 83 06/10/2018 06:26 AM  
 Glucose (POC) 108 (H) 06/09/2018 10:08 PM  
 Glucose (POC) 138 (H) 06/09/2018 06:30 PM  
 
Lab Results Component Value Date/Time Color YELLOW/STRAW 07/16/2018 04:00 PM  
 Appearance TURBID (A) 07/16/2018 04:00 PM  
 Specific gravity 1.012 07/16/2018 04:00 PM  
 pH (UA) 6.0 07/16/2018 04:00 PM  
 Protein 100 (A) 07/16/2018 04:00 PM  
 Glucose NEGATIVE  07/16/2018 04:00 PM  
 Ketone NEGATIVE  07/16/2018 04:00 PM  
 Bilirubin NEGATIVE  07/16/2018 04:00 PM  
 Urobilinogen 0.2 07/16/2018 04:00 PM  
 Nitrites NEGATIVE  07/16/2018 04:00 PM  
 Leukocyte Esterase LARGE (A) 07/16/2018 04:00 PM  
 Epithelial cells FEW 07/16/2018 04:00 PM  
 Bacteria 1+ (A) 07/16/2018 04:00 PM  
 WBC >100 (H) 07/16/2018 04:00 PM  
 RBC 20-50 07/16/2018 04:00 PM  
 
 
 
Medications Reviewed:  
 
Current Facility-Administered Medications Medication Dose Route Frequency  fluconazole (DIFLUCAN) tablet 200 mg  200 mg Oral DAILY  allopurinol (ZYLOPRIM) tablet 100 mg  100 mg Oral DAILY  sodium bicarbonate (8.4%) 150 mEq in dextrose 5% 1,000 mL infusion   IntraVENous CONTINUOUS  
 famotidine (PEPCID) tablet 20 mg  20 mg Oral QPM  
 aspirin chewable tablet 81 mg  81 mg Oral DAILY  calcitRIOL (ROCALTROL) capsule 0.25 mcg  0.25 mcg Oral DAILY  ferrous sulfate tablet 325 mg  325 mg Oral ACB&D  patiromer calcium sorbitex (VELTASSA) powder 8.4 g  8.4 g Oral DAILY  sodium chloride (NS) flush 5-10 mL  5-10 mL IntraVENous Q8H  
 sodium chloride (NS) flush 5-10 mL  5-10 mL IntraVENous PRN  
 cefTRIAXone (ROCEPHIN) 1 g in 0.9% sodium chloride (MBP/ADV) 50 mL  1 g IntraVENous Q24H  
 
______________________________________________________________________ EXPECTED LENGTH OF STAY: 3d 7h 
ACTUAL LENGTH OF STAY:          3 Lianna Nguyen MD

## 2018-07-19 NOTE — PROGRESS NOTES
Problem: Falls - Risk of  Goal: *Absence of Falls  Document Jonathon Fall Risk and appropriate interventions in the flowsheet.    Outcome: Progressing Towards Goal  Fall Risk Interventions:  Mobility Interventions: Patient to call before getting OOB         Medication Interventions: Patient to call before getting OOB

## 2018-07-19 NOTE — PROGRESS NOTES
Care Management Interventions  PCP Verified by CM: Yes  Palliative Care Criteria Met (RRAT>21 & CHF Dx)?: No  MyChart Signup: No  Discharge Durable Medical Equipment: No  Physical Therapy Consult: No  Occupational Therapy Consult: No  Speech Therapy Consult: No  Current Support Network: Own Home  Confirm Follow Up Transport: Family  Plan discussed with Pt/Family/Caregiver: Yes  Freedom of Choice Offered: Yes  [place] Resource Information Provided?: No  Discharge Location  Discharge Placement: Home with family assistance    Reason for Admission: JASIEL                  RRAT Score:     14             Do you (patient/family) have any concerns for transition/discharge? None voiced              Plan for utilizing home health: To be determined    Likelihood of readmission?   low            Transition of Care Plan:      Cm met with patient to explain role and offer support. Patient confirmed he lives alone in independent living at [facility], where he has been for about 4 years. Patient drives and states he is able to perform all ADLs and IADLs independently. He has a daughter Curly Junior, [phone]) who is involved in his care and helps with appointments and other errands when able. Patient would benefit from a PT eval while here, as he states he feels weak. Cm will contact attending to discuss.     [employer] , [place]

## 2018-07-19 NOTE — PROGRESS NOTES
Nephrology Progress Note Louann Al. Date of Admission : 7/16/2018 CC: Follow up for JASIEL on CKD Assessment and Plan JASIEL on CKD IV: 
- 2/2 MEDINA with b/l hydro - s/p b/l stent exchange - Cr slightly better 
- continue IVF Hx of MEDINA with ongoing b/l hydro despite stent placement: 
- b/l stent obstruction - s/p stent change CKD IV: 
- baseline Cr 2.4 
- f/b Dr. Annabel Baldwin as an outpt Chronic hyperkalemia: 
- stable 
- continue veltassa Metabolic Acidosis: 
- cont bicarb drip for now Anemia of CKD: 
- hgb stable CAD s/p CABG Hx of prostate cancer Interval History: 
Seen and examined. Has not seen much improvement w/ UOP . Denies any N/V/CP/SOB Current Medications: all current  Medications have been eviewed in [facility] Review of Systems: Pertinent items are noted in HPI. Objective: 
Vitals:   
Vitals:  
 07/19/18 0309 07/19/18 5450 07/19/18 0646 07/19/18 1411 BP: 138/81  137/75 141/78 Pulse: 70  70 70 Resp: 16  16 16 Temp: 97.8 °F (36.6 °C)  98.5 °F (36.9 °C) 98.1 °F (36.7 °C) SpO2: 97%  98% 98% Weight:  101.1 kg (222 lb 14.4 oz) Height:  5' 11\" (1.803 m) Intake and Output: 
07/19 0701 - 07/19 1900 In: -  
Out: 729 [HAMHC:884] 07/17 1901 - 07/19 0700 In: 510 [I.V.:510] Out: 2125 [Urine:2125] Physical Examination: 
General: NAD,Conversant Neck:  Supple, no mass Resp:  Lungs CTA B/L, no wheezing , normal respiratory effort CV:  RRR,  no murmur or rub, no LE edema GI:  Soft, NT, + Bowel sounds, no hepatosplenomegaly Neurologic:  Non focal 
Psych:             AAO x 3 appropriate affect Skin:  No Rash :  No goldberg in place 
 
[]    High complexity decision making was performed 
[]    Patient is at high-risk of decompensation with multiple organ involvement Lab Data Personally Reviewed: I have reviewed all the pertinent labs, microbiology data and radiology studies during assessment.  
 
Recent Labs  
   07/19/18 
 5549 07/18/18 
 0403  07/17/18 
 0125 NA  138  138  141  
K  4.1  4.2  4.6 CL  105  107  113* CO2  20*  16*  14* GLU  109*  112*  122* BUN  48*  47*  54* CREA  3.68*  3.91*  4.40* CA  7.3*  7.9*  8.7 MG   --    --   1.9 PHOS   --    --   4.3 Recent Labs  
   07/17/18 
 0125 WBC  5.4 HGB  10.2* HCT  30.6* PLT  242 Lab Results Component Value Date/Time Specimen Description: URINE 10/07/2013 01:36 AM  
 Specimen Description: BLOOD 10/06/2013 04:13 PM  
 
Lab Results Component Value Date/Time Culture result: NO GROWTH 2 DAYS 07/17/2018 01:25 AM  
 Culture result: (A) 07/16/2018 04:00 PM  
  PROBABLE ALPHA STREPTOCOCCUS (>100,000 COLONIES/mL) Culture result: YEAST (>100,000 COLONIES/mL) (A) 07/16/2018 04:00 PM  
 Culture result: GRAM NEGATIVE RODS (2,000 COLONIES/mL) (A) 07/16/2018 04:00 PM  
 
Recent Results (from the past 24 hour(s)) GLUCOSE, POC Collection Time: 07/18/18  5:06 PM  
Result Value Ref Range Glucose (POC) 137 (H) 65 - 100 mg/dL Performed by Verenice Gomez METABOLIC PANEL, BASIC Collection Time: 07/19/18  2:30 AM  
Result Value Ref Range Sodium 138 136 - 145 mmol/L Potassium 4.1 3.5 - 5.1 mmol/L Chloride 105 97 - 108 mmol/L  
 CO2 20 (L) 21 - 32 mmol/L Anion gap 13 5 - 15 mmol/L Glucose 109 (H) 65 - 100 mg/dL BUN 48 (H) 6 - 20 MG/DL Creatinine 3.68 (H) 0.70 - 1.30 MG/DL  
 BUN/Creatinine ratio 13 12 - 20 GFR est AA 20 (L) >60 ml/min/1.73m2 GFR est non-AA 16 (L) >60 ml/min/1.73m2 Calcium 7.3 (L) 8.5 - 10.1 MG/DL Keegan Valentine MD 
[address] [place] [address], [address] [facility] Phone - [phone] Fax - [phone] 
[email or web address]

## 2018-07-19 NOTE — PROGRESS NOTES
Patient POD 1 bilateral stent exchange. Creatinine down trending. Please reconsult PRN.   Patient needs outpatient followup with Dr. Mony Foss ([facility])

## 2018-07-20 ENCOUNTER — APPOINTMENT (OUTPATIENT)
Dept: MRI IMAGING | Age: 78
DRG: 694 | End: 2018-07-20
Attending: INTERNAL MEDICINE
Payer: MEDICARE

## 2018-07-20 LAB
ANION GAP SERPL CALC-SCNC: 11 MMOL/L (ref 5–15)
ATRIAL RATE: 70 BPM
BUN SERPL-MCNC: 43 MG/DL (ref 6–20)
BUN/CREAT SERPL: 11 (ref 12–20)
CALCIUM SERPL-MCNC: 6.9 MG/DL (ref 8.5–10.1)
CALCULATED P AXIS, ECG09: 3 DEGREES
CALCULATED R AXIS, ECG10: 8 DEGREES
CALCULATED T AXIS, ECG11: 22 DEGREES
CHLORIDE SERPL-SCNC: 103 MMOL/L (ref 97–108)
CK SERPL-CCNC: 28 U/L (ref 39–308)
CO2 SERPL-SCNC: 25 MMOL/L (ref 21–32)
CREAT SERPL-MCNC: 3.78 MG/DL (ref 0.7–1.3)
DIAGNOSIS, 93000: NORMAL
ERYTHROCYTE [DISTWIDTH] IN BLOOD BY AUTOMATED COUNT: 14.7 % (ref 11.5–14.5)
GLUCOSE SERPL-MCNC: 104 MG/DL (ref 65–100)
HCT VFR BLD AUTO: 25.3 % (ref 36.6–50.3)
HGB BLD-MCNC: 8.4 G/DL (ref 12.1–17)
MAGNESIUM SERPL-MCNC: 1.3 MG/DL (ref 1.6–2.4)
MCH RBC QN AUTO: 32.3 PG (ref 26–34)
MCHC RBC AUTO-ENTMCNC: 33.2 G/DL (ref 30–36.5)
MCV RBC AUTO: 97.3 FL (ref 80–99)
NRBC # BLD: 0 K/UL (ref 0–0.01)
NRBC BLD-RTO: 0 PER 100 WBC
P-R INTERVAL, ECG05: 204 MS
PLATELET # BLD AUTO: 169 K/UL (ref 150–400)
PMV BLD AUTO: 10.1 FL (ref 8.9–12.9)
POTASSIUM SERPL-SCNC: 3.9 MMOL/L (ref 3.5–5.1)
Q-T INTERVAL, ECG07: 456 MS
QRS DURATION, ECG06: 84 MS
QTC CALCULATION (BEZET), ECG08: 492 MS
RBC # BLD AUTO: 2.6 M/UL (ref 4.1–5.7)
SODIUM SERPL-SCNC: 139 MMOL/L (ref 136–145)
TROPONIN I SERPL-MCNC: <0.05 NG/ML
VENTRICULAR RATE, ECG03: 70 BPM
WBC # BLD AUTO: 5.5 K/UL (ref 4.1–11.1)

## 2018-07-20 PROCEDURE — 74011250637 HC RX REV CODE- 250/637: Performed by: UROLOGY

## 2018-07-20 PROCEDURE — 74011000250 HC RX REV CODE- 250: Performed by: INTERNAL MEDICINE

## 2018-07-20 PROCEDURE — 97162 PT EVAL MOD COMPLEX 30 MIN: CPT

## 2018-07-20 PROCEDURE — 74011250637 HC RX REV CODE- 250/637: Performed by: FAMILY MEDICINE

## 2018-07-20 PROCEDURE — 70544 MR ANGIOGRAPHY HEAD W/O DYE: CPT

## 2018-07-20 PROCEDURE — 74011250637 HC RX REV CODE- 250/637: Performed by: NURSE PRACTITIONER

## 2018-07-20 PROCEDURE — 74011250636 HC RX REV CODE- 250/636: Performed by: NURSE PRACTITIONER

## 2018-07-20 PROCEDURE — 74011250636 HC RX REV CODE- 250/636: Performed by: INTERNAL MEDICINE

## 2018-07-20 PROCEDURE — 84484 ASSAY OF TROPONIN QUANT: CPT | Performed by: NURSE PRACTITIONER

## 2018-07-20 PROCEDURE — 74011250637 HC RX REV CODE- 250/637: Performed by: INTERNAL MEDICINE

## 2018-07-20 PROCEDURE — 74011250636 HC RX REV CODE- 250/636: Performed by: FAMILY MEDICINE

## 2018-07-20 PROCEDURE — 80048 BASIC METABOLIC PNL TOTAL CA: CPT | Performed by: NURSE PRACTITIONER

## 2018-07-20 PROCEDURE — 65270000029 HC RM PRIVATE

## 2018-07-20 PROCEDURE — 82550 ASSAY OF CK (CPK): CPT | Performed by: INTERNAL MEDICINE

## 2018-07-20 PROCEDURE — 36415 COLL VENOUS BLD VENIPUNCTURE: CPT | Performed by: NURSE PRACTITIONER

## 2018-07-20 PROCEDURE — 70551 MRI BRAIN STEM W/O DYE: CPT

## 2018-07-20 PROCEDURE — 93041 RHYTHM ECG TRACING: CPT

## 2018-07-20 PROCEDURE — 83735 ASSAY OF MAGNESIUM: CPT | Performed by: NURSE PRACTITIONER

## 2018-07-20 PROCEDURE — 85027 COMPLETE CBC AUTOMATED: CPT | Performed by: INTERNAL MEDICINE

## 2018-07-20 PROCEDURE — 97116 GAIT TRAINING THERAPY: CPT

## 2018-07-20 PROCEDURE — 74011000258 HC RX REV CODE- 258: Performed by: FAMILY MEDICINE

## 2018-07-20 RX ORDER — ACETAMINOPHEN 325 MG/1
650 TABLET ORAL
Status: DISCONTINUED | OUTPATIENT
Start: 2018-07-20 | End: 2018-07-22 | Stop reason: HOSPADM

## 2018-07-20 RX ORDER — ALLOPURINOL 100 MG/1
100 TABLET ORAL 2 TIMES DAILY
Status: DISCONTINUED | OUTPATIENT
Start: 2018-07-20 | End: 2018-07-22 | Stop reason: HOSPADM

## 2018-07-20 RX ORDER — COLCHICINE 0.6 MG/1
0.6 TABLET ORAL ONCE
Status: COMPLETED | OUTPATIENT
Start: 2018-07-20 | End: 2018-07-20

## 2018-07-20 RX ORDER — COLCHICINE 0.6 MG/1
0.6 TABLET ORAL DAILY
Status: DISCONTINUED | OUTPATIENT
Start: 2018-07-20 | End: 2018-07-20

## 2018-07-20 RX ORDER — MAGNESIUM SULFATE HEPTAHYDRATE 40 MG/ML
2 INJECTION, SOLUTION INTRAVENOUS ONCE
Status: COMPLETED | OUTPATIENT
Start: 2018-07-20 | End: 2018-07-20

## 2018-07-20 RX ADMIN — DEXTROSE MONOHYDRATE: 5 INJECTION, SOLUTION INTRAVENOUS at 17:46

## 2018-07-20 RX ADMIN — COLCHICINE 0.6 MG: 0.6 TABLET, FILM COATED ORAL at 10:05

## 2018-07-20 RX ADMIN — PATIROMER 8.4 G: 8.4 POWDER, FOR SUSPENSION ORAL at 13:51

## 2018-07-20 RX ADMIN — COLCHICINE 0.6 MG: 0.6 TABLET, FILM COATED ORAL at 17:42

## 2018-07-20 RX ADMIN — FERROUS SULFATE TAB 325 MG (65 MG ELEMENTAL FE) 325 MG: 325 (65 FE) TAB at 06:41

## 2018-07-20 RX ADMIN — DEXTROSE MONOHYDRATE: 5 INJECTION, SOLUTION INTRAVENOUS at 04:43

## 2018-07-20 RX ADMIN — FERROUS SULFATE TAB 325 MG (65 MG ELEMENTAL FE) 325 MG: 325 (65 FE) TAB at 17:42

## 2018-07-20 RX ADMIN — FLUCONAZOLE 200 MG: 100 TABLET ORAL at 08:51

## 2018-07-20 RX ADMIN — ACETAMINOPHEN 650 MG: 325 TABLET, FILM COATED ORAL at 00:47

## 2018-07-20 RX ADMIN — MAGNESIUM SULFATE HEPTAHYDRATE 2 G: 40 INJECTION, SOLUTION INTRAVENOUS at 04:43

## 2018-07-20 RX ADMIN — Medication 10 ML: at 06:41

## 2018-07-20 RX ADMIN — DOCUSATE SODIUM 100 MG: 100 CAPSULE, LIQUID FILLED ORAL at 08:51

## 2018-07-20 RX ADMIN — ALLOPURINOL 100 MG: 100 TABLET ORAL at 17:42

## 2018-07-20 RX ADMIN — CEFTRIAXONE 1 G: 1 INJECTION, POWDER, FOR SOLUTION INTRAMUSCULAR; INTRAVENOUS at 10:05

## 2018-07-20 RX ADMIN — ALLOPURINOL 100 MG: 100 TABLET ORAL at 08:51

## 2018-07-20 RX ADMIN — FAMOTIDINE 20 MG: 20 TABLET ORAL at 17:42

## 2018-07-20 RX ADMIN — ASPIRIN 81 MG 81 MG: 81 TABLET ORAL at 08:51

## 2018-07-20 RX ADMIN — ACETAMINOPHEN 650 MG: 325 TABLET, FILM COATED ORAL at 21:50

## 2018-07-20 RX ADMIN — CALCITRIOL 0.25 MCG: 0.25 CAPSULE, LIQUID FILLED ORAL at 08:51

## 2018-07-20 RX ADMIN — Medication 5 ML: at 22:00

## 2018-07-20 NOTE — PROGRESS NOTES
Problem: Mobility Impaired (Adult and Pediatric)  Goal: *Acute Goals and Plan of Care (Insert Text)  Physical Therapy Goals  Initiated 7/20/2018  1. Patient will move from supine to sit and sit to supine , scoot up and down and roll side to side in bed with independence within 7 day(s). 2.  Patient will transfer from bed to chair and chair to bed with modified independence using the least restrictive device within 7 day(s). 3.  Patient will perform sit to stand with modified independence within 7 day(s). 4.  Patient will ambulate with modified independence for 500 feet with the least restrictive device within 7 day(s). physical Therapy EVALUATION  Patient: Candy De Souza (71 y.o. male)  Date: 7/20/2018  Primary Diagnosis: ARF (acute renal failure) (HCC)  ARF (acute renal failure) (HCC)  UNKNOWN  Procedure(s) (LRB):  CYSTOSCOPY; BILATERAL RETROGRADE PYELOGRAM, BILATERAL STENT EXCHANGE/ RM. 541 (Bilateral) 2 Days Post-Op   Precautions: Fall       ASSESSMENT :  Based on the objective data described below, the patient presents with impaired balance and gait dysfunction, decreased endurance, R foot pain and decreased safety awareness s/p admissino for ARF with new onset of gout flare in R foot. Patient overall demo's need for SUP to MIN A for safe mobility at this time. CGA for sit to stand and MIN A for ambulation with SPC with one LOB that required MIN A to correct. With RW patient able to ambulate with decreased sway and no LOB but fatigued after 125' total distance and with increasing antalgic limp due to R foot pain as distance increased. Patient [facility] at [facility], [facility], [facility] at baseline or no AD, has to take elevator to the 5th floor. At this time, he presents with high fall risk per Tinetti score and has reduced tolerance to household ambulation distances. Would recommend SNF placement with d/c pending due to safety concerns with returning home independently. Patient will benefit from skilled intervention to address the above impairments. Patients rehabilitation potential is considered to be Excellent  Factors which may influence rehabilitation potential include:   []         None noted  []         Mental ability/status  []         Medical condition  [x]         Home/family situation and support systems  []         Safety awareness  []         Pain tolerance/management  []         Other:      PLAN :  Recommendations and Planned Interventions:  [x]           Bed Mobility Training             [x]    Neuromuscular Re-Education  [x]           Transfer Training                   []    Orthotic/Prosthetic Training  [x]           Gait Training                         []    Modalities  [x]           Therapeutic Exercises           []    Edema Management/Control  [x]           Therapeutic Activities            [x]    Patient and Family Training/Education  []           Other (comment):    Frequency/Duration: Patient will be followed by physical therapy  5 times a week to address goals. Discharge Recommendations: Skilled Nursing Facility  Further Equipment Recommendations for Discharge: TBD at SNF     SUBJECTIVE:   Patient stated I've gotten so weak from being here so many days.     OBJECTIVE DATA SUMMARY:   HISTORY:    Past Medical History:   Diagnosis Date    Celiac disease     Chronic kidney disease     Hypertension     MI (mitral incompetence)     Prostate cancer ([facility] [address]      Past Surgical History:   Procedure Laterality Date    CARDIAC SURG PROCEDURE UNLIST      qaudruple bipass    HX ORTHOPAEDIC Right 2015    suni in femur from pathological fracture     Prior Level of Function/Home Situation: Indep with SPC prior to admission, drives, lives at [employer] on 5th floor  Personal factors and/or comorbidities impacting plan of care: lives alone, gout in R foot    Home Situation  Home Environment: Independent living  # Steps to Enter: 0 (Patient on 5th floor at [facility] [employer] [facility])  One/Two Story Residence:  (5th floor, elevators available)  Living Alone: No  Support Systems: Child(patti)  Patient Expects to be Discharged to[de-identified]  ([facility])  Current DME Used/Available at Home: Cane, straight, Shower chair, Raised toilet seat  Tub or Shower Type: Shower    EXAMINATION/PRESENTATION/DECISION MAKING:   Critical Behavior:  Neurologic State: Alert  Orientation Level: Oriented X4        Hearing:     Skin:  See nursing notes  Edema: none  Range Of Motion:  AROM: Within functional limits                       Strength:    Strength: Generally decreased, functional                    Tone & Sensation:   Tone: Normal              Sensation: Intact               Coordination:  Coordination: Within functional limits  Vision:      Functional Mobility:  Bed Mobility:  Rolling: Supervision  Supine to Sit: Supervision        Transfers:  Sit to Stand: Contact guard assistance  Stand to Sit: Contact guard assistance                       Balance:   Sitting: Intact; Without support  Standing: Impaired  Standing - Static: Constant support;Good  Standing - Dynamic : Fair  Ambulation/Gait Training:  Distance (ft): 125 Feet (ft)  Assistive Device: Gait belt;Cane, straight;Walker, rolling  Ambulation - Level of Assistance: Contact guard assistance;Minimal assistance (MIN A for one LOB during gait)        Gait Abnormalities: Decreased step clearance; Path deviations        Base of Support: Widened;Shift to left     Speed/Park: Pace decreased (<100 feet/min)  Step Length: Right shortened;Left shortened                         Functional Measure:  Tinetti test:    Sitting Balance: 1  Arises: 1  Attempts to Rise: 2  Immediate Standing Balance: 1  Standing Balance: 1  Nudged: 2  Eyes Closed: 1  Turn 360 Degrees - Continuous/Discontinuous: 0  Turn 360 Degrees - Steady/Unsteady: 0  Sitting Down: 1  Balance Score: 10  Indication of Gait: 0  R Step Length/Height: 1  L Step Length/Height: 1  R Foot Clearance: 1  L Foot Clearance: 1  Step Symmetry: 0  Step Continuity: 0  Path: 1  Trunk: 1  Walking Time: 0  Gait Score: 6  Total Score: 16       Tinetti Test and G-code impairment scale:  Percentage of Impairment CH    0%   CI    1-19% CJ    20-39% CK    40-59% CL    60-79% CM    80-99% CN     100%   Tinetti  Score 0-28 28 23-27 17-22 12-16 6-11 1-5 0       Tinetti Tool Score Risk of Falls  <19 = High Fall Risk  19-24 = Moderate Fall Risk  25-28 = Low Fall Risk  Tinetti ME. Performance-Oriented Assessment of Mobility Problems in Elderly Patients. [facility] 66; [id number]. (Scoring Description: PT Bulletin Feb. 10, 1993)    Older adults: Nagi Jacobson et al, 2009; n = 1000 [facility] elderly evaluated with ABC, ELMER, ADL, and IADL)  · Mean ELMER score for males aged 69-68 years = 26.21(3.40)  · Mean ELMER score for females age 69-68 years = 25.16(4.30)  · Mean ELMER score for males over 80 years = 23.29(6.02)  · Mean ELMER score for females over 80 years = 17.20(8.32)         G codes: In compliance with CMSs Claims Based Outcome Reporting, the following G-code set was chosen for this patient based on their primary functional limitation being treated: The outcome measure chosen to determine the severity of the functional limitation was the Tinetti with a score of 16/28 which was correlated with the impairment scale.     ? Mobility - Walking and Moving Around:     - CURRENT STATUS: CK - 40%-59% impaired, limited or restricted    - GOAL STATUS: CI - 1%-19% impaired, limited or restricted    - D/C STATUS:  ---------------To be determined---------------      Physical Therapy Evaluation Charge Determination   History Examination Presentation Decision-Making   HIGH Complexity :3+ comorbidities / personal factors will impact the outcome/ POC  MEDIUM Complexity : 3 Standardized tests and measures addressing body structure, function, activity limitation and / or participation in recreation  MEDIUM Complexity : Evolving with changing characteristics  Other outcome measures Tinetti 16/28  MEDIUM      Based on the above components, the patient evaluation is determined to be of the following complexity level: MEDIUM    Pain:  Pain Scale 1: Numeric (0 - 10)  Pain Intensity 1: 0              Activity Tolerance:   Fair - 125' ambulation with RW with CGA to MIN A and fatigue, antalgia on R    Please refer to the flowsheet for vital signs taken during this treatment. After treatment:   [x]         Patient left in no apparent distress sitting up in chair  []         Patient left in no apparent distress in bed  [x]         Call bell left within reach  [x]         Nursing notified  []         Caregiver present  []         Bed alarm activated    COMMUNICATION/EDUCATION:   The patients plan of care was discussed with: Registered Nurse. [x]         Fall prevention education was provided and the patient/caregiver indicated understanding. []         Patient/family have participated as able in goal setting and plan of care. [x]         Patient/family agree to work toward stated goals and plan of care. []         Patient understands intent and goals of therapy, but is neutral about his/her participation. []         Patient is unable to participate in goal setting and plan of care.     Thank you for this referral.  Joselyn Drake, PT   Time Calculation: 32 mins

## 2018-07-20 NOTE — PROGRESS NOTES
Patient has been accepted to [facility] for short term SNF placement. Cm informed patient of this and sent a referral to [facility] for a 5 pm ; waiting for response.   [employer] , [place]

## 2018-07-20 NOTE — PROGRESS NOTES
PCP/Specialist appointment will call patient for appointment date and times.     Day Gary CM  Specialist.

## 2018-07-20 NOTE — DISCHARGE SUMMARY
[address]    DISCHARGE SUMMARY    Josh Pedro  MR#: [id number]  : 1940  ACCOUNT #: [de-identified]   ADMIT DATE: 2018  DISCHARGE DATE:     REASON FOR ADMISSION:  Fatigue. DISCHARGE DIAGNOSES:  1. Acute on chronic renal failure  2. Status post hyperkalemia. 3.  Urinary tract infection multiple microorganisms. 4.  History of prostate cancer. 5.  Metabolic acidosis. 6.  History of coronary artery disease. 7.  History of dizziness. CONSULTS DONE:  Urology and Nephrology. STUDIES DONE:  CT as well as cystoscopy. PROCEDURES PERFORMED:  Ureteral stent removal.    HOSPITAL COURSE:  Mr. Bri Montero is a 82-year-old patient known for his chronic kidney disease as well as prostate cancer, was admitted on  due to fatigue. On that location, his  creatinine was 4.5. Since the patient has a prostate cancer, a cystoscopy and exchange of the stent was done due to bilateral hydronephrosis. This is the second time, it has been done prior, the patient had it done on 2018. During this admission also patient developed a gout attack that was treated with cyclosporine and colchicine. DISCHARGE MEDICATIONS:  Will be allopurinol 100 mg daily, aspirin 81 mg daily, calcitriol 0.25 mcg daily, ferrous sulfate b.i.d., metoprolol 50 mg p.o. b.i.d.,  sodium bicarbonate 650 mg twice a day. Veltassa b.i.d. Xtandi 40 mg, 160 mg at night. DIET:  Will be renal diet. ACTIVITY:  As tolerated. FOLLOWUP:  Follow up with urology, PCP, and nephrologist.    CONDITION ON DISCHARGE:  Stable. DISPOSITION: Home to family.       MD ASH Garcia/TN  D: 2018 10:11     T: 2018 13:25  JOB #: [id number]

## 2018-07-20 NOTE — PROGRESS NOTES
Cm spoke with therapy regarding their recommendations for SNF placement and followed up with patient. He is agreeable to going to [facility]. Referral sent via [address]; will follow.   [employer] , [place]

## 2018-07-20 NOTE — PROGRESS NOTES
Hospitalist Progress Note Edith Appiah MD 
Office: [phone] Cell:   
  
Date of Service:  2018 NAME:  Ana Fraser. :  1940 MRN:  [id number] Admission Summary:  
46-year-old gentleman with past medical history significant for chronic kidney disease stage IV, history of prostate cancer, history of bilateral chronic hydronephrosis, hyperkalemia, metabolic acidosis, coronary artery disease status post CABG x4, macrocytic anemia, history of gout, hypocalcemia, hyperkalemia, who presents to the hospital with the above-mentioned symptoms.  Patient reports that for about a week or so, he has been feeling quite fatigued.  Patient reports that he has also been lightheaded, dizzy, has not been eating and drinking well, has had poor appetite and also had some pronounced fatigue when he walked or exerted himself. Interval history / Subjective:  
  Refer dizzy once he move out of the bed, tofay also he complaint of foot pain related to his gout Assessment & Plan:  
 
1. S/p Bilateral Stent placement Remain stable. 2.CKD./ 
    Renal function little improvement 3. CAD. Stable. 4. Anemia of chronic Ds. Multifactorial 
5. Gout. 
     will add colchine on time Dischargeable if Urology and nephrology agree 
  
Code status: full DVT prophylaxis: scd Care Plan discussed with: Patient/Family and Nurse Disposition: TBD Hospital Problems  Date Reviewed: 2018 Codes Class Noted POA * (Principal)ARF (acute renal failure) (HCC) ICD-10-CM: N17.9 ICD-9-CM: 584.9  2018 Unknown Review of Systems:  
Pertinent items are noted in HPI. Vital Signs:  
 Last 24hrs VS reviewed since prior progress note. Most recent are: 
Visit Vitals  /86  Pulse 100  Temp 98 °F (36.7 °C)  Resp 16  
 Ht 5' 11\" (1.803 m)  Wt 102 kg (224 lb 12.8 oz)  SpO2 98%  BMI 31.35 kg/m2 Intake/Output Summary (Last 24 hours) at 07/20/18 5857 Last data filed at 07/20/18 0177 Gross per 24 hour Intake                0 ml Output             2100 ml Net            -2100 ml Physical Examination:  
 
 
     
Constitutional:  No acute distress, cooperative, pleasant   
ENT:  Oral mucous moist, oropharynx benign. Neck supple, Resp:  CTA bilaterally. No wheezing/rhonchi/rales. No accessory muscle use CV:  Regular rhythm, normal rate, no murmurs, gallops, rubs GI:  Soft, non distended, non tender. normoactive bowel sounds, no hepatosplenomegaly Musculoskeletal:  No edema, warm, 2+ pulses throughout Neurologic:  Moves all extremities. AAOx3, CN II-XII reviewed Data Review:  
 Review and/or order of clinical lab test 
 
 
Labs:  
 
Recent Labs  
   07/20/18 
 0050 WBC  5.5 HGB  8.4* HCT  25.3*  
PLT  169 Recent Labs  
   07/20/18 
 0046  07/19/18 
 0230  07/18/18 
 0403 NA  139  138  138  
K  3.9  4.1  4.2 CL  103  105  107 CO2  25  20*  16*  
BUN  43*  48*  47* CREA  3.78*  3.68*  3.91* GLU  104*  109*  112* CA  6.9*  7.3*  7.9*  
MG  1.3*   --    -- No results for input(s): SGOT, GPT, ALT, AP, TBIL, TBILI, TP, ALB, GLOB, GGT, AML, LPSE in the last 72 hours. No lab exists for component: AMYP, HLPSE No results for input(s): INR, PTP, APTT in the last 72 hours. No lab exists for component: INREXT No results for input(s): FE, TIBC, PSAT, FERR in the last 72 hours. No results found for: FOL, RBCF No results for input(s): PH, PCO2, PO2 in the last 72 hours. Recent Labs  
   07/20/18 
 0050  07/20/18 
 0046 CPK  28*   --   
TROIQ   --   <0.05 Lab Results Component Value Date/Time  Cholesterol, total 85 10/07/2013 12:00 AM  
 HDL Cholesterol 5 10/07/2013 12:00 AM  
 LDL, calculated 39.6 10/07/2013 12:00 AM  
 Triglyceride 202 (H) 10/07/2013 12:00 AM  
 CHOL/HDL Ratio 17.0 (H) 10/07/2013 12:00 AM Lab Results Component Value Date/Time Glucose (POC) 137 (H) 07/18/2018 05:06 PM  
 Glucose (POC) 100 07/17/2018 06:07 AM  
 Glucose (POC) 59 (L) 06/20/2018 05:03 PM  
 Glucose (POC) 101 (H) 06/20/2018 03:14 PM  
 Glucose (POC) 83 06/10/2018 06:26 AM  
 Glucose (POC) 108 (H) 06/09/2018 10:08 PM  
 Glucose (POC) 138 (H) 06/09/2018 06:30 PM  
 
Lab Results Component Value Date/Time Color YELLOW/STRAW 07/16/2018 04:00 PM  
 Appearance TURBID (A) 07/16/2018 04:00 PM  
 Specific gravity 1.012 07/16/2018 04:00 PM  
 pH (UA) 6.0 07/16/2018 04:00 PM  
 Protein 100 (A) 07/16/2018 04:00 PM  
 Glucose NEGATIVE  07/16/2018 04:00 PM  
 Ketone NEGATIVE  07/16/2018 04:00 PM  
 Bilirubin NEGATIVE  07/16/2018 04:00 PM  
 Urobilinogen 0.2 07/16/2018 04:00 PM  
 Nitrites NEGATIVE  07/16/2018 04:00 PM  
 Leukocyte Esterase LARGE (A) 07/16/2018 04:00 PM  
 Epithelial cells FEW 07/16/2018 04:00 PM  
 Bacteria 1+ (A) 07/16/2018 04:00 PM  
 WBC >100 (H) 07/16/2018 04:00 PM  
 RBC 20-50 07/16/2018 04:00 PM  
 
 
 
Medications Reviewed:  
 
Current Facility-Administered Medications Medication Dose Route Frequency  acetaminophen (TYLENOL) tablet 650 mg  650 mg Oral Q6H PRN  
 docusate sodium (COLACE) capsule 100 mg  100 mg Oral DAILY  fluconazole (DIFLUCAN) tablet 200 mg  200 mg Oral DAILY  allopurinol (ZYLOPRIM) tablet 100 mg  100 mg Oral DAILY  sodium bicarbonate (8.4%) 150 mEq in dextrose 5% 1,000 mL infusion   IntraVENous CONTINUOUS  
 famotidine (PEPCID) tablet 20 mg  20 mg Oral QPM  
 aspirin chewable tablet 81 mg  81 mg Oral DAILY  calcitRIOL (ROCALTROL) capsule 0.25 mcg  0.25 mcg Oral DAILY  ferrous sulfate tablet 325 mg  325 mg Oral ACB&D  patiromer calcium sorbitex (VELTASSA) powder 8.4 g  8.4 g Oral DAILY  sodium chloride (NS) flush 5-10 mL  5-10 mL IntraVENous Q8H  
 sodium chloride (NS) flush 5-10 mL  5-10 mL IntraVENous PRN  
 cefTRIAXone (ROCEPHIN) 1 g in 0.9% sodium chloride (MBP/ADV) 50 mL  1 g IntraVENous Q24H  
 
______________________________________________________________________ EXPECTED LENGTH OF STAY: 3d 7h 
ACTUAL LENGTH OF STAY:          4 Kait Thorpe MD

## 2018-07-20 NOTE — PROGRESS NOTES
Bedside shift change report given to [facility] JENY (oncoming nurse) by Raj Wall (offgoing nurse). Report included the following information SBAR.     4546- Call from tele received patient had 4 runs Vtach. Patient observed watching tv and c/o back pain. Denies chest pain. Will notify hospitalist. Deanna Galarza obtained 134/74, 76, 96% RA. Will continue to monitor. Also spiked temperature earlier at 101.2 ambulated with RN and reassessed tempt at 97.7     Notified Self, NP ordered mag, troponin, and EKG. Patient given ordered tylenol for back pain. - replaced magnesium for mg=1.6. Patient ambulated from room to nurses station tolerated fairly c/o toe pain due to gout.

## 2018-07-20 NOTE — DISCHARGE INSTRUCTIONS
Follow up with pcp  Monitor your blood pressure  Don't do sudden movement when changing position.   Be compliant with the medication

## 2018-07-20 NOTE — DISCHARGE SUMMARY
[address]    DISCHARGE SUMMARY    Calvin Catalan  MR#: [id number]  : 1940  ACCOUNT #: [de-identified]   ADMIT DATE: 2018  DISCHARGE DATE:     DISCHARGE DIAGNOSES:  1. Acute on chronic renal failure. 2.  Status post hyperkalemia. 3.  Urinary tract infection with multiple microorganism. 4.  History of prostate cancer. 5.  Metabolic acidosis secondary to the chronic kidney disease. 6.  History of coronary artery disease. 7.  History of dizziness  8. Status post bilateral Ureter stent replacement. CONSULTS DONE:  Nephrology and urology. PROCEDURE DONE: Bilateral Ureteral  stent replacement. HOSPITAL COURSE:  The patient is a 28-year-old patient who was admitted in the hospital because of fatigue. Upon admission, it was found that his creatinine chris to 4.5 from his baseline for which he was identified as hydronephrosis associated with obstruction. Since the patient has a prostate cancer obstruction of the stent was the main event because there is  backflow and a replacement of the stent was done on the . No complications developed after the procedure. The next 24 hours, the patient started complaining of right foot pain associated with his gout for which. Patient was discharge but his daughter refuse to take him home, she wants to have a work for dizziness which is a chronic condition in this patient, she requested Neurology consult that was done,all of the supporting test MRA, MRI where reported within normal limit. Finally I had explain the the work of chronic conditions need to be done in outpatient basis. DEPOSITION: SNF. CONDITION OF DISCHARGE:  Stable. FOLLOW UPS 1. Urology. 2. PCP.          MD ASH Foley/ANNA  D: 2018 10:06     T: 2018 13:32  JOB #: [id number]

## 2018-07-20 NOTE — PROGRESS NOTES
Nephrology Progress Note Hubert Power. Date of Admission : 7/16/2018 CC: Follow up for JASIEL on CKD Assessment and Plan JASIEL on CKD IV: 
- 2/2 MEDINA with b/l hydro - s/p b/l stent exchange - Cr stable with little improvement since stent exchange 
- cont IVF while here 
- ok for d/c from renal standpoint 
- he will need f/u with Dr. Hitesh Dickson next week 
- daily labs while here Hx of MEDINA with ongoing b/l hydro despite stent placement: 
- b/l stent obstruction - s/p stent change - per urology CKD IV: 
- baseline Cr 2.4 
- f/b Dr. Hitesh Dickson as an outpt Chronic hyperkalemia: 
- stable 
- continue veltassa Metabolic Acidosis: 
- cont bicarb drip for now Anemia of CKD: 
- hgb stable CAD s/p CABG Hx of prostate cancer Interval History: 
Seen and examined. Cr stable, UOP 2.1 liters in the past 24 hours. Denies any N/V/CP/SOB Current Medications: all current  Medications have been eviewed in [facility] Review of Systems: Pertinent items are noted in HPI. Objective: 
Vitals:   
Vitals:  
 07/20/18 0002 07/20/18 2489 07/20/18 3263 07/20/18 9481 BP: 134/74 109/57  158/86 Pulse: 76 74  100 Resp: 18 18  16 Temp: 98.7 °F (37.1 °C) 99.1 °F (37.3 °C)  98 °F (36.7 °C) SpO2: 96% 97%  98% Weight:   102 kg (224 lb 12.8 oz) Height:   5' 11\" (1.803 m) Intake and Output: 
  
07/18 1901 - 07/20 0700 In: -  
Out: [place] 15 Physical Examination: 
General: NAD,Conversant Neck:  Supple, no mass Resp:  Lungs CTA B/L, no wheezing , normal respiratory effort CV:  RRR,  no murmur or rub, no LE edema GI:  Soft, NT, + Bowel sounds, no hepatosplenomegaly Neurologic:  Non focal 
Psych:             AAO x 3 appropriate affect Skin:  No Rash :  No goldberg in place 
 
[]    High complexity decision making was performed 
[]    Patient is at high-risk of decompensation with multiple organ involvement Lab Data Personally Reviewed: I have reviewed all the pertinent labs, microbiology data and radiology studies during assessment. Recent Labs  
   07/20/18 
 0046  07/19/18 
 0230  07/18/18 
 0403 NA  139  138  138  
K  3.9  4.1  4.2 CL  103  105  107 CO2  25  20*  16* GLU  104*  109*  112* BUN  43*  48*  47* CREA  3.78*  3.68*  3.91* CA  6.9*  7.3*  7.9*  
MG  1.3*   --    --   
 
Recent Labs  
   07/20/18 
 0050 WBC  5.5 HGB  8.4* HCT  25.3*  
PLT  169 Lab Results Component Value Date/Time Specimen Description: URINE 10/07/2013 01:36 AM  
 Specimen Description: BLOOD 10/06/2013 04:13 PM  
 
Lab Results Component Value Date/Time Culture result: NO GROWTH 3 DAYS 07/17/2018 01:25 AM  
 Culture result: (A) 07/16/2018 04:00 PM  
  PROBABLE ALPHA STREPTOCOCCUS (>100,000 COLONIES/mL) Culture result: YEAST (>100,000 COLONIES/mL) (A) 07/16/2018 04:00 PM  
 Culture result: GRAM NEGATIVE RODS (2,000 COLONIES/mL) (A) 07/16/2018 04:00 PM  
 
Recent Results (from the past 24 hour(s)) MAGNESIUM Collection Time: 07/20/18 12:46 AM  
Result Value Ref Range Magnesium 1.3 (L) 1.6 - 2.4 mg/dL METABOLIC PANEL, BASIC Collection Time: 07/20/18 12:46 AM  
Result Value Ref Range Sodium 139 136 - 145 mmol/L Potassium 3.9 3.5 - 5.1 mmol/L Chloride 103 97 - 108 mmol/L  
 CO2 25 21 - 32 mmol/L Anion gap 11 5 - 15 mmol/L Glucose 104 (H) 65 - 100 mg/dL BUN 43 (H) 6 - 20 MG/DL Creatinine 3.78 (H) 0.70 - 1.30 MG/DL  
 BUN/Creatinine ratio 11 (L) 12 - 20 GFR est AA 19 (L) >60 ml/min/1.73m2 GFR est non-AA 16 (L) >60 ml/min/1.73m2 Calcium 6.9 (L) 8.5 - 10.1 MG/DL  
TROPONIN I Collection Time: 07/20/18 12:46 AM  
Result Value Ref Range Troponin-I, Qt. <0.05 <0.05 ng/mL CBC W/O DIFF Collection Time: 07/20/18 12:50 AM  
Result Value Ref Range WBC 5.5 4.1 - 11.1 K/uL  
 RBC 2.60 (L) 4.10 - 5.70 M/uL HGB 8.4 (L) 12.1 - 17.0 g/dL HCT 25.3 (L) 36.6 - 50.3 %  MCV 97.3 80.0 - 99.0 FL  
 MCH 32.3 26.0 - 34.0 PG  
 MCHC 33.2 30.0 - 36.5 g/dL  
 RDW 14.7 (H) 11.5 - 14.5 % PLATELET 572 771 - 634 K/uL MPV 10.1 8.9 - 12.9 FL  
 NRBC 0.0 0  WBC ABSOLUTE NRBC 0.00 0.00 - 0.01 K/uL CK Collection Time: 07/20/18 12:50 AM  
Result Value Ref Range CK 28 (L) 39 - 308 U/L  
ECG RHYTHM ANALYSIS ADULT Collection Time: 07/20/18  3:01 AM  
Result Value Ref Range Ventricular Rate 70 BPM  
 Atrial Rate 70 BPM  
 P-R Interval 204 ms QRS Duration 84 ms Q-T Interval 456 ms  
 QTC Calculation (Bezet) 492 ms Calculated P Axis 3 degrees Calculated R Axis 8 degrees Calculated T Axis 22 degrees Diagnosis Normal sinus rhythm Possible Inferior infarct (cited on or before 16-JUL-2018) When compared with ECG of 16-JUL-2018 14:52, 
QT has lengthened Confirmed by Dai Tapia MD, [facility] ([id number]) on 7/20/2018 8:54:14 AM 
  
 
 
 
 
 
Robert Willis MD 
[facility] Nephrology THE [facility] OF [address], [address] [facility] Phone - [phone] Fax - [phone] 
[email or web address]

## 2018-07-21 VITALS
DIASTOLIC BLOOD PRESSURE: 85 MMHG | RESPIRATION RATE: 16 BRPM | HEART RATE: 68 BPM | OXYGEN SATURATION: 97 % | TEMPERATURE: 98 F | SYSTOLIC BLOOD PRESSURE: 161 MMHG | WEIGHT: 225 LBS | HEIGHT: 71 IN | BODY MASS INDEX: 31.5 KG/M2

## 2018-07-21 PROBLEM — H81.393 PERIPHERAL VERTIGO OF BOTH EARS: Status: ACTIVE | Noted: 2018-07-21

## 2018-07-21 LAB
ALBUMIN SERPL-MCNC: 2.6 G/DL (ref 3.5–5)
ALBUMIN/GLOB SERPL: 0.8 {RATIO} (ref 1.1–2.2)
ALP SERPL-CCNC: 61 U/L (ref 45–117)
ALT SERPL-CCNC: 9 U/L (ref 12–78)
ANION GAP SERPL CALC-SCNC: 9 MMOL/L (ref 5–15)
AST SERPL-CCNC: 10 U/L (ref 15–37)
BILIRUB SERPL-MCNC: 0.4 MG/DL (ref 0.2–1)
BUN SERPL-MCNC: 37 MG/DL (ref 6–20)
BUN/CREAT SERPL: 10 (ref 12–20)
CALCIUM SERPL-MCNC: 7.1 MG/DL (ref 8.5–10.1)
CHLORIDE SERPL-SCNC: 99 MMOL/L (ref 97–108)
CK SERPL-CCNC: 33 U/L (ref 39–308)
CO2 SERPL-SCNC: 31 MMOL/L (ref 21–32)
CREAT SERPL-MCNC: 3.54 MG/DL (ref 0.7–1.3)
ERYTHROCYTE [DISTWIDTH] IN BLOOD BY AUTOMATED COUNT: 14.7 % (ref 11.5–14.5)
GLOBULIN SER CALC-MCNC: 3.3 G/DL (ref 2–4)
GLUCOSE SERPL-MCNC: 94 MG/DL (ref 65–100)
HCT VFR BLD AUTO: 26.3 % (ref 36.6–50.3)
HGB BLD-MCNC: 8.8 G/DL (ref 12.1–17)
MCH RBC QN AUTO: 32.4 PG (ref 26–34)
MCHC RBC AUTO-ENTMCNC: 33.5 G/DL (ref 30–36.5)
MCV RBC AUTO: 96.7 FL (ref 80–99)
NRBC # BLD: 0 K/UL (ref 0–0.01)
NRBC BLD-RTO: 0 PER 100 WBC
PLATELET # BLD AUTO: 203 K/UL (ref 150–400)
PMV BLD AUTO: 10.2 FL (ref 8.9–12.9)
POTASSIUM SERPL-SCNC: 3.7 MMOL/L (ref 3.5–5.1)
PROT SERPL-MCNC: 5.9 G/DL (ref 6.4–8.2)
RBC # BLD AUTO: 2.72 M/UL (ref 4.1–5.7)
SODIUM SERPL-SCNC: 139 MMOL/L (ref 136–145)
WBC # BLD AUTO: 5.5 K/UL (ref 4.1–11.1)

## 2018-07-21 PROCEDURE — 74011250636 HC RX REV CODE- 250/636: Performed by: INTERNAL MEDICINE

## 2018-07-21 PROCEDURE — 36415 COLL VENOUS BLD VENIPUNCTURE: CPT | Performed by: INTERNAL MEDICINE

## 2018-07-21 PROCEDURE — 74011000258 HC RX REV CODE- 258: Performed by: INTERNAL MEDICINE

## 2018-07-21 PROCEDURE — 74011250637 HC RX REV CODE- 250/637: Performed by: NURSE PRACTITIONER

## 2018-07-21 PROCEDURE — 74011250637 HC RX REV CODE- 250/637: Performed by: UROLOGY

## 2018-07-21 PROCEDURE — 74011000250 HC RX REV CODE- 250: Performed by: INTERNAL MEDICINE

## 2018-07-21 PROCEDURE — 85027 COMPLETE CBC AUTOMATED: CPT | Performed by: INTERNAL MEDICINE

## 2018-07-21 PROCEDURE — 80053 COMPREHEN METABOLIC PANEL: CPT | Performed by: INTERNAL MEDICINE

## 2018-07-21 PROCEDURE — 93880 EXTRACRANIAL BILAT STUDY: CPT

## 2018-07-21 PROCEDURE — 82550 ASSAY OF CK (CPK): CPT | Performed by: INTERNAL MEDICINE

## 2018-07-21 PROCEDURE — 74011250637 HC RX REV CODE- 250/637: Performed by: INTERNAL MEDICINE

## 2018-07-21 PROCEDURE — 74011250637 HC RX REV CODE- 250/637: Performed by: FAMILY MEDICINE

## 2018-07-21 RX ORDER — ALLOPURINOL 100 MG/1
100 TABLET ORAL 2 TIMES DAILY
Qty: 60 TAB | Refills: 2 | Status: SHIPPED | OUTPATIENT
Start: 2018-07-21 | End: 2019-07-17 | Stop reason: SDUPTHER

## 2018-07-21 RX ORDER — FACIAL-BODY WIPES
10 EACH TOPICAL DAILY PRN
Status: DISCONTINUED | OUTPATIENT
Start: 2018-07-21 | End: 2018-07-22 | Stop reason: HOSPADM

## 2018-07-21 RX ADMIN — FLUCONAZOLE 200 MG: 100 TABLET ORAL at 08:30

## 2018-07-21 RX ADMIN — FERROUS SULFATE TAB 325 MG (65 MG ELEMENTAL FE) 325 MG: 325 (65 FE) TAB at 08:11

## 2018-07-21 RX ADMIN — DEXTROSE MONOHYDRATE: 5 INJECTION, SOLUTION INTRAVENOUS at 05:15

## 2018-07-21 RX ADMIN — LACTULOSE 20 G: 20 SOLUTION ORAL at 12:24

## 2018-07-21 RX ADMIN — CALCITRIOL 0.25 MCG: 0.25 CAPSULE, LIQUID FILLED ORAL at 08:30

## 2018-07-21 RX ADMIN — BISACODYL 10 MG: 10 SUPPOSITORY RECTAL at 12:24

## 2018-07-21 RX ADMIN — IRON SUCROSE 200 MG: 20 INJECTION, SOLUTION INTRAVENOUS at 08:18

## 2018-07-21 RX ADMIN — DOCUSATE SODIUM 100 MG: 100 CAPSULE, LIQUID FILLED ORAL at 08:30

## 2018-07-21 RX ADMIN — PATIROMER 8.4 G: 8.4 POWDER, FOR SUSPENSION ORAL at 14:28

## 2018-07-21 RX ADMIN — ALLOPURINOL 100 MG: 100 TABLET ORAL at 08:30

## 2018-07-21 RX ADMIN — Medication 10 ML: at 05:14

## 2018-07-21 RX ADMIN — ACETAMINOPHEN 650 MG: 325 TABLET, FILM COATED ORAL at 10:04

## 2018-07-21 RX ADMIN — ASPIRIN 81 MG 81 MG: 81 TABLET ORAL at 08:30

## 2018-07-21 NOTE — CONSULTS
NEUROLOGY  7/21/2018     Consulted by: Violetta De La Torre MD        Patient ID:  Elvia Saini  [id number]  78 y.o.  1940    Chief Complaint   Patient presents with    Fatigue   Cc: Dizziness    HPI    Mr. Irby Bloch is a 68-year-old gentleman who was admitted on July 16 for increasing fatigue, lightheadedness, dizziness. He has a history of CAD, CABG, CKD. Yesterday he was getting ready for discharge and he had worsening dizziness. Discharge was canceled until further evaluation was done. This morning he tells me he still feels a little bit of imbalance but it is not as severe as of dizziness he has had past.  He tells me he has had vertigo before which required mechanical manipulation by ENT which helped his symptoms. He denies any double vision or slurred speech. If he moves too quickly or turns too quickly he feels symptoms dramatically aggravated. If he lies still he has some respite. MRI was done out of concern for stroke given his stroke risk factors. I reviewed the imaging myself. There is no acute process. MRA is also negative for any aneurysm or occlusive disease. Review of Systems   Constitutional: Positive for malaise/fatigue. Neurological: Positive for dizziness. All other systems reviewed and are negative. Past Medical History:   Diagnosis Date    Celiac disease     Chronic kidney disease     Hypertension     MI (mitral incompetence)     Prostate cancer ([facility] [address]      History reviewed. No pertinent family history. Social History     Social History    Marital status:      Spouse name: N/A    Number of children: N/A    Years of education: N/A     Occupational History    Not on file.      Social History Main Topics    Smoking status: Former Smoker    Smokeless tobacco: Never Used    Alcohol use No    Drug use: Not on file    Sexual activity: Not on file     Other Topics Concern    Not on file     Social History Narrative     Current Facility-Administered Medications   Medication Dose Route Frequency    acetaminophen (TYLENOL) tablet 650 mg  650 mg Oral Q6H PRN    allopurinol (ZYLOPRIM) tablet 100 mg  100 mg Oral BID    docusate sodium (COLACE) capsule 100 mg  100 mg Oral DAILY    fluconazole (DIFLUCAN) tablet 200 mg  200 mg Oral DAILY    sodium bicarbonate (8.4%) 150 mEq in dextrose 5% 1,000 mL infusion   IntraVENous CONTINUOUS    famotidine (PEPCID) tablet 20 mg  20 mg Oral QPM    aspirin chewable tablet 81 mg  81 mg Oral DAILY    calcitRIOL (ROCALTROL) capsule 0.25 mcg  0.25 mcg Oral DAILY    ferrous sulfate tablet 325 mg  325 mg Oral ACB&D    patiromer calcium sorbitex (VELTASSA) powder 8.4 g  8.4 g Oral DAILY    sodium chloride (NS) flush 5-10 mL  5-10 mL IntraVENous Q8H    sodium chloride (NS) flush 5-10 mL  5-10 mL IntraVENous PRN     Allergies   Allergen Reactions    Morphine Other (comments)     AMS       Visit Vitals    BP (!) 158/96 (BP 1 Location: Left arm, BP Patient Position: At rest)    Pulse 74    Temp 98 °F (36.7 °C)    Resp 16    Ht 5' 11\" (1.803 m)    Wt 102.1 kg (225 lb)    SpO2 100%    BMI 31.38 kg/m2     Physical Exam   Constitutional: He appears well-developed and well-nourished. Eyes: EOM are normal. Pupils are equal, round, and reactive to light. Cardiovascular: Normal rate. Pulmonary/Chest: Effort normal.   Neurological: He has normal strength. He has a normal Finger-Nose-Finger Test.   Skin: Skin is warm and dry. Psychiatric: His behavior is normal.   Vitals reviewed. Neurologic Exam     Mental Status   Level of consciousness: alert    Cranial Nerves     CN III, IV, VI   Pupils are equal, round, and reactive to light. Extraocular motions are normal.   Nystagmus: bilateral   Nystagmus type: horizontal and rapid  Ophthalmoparesis: none    CN V   Facial sensation intact. CN VII   Facial expression full, symmetric.      CN VIII   Hearing: intact    CN XII   Tongue deviation: none    Motor Exam   Muscle bulk: normal    Strength   Strength 5/5 throughout. Sensory Exam   Light touch normal.     Gait, Coordination, and Reflexes     Gait  Gait: (Slightly wide slow with some unsteadiness.)    Coordination   Finger to nose coordination: normal    Tremor   Resting tremor: absent  Intention tremor: present           Lab Results  Component Value Date/Time   WBC 5.5 07/21/2018 05:42 AM   HGB 8.8 (L) 07/21/2018 05:42 AM   HCT 26.3 (L) 07/21/2018 05:42 AM   Hematocrit (POC) 28 (L) 06/20/2018 05:03 PM   PLATELET 177 49/49/5268 05:42 AM   MCV 96.7 07/21/2018 05:42 AM     Lab Results  Component Value Date/Time   Glucose 94 07/21/2018 05:42 AM   Glucose (POC) 137 (H) 07/18/2018 05:06 PM   LDL, calculated 39.6 10/07/2013 12:00 AM   Creatinine (POC) 2.3 (H) 06/20/2018 05:03 PM   Creatinine 3.54 (H) 07/21/2018 05:42 AM      Lab Results  Component Value Date/Time   Cholesterol, total 85 10/07/2013 12:00 AM   HDL Cholesterol 5 10/07/2013 12:00 AM   LDL, calculated 39.6 10/07/2013 12:00 AM   Triglyceride 202 (H) 10/07/2013 12:00 AM   CHOL/HDL Ratio 17.0 (H) 10/07/2013 12:00 AM     Lab Results  Component Value Date/Time   ALT (SGPT) 9 (L) 07/21/2018 05:42 AM   AST (SGOT) 10 (L) 07/21/2018 05:42 AM   Alk. phosphatase 61 07/21/2018 05:42 AM   Bilirubin, direct 0.1 07/16/2018 02:29 PM   Bilirubin, total 0.4 07/21/2018 05:42 AM   Albumin 2.6 (L) 07/21/2018 05:42 AM   Protein, total 5.9 (L) 07/21/2018 05:42 AM   PLATELET 092 82/73/4590 05:42 AM          CT Results (maximum last 3): Results from [place] encounter on 07/16/18   CT ABD PELV WO CONT   Narrative CT ABDOMEN AND PELVIS WITHOUT CONTRAST. 7/16/2018 4:55 PM     INDICATION: Acute kidney injury. History of chronic kidney disease, mitral  incompetence, hypertension, celiac disease, prostate carcinoma, coronary artery  disease. COMPARISON: 6/20/2018, 6/6/2018.     TECHNIQUE: CT of the abdomen and pelvis was performed without contrast.  Evaluation of solid organs is less sensitive without IV contrast. CT dose  reduction was achieved through use of a standardized protocol tailored for this  examination and automatic exposure control for dose modulation. Adaptive  statistical iterative reconstruction (ASIR) was utilized. FINDINGS:  Abdomen: The lung bases are clear. The heart size is normal. Post CABG,  cholecystectomy, and bilateral ureteral stent placement. Despite bilateral  ureteral stent placement, there is severe bilateral hydronephrosis and right  greater than left renal cortical atrophy. Incidental note is made of bilateral  renal cysts, hyperdense left renal cyst, and a small periampullary duodenal  diverticulum. A tiny right adrenal nodule is too small to characterize, but  likely represents a cyst.    Pelvis: There is mild thickening of the bladder wall at its left superior aspect  (601-71). Sigmoid diverticulosis is mild. The unenhanced small bowel, ileocecal  junction, appendix, and colon are otherwise normal. No free air or fluid, and no  abdominopelvic lymphadenopathy. Bones: There are diffuse sclerotic osseous metastases. Post right femoral  intramedullary nail and gamma screw placement. Impression IMPRESSION:   1. Severe bilateral hydronephrosis, despite appropriately positioned bilateral  ureteral stents. 2. Bilateral renal cortical atrophy, consistent with chronic obstruction. 3. Mild thickening of the left superior aspect of the bladder wall. 4. Diffuse sclerotic osseous metastases. CT HEAD WO CONT   Narrative EXAM:  CT HEAD WITHOUT CONTRAST  INDICATION: Confusion, weakness, mass. COMPARISON: 10/6/2013. CONTRAST: None. TECHNIQUE: Unenhanced CT of the head was performed using 5 mm images. Brain and  bone windows were generated. Sagittal and coronal reformations were generated.   CT dose reduction was achieved through use of a standardized protocol tailored  for this examination and automatic exposure control for dose modulation. Adaptive statistical iterative reconstruction (ASIR) was utilized for this  examination. FINDINGS:  The ventricles and sulci are normal in size, shape and configuration and  midline. There is vertebral and carotid artery calcification. There is no  significant white matter disease. There is no intracranial hemorrhage. There  is no extra-axial collection, mass, mass effect or midline shift. The basilar  cisterns are open. No acute infarct is identified. The bone windows demonstrate  no abnormalities. The visualized portions of the paranasal sinuses and mastoid  air cells are clear. Impression IMPRESSION: No acute intracranial abnormality. MRI Results (maximum last 3): Results from [place] encounter on 07/16/18   MRI BRAIN WO CONT   Narrative INDICATION: Dizziness. COMPARISON: CT head on 7/16/2018. TECHNIQUE: Multisequence, multiplanar MRI of the brain without contrast.  Noncontrast time of flight MR angiography of the head. Multiplanar maximum  intensity projection reformats. (2 separate studies reported together)    FINDINGS: MRI brain: No hydrocephalus. No mass effect or midline shift. No  extra-axial fluid collection. Minimal chronic microvascular ischemic disease is  primarily in the bilateral frontal periventricular white matter. No restricted  diffusion to indicate acute infarct. The midline structures, including the  cervicomedullary junction, are within normal limits. MRA head: The right vertebral artery is dominant. . The basilar artery and its  branches are normal. The internal carotid, anterior cerebral, and middle  cerebral arteries are patent. There is no flow-limiting intracranial stenosis. There is no aneurysm. Right posterior communicating artery is present. Impression IMPRESSION:  1. Minimal chronic microvascular ischemic disease. No acute infarct. 2. Normal MR angiography of the head.         MRA BRAIN WO CONT   Narrative INDICATION: Dizziness. COMPARISON: CT head on 7/16/2018. TECHNIQUE: Multisequence, multiplanar MRI of the brain without contrast.  Noncontrast time of flight MR angiography of the head. Multiplanar maximum  intensity projection reformats. (2 separate studies reported together)    FINDINGS: MRI brain: No hydrocephalus. No mass effect or midline shift. No  extra-axial fluid collection. Minimal chronic microvascular ischemic disease is  primarily in the bilateral frontal periventricular white matter. No restricted  diffusion to indicate acute infarct. The midline structures, including the  cervicomedullary junction, are within normal limits. MRA head: The right vertebral artery is dominant. . The basilar artery and its  branches are normal. The internal carotid, anterior cerebral, and middle  cerebral arteries are patent. There is no flow-limiting intracranial stenosis. There is no aneurysm. Right posterior communicating artery is present. Impression IMPRESSION:  1. Minimal chronic microvascular ischemic disease. No acute infarct. 2. Normal MR angiography of the head. VAS/US/Carotid Doppler Results (maximum last 3): No results found for this or any previous visit. PET Results (maximum last 3): No results found for this or any previous visit. Assessment and Plan        56-year-old gentleman with multiple risk factors for stroke who has had persistent dizziness I suspect more vertigo. He does have some peripheral findings on examination. He has had this condition before remedied with physical maneuvers by ENT. I reviewed the MRI and there is no acute stroke. I discussed with him that peripheral vertigo can be very challenging to treat. He should follow-up with ENT for further management as an outpatient. Reassured him that there is no ischemic event to account for this. I also spoke with his daughter Joseph Mead by phone and reassured her of the same.   No further workup from neurology at this time. Signing off. More than 45 minutes of time was spent reviewing his chart, evaluating patient, speaking with his daughter on the phone at length. During this evaluation, we also discussed stroke education to include signs and symptoms of stroke and TIA.        [address] [facility],   NEUROLOGIST  Diplomate ALEJANDRA  7/21/2018

## 2018-07-21 NOTE — PROGRESS NOTES
Cm received a page from the staff nurse that the patient is ready for discharge today. Cm contacted the patients daughter, Bing Joel and informed her that Medicare covers skilled care for up to 20 days at 100% and also informed her that the ambulance was scheduled for 4pm. The staff nurse will have to call report at [phone]. Nicholas sent a referral to [facility] via [email or web address] for a 4pm  time.

## 2018-07-21 NOTE — PROGRESS NOTES
Nephrology Progress Note Nasima Whitfield. Date of Admission : 7/16/2018 CC: Follow up for JASIEL on CKD Assessment and Plan JASIEL on CKD IV: 
- 2/2 MEDINA with b/l hydro - s/p b/l stent exchange - Cr slowly I mproving 
- ok for d/c from renal standpoint 
- he will need f/u with Dr. Essence Dee next week 
- daily labs while here Hx of MEDINA with ongoing b/l hydro despite stent placement: 
- b/l stent obstruction - s/p stent change - per urology Vertigo: 
- neg MRI 
- f/u ENT as an outpt CKD IV: 
- baseline Cr 2.4 
- f/b Dr. Essence Dee as an outpt Chronic hyperkalemia: 
- stable 
- continue veltassa Metabolic Acidosis: 
- cont bicarb drip for now Anemia of CKD: 
- hgb stable CAD s/p CABG Hx of prostate cancer Interval History: 
Seen and examined. Cr improving. Was set for d/c yesterday then developed dizziness. MRI neg for stroke. Feels ok today. No cp or sob, palps, n/v/d reported. Current Medications: all current  Medications have been eviewed in [facility] Review of Systems: Pertinent items are noted in HPI. Objective: 
Vitals:   
Vitals:  
 07/21/18 0216 07/21/18 5099 07/21/18 1762 07/21/18 2938 BP: (!) 152/91  149/67 (!) 158/96 Pulse: 66  80 74 Resp: 16  16 16 Temp: 97.9 °F (36.6 °C)  98 °F (36.7 °C) 98 °F (36.7 °C) SpO2: 98%  94% 100% Weight:  102.1 kg (225 lb) Height:      
 
Intake and Output: 
07/21 0701 - 07/21 1900 In: 500 [P.O.:500] Out: 600 [Urine:600] 07/19 1901 - 07/21 0700 In: -  
Out: 2700 [Urine:2700] Physical Examination: 
General: NAD,Conversant Neck:  Supple, no mass Resp:  Lungs CTA B/L, no wheezing , normal respiratory effort CV:  RRR,  no murmur or rub, no LE edema GI:  Soft, NT, + Bowel sounds, no hepatosplenomegaly Neurologic:  Non focal 
Psych:             AAO x 3 appropriate affect Skin:  No Rash :  No goldberg in place 
 
[]    High complexity decision making was performed 
[]    Patient is at high-risk of decompensation with multiple organ involvement Lab Data Personally Reviewed: I have reviewed all the pertinent labs, microbiology data and radiology studies during assessment. Recent Labs  
   07/21/18 
 0542  07/20/18 
 0046  07/19/18 
 0230 NA  139  139  138  
K  3.7  3.9  4.1 CL  99  103  105 CO2  31  25  20* GLU  94  104*  109* BUN  37*  43*  48* CREA  3.54*  3.78*  3.68* CA  7.1*  6.9*  7.3*  
MG   --   1.3*   --   
ALB  2.6*   --    --   
SGOT  10*   --    --   
ALT  9*   --    --   
 
Recent Labs  
   07/21/18 
 0542  07/20/18 
 0050 WBC  5.5  5.5 HGB  8.8*  8.4* HCT  26.3*  25.3*  
PLT  203  169 Lab Results Component Value Date/Time Specimen Description: URINE 10/07/2013 01:36 AM  
 Specimen Description: BLOOD 10/06/2013 04:13 PM  
 
Lab Results Component Value Date/Time Culture result: NO GROWTH 4 DAYS 07/17/2018 01:25 AM  
 Culture result: (A) 07/16/2018 04:00 PM  
  PROBABLE ALPHA STREPTOCOCCUS (>100,000 COLONIES/mL) Culture result: YEAST (>100,000 COLONIES/mL) (A) 07/16/2018 04:00 PM  
 Culture result: GRAM NEGATIVE RODS (2,000 COLONIES/mL) (A) 07/16/2018 04:00 PM  
 
Recent Results (from the past 24 hour(s)) CK Collection Time: 07/21/18  5:42 AM  
Result Value Ref Range CK 33 (L) 39 - 308 U/L  
CBC W/O DIFF Collection Time: 07/21/18  5:42 AM  
Result Value Ref Range WBC 5.5 4.1 - 11.1 K/uL  
 RBC 2.72 (L) 4.10 - 5.70 M/uL HGB 8.8 (L) 12.1 - 17.0 g/dL HCT 26.3 (L) 36.6 - 50.3 % MCV 96.7 80.0 - 99.0 FL  
 MCH 32.4 26.0 - 34.0 PG  
 MCHC 33.5 30.0 - 36.5 g/dL  
 RDW 14.7 (H) 11.5 - 14.5 % PLATELET 216 806 - 236 K/uL MPV 10.2 8.9 - 12.9 FL  
 NRBC 0.0 0  WBC ABSOLUTE NRBC 0.00 0.00 - 0.01 K/uL METABOLIC PANEL, COMPREHENSIVE Collection Time: 07/21/18  5:42 AM  
Result Value Ref Range Sodium 139 136 - 145 mmol/L Potassium 3.7 3.5 - 5.1 mmol/L Chloride 99 97 - 108 mmol/L  
 CO2 31 21 - 32 mmol/L  Anion gap 9 5 - 15 mmol/L Glucose 94 65 - 100 mg/dL BUN 37 (H) 6 - 20 MG/DL Creatinine 3.54 (H) 0.70 - 1.30 MG/DL  
 BUN/Creatinine ratio 10 (L) 12 - 20 GFR est AA 21 (L) >60 ml/min/1.73m2 GFR est non-AA 17 (L) >60 ml/min/1.73m2 Calcium 7.1 (L) 8.5 - 10.1 MG/DL Bilirubin, total 0.4 0.2 - 1.0 MG/DL  
 ALT (SGPT) 9 (L) 12 - 78 U/L  
 AST (SGOT) 10 (L) 15 - 37 U/L Alk. phosphatase 61 45 - 117 U/L Protein, total 5.9 (L) 6.4 - 8.2 g/dL Albumin 2.6 (L) 3.5 - 5.0 g/dL Globulin 3.3 2.0 - 4.0 g/dL A-G Ratio 0.8 (L) 1.1 - 2.2 Jonathan Olmos MD 
[address] [place] [address], [facility] Phone - [phone] Fax - [phone] 
[email or web address]

## 2018-07-21 NOTE — PROCEDURES
[facility]  *** FINAL REPORT ***    Name: Nara Hampton  MRN: [id number]    Inpatient  : 29 Oct 1940  HIS Order #: [id number]  [id number] [facility] Visit #: [id number]  Date: 2018    TYPE OF TEST: Cerebrovascular Duplex    REASON FOR TEST  Dizziness/vertigo    Right Carotid:-             Proximal               Mid                 Distal  cm/s  Systolic  Diastolic  Systolic  Diastolic  Systolic  Diastolic  CCA:     24.1      14.0                            50.0      11.0  Bulb:  ECA:     67.0  ICA:     60.0      15.0       64.0      17.0       57.0      17.0  ICA/CCA:  1.2       1.4    ICA Stenosis:    Right Vertebral:-  Finding: Antegrade  Sys:       35.0  Hyacinth:    Right Subclavian:    Left Carotid:-            Proximal                Mid                 Distal  cm/s  Systolic  Diastolic  Systolic  Diastolic  Systolic  Diastolic  CCA:     77.1      15.0                            74.0      15.0  Bulb:  ECA:     65.0  ICA:     70.0      14.0       70.0      25.0       73.0      19.0  ICA/CCA:  0.9       0.9    ICA Stenosis:    Left Vertebral:-  Finding: Antegrade  Sys:       27.0  Hyacinth:    Left Subclavian:    INTERPRETATION/FINDINGS  PROCEDURE:  Color duplex ultrasound imaging of extracranial  cerebrovascular arteries. FINDINGS:       Right:  Internal carotid velocity is within normal limits, there  is no observed narrowing of the flow channel on color Doppler imaging,   and no plaque is visualized on B-mode imaging. The common and  external carotid arteries are patent and without evidence of  hemodynamically significant stenosis. Left:  Internal carotid velocity is within normal limits. There  is narrowing of the internal carotid flow channel on color Doppler  imaging and mixed density plaque on B-mode imaging, consistent with  less than 50 percent stenosis (lower portion of the 0 to 49 percent  range).   The common and external carotid arteries are patent and  without evidence of hemodynamically significant stenosis. IMPRESSION:  Consistent with no stenosis of the right internal carotid   and less than 50% stenosis (low end) of the left internal carotid. Vertebrals are patent with antegrade flow. ADDITIONAL COMMENTS    I have personally reviewed the data relevant to the interpretation of  this  study.     TECHNOLOGIST: Meek Wells RVT  Signed: 07/21/2018 11:27 AM    PHYSICIAN: Micaela Peabody, MD  Signed: 07/22/2018 03:26 PM

## 2018-07-21 NOTE — ROUTINE PROCESS
TRANSFER - OUT REPORT:    Verbal report given to Kerri(name) on [facility].  being transferred to [facility](unit) for routine progression of care       Report consisted of patients Situation, Background, Assessment and   Recommendations(SBAR). Information from the following report(s) SBAR, Kardex, MAR, Recent Results and Med Rec Status was reviewed with the receiving nurse. Lines:       Opportunity for questions and clarification was provided.       Patient transported with:

## 2018-07-21 NOTE — PROGRESS NOTES
Hospitalist Progress Note Anali Hernandez MD 
Office: [phone] Cell:  
  
Date of Service:  2018 NAME:  Danielle Gould. :  1940 MRN:  [id number] Admission Summary:  
56-year-old gentleman with past medical history significant for chronic kidney disease stage IV, history of prostate cancer, history of bilateral chronic hydronephrosis, hyperkalemia, metabolic acidosis, coronary artery disease status post CABG x4, macrocytic anemia, history of gout, hypocalcemia, hyperkalemia, who presents to the hospital with the above-mentioned symptoms.  Patient reports that for about a week or so, he has been feeling quite fatigued.  Patient reports that he has also been lightheaded, dizzy, has not been eating and drinking well, has had poor appetite and also had some pronounced fatigue when he walked or exerted himself. Interval history / Subjective:  
  Stable. Patient was discharge yesterday his daughter wants to have the patient workup for his Dizziness a Chronic condition, she does want work up as inpatient, neurology consult and supportive study were order Assessment & Plan:  
 
1. S/p Bilateral Stent placement 
   Remain stable. 2.CKD. / 
    Continue improving with a creat 3.5 3. CAD.     Stable. 4. Anemia of chronic Ds. 
     Multifactorial 
     Add iron 1 time 5. Gout. 
     On colchicine and Allopurinol. 6. Dizzy. Chronic in nature. Multifactorial neurology less likely specially due a negative report of MRI/MRA. If further study are needed he will benefit from outpatient work up with an ENT. Code status: full DVT prophylaxis: scd Care Plan discussed with: Patient/Family and Nurse Disposition: SNF/LTC and TBD Hospital Problems  Date Reviewed: 2018 Codes Class Noted POA * (Principal)ARF (acute renal failure) (HCC) ICD-10-CM: N17.9 ICD-9-CM: 584.9  2018 Unknown Review of Systems:  
Pertinent items are noted in HPI. Vital Signs:  
 Last 24hrs VS reviewed since prior progress note. Most recent are: 
Visit Vitals  BP (!) (P) 158/96 (BP 1 Location: Left arm, BP Patient Position: At rest)  Pulse 74  Temp 98 °F (36.7 °C)  Resp 16  
 Ht 5' 11\" (1.803 m)  Wt 102.1 kg (225 lb)  SpO2 100%  BMI 31.38 kg/m2 Intake/Output Summary (Last 24 hours) at 07/21/18 0450 Last data filed at 07/21/18 6892 Gross per 24 hour Intake              500 ml Output             1400 ml Net             -900 ml Physical Examination:  
 
 
     
Constitutional:  No acute distress, cooperative, pleasant   
ENT:  Oral mucous moist, oropharynx benign. Neck supple, Resp:  CTA bilaterally. No wheezing/rhonchi/rales. No accessory muscle use CV:  Regular rhythm, normal rate, no murmurs, gallops, rubs GI:  Soft, non distended, non tender. normoactive bowel sounds, no hepatosplenomegaly Musculoskeletal:  No edema, warm, 2+ pulses throughout Neurologic:  Moves all extremities. AAOx3, CN II-XII reviewed Data Review:  
 Review and/or order of clinical lab test 
 
 
Labs:  
 
Recent Labs  
   07/21/18 
 0542  07/20/18 
 0050 WBC  5.5  5.5 HGB  8.8*  8.4* HCT  26.3*  25.3*  
PLT  203  169 Recent Labs  
   07/21/18 
 0542  07/20/18 
 0046  07/19/18 
 0230 NA  139  139  138  
K  3.7  3.9  4.1 CL  99  103  105 CO2  31  25  20* BUN  37*  43*  48* CREA  3.54*  3.78*  3.68* GLU  94  104*  109* CA  7.1*  6.9*  7.3*  
MG   --   1.3*   --   
 
Recent Labs  
   07/21/18 
 0542 SGOT  10* ALT  9* AP  61 TBILI  0.4 TP  5.9* ALB  2.6*  
GLOB  3.3 No results for input(s): INR, PTP, APTT in the last 72 hours. No lab exists for component: INREXT No results for input(s): FE, TIBC, PSAT, FERR in the last 72 hours. No results found for: FOL, RBCF No results for input(s): PH, PCO2, PO2 in the last 72 hours. Recent Labs  
   07/21/18 
 0542  07/20/18 
 0050  07/20/18 
 0046 CPK  33*  28*   --   
TROIQ   --    --   <0.05 Lab Results Component Value Date/Time Cholesterol, total 85 10/07/2013 12:00 AM  
 HDL Cholesterol 5 10/07/2013 12:00 AM  
 LDL, calculated 39.6 10/07/2013 12:00 AM  
 Triglyceride 202 (H) 10/07/2013 12:00 AM  
 CHOL/HDL Ratio 17.0 (H) 10/07/2013 12:00 AM  
 
Lab Results Component Value Date/Time Glucose (POC) 137 (H) 07/18/2018 05:06 PM  
 Glucose (POC) 100 07/17/2018 06:07 AM  
 Glucose (POC) 59 (L) 06/20/2018 05:03 PM  
 Glucose (POC) 101 (H) 06/20/2018 03:14 PM  
 Glucose (POC) 83 06/10/2018 06:26 AM  
 Glucose (POC) 108 (H) 06/09/2018 10:08 PM  
 Glucose (POC) 138 (H) 06/09/2018 06:30 PM  
 
Lab Results Component Value Date/Time Color YELLOW/STRAW 07/16/2018 04:00 PM  
 Appearance TURBID (A) 07/16/2018 04:00 PM  
 Specific gravity 1.012 07/16/2018 04:00 PM  
 pH (UA) 6.0 07/16/2018 04:00 PM  
 Protein 100 (A) 07/16/2018 04:00 PM  
 Glucose NEGATIVE  07/16/2018 04:00 PM  
 Ketone NEGATIVE  07/16/2018 04:00 PM  
 Bilirubin NEGATIVE  07/16/2018 04:00 PM  
 Urobilinogen 0.2 07/16/2018 04:00 PM  
 Nitrites NEGATIVE  07/16/2018 04:00 PM  
 Leukocyte Esterase LARGE (A) 07/16/2018 04:00 PM  
 Epithelial cells FEW 07/16/2018 04:00 PM  
 Bacteria 1+ (A) 07/16/2018 04:00 PM  
 WBC >100 (H) 07/16/2018 04:00 PM  
 RBC 20-50 07/16/2018 04:00 PM  
Mra Brain Wo Cont Result Date: 7/20/2018 IMPRESSION: 1. Minimal chronic microvascular ischemic disease. No acute infarct. 2. Normal MR angiography of the head. Mri Brain Wo Cont Result Date: 7/20/2018 IMPRESSION: 1. Minimal chronic microvascular ischemic disease. No acute infarct. 2. Normal MR angiography of the head. Medications Reviewed:  
 
Current Facility-Administered Medications Medication Dose Route Frequency  acetaminophen (TYLENOL) tablet 650 mg  650 mg Oral Q6H PRN  
 allopurinol (ZYLOPRIM) tablet 100 mg  100 mg Oral BID  docusate sodium (COLACE) capsule 100 mg  100 mg Oral DAILY  fluconazole (DIFLUCAN) tablet 200 mg  200 mg Oral DAILY  sodium bicarbonate (8.4%) 150 mEq in dextrose 5% 1,000 mL infusion   IntraVENous CONTINUOUS  
 famotidine (PEPCID) tablet 20 mg  20 mg Oral QPM  
 aspirin chewable tablet 81 mg  81 mg Oral DAILY  calcitRIOL (ROCALTROL) capsule 0.25 mcg  0.25 mcg Oral DAILY  ferrous sulfate tablet 325 mg  325 mg Oral ACB&D  patiromer calcium sorbitex (VELTASSA) powder 8.4 g  8.4 g Oral DAILY  sodium chloride (NS) flush 5-10 mL  5-10 mL IntraVENous Q8H  
 sodium chloride (NS) flush 5-10 mL  5-10 mL IntraVENous PRN  
 
______________________________________________________________________ EXPECTED LENGTH OF STAY: 3d 19h ACTUAL LENGTH OF STAY:          5 Darlene Ruiz MD

## 2018-07-22 LAB
BACTERIA SPEC CULT: NORMAL
SERVICE CMNT-IMP: NORMAL

## 2018-07-25 ENCOUNTER — EXTERNAL NURSING HOME DOCUMENTATION (OUTPATIENT)
Dept: INTERNAL MEDICINE CLINIC | Age: 78
End: 2018-07-25

## 2018-07-25 DIAGNOSIS — N18.30 CKD (CHRONIC KIDNEY DISEASE) STAGE 3, GFR 30-59 ML/MIN (HCC): ICD-10-CM

## 2018-07-25 DIAGNOSIS — N39.0 URINARY TRACT INFECTION WITHOUT HEMATURIA, SITE UNSPECIFIED: Primary | ICD-10-CM

## 2018-07-25 DIAGNOSIS — I25.10 CORONARY ARTERY DISEASE INVOLVING NATIVE CORONARY ARTERY OF NATIVE HEART WITHOUT ANGINA PECTORIS: ICD-10-CM

## 2018-07-25 DIAGNOSIS — E66.9 OBESITY (BMI 30.0-34.9): ICD-10-CM

## 2018-07-25 DIAGNOSIS — D64.9 CHRONIC ANEMIA: ICD-10-CM

## 2018-07-25 DIAGNOSIS — E87.5 HYPERKALEMIA: ICD-10-CM

## 2018-07-25 DIAGNOSIS — I10 ESSENTIAL HYPERTENSION: ICD-10-CM

## 2018-07-25 DIAGNOSIS — N17.9 AKI (ACUTE KIDNEY INJURY) (HCC): ICD-10-CM

## 2018-07-25 DIAGNOSIS — N13.30 BILATERAL HYDRONEPHROSIS: ICD-10-CM

## 2018-07-25 DIAGNOSIS — C61 PROSTATE CANCER (HCC): ICD-10-CM

## 2018-07-25 NOTE — PROGRESS NOTES
History of Present Illness:  Ashutosh Frankel is a 78 y.o. white man who was admitted to [facility] after hospitalization at [facility] with acute kidney failure and UTI. The patient has a number of chronic medical issues. I have reviewed the hospital records. He has CKD and chronic bilateral hydronephrosis. He has ureteral stents which are changed by urologist every 3-4 months. Also, has a history of heart disease. The patient tells me he is feeling better now. He has had some issues with dizziness/vertigo. He is supposed to see ENT later today. He denies headaches, chest pain, dyspnea, GI,  symptoms. The staff do not report any significant new problems. PMH:  CKD, bilateral hydronephrosis, CAD, CABG, anemia, gout, HTN, prostate cancer. Allergies: Morphine. SH: Former smoker, social alcohol use, has family in town. He lives at [facility]. .   FH: Noncontributory. .   Medications:  See NH list including Allopurinol, Aspirin, Calcitriol, Ferrous Sulfate, Metoprolol, Multivitamins, Sodium Bicarbonate, Veltassa, B-12 and Xtandi. Physical Examination:   GENERAL:  Vital signs stable. Afebrile. Pleasant obese elderly man in chair, NAD and answering questions properly. HEENT:  Unremarkable. NECK:  Supple, no JVD, bruits. HEART:  Regular, distant sounds. LUNGS:  Diminished sounds, but clear. ABDOMEN:  Soft, nontender. EXTREMITIES:  No significant edema. DJD changes. NEURO:  Nonfocal.     Impression:  UTI. Recent acute kidney injury. CKD. Bilateral hydronephrosis. Hyperkalemia. CAD with history of CABG. Chronic anemia. Gout. HTN. Prostate cancer. Obesity. Plan:  Continue current medications. Baseline labs. PT, OT. Overall seems to be stable. Full code.

## 2018-08-01 ENCOUNTER — EXTERNAL NURSING HOME DOCUMENTATION (OUTPATIENT)
Dept: INTERNAL MEDICINE CLINIC | Age: 78
End: 2018-08-01

## 2018-08-01 DIAGNOSIS — E87.6 HYPOKALEMIA: ICD-10-CM

## 2018-08-01 DIAGNOSIS — N18.30 CKD (CHRONIC KIDNEY DISEASE) STAGE 3, GFR 30-59 ML/MIN (HCC): Primary | ICD-10-CM

## 2018-08-01 DIAGNOSIS — N13.30 BILATERAL HYDRONEPHROSIS: ICD-10-CM

## 2018-08-01 DIAGNOSIS — M17.11 PRIMARY OSTEOARTHRITIS OF RIGHT KNEE: ICD-10-CM

## 2018-08-01 DIAGNOSIS — G89.29 CHRONIC PAIN OF RIGHT KNEE: ICD-10-CM

## 2018-08-01 DIAGNOSIS — M25.561 CHRONIC PAIN OF RIGHT KNEE: ICD-10-CM

## 2018-08-06 ENCOUNTER — EXTERNAL NURSING HOME DOCUMENTATION (OUTPATIENT)
Dept: INTERNAL MEDICINE CLINIC | Age: 78
End: 2018-08-06

## 2018-08-06 DIAGNOSIS — I25.119 CORONARY ARTERY DISEASE WITH ANGINA PECTORIS, UNSPECIFIED VESSEL OR LESION TYPE, UNSPECIFIED WHETHER NATIVE OR TRANSPLANTED HEART (HCC): ICD-10-CM

## 2018-08-06 DIAGNOSIS — M10.9 GOUT, UNSPECIFIED CAUSE, UNSPECIFIED CHRONICITY, UNSPECIFIED SITE: ICD-10-CM

## 2018-08-06 DIAGNOSIS — N18.4 ACUTE WORSENING OF STAGE 4 CHRONIC KIDNEY DISEASE (HCC): ICD-10-CM

## 2018-08-06 DIAGNOSIS — D64.9 ANEMIA, UNSPECIFIED TYPE: ICD-10-CM

## 2018-08-06 DIAGNOSIS — N13.30 BILATERAL HYDRONEPHROSIS: ICD-10-CM

## 2018-08-06 DIAGNOSIS — E66.9 OBESITY (BMI 30-39.9): ICD-10-CM

## 2018-08-06 DIAGNOSIS — N39.0 URINARY TRACT INFECTION WITHOUT HEMATURIA, SITE UNSPECIFIED: Primary | ICD-10-CM

## 2018-08-06 DIAGNOSIS — N17.9 AKI (ACUTE KIDNEY INJURY) (HCC): ICD-10-CM

## 2018-08-06 DIAGNOSIS — C61 PROSTATE CA (HCC): ICD-10-CM

## 2018-08-06 RX ORDER — LANOLIN ALCOHOL/MO/W.PET/CERES
325 CREAM (GRAM) TOPICAL
Qty: 30 TAB | Refills: 0 | Status: SHIPPED | OUTPATIENT
Start: 2018-08-06

## 2018-08-06 RX ORDER — METOPROLOL TARTRATE 50 MG/1
50 TABLET ORAL 2 TIMES DAILY
Qty: 60 TAB | Refills: 0 | Status: SHIPPED | OUTPATIENT
Start: 2018-08-06 | End: 2019-07-29

## 2018-08-06 RX ORDER — SODIUM BICARBONATE 650 MG/1
650 TABLET ORAL 2 TIMES DAILY
Qty: 60 TAB | Refills: 0 | Status: SHIPPED | OUTPATIENT
Start: 2018-08-06 | End: 2019-07-17 | Stop reason: SDUPTHER

## 2018-08-06 RX ORDER — CALCITRIOL 0.25 UG/1
0.25 CAPSULE ORAL DAILY
Qty: 30 CAP | Refills: 0 | Status: SHIPPED | OUTPATIENT
Start: 2018-08-06 | End: 2019-02-15

## 2018-08-06 NOTE — PROGRESS NOTES
Subjective:   THIS IS NOT A COMPLETE MEDICAL RECORD ON THIS PATIENT. THIS IS A PATIENT AT [facility]. PLEASE CONTACT THE FACILITY LISTED BELOW FOR MORE INFORMATION ON THIS PATIENT. THE COMPLETE RECORD RESIDES WITH THIS LONG TERM CARE FACILITY. History of Present Illness    Norman Salazar is a 78y.o. year old male who is under the care of Dr. Lis Sadler while at [facility].  He is admitted after a hospitalization at [address] [place] [address] with acute kidney failure and UTI. The patient has a number of chronic medical issues. He has CKD and chronic bilateral hydronephrosis. He has ureteral stents which are changed by urologist every 3-4 months. Also, has a history of heart disease. His other PMH includes bilateral hydronephrosis, CAD, CABG, anemia, gout, HTN, prostate cancer. He is being seen today for discharge planning. He denies any new complaints today. His medications have been sent to his pharmacy. Needs to follow-up with PCP. No CP, SOB, GI, or  symptoms. Reviewed medications, recent lab work and imaging with patient. Pt reports compliance with medications. Current Outpatient Prescriptions on File Prior to Visit   Medication Sig Dispense Refill    allopurinol (ZYLOPRIM) 100 mg tablet Take 1 Tab by mouth two (2) times a day. 60 Tab 2    patiromer calcium sorbitex (VELTASSA) 8.4 gram powder Take 8.4 g by mouth daily. SAMPLES FROM MD   Indications: hyperkalemia      sodium bicarbonate 650 mg tablet Take 1 Tab by mouth two (2) times a day. 60 Tab 0    metoprolol tartrate (LOPRESSOR) 50 mg tablet Take 50 mg by mouth two (2) times a day.  enzalutamide (XTANDI) 40 mg capsule Take 160 mg by mouth nightly.  allopurinol (ZYLOPRIM) 100 mg tablet Take 100 mg by mouth nightly.  ferrous sulfate 325 mg (65 mg iron) tablet Take 325 mg by mouth Before breakfast and dinner.  calcitRIOL (ROCALTROL) 0.25 mcg capsule Take 0.25 mcg by mouth daily.       cyanocobalamin, vitamin B-12, (VITAMIN B-12) 1,000 mcg/mL drop Take 1,000 mcg by mouth daily.  OTHER,NON-FORMULARY, daily. Neurim  (B-complex with additional supplements) for Memory      aspirin 81 mg chewable tablet Take 81 mg by mouth daily. No current facility-administered medications on file prior to visit. Allergies   Allergen Reactions    Morphine Other (comments)     AMS      Past Medical History:   Diagnosis Date    Celiac disease     Chronic kidney disease     Hypertension     MI (mitral incompetence)     Prostate cancer ([facility] [address]       Past Surgical History:   Procedure Laterality Date    CARDIAC SURG PROCEDURE UNLIST      qaudruple bipass    HX ORTHOPAEDIC Right 2015    suni in femur from pathological fracture      Social History   Substance Use Topics    Smoking status: Former Smoker    Smokeless tobacco: Never Used    Alcohol use No      No family history on file. Objective: There were no vitals filed for this visit. Review of Systems   Constitutional: Negative. Respiratory: Negative. Cardiovascular: Negative. Genitourinary: Negative. Neurological: Negative. All other systems reviewed and are negative. Physical Exam   Constitutional: He is oriented to person, place, and time. He appears well-developed and well-nourished. No distress. Well-appearing male. NAD   HENT:   Head: Normocephalic and atraumatic. Eyes: Conjunctivae and EOM are normal. Pupils are equal, round, and reactive to light. Neck: Normal range of motion. Neck supple. Cardiovascular: Normal rate, regular rhythm and normal heart sounds. Pulmonary/Chest: Effort normal and breath sounds normal. No respiratory distress. He has no wheezes. Abdominal: He exhibits no distension. Musculoskeletal: Normal range of motion. He exhibits no edema, tenderness or deformity. Neurological: He is alert and oriented to person, place, and time. Skin: Skin is warm and dry. No rash noted.  He is not diaphoretic. No erythema. No pallor. Psychiatric: He has a normal mood and affect. His behavior is normal.   Nursing note and vitals reviewed. Assessment/ Plan:   Diagnoses and all orders for this visit:    1. Urinary tract infection without hematuria, site unspecified    2. JASIEL (acute kidney injury) ([facility] [address]    3. Acute worsening of stage 4 chronic kidney disease ([facility] [address]    4. Bilateral hydronephrosis    5. Coronary artery disease with angina pectoris, unspecified vessel or lesion type, unspecified whether native or transplanted heart ([facility] [address]    6. Anemia, unspecified type    7. Gout, unspecified cause, unspecified chronicity, unspecified site    8. Prostate CA ([facility] [address]    9. Obesity (BMI 30-39. 9)

## 2018-08-31 PROBLEM — C61 PROSTATE CANCER (HCC): Status: ACTIVE | Noted: 2018-08-31

## 2018-08-31 PROBLEM — I25.10 CORONARY ARTERY DISEASE INVOLVING NATIVE CORONARY ARTERY OF NATIVE HEART WITHOUT ANGINA PECTORIS: Status: ACTIVE | Noted: 2018-08-31

## 2018-08-31 PROBLEM — E66.9 OBESITY (BMI 30.0-34.9): Status: ACTIVE | Noted: 2018-08-31

## 2018-08-31 PROBLEM — N13.30 BILATERAL HYDRONEPHROSIS: Status: ACTIVE | Noted: 2018-08-31

## 2018-08-31 PROBLEM — N18.30 CKD (CHRONIC KIDNEY DISEASE) STAGE 3, GFR 30-59 ML/MIN (HCC): Status: ACTIVE | Noted: 2018-08-31

## 2018-08-31 PROBLEM — D64.9 CHRONIC ANEMIA: Status: ACTIVE | Noted: 2018-08-31

## 2018-08-31 PROBLEM — I10 ESSENTIAL HYPERTENSION: Status: ACTIVE | Noted: 2018-08-31

## 2018-09-30 PROBLEM — M17.11 PRIMARY OSTEOARTHRITIS OF RIGHT KNEE: Status: ACTIVE | Noted: 2018-09-30

## 2019-02-15 ENCOUNTER — HOSPITAL ENCOUNTER (EMERGENCY)
Age: 79
Discharge: HOME | End: 2019-02-15
Attending: EMERGENCY MEDICINE
Payer: MEDICARE

## 2019-02-15 VITALS
DIASTOLIC BLOOD PRESSURE: 75 MMHG | HEART RATE: 72 BPM | SYSTOLIC BLOOD PRESSURE: 123 MMHG | WEIGHT: 231 LBS | TEMPERATURE: 97.4 F | RESPIRATION RATE: 25 BRPM | OXYGEN SATURATION: 95 % | HEIGHT: 71 IN | BODY MASS INDEX: 32.34 KG/M2

## 2019-02-15 DIAGNOSIS — R42 LIGHTHEADEDNESS: ICD-10-CM

## 2019-02-15 DIAGNOSIS — N18.9 CHRONIC KIDNEY DISEASE, UNSPECIFIED CKD STAGE: Primary | ICD-10-CM

## 2019-02-15 LAB
ALBUMIN SERPL-MCNC: 3.3 G/DL (ref 3.5–5)
ALBUMIN/GLOB SERPL: 0.8 {RATIO} (ref 1.1–2.2)
ALP SERPL-CCNC: 77 U/L (ref 45–117)
ALT SERPL-CCNC: 11 U/L (ref 12–78)
ANION GAP SERPL CALC-SCNC: 14 MMOL/L (ref 5–15)
APPEARANCE UR: ABNORMAL
AST SERPL-CCNC: 14 U/L (ref 15–37)
BACTERIA URNS QL MICRO: ABNORMAL /HPF
BASOPHILS # BLD: 0.1 K/UL (ref 0–0.1)
BASOPHILS NFR BLD: 1 % (ref 0–1)
BILIRUB SERPL-MCNC: 0.2 MG/DL (ref 0.2–1)
BILIRUB UR QL: NEGATIVE
BUN SERPL-MCNC: 55 MG/DL (ref 6–20)
BUN/CREAT SERPL: 11 (ref 12–20)
CALCIUM SERPL-MCNC: 6.6 MG/DL (ref 8.5–10.1)
CHLORIDE SERPL-SCNC: 106 MMOL/L (ref 97–108)
CO2 SERPL-SCNC: 19 MMOL/L (ref 21–32)
COLOR UR: ABNORMAL
CREAT SERPL-MCNC: 4.96 MG/DL (ref 0.7–1.3)
DIFFERENTIAL METHOD BLD: ABNORMAL
EOSINOPHIL # BLD: 0.7 K/UL (ref 0–0.4)
EOSINOPHIL NFR BLD: 8 % (ref 0–7)
EPITH CASTS URNS QL MICRO: ABNORMAL /LPF
ERYTHROCYTE [DISTWIDTH] IN BLOOD BY AUTOMATED COUNT: 14.9 % (ref 11.5–14.5)
GLOBULIN SER CALC-MCNC: 4.1 G/DL (ref 2–4)
GLUCOSE SERPL-MCNC: 86 MG/DL (ref 65–100)
GLUCOSE UR STRIP.AUTO-MCNC: NEGATIVE MG/DL
HCT VFR BLD AUTO: 32.7 % (ref 36.6–50.3)
HGB BLD-MCNC: 10.4 G/DL (ref 12.1–17)
HGB UR QL STRIP: ABNORMAL
KETONES UR QL STRIP.AUTO: NEGATIVE MG/DL
LEUKOCYTE ESTERASE UR QL STRIP.AUTO: ABNORMAL
LYMPHOCYTES # BLD: 1.4 K/UL
LYMPHOCYTES NFR BLD: 15 % (ref 12–49)
MCH RBC QN AUTO: 31 PG (ref 26–34)
MCHC RBC AUTO-ENTMCNC: 31.8 G/DL (ref 30–36.5)
MCV RBC AUTO: 97.6 FL (ref 80–99)
MONOCYTES # BLD: 0.7 K/UL (ref 0–1)
MONOCYTES NFR BLD: 8 % (ref 5–13)
NEUTS SEG # BLD: 6.6 K/UL (ref 1.8–8)
NEUTS SEG NFR BLD: 68 % (ref 32–75)
NITRITE UR QL STRIP.AUTO: NEGATIVE
PH UR STRIP: 5 [PH] (ref 5–8)
PLATELET # BLD AUTO: 224 K/UL (ref 150–400)
PMV BLD AUTO: 9.6 FL (ref 8.9–12.9)
POTASSIUM SERPL-SCNC: 5.3 MMOL/L (ref 3.5–5.1)
PROT SERPL-MCNC: 7.4 G/DL (ref 6.4–8.2)
PROT UR STRIP-MCNC: >300 MG/DL
RBC # BLD AUTO: 3.35 M/UL (ref 4.1–5.7)
RBC #/AREA URNS HPF: ABNORMAL /HPF (ref 0–5)
SODIUM SERPL-SCNC: 139 MMOL/L (ref 136–145)
SP GR UR REFRACTOMETRY: 1.01 (ref 1–1.03)
TROPONIN I SERPL-MCNC: <0.05 NG/ML
UA: UC IF INDICATED,UAUC: ABNORMAL
UROBILINOGEN UR QL STRIP.AUTO: 0.2 EU/DL (ref 0.2–1)
WBC # BLD AUTO: 9.6 K/UL (ref 4.1–11.1)
WBC URNS QL MICRO: >100 /HPF (ref 0–4)
XXWBCSUS: 0

## 2019-02-15 PROCEDURE — 99285 EMERGENCY DEPT VISIT HI MDM: CPT

## 2019-02-15 PROCEDURE — 81001 URINALYSIS AUTO W/SCOPE: CPT

## 2019-02-15 PROCEDURE — 80053 COMPREHEN METABOLIC PANEL: CPT

## 2019-02-15 PROCEDURE — 85025 COMPLETE CBC W/AUTO DIFF WBC: CPT

## 2019-02-15 PROCEDURE — 93005 ELECTROCARDIOGRAM TRACING: CPT

## 2019-02-15 PROCEDURE — 87086 URINE CULTURE/COLONY COUNT: CPT

## 2019-02-15 PROCEDURE — 36415 COLL VENOUS BLD VENIPUNCTURE: CPT

## 2019-02-15 PROCEDURE — 84484 ASSAY OF TROPONIN QUANT: CPT

## 2019-02-15 RX ORDER — COLCHICINE 0.6 MG/1
0.6 TABLET ORAL
COMMUNITY

## 2019-02-15 RX ORDER — FUROSEMIDE 40 MG/1
40 TABLET ORAL
COMMUNITY

## 2019-02-15 RX ORDER — MIRTAZAPINE 15 MG/1
15 TABLET, FILM COATED ORAL
COMMUNITY

## 2019-02-15 RX ORDER — BICALUTAMIDE 50 MG/1
50 TABLET, FILM COATED ORAL
COMMUNITY
End: 2019-07-17

## 2019-02-15 NOTE — ED NOTES
AIDET communication provided and informed of purposeful rounding to include collaboration of entire care team; patient acknowledged understanding. Pt had no dizziness or lightheadedness with orthostatics or walking to the bathroom

## 2019-02-15 NOTE — DISCHARGE INSTRUCTIONS
Patient Education        Lightheadedness or Faintness: Care Instructions  Your Care Instructions  Lightheadedness is a feeling that you are about to faint or \"pass out. \" You do not feel as if you or your surroundings are moving. It is different from vertigo, which is the feeling that you or things around you are spinning or tilting. Lightheadedness usually goes away or gets better when you lie down. If lightheadedness gets worse, it can lead to a fainting spell. It is common to feel lightheaded from time to time. Lightheadedness usually is not caused by a serious problem. It often is caused by a short-lasting drop in blood pressure and blood flow to your head that occurs when you get up too quickly from a seated or lying position. Follow-up care is a key part of your treatment and safety. Be sure to make and go to all appointments, and call your doctor if you are having problems. It's also a good idea to know your test results and keep a list of the medicines you take. How can you care for yourself at home? · Lie down for 1 or 2 minutes when you feel lightheaded. After lying down, sit up slowly and remain sitting for 1 to 2 minutes before slowly standing up. · Avoid movements, positions, or activities that have made you lightheaded in the past.  · Get plenty of rest, especially if you have a cold or flu, which can cause lightheadedness. · Make sure you drink plenty of fluids, especially if you have a fever or have been sweating. · Do not drive or put yourself and others in danger while you feel lightheaded. When should you call for help? Call 911 anytime you think you may need emergency care. For example, call if:    · You have symptoms of a stroke. These may include:  ? Sudden numbness, tingling, weakness, or loss of movement in your face, arm, or leg, especially on only one side of your body. ? Sudden vision changes. ? Sudden trouble speaking.   ? Sudden confusion or trouble understanding simple statements. ? Sudden problems with walking or balance. ? A sudden, severe headache that is different from past headaches.     · You have symptoms of a heart attack. These may include:  ? Chest pain or pressure, or a strange feeling in the chest.  ? Sweating. ? Shortness of breath. ? Nausea or vomiting. ? Pain, pressure, or a strange feeling in the back, neck, jaw, or upper belly or in one or both shoulders or arms. ? Lightheadedness or sudden weakness. ? A fast or irregular heartbeat. After you call 911, the  may tell you to chew 1 adult-strength or 2 to 4 low-dose aspirin. Wait for an ambulance. Do not try to drive yourself.    Watch closely for changes in your health, and be sure to contact your doctor if:    · Your lightheadedness gets worse or does not get better with home care. Where can you learn more? Go to http://sushil-tray.info/. Enter V270 in the search box to learn more about \"Lightheadedness or Faintness: Care Instructions. \"  Current as of: September 23, 2018  Content Version: 11.9  © 4627-0638 [employer]. Care instructions adapted under license by [employer] (which disclaims liability or warranty for this information). If you have questions about a medical condition or this instruction, always ask your healthcare professional. [place] [address] any warranty or liability for your use of this information. Patient Education        Chronic Kidney Disease: Care Instructions  Your Care Instructions    Chronic kidney disease happens when your kidneys don't work as well as they should. Your kidneys have a few important jobs. They remove waste from your blood. This waste leaves your body in your urine. They also balance your body's fluids and chemicals. When your kidneys don't work well, extra waste and fluid can build up. This can poison the body and sometimes cause death.   The most common causes of this disease are diabetes and high blood pressure. In some cases, the disease develops in 2 to 3 months. But it usually develops over many years. If you take medicine and make healthy changes to your lifestyle, you may be able to prevent the disease from getting worse. But if your kidney damage gets worse, you may need dialysis or a kidney transplant. Dialysis uses a machine to filter waste from the blood. A transplant is surgery to give you a healthy kidney from another person. Follow-up care is a key part of your treatment and safety. Be sure to make and go to all appointments, and call your doctor if you are having problems. It's also a good idea to know your test results and keep a list of the medicines you take. How can you care for yourself at home?   Treatments and appointments    · Be safe with medicines. Take your medicines exactly as prescribed. Call your doctor if you have any problems with your medicine. You also may take medicine to control your blood pressure or to treat diabetes. Many people who have diabetes take blood pressure medicine.     · If you have diabetes, do your best to keep your blood sugar in your target range. You may do this by eating healthy food and exercising. You may also take medicines.     · Go to your dialysis appointments if you have this treatment.     · Do not take ibuprofen (Advil, Motrin), naproxen (Aleve), or similar medicines, unless your doctor tells you to. These may make the disease worse.     · Do not take any vitamins, over-the-counter medicines, or herbal products without talking to your doctor first.     · Do not smoke or use other tobacco products. Smoking can reduce blood flow to the kidneys. If you need help quitting, talk to your doctor about stop-smoking programs and medicines. These can increase your chances of quitting for good.     · Do not drink alcohol or use illegal drugs.     · Talk to your doctor about an exercise plan. Exercise helps lower your blood pressure.  It also makes you feel better.     · If you have an advance directive, let your doctor know. It may include a living will and a durable power of  for health care. If you don't have one, you may want to prepare one. It lets your doctor and loved ones know your health care wishes if you become unable to speak for yourself. Diet    · Talk to a registered dietitian. He or she can help you make a meal plan that is right for you. Most people with kidney disease need to limit salt (sodium), fluids, and protein. Some also have to limit potassium and phosphorus.     · You may have to give up many foods you like. But try to focus on the fact that this will help you stay healthy for as long as possible.     · If you have a hard time eating enough, talk to your doctor or dietitian about ways to add calories to your diet.     · Your diet may change as your disease changes. See your doctor for regular testing. And work with a dietitian to change your diet as needed. When should you call for help? Call 911 anytime you think you may need emergency care. For example, call if:    · You passed out (lost consciousness).    Call your doctor now or seek immediate medical care if:    · You have less urine than normal or no urine.     · You have trouble urinating or can urinate only very small amounts.     · You are confused or have trouble thinking clearly.     · You feel weaker or more tired than usual.     · You are very thirsty, lightheaded, or dizzy.     · You have nausea and vomiting.     · You have new swelling of your arms or feet, or your swelling is worse.     · You have blood in your urine.     · You have new or worse trouble breathing.    Watch closely for changes in your health, and be sure to contact your doctor if:    · You have any problems with your medicine or other treatment. Where can you learn more? Go to http://sushil-tray.info/.   Enter N276 in the search box to learn more about \"Chronic Kidney Disease: Care Instructions. \"  Current as of: March 14, 2018  Content Version: 11.9  © 0949-6816 [employer]. Care instructions adapted under license by [employer] (which disclaims liability or warranty for this information). If you have questions about a medical condition or this instruction, always ask your healthcare professional. [address] any warranty or liability for your use of this information.

## 2019-02-15 NOTE — ED TRIAGE NOTES
Pt was at a nephrology (same [facility]) visit today and started feeling bad while waiting for the appointment (15 min ago). Pt drove here. C/o lightheadedness, nausea and right hand tingling. No tingling at this time, unsure if he was leaning on his elbow.

## 2019-02-15 NOTE — ED PROVIDER NOTES
HPI The patient was sitting in his nephrologists office about 15 minutes ago when he developed lightheadedness had worsened when he stood up. He also had nausea and tingling of his right hand. This episode lasted about 5 minutes and resolved. He denies headache vertigo, syncope, palpitations, shortened or, headache, chest pain or other complaints. He has no complaints at the present time. Past Medical History:  
Diagnosis Date  Celiac disease  Chronic kidney disease  Hypertension  MI (mitral incompetence)  Prostate cancer ([facility] [address] Past Surgical History:  
Procedure Laterality Date  CARDIAC SURG PROCEDURE UNLIST    
 qaudruple bipass  HX CHOLECYSTECTOMY  HX ORTHOPAEDIC Right 2015  
 suni in femur from pathological fracture History reviewed. No pertinent family history. Social History Socioeconomic History  Marital status:  Spouse name: Not on file  Number of children: Not on file  Years of education: Not on file  Highest education level: Not on file Social Needs  Financial resource strain: Not on file  Food insecurity - worry: Not on file  Food insecurity - inability: Not on file  Transportation needs - medical: Not on file  Transportation needs - non-medical: Not on file Occupational History  Not on file Tobacco Use  Smoking status: Former Smoker Packs/day: 1.50 Years: 10.00 Pack years: 15.00  Smokeless tobacco: Never Used  Tobacco comment: quit smoking age 31's Substance and Sexual Activity  Alcohol use: Yes Comment: rarely  Drug use: No  
 Sexual activity: Not on file Other Topics Concern  Not on file Social History Narrative  Not on file ALLERGIES: Morphine Review of Systems Constitutional: Negative for fever. Eyes: Negative for visual disturbance. Respiratory: Negative for cough and shortness of breath. Cardiovascular: Negative for chest pain and palpitations. Gastrointestinal: Positive for nausea. Negative for abdominal pain, diarrhea and vomiting. Genitourinary: Negative for dysuria. Musculoskeletal: Negative. Negative for back pain and neck stiffness. Skin: Negative for rash. Neurological: Positive for light-headedness. Negative for syncope and headaches. Psychiatric/Behavioral: Negative for confusion. All other systems reviewed and are negative. Vitals:  
 02/15/19 1452 BP: (!) 160/91 Pulse: 71 Resp: 16 Temp: 97.4 °F (36.3 °C) SpO2: 100% Weight: 104.8 kg (231 lb) Height: 5' 11\" (1.803 m) Physical Exam  
Constitutional: He appears well-developed and well-nourished. No distress. HENT:  
Head: Normocephalic. Eyes: EOM are normal. Pupils are equal, round, and reactive to light. Neck: Normal range of motion. Cardiovascular: Normal rate. No murmur heard. Pulmonary/Chest: Effort normal and breath sounds normal.  
Abdominal: Soft. There is no tenderness. Musculoskeletal: Normal range of motion. r hand c nl neuro and pulse Neurological: He is alert. Skin: Skin is warm and dry. Capillary refill takes less than 2 seconds. Psychiatric: He has a normal mood and affect. His behavior is normal.  
  
 
MDM Procedures ED EKG interpretation: 
Rhythm:sr, rate 72. 1avb. Prolonged qt. This EKG was interpreted by Azeem Child MD,ED Provider. 450 pm- spoke c dr. Ananth Bryant - his most recent cr. Was 5.4, so today result is a little improved. pjt c no sx here. Will dc to f/u c urology on Monday.

## 2019-02-15 NOTE — ED NOTES
Patient discharged home after receiving discharge instructions from MD.  Patient voiced understanding and doesn't have any questions at this time. Patient in no distress at this time. Wheeled pt out to car. Pt had no lightheadedness, dizziness, or tingling while here in the ER. Pt was talking to daughter on his cell phone.

## 2019-02-17 LAB
BACTERIA SPEC CULT: ABNORMAL
CC UR VC: ABNORMAL
SERVICE CMNT-IMP: ABNORMAL

## 2019-02-18 LAB
ATRIAL RATE: 72 BPM
CALCULATED P AXIS, ECG09: 13 DEGREES
CALCULATED R AXIS, ECG10: 7 DEGREES
CALCULATED T AXIS, ECG11: 63 DEGREES
DIAGNOSIS, 93000: NORMAL
P-R INTERVAL, ECG05: 254 MS
Q-T INTERVAL, ECG07: 442 MS
QRS DURATION, ECG06: 80 MS
QTC CALCULATION (BEZET), ECG08: 483 MS
VENTRICULAR RATE, ECG03: 72 BPM

## 2019-03-11 ENCOUNTER — HOSPITAL ENCOUNTER (OUTPATIENT)
Dept: ULTRASOUND IMAGING | Age: 79
Discharge: HOME | End: 2019-03-11
Attending: UROLOGY
Payer: MEDICARE

## 2019-03-11 DIAGNOSIS — N13.30 HYDRONEPHROSIS: ICD-10-CM

## 2019-03-11 PROCEDURE — 76770 US EXAM ABDO BACK WALL COMP: CPT

## 2019-07-17 ENCOUNTER — APPOINTMENT (OUTPATIENT)
Dept: CT IMAGING | Age: 79
DRG: 854 | End: 2019-07-17
Attending: INTERNAL MEDICINE
Payer: MEDICARE

## 2019-07-17 ENCOUNTER — HOSPITAL ENCOUNTER (INPATIENT)
Age: 79
LOS: 12 days | Discharge: NURSING, REHAB OR LONG-TERM CARE | DRG: 854 | End: 2019-07-29
Attending: EMERGENCY MEDICINE | Admitting: HOSPITALIST
Payer: MEDICARE

## 2019-07-17 DIAGNOSIS — R42 DIZZINESS: ICD-10-CM

## 2019-07-17 DIAGNOSIS — E88.09 HYPOALBUMINEMIA: ICD-10-CM

## 2019-07-17 DIAGNOSIS — K59.03 THERAPEUTIC OPIOID-INDUCED CONSTIPATION (OIC): ICD-10-CM

## 2019-07-17 DIAGNOSIS — M25.561 PAIN IN BOTH KNEES, UNSPECIFIED CHRONICITY: ICD-10-CM

## 2019-07-17 DIAGNOSIS — T88.7XXA MEDICATION SIDE EFFECT: ICD-10-CM

## 2019-07-17 DIAGNOSIS — R11.11 VOMITING WITHOUT NAUSEA, INTRACTABILITY OF VOMITING NOT SPECIFIED, UNSPECIFIED VOMITING TYPE: ICD-10-CM

## 2019-07-17 DIAGNOSIS — R53.81 PHYSICAL DEBILITY: ICD-10-CM

## 2019-07-17 DIAGNOSIS — N17.9 ACUTE KIDNEY INJURY SUPERIMPOSED ON CHRONIC KIDNEY DISEASE (HCC): Primary | ICD-10-CM

## 2019-07-17 DIAGNOSIS — R30.0 DYSURIA: ICD-10-CM

## 2019-07-17 DIAGNOSIS — E87.5 ACUTE HYPERKALEMIA: ICD-10-CM

## 2019-07-17 DIAGNOSIS — M25.552 LEFT HIP PAIN: ICD-10-CM

## 2019-07-17 DIAGNOSIS — T40.2X5A THERAPEUTIC OPIOID-INDUCED CONSTIPATION (OIC): ICD-10-CM

## 2019-07-17 DIAGNOSIS — E87.20 METABOLIC ACIDOSIS: ICD-10-CM

## 2019-07-17 DIAGNOSIS — M25.562 PAIN IN BOTH KNEES, UNSPECIFIED CHRONICITY: ICD-10-CM

## 2019-07-17 DIAGNOSIS — N18.9 ACUTE KIDNEY INJURY SUPERIMPOSED ON CHRONIC KIDNEY DISEASE (HCC): Primary | ICD-10-CM

## 2019-07-17 LAB
ALBUMIN SERPL-MCNC: 3.1 G/DL (ref 3.5–5)
ALBUMIN/GLOB SERPL: 0.7 {RATIO} (ref 1.1–2.2)
ALP SERPL-CCNC: 83 U/L (ref 45–117)
ALT SERPL-CCNC: 12 U/L (ref 12–78)
ANION GAP BLD CALC-SCNC: 16 MMOL/L (ref 10–20)
ANION GAP BLD CALC-SCNC: 17 MMOL/L (ref 10–20)
ANION GAP SERPL CALC-SCNC: 13 MMOL/L (ref 5–15)
ANION GAP SERPL CALC-SCNC: 8 MMOL/L (ref 5–15)
APPEARANCE UR: ABNORMAL
AST SERPL-CCNC: 10 U/L (ref 15–37)
ATRIAL RATE: 83 BPM
BACTERIA URNS QL MICRO: ABNORMAL /HPF
BASOPHILS # BLD: 0 K/UL (ref 0–0.1)
BASOPHILS NFR BLD: 0 % (ref 0–1)
BILIRUB SERPL-MCNC: 0.4 MG/DL (ref 0.2–1)
BILIRUB UR QL: NEGATIVE
BUN BLD-MCNC: 88 MG/DL (ref 9–20)
BUN BLD-MCNC: 89 MG/DL (ref 9–20)
BUN SERPL-MCNC: 78 MG/DL (ref 6–20)
BUN SERPL-MCNC: 81 MG/DL (ref 6–20)
BUN/CREAT SERPL: 14 (ref 12–20)
BUN/CREAT SERPL: 15 (ref 12–20)
CA-I BLD-MCNC: 0.96 MMOL/L (ref 1.12–1.32)
CA-I BLD-MCNC: 0.97 MMOL/L (ref 1.12–1.32)
CALCIUM SERPL-MCNC: 7.4 MG/DL (ref 8.5–10.1)
CALCIUM SERPL-MCNC: 7.8 MG/DL (ref 8.5–10.1)
CALCULATED P AXIS, ECG09: -15 DEGREES
CALCULATED R AXIS, ECG10: -10 DEGREES
CALCULATED T AXIS, ECG11: 79 DEGREES
CHLORIDE BLD-SCNC: 108 MMOL/L (ref 98–107)
CHLORIDE BLD-SCNC: 108 MMOL/L (ref 98–107)
CHLORIDE SERPL-SCNC: 106 MMOL/L (ref 97–108)
CHLORIDE SERPL-SCNC: 108 MMOL/L (ref 97–108)
CO2 BLD-SCNC: 18 MMOL/L (ref 21–32)
CO2 BLD-SCNC: 20 MMOL/L (ref 21–32)
CO2 SERPL-SCNC: 18 MMOL/L (ref 21–32)
CO2 SERPL-SCNC: 19 MMOL/L (ref 21–32)
COLOR UR: ABNORMAL
COMMENT, HOLDF: NORMAL
CREAT BLD-MCNC: 6.1 MG/DL (ref 0.6–1.3)
CREAT BLD-MCNC: 6.2 MG/DL (ref 0.6–1.3)
CREAT SERPL-MCNC: 5.48 MG/DL (ref 0.7–1.3)
CREAT SERPL-MCNC: 5.5 MG/DL (ref 0.7–1.3)
DIAGNOSIS, 93000: NORMAL
DIFFERENTIAL METHOD BLD: ABNORMAL
EOSINOPHIL # BLD: 0.3 K/UL (ref 0–0.4)
EOSINOPHIL NFR BLD: 3 % (ref 0–7)
EPITH CASTS URNS QL MICRO: ABNORMAL /LPF
ERYTHROCYTE [DISTWIDTH] IN BLOOD BY AUTOMATED COUNT: 15.6 % (ref 11.5–14.5)
GLOBULIN SER CALC-MCNC: 4.7 G/DL (ref 2–4)
GLUCOSE BLD-MCNC: 105 MG/DL (ref 65–100)
GLUCOSE BLD-MCNC: 105 MG/DL (ref 65–100)
GLUCOSE SERPL-MCNC: 103 MG/DL (ref 65–100)
GLUCOSE SERPL-MCNC: 222 MG/DL (ref 65–100)
GLUCOSE UR STRIP.AUTO-MCNC: NEGATIVE MG/DL
HCT VFR BLD AUTO: 36.5 % (ref 36.6–50.3)
HCT VFR BLD CALC: 34 % (ref 36.6–50.3)
HCT VFR BLD CALC: 35 % (ref 36.6–50.3)
HGB BLD-MCNC: 11.4 G/DL (ref 12.1–17)
HGB UR QL STRIP: ABNORMAL
HYALINE CASTS URNS QL MICRO: ABNORMAL /LPF (ref 0–5)
IMM GRANULOCYTES # BLD AUTO: 0 K/UL (ref 0–0.04)
IMM GRANULOCYTES NFR BLD AUTO: 1 % (ref 0–0.5)
KETONES UR QL STRIP.AUTO: NEGATIVE MG/DL
LEUKOCYTE ESTERASE UR QL STRIP.AUTO: ABNORMAL
LYMPHOCYTES # BLD: 0.9 K/UL (ref 0.8–3.5)
LYMPHOCYTES NFR BLD: 12 % (ref 12–49)
MCH RBC QN AUTO: 31.2 PG (ref 26–34)
MCHC RBC AUTO-ENTMCNC: 31.2 G/DL (ref 30–36.5)
MCV RBC AUTO: 100 FL (ref 80–99)
MONOCYTES # BLD: 0.6 K/UL (ref 0–1)
MONOCYTES NFR BLD: 8 % (ref 5–13)
NEUTS SEG # BLD: 5.8 K/UL (ref 1.8–8)
NEUTS SEG NFR BLD: 76 % (ref 32–75)
NITRITE UR QL STRIP.AUTO: NEGATIVE
NRBC # BLD: 0 K/UL (ref 0–0.01)
NRBC BLD-RTO: 0 PER 100 WBC
P-R INTERVAL, ECG05: 244 MS
PH UR STRIP: 6 [PH] (ref 5–8)
PLATELET # BLD AUTO: 208 K/UL (ref 150–400)
PMV BLD AUTO: 9.9 FL (ref 8.9–12.9)
POTASSIUM BLD-SCNC: 6.2 MMOL/L (ref 3.5–5.1)
POTASSIUM BLD-SCNC: 6.5 MMOL/L (ref 3.5–5.1)
POTASSIUM SERPL-SCNC: 5.1 MMOL/L (ref 3.5–5.1)
POTASSIUM SERPL-SCNC: 6 MMOL/L (ref 3.5–5.1)
PROT SERPL-MCNC: 7.8 G/DL (ref 6.4–8.2)
PROT UR STRIP-MCNC: 100 MG/DL
Q-T INTERVAL, ECG07: 402 MS
QRS DURATION, ECG06: 76 MS
QTC CALCULATION (BEZET), ECG08: 472 MS
RBC # BLD AUTO: 3.65 M/UL (ref 4.1–5.7)
RBC #/AREA URNS HPF: ABNORMAL /HPF (ref 0–5)
SAMPLES BEING HELD,HOLD: NORMAL
SERVICE CMNT-IMP: ABNORMAL
SERVICE CMNT-IMP: ABNORMAL
SODIUM BLD-SCNC: 136 MMOL/L (ref 136–145)
SODIUM BLD-SCNC: 136 MMOL/L (ref 136–145)
SODIUM SERPL-SCNC: 134 MMOL/L (ref 136–145)
SODIUM SERPL-SCNC: 138 MMOL/L (ref 136–145)
SP GR UR REFRACTOMETRY: 1.01 (ref 1–1.03)
TROPONIN I BLD-MCNC: <0.04 NG/ML (ref 0–0.08)
UR CULT HOLD, URHOLD: NORMAL
UROBILINOGEN UR QL STRIP.AUTO: 0.2 EU/DL (ref 0.2–1)
VENTRICULAR RATE, ECG03: 83 BPM
WBC # BLD AUTO: 7.6 K/UL (ref 4.1–11.1)
WBC URNS QL MICRO: >100 /HPF (ref 0–4)
YEAST URNS QL MICRO: PRESENT

## 2019-07-17 PROCEDURE — 87040 BLOOD CULTURE FOR BACTERIA: CPT

## 2019-07-17 PROCEDURE — 94640 AIRWAY INHALATION TREATMENT: CPT

## 2019-07-17 PROCEDURE — 74011250636 HC RX REV CODE- 250/636: Performed by: INTERNAL MEDICINE

## 2019-07-17 PROCEDURE — 74011250636 HC RX REV CODE- 250/636: Performed by: EMERGENCY MEDICINE

## 2019-07-17 PROCEDURE — 87086 URINE CULTURE/COLONY COUNT: CPT

## 2019-07-17 PROCEDURE — 65660000000 HC RM CCU STEPDOWN

## 2019-07-17 PROCEDURE — 93005 ELECTROCARDIOGRAM TRACING: CPT

## 2019-07-17 PROCEDURE — 80053 COMPREHEN METABOLIC PANEL: CPT

## 2019-07-17 PROCEDURE — 74011250637 HC RX REV CODE- 250/637: Performed by: EMERGENCY MEDICINE

## 2019-07-17 PROCEDURE — 74011250637 HC RX REV CODE- 250/637: Performed by: NURSE PRACTITIONER

## 2019-07-17 PROCEDURE — 74011000250 HC RX REV CODE- 250: Performed by: EMERGENCY MEDICINE

## 2019-07-17 PROCEDURE — 77030013140 HC MSK NEB VYRM -A

## 2019-07-17 PROCEDURE — 81001 URINALYSIS AUTO W/SCOPE: CPT

## 2019-07-17 PROCEDURE — 96374 THER/PROPH/DIAG INJ IV PUSH: CPT

## 2019-07-17 PROCEDURE — 74011000258 HC RX REV CODE- 258: Performed by: HOSPITALIST

## 2019-07-17 PROCEDURE — 74176 CT ABD & PELVIS W/O CONTRAST: CPT

## 2019-07-17 PROCEDURE — 85025 COMPLETE CBC W/AUTO DIFF WBC: CPT

## 2019-07-17 PROCEDURE — 99285 EMERGENCY DEPT VISIT HI MDM: CPT

## 2019-07-17 PROCEDURE — 80047 BASIC METABLC PNL IONIZED CA: CPT

## 2019-07-17 PROCEDURE — 74011000250 HC RX REV CODE- 250: Performed by: INTERNAL MEDICINE

## 2019-07-17 PROCEDURE — 96375 TX/PRO/DX INJ NEW DRUG ADDON: CPT

## 2019-07-17 PROCEDURE — 74011000250 HC RX REV CODE- 250: Performed by: NURSE PRACTITIONER

## 2019-07-17 PROCEDURE — 74011000258 HC RX REV CODE- 258: Performed by: EMERGENCY MEDICINE

## 2019-07-17 PROCEDURE — 36415 COLL VENOUS BLD VENIPUNCTURE: CPT

## 2019-07-17 PROCEDURE — 74011250636 HC RX REV CODE- 250/636: Performed by: HOSPITALIST

## 2019-07-17 PROCEDURE — 74011250637 HC RX REV CODE- 250/637: Performed by: HOSPITALIST

## 2019-07-17 PROCEDURE — 84484 ASSAY OF TROPONIN QUANT: CPT

## 2019-07-17 RX ORDER — LANOLIN ALCOHOL/MO/W.PET/CERES
325 CREAM (GRAM) TOPICAL
Status: DISCONTINUED | OUTPATIENT
Start: 2019-07-17 | End: 2019-07-29 | Stop reason: HOSPADM

## 2019-07-17 RX ORDER — TRAMADOL HYDROCHLORIDE 50 MG/1
50-100 TABLET ORAL
COMMUNITY

## 2019-07-17 RX ORDER — ACETAMINOPHEN 325 MG/1
650 TABLET ORAL
Status: DISCONTINUED | OUTPATIENT
Start: 2019-07-17 | End: 2019-07-29 | Stop reason: HOSPADM

## 2019-07-17 RX ORDER — ONDANSETRON 2 MG/ML
4 INJECTION INTRAMUSCULAR; INTRAVENOUS
Status: COMPLETED | OUTPATIENT
Start: 2019-07-17 | End: 2019-07-17

## 2019-07-17 RX ORDER — METOPROLOL TARTRATE 25 MG/1
25 TABLET, FILM COATED ORAL 2 TIMES DAILY
Status: DISCONTINUED | OUTPATIENT
Start: 2019-07-17 | End: 2019-07-29 | Stop reason: HOSPADM

## 2019-07-17 RX ORDER — ONDANSETRON 2 MG/ML
4 INJECTION INTRAMUSCULAR; INTRAVENOUS
Status: DISCONTINUED | OUTPATIENT
Start: 2019-07-17 | End: 2019-07-29 | Stop reason: HOSPADM

## 2019-07-17 RX ORDER — GUAIFENESIN 100 MG/5ML
81 LIQUID (ML) ORAL DAILY
Status: DISCONTINUED | OUTPATIENT
Start: 2019-07-17 | End: 2019-07-29 | Stop reason: HOSPADM

## 2019-07-17 RX ORDER — SODIUM BICARBONATE 650 MG/1
1950 TABLET ORAL 2 TIMES DAILY
COMMUNITY
End: 2019-07-29

## 2019-07-17 RX ORDER — SODIUM BICARBONATE 650 MG/1
1950 TABLET ORAL 2 TIMES DAILY
Status: DISCONTINUED | OUTPATIENT
Start: 2019-07-17 | End: 2019-07-17

## 2019-07-17 RX ORDER — LIDOCAINE 50 MG/G
1 PATCH TOPICAL EVERY 24 HOURS
Status: DISCONTINUED | OUTPATIENT
Start: 2019-07-17 | End: 2019-07-29 | Stop reason: HOSPADM

## 2019-07-17 RX ORDER — CALCIUM GLUCONATE 94 MG/ML
1 INJECTION, SOLUTION INTRAVENOUS
Status: DISCONTINUED | OUTPATIENT
Start: 2019-07-17 | End: 2019-07-17 | Stop reason: CLARIF

## 2019-07-17 RX ORDER — ALLOPURINOL 100 MG/1
100 TABLET ORAL DAILY
Status: DISCONTINUED | OUTPATIENT
Start: 2019-07-17 | End: 2019-07-29 | Stop reason: HOSPADM

## 2019-07-17 RX ORDER — ALBUTEROL SULFATE 0.83 MG/ML
10 SOLUTION RESPIRATORY (INHALATION)
Status: COMPLETED | OUTPATIENT
Start: 2019-07-17 | End: 2019-07-17

## 2019-07-17 RX ORDER — ALLOPURINOL 100 MG/1
100 TABLET ORAL DAILY
COMMUNITY

## 2019-07-17 RX ORDER — MIRTAZAPINE 15 MG/1
15 TABLET, FILM COATED ORAL
Status: DISCONTINUED | OUTPATIENT
Start: 2019-07-17 | End: 2019-07-29 | Stop reason: HOSPADM

## 2019-07-17 RX ORDER — SODIUM BICARBONATE 1 MEQ/ML
50 SYRINGE (ML) INTRAVENOUS
Status: COMPLETED | OUTPATIENT
Start: 2019-07-17 | End: 2019-07-17

## 2019-07-17 RX ORDER — SODIUM POLYSTYRENE SULFONATE 15 G/60ML
15 SUSPENSION ORAL; RECTAL
Status: COMPLETED | OUTPATIENT
Start: 2019-07-17 | End: 2019-07-17

## 2019-07-17 RX ORDER — METOPROLOL TARTRATE 50 MG/1
50 TABLET ORAL 2 TIMES DAILY
Status: DISCONTINUED | OUTPATIENT
Start: 2019-07-17 | End: 2019-07-17

## 2019-07-17 RX ADMIN — ALBUTEROL SULFATE 10 MG: 2.5 SOLUTION RESPIRATORY (INHALATION) at 07:51

## 2019-07-17 RX ADMIN — ACETAMINOPHEN 650 MG: 325 TABLET ORAL at 21:29

## 2019-07-17 RX ADMIN — METOPROLOL TARTRATE 25 MG: 25 TABLET ORAL at 18:54

## 2019-07-17 RX ADMIN — DEXTROSE MONOHYDRATE: 5 INJECTION, SOLUTION INTRAVENOUS at 22:36

## 2019-07-17 RX ADMIN — DEXTROSE MONOHYDRATE: 5 INJECTION, SOLUTION INTRAVENOUS at 09:02

## 2019-07-17 RX ADMIN — PATIROMER 8.4 G: 8.4 POWDER, FOR SUSPENSION ORAL at 18:54

## 2019-07-17 RX ADMIN — ONDANSETRON 4 MG: 2 INJECTION INTRAMUSCULAR; INTRAVENOUS at 11:33

## 2019-07-17 RX ADMIN — ONDANSETRON 4 MG: 2 INJECTION INTRAMUSCULAR; INTRAVENOUS at 06:10

## 2019-07-17 RX ADMIN — ASPIRIN 81 MG 81 MG: 81 TABLET ORAL at 10:38

## 2019-07-17 RX ADMIN — SODIUM BICARBONATE 50 MEQ: 84 INJECTION INTRAVENOUS at 07:17

## 2019-07-17 RX ADMIN — CALCIUM GLUCONATE 1 G: 98 INJECTION, SOLUTION INTRAVENOUS at 08:59

## 2019-07-17 RX ADMIN — SODIUM CHLORIDE 500 ML: 900 INJECTION, SOLUTION INTRAVENOUS at 07:16

## 2019-07-17 RX ADMIN — ONDANSETRON 4 MG: 2 INJECTION INTRAMUSCULAR; INTRAVENOUS at 15:10

## 2019-07-17 RX ADMIN — PIPERACILLIN SODIUM,TAZOBACTAM SODIUM 3.38 G: 3; .375 INJECTION, POWDER, FOR SOLUTION INTRAVENOUS at 21:11

## 2019-07-17 RX ADMIN — ALLOPURINOL 100 MG: 100 TABLET ORAL at 10:38

## 2019-07-17 RX ADMIN — FERROUS SULFATE TAB 325 MG (65 MG ELEMENTAL FE) 325 MG: 325 (65 FE) TAB at 10:38

## 2019-07-17 RX ADMIN — METOPROLOL TARTRATE 25 MG: 25 TABLET ORAL at 10:38

## 2019-07-17 RX ADMIN — MIRTAZAPINE 15 MG: 15 TABLET, FILM COATED ORAL at 21:11

## 2019-07-17 RX ADMIN — SODIUM POLYSTYRENE SULFONATE 15 G: 15 SUSPENSION ORAL; RECTAL at 07:39

## 2019-07-17 RX ADMIN — FERROUS SULFATE TAB 325 MG (65 MG ELEMENTAL FE) 325 MG: 325 (65 FE) TAB at 18:54

## 2019-07-17 RX ADMIN — PIPERACILLIN SODIUM,TAZOBACTAM SODIUM 3.38 G: 3; .375 INJECTION, POWDER, FOR SOLUTION INTRAVENOUS at 11:33

## 2019-07-17 NOTE — CONSULTS
Urology Consult    Subjective:     Date of Consultation:  July 17, 2019    Referring Physician: Robert Nicole    Reason for Consultation:  hydro    Patient Name: Micehlle Irby. MRN: [id number]    History of Present Illness:   Patient is a 70 y.o.  male who is being seen for same. He was admitted to the hospital for JASIEL (acute kidney injury) ([facility] [address] [N17.9]. Past Medical History:   Diagnosis Date    Celiac disease     Chronic kidney disease     Hypertension     MI (mitral incompetence)     Prostate cancer ([facility] [address]       Past Surgical History:   Procedure Laterality Date    CARDIAC SURG PROCEDURE UNLIST      qaudruple bipass    HX CHOLECYSTECTOMY      HX ORTHOPAEDIC Right 2015    suni in femur from pathological fracture      History reviewed. No pertinent family history. Social History     Tobacco Use    Smoking status: Former Smoker     Packs/day: 1.50     Years: 10.00     Pack years: 15.00    Smokeless tobacco: Never Used    Tobacco comment: quit smoking age 31's   Substance Use Topics    Alcohol use: Yes     Comment: rarely     Allergies   Allergen Reactions    Bactrim [Sulfamethoxazole-Trimethoprim] Anaphylaxis and Swelling     Patient reported that his \"tongue swelled up and it was hard to breath\"    Morphine Other (comments)     AMS; \"goes crazy\"      Prior to Admission medications    Medication Sig Start Date End Date Taking? Authorizing Provider   allopurinol (ZYLOPRIM) 100 mg tablet Take 100 mg by mouth daily. Yes Provider, Historical   sodium bicarbonate 650 mg tablet Take 1,950 mg by mouth two (2) times a day. Yes Provider, Historical   Evin Scales [place] Histablock complex daily   Yes Provider, Historical   traMADol (ULTRAM) 50 mg tablet Take  mg by mouth two (2) times daily as needed for Pain. Yes Provider, Historical   colchicine 0.6 mg tablet Take 0.6 mg by mouth daily as needed.    Yes Other, MD Neville   furosemide (LASIX) 40 mg tablet Take 40 mg by mouth daily as needed. Yes Lara, MD Neville   OTHER    Yes Lara, MD Neville   ferrous sulfate 325 mg (65 mg iron) tablet Take 1 Tab by mouth Before breakfast and dinner. 18  Yes Patricia Bertrand NP   patiromer calcium sorbitex (VELTASSA) 8.4 gram powder Take 8.4 g by mouth daily. SAMPLES FROM MD   Indications: hyperkalemia 18  Yes Patricia Bertrand NP   metoprolol tartrate (LOPRESSOR) 50 mg tablet Take 1 Tab by mouth two (2) times a day. 18  Yes Kelsy Bertrand NP   OTHER,NON-FORMULARY, daily. Neurim  (B-complex with additional supplements) for Memory    Yes Provider, Kalli   aspirin 81 mg chewable tablet Take 81 mg by mouth daily. Yes Lara, MD Neville   mirtazapine (REMERON) 15 mg tablet Take 15 mg by mouth nightly. Lara, MD Neville         Review of Systems:  A comprehensive review of systems was negative except for that written in the HPI. Objective:     Data Review (Labs):    Recent Labs     19  0644 19  0554   WBC  --  7.6   HGB  --  11.4*   MCV  --  100.0*   PLT  --  208   *  --    K 6.0*  --    CREA 5.48*  --    BUN 78*  --    ALB 3.1*  --    TBILI 0.4  --    SGOT 10*  --    ALT 12  --    AP 83  --        Patient Vitals for the past 8 hrs:   BP Temp Pulse Resp SpO2 Height Weight   19 1120      5' 11\" (1.803 m) 104.8 kg (231 lb)   19 0751     99 %     19 0700 137/69  76 19 98 %     19 0600 142/78  83 17 97 %     19 0530 148/78 97.8 °F (36.6 °C) 87 16 100 %       Temp (24hrs), Av.8 °F (36.6 °C), Min:97.8 °F (36.6 °C), Max:97.8 °F (36.6 °C)      Intake and Output:   No intake/output data recorded. Physical Exam:            General:    alert, cooperative, no distress, appears stated age                     Skin:  Normal.   Lymph nodes:  Cervical, supraclavicular, and axillary nodes normal.             Abdomen[de-identified]  soft, non-tender.  Bowel sounds normal. No masses,  no organomegaly             Genitalia: defer exam          Extremities:  negative       Assessment:     Active Problems:    Hyperkalemia (6/5/2018)      Coronary artery disease involving native coronary artery of native heart without angina pectoris (8/31/2018)      Essential hypertension (8/31/2018)      Prostate cancer ([facility] [address] (8/31/2018)      JASIEL (acute kidney injury) ([facility] [address] (7/17/2019)          Pt has met cap and bilat hydro w stents not changed as planned. Now w worsening renal failure and uti. Just had some food. Plan:      needs stents changed as soon as can be scheduled. Npo until we get a surg time.      Signed By: Noble Ritter MD                         July 17, 2019

## 2019-07-17 NOTE — ED NOTES
Gave bedside report regarding, SBAR, MAR, and plan of care to [facility]. Transfered care of patient to RN.

## 2019-07-17 NOTE — ED TRIAGE NOTES
[facility] on [address], reporting vomiting around 0200. Patient reports taking tramadol around 2200, and unable to sleep, and started feeling worse    Also reports pain during urination. Denies CP, SOB,     VSS    Hx metastatic prostate cancer.

## 2019-07-17 NOTE — H&P
History and Physical  Primary Care Provider: Milla Plaza MD    Subjective:     Liz Cai is a 70 y.o. male with   Cc of dizziness,nausea/vomiting      Patient states that he had pain while urination,was seen by PCP and UA was done which was negative -so was prescribed tramadol. He took the first dose of 50 mg tramadol last night and felt dizziness/lightheadedness after that associated with nausea and 2-3 episodes of vomiting. He came to the hospital for further eval.  He denied any headache,muscle weakness,numbness/tingling,chest pain,cough,SOB,fever. States that he had kidney disease,follows with  - was supposed to take valtesaa for hyperkalemia -he forgot to take his dose yesterday. In the ER, he received calcium,bicarb. Hospitalist was consulted for admission. Review of Systems:    A comprehensive review of systems was negative except for that written in the History of Present Illness. Past Medical History:   Diagnosis Date    Celiac disease     Chronic kidney disease     Hypertension     MI (mitral incompetence)     Prostate cancer ([facility] [address]       Past Surgical History:   Procedure Laterality Date    CARDIAC SURG PROCEDURE UNLIST      qaudruple bipass    HX CHOLECYSTECTOMY      HX ORTHOPAEDIC Right 2015    suni in femur from pathological fracture     Prior to Admission medications    Medication Sig Start Date End Date Taking? Authorizing Provider   colchicine 0.6 mg tablet Take 0.6 mg by mouth daily. Neville Bermudez MD   furosemide (LASIX) 40 mg tablet Take 40 mg by mouth daily. Neville Bermudez MD   bicalutamide (CASODEX) 50 mg tablet Take 50 mg by mouth nightly. Neville Bermudez MD   mirtazapine (REMERON) 15 mg tablet Take 15 mg by mouth nightly. Neville Bermudez MD OTHER Other, Phys, MD   ferrous sulfate 325 mg (65 mg iron) tablet Take 1 Tab by mouth Before breakfast and dinner.  8/6/18   Patricia Bertrand NP   patiromer calcium sorbitex (VELTASSA) 8.4 gram powder Take 8.4 g by mouth daily. SAMPLES FROM MD   Indications: hyperkalemia 8/6/18   Patricia Bertrand NP   sodium bicarbonate 650 mg tablet Take 1 Tab by mouth two (2) times a day. Patient taking differently: Take 650 mg by mouth two (2) times a day. 8/6/18   McIPatricia barnett NP   metoprolol tartrate (LOPRESSOR) 50 mg tablet Take 1 Tab by mouth two (2) times a day. 8/6/18   McInturffPatricia NP   allopurinol (ZYLOPRIM) 100 mg tablet Take 1 Tab by mouth two (2) times a day. 7/21/18   Neha Almanzar MD   OTHER,NON-FORMULARY, daily. Neurim  (B-complex with additional supplements) for Memory     Provider, Historical   aspirin 81 mg chewable tablet Take 81 mg by mouth daily. Other, MD Neville     Allergies   Allergen Reactions    Morphine Other (comments)     AMS      Family history was reviewed - negative pertinent history     SOCIAL HISTORY:  Patient resides at living facility  Patient ambulates with cane,roller   Smoking history:former smoker,quit years ago  Alcohol history: occassionally drinks alcohol        Objective:       Physical Exam:   Gen:  Well-developed, well-nourished, in no acute distress,elderly gentle man  HEENT:   PERRL, EOMI,anicteric, no pallor,hearing intact to voice, moist mucous membranes,sinus non tender  Neck:  Supple, without masses, thyroid non-tender  Resp:  No accessory muscle use, clear breath sounds without wheezes rales or rhonchi  Card:  No murmurs, normal S1, S2 without thrills, bruits or peripheral edema  Abd:  Soft, non-tender, non-distended, normoactive bowel sounds are present  Lymph:  No cervical adenopathy  Musc:  No cyanosis or clubbing  Skin:  No rashes or ulcers, skin turgor is good  Neuro:  Cranial nerves 3-12 are grossly intact,  strength is 5/5 bilaterally, dorsi / plantarflexion strength is 5/5 bilaterally, follows commands appropriately  Psych:  Alert with good insight.   Oriented to person, place, and time         ECG:  Normal sinus rhythym, first degree heart block    Data Review: All diagnostic labs and studies have been reviewed. Assessment:     #Hyperkalemia:  #JASIEL on CKD stage 5:  #Metabolic acidosis  -Cr :8.74 today which was 4.96 in 2/2019  -likely has progression of kidney disease  - Hyperkalemia :likely due to missing dose of veltassa vs progression of CKD  -restart home meds - repeat labs later in the afternoon  -Resume veltassa,bicarb tablets  -Nephrology consulted    #UTI:  #History of b/l hydronephrosis due to prostate cancer with stent placement in 2018:  -patient complaints of burning and pain while urination in the last few days  -UA shows >100 WBCs with 2+ bacteria, he has ureteral  Stents  -will give zosyn for now  -Urine cultures -de escalate based on cultures. #Dizziness/lightheadedness:  #Nausea/vomiting:  -side effects due to tramadol. Discussed with the patient  -prn zofran    #History of metastatic prostate cancer:  - on casodex    #History of CAD s/p CABG:  -resume aspirin and metoprolol    #HTN: resume home meds      Full code -discussed with patient what resuscitation means in case of cardiac arrest/resp intuabtion -he said he wants to be resuscitated. Daughter -Prachi Chavez is mPOA    Signed By: Kenton Estrada MD     July 17, 2019       Addendum: Spoke to patient's daughter in the evening,she states that patient has been hospitalised few times in the last one year. States that he goes back and forth about care decisions including code status. States that he is on pain. Discussed about palliative care,she agreed to talk with the team along with her father. She also wants to talk to . I explained his labs and plan of care including stent exchange by urology.

## 2019-07-17 NOTE — CONSULTS
[facility]   [address] [facility], [address] [place] [address], [facility]  Phone: [phone] NOTE     Patient: Servando Rock MRN: [id number]  PCP: Joe Gonzalez MD   :     1940  Age:   70 y.o. Sex:  male      Referring physician: No att. providers found  Reason for consultation: 70 y.o. male with JASIEL (acute kidney injury) ([facility] [address] [J94.3] complicated by JASIEL   Admission Date: 2019  5:20 AM  LOS: 0 days      ASSESSMENT and PLAN :   JASIEL on CKD   - need to r/o obstruction of urteral stents   - abx for UTI   - Bicarb gtt for IVF  - hold colchicine, lasix      CKD Stage V  - 2/2 chronic obstruction/ fsgs  - baseline Cr ~ 4 mg/dl at best   - f/b Dr Samanta Thompson     Hx of MEDINA with ongoing b/l hydro despite stent placement:  - s/p stent change in March  By Dr Jessie Rebolledo   - consult Urology     Hyperkalemia:  - resume Valtessa     Metabolic Acidosis:  - bicarb drip for now  - continue oral Bicarb      Anemia of CKD:  -serial H/H     CAD s/p CABG     Hx of prostate cancer          Care Plan discussed with: pt     Thank you for consulting [facility] in the care of your patient. Subjective:   HPI: Servando Rock is a 70 y.o.  male who has been admitted to the hospital for Nausea, vomiting , confusion since starting tramadol for dysuria. [facility]. He has advanced CKD and followed by Dr Samanta Thompson since his recent admission to [facility]. His Creatinine was > 8  And needed brief dialysis. His Creatinine was down to 3.9 on d/c. He developed dysuria and had urine cx doen by Aasonn that was negative and 2 weeks ago was negative again w/ Dr Dennie Castleman.  He is due to for stent change and has not done so   He reports fevers w/ chills, dysuria now   Nausea and vomiting   Denies any abdo pain   Has missed his valtessa dose yesterday for hyperkalemia     Past Medical Hx:   Past Medical History:   Diagnosis Date  Celiac disease     Chronic kidney disease     Hypertension     MI (mitral incompetence)     Prostate cancer (HCC)         Past Surgical Hx:     Past Surgical History:   Procedure Laterality Date    CARDIAC SURG PROCEDURE UNLIST      qaudruple bipass    HX CHOLECYSTECTOMY      HX ORTHOPAEDIC Right 2015    suni in femur from pathological fracture         Allergies   Allergen Reactions    Bactrim [Sulfamethoxazole-Trimethoprim] Anaphylaxis and Swelling     Patient reported that his \"tongue swelled up and it was hard to breath\"    Morphine Other (comments)     AMS; \"goes crazy\"       Social Hx:  reports that he has quit smoking. He has a 15.00 pack-year smoking history. He has never used smokeless tobacco. He reports that he drinks alcohol. He reports that he does not use drugs. History reviewed. No pertinent family history. Review of Systems:  A thorough twelve point review of system was performed today. Pertinent positives and negatives are mentioned in the HPI. The reminder of the ROS is negative and noncontributory. Objective:    Vitals:    Vitals:    07/17/19 0530 07/17/19 0600 07/17/19 0700 07/17/19 0751   BP: 148/78 142/78 137/69    Pulse: 87 83 76    Resp: 16 17 19    Temp: 97.8 °F (36.6 °C)      SpO2: 100% 97% 98% 99%     I&O's:  No intake/output data recorded. Visit Vitals  /69   Pulse 76   Temp 97.8 °F (36.6 °C)   Resp 19   SpO2 99%       Physical Exam:  General:  No apparent Distress  HEENT: PERRL,  No Pallor , No Icterus  Neck: Supple,no mass palpable  Lungs : CTA  CVS: RRR, S1 S2 normal, No murmur   Abdomen: Soft, mild diffuse tenderness  Extremities: trace Edema  Skin: No rash or lesions.   MS: No joint swelling, erythema, warmth  Neurologic: non focal, AAO x 3  Psych: normal affect    Laboratory Results:    Recent Labs     07/17/19  0644   *   K 6.0*      CO2 18*   *   BUN 78*   CREA 5.48*   CA 7.8*   ALB 3.1*   SGOT 10*   ALT 12     Recent Labs 07/17/19  0554   WBC 7.6   HGB 11.4*   HCT 36.5*        Lab Results   Component Value Date/Time    Specimen Description: URINE 10/07/2013 01:36 AM    Specimen Description: BLOOD 10/06/2013 04:13 PM     Lab Results   Component Value Date/Time    Culture result: CANDIDA ALBICANS (A) 02/15/2019 04:01 PM    Culture result: NO GROWTH 5 DAYS 07/17/2018 01:25 AM    Culture result: (A) 07/16/2018 04:00 PM     PROBABLE ALPHA STREPTOCOCCUS (>100,000 COLONIES/mL)    Culture result: YEAST (>100,000 COLONIES/mL) (A) 07/16/2018 04:00 PM    Culture result: GRAM NEGATIVE RODS (2,000 COLONIES/mL) (A) 07/16/2018 04:00 PM     Recent Results (from the past 24 hour(s))   EKG, 12 LEAD, INITIAL    Collection Time: 07/17/19  5:32 AM   Result Value Ref Range    Ventricular Rate 83 BPM    Atrial Rate 83 BPM    P-R Interval 244 ms    QRS Duration 76 ms    Q-T Interval 402 ms    QTC Calculation (Bezet) 472 ms    Calculated P Axis -15 degrees    Calculated R Axis -10 degrees    Calculated T Axis 79 degrees    Diagnosis       Sinus rhythm with 1st degree AV block  When compared with ECG of 15-FEB-2019 14:56,  No significant change was found     SAMPLES BEING HELD    Collection Time: 07/17/19  5:54 AM   Result Value Ref Range    SAMPLES BEING HELD 1PST,1BLUE     COMMENT        Add-on orders for these samples will be processed based on acceptable specimen integrity and analyte stability, which may vary by analyte.    CBC WITH AUTOMATED DIFF    Collection Time: 07/17/19  5:54 AM   Result Value Ref Range    WBC 7.6 4.1 - 11.1 K/uL    RBC 3.65 (L) 4.10 - 5.70 M/uL    HGB 11.4 (L) 12.1 - 17.0 g/dL    HCT 36.5 (L) 36.6 - 50.3 %    .0 (H) 80.0 - 99.0 FL    MCH 31.2 26.0 - 34.0 PG    MCHC 31.2 30.0 - 36.5 g/dL    RDW 15.6 (H) 11.5 - 14.5 %    PLATELET 664 608 - 069 K/uL    MPV 9.9 8.9 - 12.9 FL    NRBC 0.0 0  WBC    ABSOLUTE NRBC 0.00 0.00 - 0.01 K/uL    NEUTROPHILS 76 (H) 32 - 75 %    LYMPHOCYTES 12 12 - 49 %    MONOCYTES 8 5 - 13 % EOSINOPHILS 3 0 - 7 %    BASOPHILS 0 0 - 1 %    IMMATURE GRANULOCYTES 1 (H) 0.0 - 0.5 %    ABS. NEUTROPHILS 5.8 1.8 - 8.0 K/UL    ABS. LYMPHOCYTES 0.9 0.8 - 3.5 K/UL    ABS. MONOCYTES 0.6 0.0 - 1.0 K/UL    ABS. EOSINOPHILS 0.3 0.0 - 0.4 K/UL    ABS. BASOPHILS 0.0 0.0 - 0.1 K/UL    ABS. IMM. GRANS. 0.0 0.00 - 0.04 K/UL    DF AUTOMATED     POC CHEM8    Collection Time: 07/17/19  5:56 AM   Result Value Ref Range    Calcium, ionized (POC) 0.96 (L) 1.12 - 1.32 mmol/L    Sodium (POC) 136 136 - 145 mmol/L    Potassium (POC) 6.5 (H) 3.5 - 5.1 mmol/L    Chloride (POC) 108 (H) 98 - 107 mmol/L    CO2 (POC) 20 (L) 21 - 32 mmol/L    Anion gap (POC) 16 10 - 20 mmol/L    Glucose (POC) 105 (H) 65 - 100 mg/dL    BUN (POC) 89 (H) 9 - 20 mg/dL    Creatinine (POC) 6.2 (H) 0.6 - 1.3 mg/dL    GFRAA, POC 11 (L) >60 ml/min/1.73m2    GFRNA, POC 9 (L) >60 ml/min/1.73m2    Hematocrit (POC) 35 (L) 36.6 - 50.3 %    Comment Comment Not Indicated. POC TROPONIN-I    Collection Time: 07/17/19  6:03 AM   Result Value Ref Range    Troponin-I (POC) <0.04 0.00 - 7.97 ng/mL   METABOLIC PANEL, COMPREHENSIVE    Collection Time: 07/17/19  6:44 AM   Result Value Ref Range    Sodium 134 (L) 136 - 145 mmol/L    Potassium 6.0 (H) 3.5 - 5.1 mmol/L    Chloride 108 97 - 108 mmol/L    CO2 18 (L) 21 - 32 mmol/L    Anion gap 8 5 - 15 mmol/L    Glucose 103 (H) 65 - 100 mg/dL    BUN 78 (H) 6 - 20 MG/DL    Creatinine 5.48 (H) 0.70 - 1.30 MG/DL    BUN/Creatinine ratio 14 12 - 20      GFR est AA 12 (L) >60 ml/min/1.73m2    GFR est non-AA 10 (L) >60 ml/min/1.73m2    Calcium 7.8 (L) 8.5 - 10.1 MG/DL    Bilirubin, total 0.4 0.2 - 1.0 MG/DL    ALT (SGPT) 12 12 - 78 U/L    AST (SGOT) 10 (L) 15 - 37 U/L    Alk.  phosphatase 83 45 - 117 U/L    Protein, total 7.8 6.4 - 8.2 g/dL    Albumin 3.1 (L) 3.5 - 5.0 g/dL    Globulin 4.7 (H) 2.0 - 4.0 g/dL    A-G Ratio 0.7 (L) 1.1 - 2.2     POC CHEM8    Collection Time: 07/17/19  6:48 AM   Result Value Ref Range    Calcium, ionized (POC) 0.97 (L) 1.12 - 1.32 mmol/L    Sodium (POC) 136 136 - 145 mmol/L    Potassium (POC) 6.2 (H) 3.5 - 5.1 mmol/L    Chloride (POC) 108 (H) 98 - 107 mmol/L    CO2 (POC) 18 (L) 21 - 32 mmol/L    Anion gap (POC) 17 10 - 20 mmol/L    Glucose (POC) 105 (H) 65 - 100 mg/dL    BUN (POC) 88 (H) 9 - 20 mg/dL    Creatinine (POC) 6.1 (H) 0.6 - 1.3 mg/dL    GFRAA, POC 11 (L) >60 ml/min/1.73m2    GFRNA, POC 9 (L) >60 ml/min/1.73m2    Hematocrit (POC) 34 (L) 36.6 - 50.3 %    Comment Comment Not Indicated. URINALYSIS W/ RFLX MICROSCOPIC    Collection Time: 07/17/19  7:21 AM   Result Value Ref Range    Color YELLOW/STRAW      Appearance TURBID (A) CLEAR      Specific gravity 1.011 1.003 - 1.030      pH (UA) 6.0 5.0 - 8.0      Protein 100 (A) NEG mg/dL    Glucose NEGATIVE  NEG mg/dL    Ketone NEGATIVE  NEG mg/dL    Bilirubin NEGATIVE  NEG      Blood LARGE (A) NEG      Urobilinogen 0.2 0.2 - 1.0 EU/dL    Nitrites NEGATIVE  NEG      Leukocyte Esterase LARGE (A) NEG      WBC >100 (H) 0 - 4 /hpf    RBC 10-20 0 - 5 /hpf    Epithelial cells FEW FEW /lpf    Bacteria 2+ (A) NEG /hpf    Hyaline cast 0-2 0 - 5 /lpf    Yeast w/hyphae PRESENT (A) NEG     URINE CULTURE HOLD SAMPLE    Collection Time: 07/17/19  7:21 AM   Result Value Ref Range    Urine culture hold        URINE ON HOLD IN MICROBIOLOGY DEPT FOR 3 DAYS. IF UNPRESERVED URINE IS SUBMITTED, IT CANNOT BE USED FOR ADDITIONAL TESTING AFTER 24 HRS, RECOLLECTION WILL BE REQUIRED.          Urine dipstick:   Lab Results   Component Value Date/Time    Color YELLOW/STRAW 07/17/2019 07:21 AM    Appearance TURBID (A) 07/17/2019 07:21 AM    Specific gravity 1.011 07/17/2019 07:21 AM    Specific gravity 1.015 02/15/2019 04:01 PM    pH (UA) 6.0 07/17/2019 07:21 AM    Protein 100 (A) 07/17/2019 07:21 AM    Glucose NEGATIVE  07/17/2019 07:21 AM    Ketone NEGATIVE  07/17/2019 07:21 AM    Bilirubin NEGATIVE  07/17/2019 07:21 AM    Urobilinogen 0.2 07/17/2019 07:21 AM    Nitrites NEGATIVE 07/17/2019 07:21 AM    Leukocyte Esterase LARGE (A) 07/17/2019 07:21 AM    Epithelial cells FEW 07/17/2019 07:21 AM    Bacteria 2+ (A) 07/17/2019 07:21 AM    WBC >100 (H) 07/17/2019 07:21 AM    RBC 10-20 07/17/2019 07:21 AM       I have reviewed the following: All pertinent labs, microbiology data, radiology imaging for my assessment     Medications list Personally Reviewed   [x]      Yes     []               No       Medications:  Prior to Admission medications    Medication Sig Start Date End Date Taking? Authorizing Provider   allopurinol (ZYLOPRIM) 100 mg tablet Take 100 mg by mouth daily. Yes Provider, Historical   sodium bicarbonate 650 mg tablet Take 1,950 mg by mouth two (2) times a day. Yes Provider, Historical   Darreld Jeans, Natures [place] Histablock complex daily   Yes Provider, Historical   traMADol (ULTRAM) 50 mg tablet Take  mg by mouth two (2) times daily as needed for Pain. Yes Provider, Historical   colchicine 0.6 mg tablet Take 0.6 mg by mouth daily as needed. Yes Lara, MD Neville   furosemide (LASIX) 40 mg tablet Take 40 mg by mouth daily as needed. Yes Lara, MD Neville   OTHER    Yes Neville Bermudez MD   ferrous sulfate 325 mg (65 mg iron) tablet Take 1 Tab by mouth Before breakfast and dinner. 8/6/18  Yes Patricia Bertrand NP   patiromer calcium sorbitex (VELTASSA) 8.4 gram powder Take 8.4 g by mouth daily. SAMPLES FROM MD   Indications: hyperkalemia 8/6/18  Yes Patricia Bertrand NP   metoprolol tartrate (LOPRESSOR) 50 mg tablet Take 1 Tab by mouth two (2) times a day. 8/6/18  Yes Deonte Bertrand NP   OTHER,NON-FORMULARY, daily. Neurim  (B-complex with additional supplements) for Memory    Yes Provider, Historical   aspirin 81 mg chewable tablet Take 81 mg by mouth daily. Yes Lara, MD Neville   mirtazapine (REMERON) 15 mg tablet Take 15 mg by mouth nightly. Other, MD Neville        Thank you for allowing us to participate in the care of this patient.    We will follow patient. Please dont hesitate to call with any questions    Linda Kirkland MD  [address] [facility]   [address] [facility] Cordero [address] [place] [address]  Phone - [phone]   Fax - [phone]  [email or web address]

## 2019-07-17 NOTE — PROGRESS NOTES
Admission Medication Reconciliation:    Information obtained from: patient, chart review, rx query, medication bottles    Significant PMH/Disease States:   Past Medical History:   Diagnosis Date    Celiac disease     Chronic kidney disease     Hypertension     MI (mitral incompetence)     Prostate cancer [facility])        Chief Complaint for this Admission:  vomiting, dizziness    Allergies:  Bactrim [sulfamethoxazole-trimethoprim] and Morphine    Prior to Admission Medications:   Prior to Admission Medications   Prescriptions Last Dose Informant Patient Reported? Taking? OTHER   Yes Yes   OTHER,NON-FORMULARY,   Yes Yes   Sig: daily. Neurim  (B-complex with additional supplements) for Memory    OTHER,NON-FORMULARY,   Yes Yes   Sig: Natures [place] Histablock complex daily   allopurinol (ZYLOPRIM) 100 mg tablet 7/16/2019 at Unknown time  Yes Yes   Sig: Take 100 mg by mouth daily. aspirin 81 mg chewable tablet 7/16/2019 at Unknown time  Yes Yes   Sig: Take 81 mg by mouth daily. colchicine 0.6 mg tablet   Yes Yes   Sig: Take 0.6 mg by mouth daily as needed. ferrous sulfate 325 mg (65 mg iron) tablet 7/16/2019 at Unknown time  No Yes   Sig: Take 1 Tab by mouth Before breakfast and dinner. furosemide (LASIX) 40 mg tablet   Yes Yes   Sig: Take 40 mg by mouth daily as needed. metoprolol tartrate (LOPRESSOR) 50 mg tablet 7/16/2019 at Unknown time  No Yes   Sig: Take 1 Tab by mouth two (2) times a day. mirtazapine (REMERON) 15 mg tablet 7/15/2019  Yes No   Sig: Take 15 mg by mouth nightly. patiromer calcium sorbitex (VELTASSA) 8.4 gram powder 7/16/2019 at Unknown time  No Yes   Sig: Take 8.4 g by mouth daily. SAMPLES FROM MD   Indications: hyperkalemia   sodium bicarbonate 650 mg tablet 7/16/2019 at Unknown time  Yes Yes   Sig: Take 1,950 mg by mouth two (2) times a day. traMADol (ULTRAM) 50 mg tablet   Yes Yes   Sig: Take  mg by mouth two (2) times daily as needed for Pain.       Facility-Administered Medications: None         Comments/Recommendations: Patient is a great historian and had his medication bottles with him.        Removed: bicalutamide 50 mg daily    Changed:  - furosemide and colchicine from daily to as needed  - sodium bicarb from 1 tab BID to 3 tabs BID  - allopurinol from BID to daily    Added: tramadol, added recently discovered allergy to Bactrim      Michelle Gastelum, PharmD  ED EXT [phone]

## 2019-07-17 NOTE — ED NOTES
TRANSFER - OUT REPORT:    Verbal report given to Christian Tinoco RN (name) on [address] [facility].  being transferred to [facility] (unit) for routine progression of care       Report consisted of patients Situation, Background, Assessment and   Recommendations(SBAR). Information from the following report(s) SBAR, Kardex, ED Summary, Intake/Output, MAR and Recent Results was reviewed with the receiving nurse. Lines:   Peripheral IV 07/17/19 Left Antecubital (Active)        Opportunity for questions and clarification was provided.

## 2019-07-17 NOTE — ED PROVIDER NOTES
61-year-old male presenting to ER with complaint of dizziness and nausea. Patient has significant past smoking history of metastatic pancreatic cancer with mets to the spine and lung-not currently undergoing treatment, chronic kidney disease, history of tinnitus and vertigo, hypertension  Patient has been having dysuria and is had 2 UAs which have been negative for urinary tract infection. Patient was prescribed tramadol for his urinary pain. Patient has not seen a urologist as of yet. Tonight was the first night patient took his tramadol dose shortly thereafter started feeling dizzy and nauseous. Patient reports dizziness as if the room was spinning. Currently symptoms are resolved. Patient denies any numbness tingling weakness in the extremities. Patient reports also having current URI-like symptoms with nasal congestion and postnasal drip. Patient denies shortness of breath chest pain or productive cough  Patient denies worsening hearing loss or worsen tinnitus. Patient does have fullness in bilateral ears. No headache or blurry vision or photophobia. Past Medical History:   Diagnosis Date    Celiac disease     Chronic kidney disease     Hypertension     MI (mitral incompetence)     Prostate cancer ([facility] [address]        Past Surgical History:   Procedure Laterality Date    CARDIAC SURG PROCEDURE UNLIST      qaudruple bipass    HX CHOLECYSTECTOMY      HX ORTHOPAEDIC Right 2015    snui in femur from pathological fracture         History reviewed. No pertinent family history.     Social History     Socioeconomic History    Marital status:      Spouse name: Not on file    Number of children: Not on file    Years of education: Not on file    Highest education level: Not on file   Occupational History    Not on file   Social Needs    Financial resource strain: Not on file    Food insecurity:     Worry: Not on file     Inability: Not on file    Transportation needs:     Medical: Not on file     Non-medical: Not on file   Tobacco Use    Smoking status: Former Smoker     Packs/day: 1.50     Years: 10.00     Pack years: 15.00    Smokeless tobacco: Never Used    Tobacco comment: quit smoking age 31's   Substance and Sexual Activity    Alcohol use: Yes     Comment: rarely    Drug use: No    Sexual activity: Not on file   Lifestyle    Physical activity:     Days per week: Not on file     Minutes per session: Not on file    Stress: Not on file   Relationships    Social connections:     Talks on phone: Not on file     Gets together: Not on file     Attends Yazidi service: Not on file     Active member of club or organization: Not on file     Attends meetings of clubs or organizations: Not on file     Relationship status: Not on file    Intimate partner violence:     Fear of current or ex partner: Not on file     Emotionally abused: Not on file     Physically abused: Not on file     Forced sexual activity: Not on file   Other Topics Concern    Not on file   Social History Narrative    Not on file         ALLERGIES: Morphine    Review of Systems   Constitutional: Negative for chills and fever. HENT: Negative for congestion and sore throat. Eyes: Negative for pain. Respiratory: Negative for shortness of breath. Cardiovascular: Negative for chest pain. Gastrointestinal: Positive for nausea. Negative for abdominal pain, diarrhea and vomiting. Genitourinary: Negative for dysuria and flank pain. Musculoskeletal: Negative for back pain and neck pain. Skin: Negative for rash. Neurological: Positive for dizziness. Negative for tremors, seizures, weakness and headaches. Vitals:    07/17/19 0530   BP: 148/78   Pulse: 87   Resp: 16   Temp: 97.8 °F (36.6 °C)   SpO2: 100%            Physical Exam   Constitutional: He is oriented to person, place, and time. He appears well-developed and well-nourished. No distress. HENT:   Head: Normocephalic. Mouth/Throat: No uvula swelling. No posterior oropharyngeal edema. Bilateral ear serous effusions  Nasal congestion and postnasal drip   Eyes: Pupils are equal, round, and reactive to light. Conjunctivae and EOM are normal.   No nystagmus   Neck: Normal range of motion. Neck supple. Cardiovascular: Normal rate and regular rhythm. Abdominal: Soft. Bowel sounds are normal. There is no tenderness. Musculoskeletal: Normal range of motion. He exhibits no edema. Neurological: He is alert and oriented to person, place, and time. He has normal strength. No cranial nerve deficit or sensory deficit. Coordination normal. GCS eye subscore is 4. GCS verbal subscore is 5. GCS motor subscore is 6. Negative Serina-Hallpike  Cranial nerves intact   Skin: Skin is warm. No rash noted. MDM  Number of Diagnoses or Management Options  Acute hyperkalemia:   Acute kidney injury superimposed on chronic kidney disease ([facility] [address]):   Dizziness:   Medication side effect:   Metabolic acidosis:   Diagnosis management comments: 21-year-old who started having dizziness and nausea after taking tramadol for the first time this evening. No other signs or symptoms that would indicate a systemic allergic reaction. Patient has no focal neurologic deficits no nystagmus normal finger-to-nose. Symptoms are currently resolved. Patient does have history of vertigo with chronic tinnitus and currently has URI-like symptoms with nasal congestion and likely sinus fullness. Advised patient to stop taking that medication, and take Tylenol for now for pain    Labs returning for the second time showing acute on chronic kidney disease with hyperkalemia potassium 6.5. Pending official lab confirmation in the lab.   Patient EKG showing no signs of peaked T waves  Patient is on several nephrotoxic medications including Lasix, colchicine, allopurinol  Patient has history of persistent bilateral hydronephrosis despite adequate stenting of the ureters   Ordered calcium gluconate, bicarb, albuterol nebulizer treatment. Amount and/or Complexity of Data Reviewed  Clinical lab tests: reviewed  Tests in the medicine section of CPT®: reviewed      ED Course as of Jul 17 0720 Wed Jul 17, 2019 0720 Spoke with Nephrology: will see pt in the ED. [ZD]      ED Course User Index  [ZD] Carlton Hirsch MD       CRITICAL CARE (ASAP ONLY)  Performed by: Carlton Hirsch MD  Authorized by: Carlton Hirsch MD     Critical care provider statement:     Critical care time (minutes):  40    Critical care time was exclusive of:  Separately billable procedures and treating other patients    Critical care was necessary to treat or prevent imminent or life-threatening deterioration of the following conditions:  Renal failure and metabolic crisis    Critical care was time spent personally by me on the following activities:  Blood draw for specimens, development of treatment plan with patient or surrogate, pulse oximetry, re-evaluation of patient's condition, review of old charts, vascular access procedures, ordering and review of radiographic studies, ordering and review of laboratory studies, ordering and performing treatments and interventions, examination of patient, interpretation of cardiac output measurements, obtaining history from patient or surrogate, evaluation of patient's response to treatment and discussions with consultants          EKG normal sinus rhythm with a rate of 83 bpm with first-degree AV block. Q waves in lead III, aVF, V1. No ST elevation or depressions EKG interpreted by Sarina Jasso MD   Unchanged from previous EKG from February 2019      Hospitalist Roman for Admission  7:20 AM    ED Room Number: ER09/09  Patient Name and age:  Stuart Thompson. 70 y.o.  male  Working Diagnosis:   1. Acute kidney injury superimposed on chronic kidney disease ([facility] [address]    2. Medication side effect    3. Dizziness    4. Acute hyperkalemia    5.  Metabolic acidosis      Readmission: no  Isolation Requirements:  no  Recommended Level of Care:  telemetry  Code Status:  Full cod  Other:

## 2019-07-18 ENCOUNTER — ANESTHESIA (OUTPATIENT)
Dept: SURGERY | Age: 79
DRG: 854 | End: 2019-07-18
Payer: MEDICARE

## 2019-07-18 ENCOUNTER — APPOINTMENT (OUTPATIENT)
Dept: GENERAL RADIOLOGY | Age: 79
DRG: 854 | End: 2019-07-18
Attending: UROLOGY
Payer: MEDICARE

## 2019-07-18 ENCOUNTER — ANESTHESIA EVENT (OUTPATIENT)
Dept: SURGERY | Age: 79
DRG: 854 | End: 2019-07-18
Payer: MEDICARE

## 2019-07-18 LAB
ANION GAP SERPL CALC-SCNC: 6 MMOL/L (ref 5–15)
BACTERIA SPEC CULT: NORMAL
BASOPHILS # BLD: 0 K/UL (ref 0–0.1)
BASOPHILS NFR BLD: 0 % (ref 0–1)
BUN SERPL-MCNC: 70 MG/DL (ref 6–20)
BUN/CREAT SERPL: 13 (ref 12–20)
CALCIUM SERPL-MCNC: 7.3 MG/DL (ref 8.5–10.1)
CC UR VC: NORMAL
CHLORIDE SERPL-SCNC: 108 MMOL/L (ref 97–108)
CO2 SERPL-SCNC: 19 MMOL/L (ref 21–32)
CREAT SERPL-MCNC: 5.42 MG/DL (ref 0.7–1.3)
DIFFERENTIAL METHOD BLD: ABNORMAL
EOSINOPHIL # BLD: 0.2 K/UL (ref 0–0.4)
EOSINOPHIL NFR BLD: 4 % (ref 0–7)
ERYTHROCYTE [DISTWIDTH] IN BLOOD BY AUTOMATED COUNT: 15.2 % (ref 11.5–14.5)
GLUCOSE SERPL-MCNC: 100 MG/DL (ref 65–100)
HCT VFR BLD AUTO: 31.4 % (ref 36.6–50.3)
HGB BLD-MCNC: 9.3 G/DL (ref 12.1–17)
IMM GRANULOCYTES # BLD AUTO: 0 K/UL (ref 0–0.04)
IMM GRANULOCYTES NFR BLD AUTO: 0 % (ref 0–0.5)
LYMPHOCYTES # BLD: 1.3 K/UL (ref 0.8–3.5)
LYMPHOCYTES NFR BLD: 28 % (ref 12–49)
MCH RBC QN AUTO: 30.5 PG (ref 26–34)
MCHC RBC AUTO-ENTMCNC: 29.6 G/DL (ref 30–36.5)
MCV RBC AUTO: 103 FL (ref 80–99)
MONOCYTES # BLD: 0.5 K/UL (ref 0–1)
MONOCYTES NFR BLD: 10 % (ref 5–13)
NEUTS SEG # BLD: 2.7 K/UL (ref 1.8–8)
NEUTS SEG NFR BLD: 58 % (ref 32–75)
NRBC # BLD: 0 K/UL (ref 0–0.01)
NRBC BLD-RTO: 0 PER 100 WBC
PLATELET # BLD AUTO: 158 K/UL (ref 150–400)
PMV BLD AUTO: 9.1 FL (ref 8.9–12.9)
POTASSIUM SERPL-SCNC: 4.7 MMOL/L (ref 3.5–5.1)
RBC # BLD AUTO: 3.05 M/UL (ref 4.1–5.7)
SERVICE CMNT-IMP: NORMAL
SODIUM SERPL-SCNC: 133 MMOL/L (ref 136–145)
WBC # BLD AUTO: 4.7 K/UL (ref 4.1–11.1)

## 2019-07-18 PROCEDURE — 74011250636 HC RX REV CODE- 250/636

## 2019-07-18 PROCEDURE — 76010000149 HC OR TIME 1 TO 1.5 HR: Performed by: UROLOGY

## 2019-07-18 PROCEDURE — C2617 STENT, NON-COR, TEM W/O DEL: HCPCS | Performed by: UROLOGY

## 2019-07-18 PROCEDURE — 74011250636 HC RX REV CODE- 250/636: Performed by: UROLOGY

## 2019-07-18 PROCEDURE — 74011250637 HC RX REV CODE- 250/637: Performed by: UROLOGY

## 2019-07-18 PROCEDURE — 74011250636 HC RX REV CODE- 250/636: Performed by: NURSE PRACTITIONER

## 2019-07-18 PROCEDURE — 74420 UROGRAPHY RTRGR +-KUB: CPT

## 2019-07-18 PROCEDURE — BT141ZZ FLUOROSCOPY OF KIDNEYS, URETERS AND BLADDER USING LOW OSMOLAR CONTRAST: ICD-10-PCS | Performed by: UROLOGY

## 2019-07-18 PROCEDURE — 65660000000 HC RM CCU STEPDOWN

## 2019-07-18 PROCEDURE — 36415 COLL VENOUS BLD VENIPUNCTURE: CPT

## 2019-07-18 PROCEDURE — 74011000250 HC RX REV CODE- 250: Performed by: UROLOGY

## 2019-07-18 PROCEDURE — 76450000000

## 2019-07-18 PROCEDURE — 80048 BASIC METABOLIC PNL TOTAL CA: CPT

## 2019-07-18 PROCEDURE — 77030019927 HC TBNG IRR CYSTO BAXT -A: Performed by: UROLOGY

## 2019-07-18 PROCEDURE — 77030010509 HC AIRWY LMA MSK TELE -A: Performed by: ANESTHESIOLOGY

## 2019-07-18 PROCEDURE — 85025 COMPLETE CBC W/AUTO DIFF WBC: CPT

## 2019-07-18 PROCEDURE — 3E1K88Z IRRIGATION OF GENITOURINARY TRACT USING IRRIGATING SUBSTANCE, VIA NATURAL OR ARTIFICIAL OPENING ENDOSCOPIC: ICD-10-PCS | Performed by: UROLOGY

## 2019-07-18 PROCEDURE — 77030018846 HC SOL IRR STRL H20 ICUM -A: Performed by: UROLOGY

## 2019-07-18 PROCEDURE — 77030018832 HC SOL IRR H20 ICUM -A: Performed by: UROLOGY

## 2019-07-18 PROCEDURE — 76060000033 HC ANESTHESIA 1 TO 1.5 HR: Performed by: UROLOGY

## 2019-07-18 PROCEDURE — C1769 GUIDE WIRE: HCPCS | Performed by: UROLOGY

## 2019-07-18 PROCEDURE — C1758 CATHETER, URETERAL: HCPCS | Performed by: UROLOGY

## 2019-07-18 PROCEDURE — 0T788DZ DILATION OF BILATERAL URETERS WITH INTRALUMINAL DEVICE, VIA NATURAL OR ARTIFICIAL OPENING ENDOSCOPIC: ICD-10-PCS | Performed by: UROLOGY

## 2019-07-18 PROCEDURE — 76210000016 HC OR PH I REC 1 TO 1.5 HR: Performed by: UROLOGY

## 2019-07-18 PROCEDURE — 94760 N-INVAS EAR/PLS OXIMETRY 1: CPT

## 2019-07-18 PROCEDURE — 74011000258 HC RX REV CODE- 258: Performed by: UROLOGY

## 2019-07-18 PROCEDURE — 74011250637 HC RX REV CODE- 250/637: Performed by: NURSE PRACTITIONER

## 2019-07-18 PROCEDURE — 0TP98DZ REMOVAL OF INTRALUMINAL DEVICE FROM URETER, VIA NATURAL OR ARTIFICIAL OPENING ENDOSCOPIC: ICD-10-PCS | Performed by: UROLOGY

## 2019-07-18 PROCEDURE — 77010033678 HC OXYGEN DAILY

## 2019-07-18 PROCEDURE — 74011250636 HC RX REV CODE- 250/636: Performed by: ANESTHESIOLOGY

## 2019-07-18 RX ORDER — HYDROMORPHONE HYDROCHLORIDE 1 MG/ML
1 INJECTION, SOLUTION INTRAMUSCULAR; INTRAVENOUS; SUBCUTANEOUS ONCE
Status: COMPLETED | OUTPATIENT
Start: 2019-07-18 | End: 2019-07-18

## 2019-07-18 RX ORDER — MIDAZOLAM HYDROCHLORIDE 1 MG/ML
INJECTION, SOLUTION INTRAMUSCULAR; INTRAVENOUS AS NEEDED
Status: DISCONTINUED | OUTPATIENT
Start: 2019-07-18 | End: 2019-07-18 | Stop reason: HOSPADM

## 2019-07-18 RX ORDER — ACETAMINOPHEN 325 MG/1
650 TABLET ORAL ONCE
Status: ACTIVE | OUTPATIENT
Start: 2019-07-18 | End: 2019-07-18

## 2019-07-18 RX ORDER — MIDAZOLAM HYDROCHLORIDE 1 MG/ML
1 INJECTION, SOLUTION INTRAMUSCULAR; INTRAVENOUS AS NEEDED
Status: DISCONTINUED | OUTPATIENT
Start: 2019-07-18 | End: 2019-07-21 | Stop reason: HOSPADM

## 2019-07-18 RX ORDER — SODIUM CHLORIDE 0.9 % (FLUSH) 0.9 %
5-40 SYRINGE (ML) INJECTION AS NEEDED
Status: DISCONTINUED | OUTPATIENT
Start: 2019-07-18 | End: 2019-07-18

## 2019-07-18 RX ORDER — SODIUM CHLORIDE, SODIUM LACTATE, POTASSIUM CHLORIDE, CALCIUM CHLORIDE 600; 310; 30; 20 MG/100ML; MG/100ML; MG/100ML; MG/100ML
75 INJECTION, SOLUTION INTRAVENOUS CONTINUOUS
Status: DISCONTINUED | OUTPATIENT
Start: 2019-07-18 | End: 2019-07-18

## 2019-07-18 RX ORDER — SODIUM CHLORIDE 9 MG/ML
25 INJECTION, SOLUTION INTRAVENOUS CONTINUOUS
Status: DISCONTINUED | OUTPATIENT
Start: 2019-07-18 | End: 2019-07-18

## 2019-07-18 RX ORDER — LIDOCAINE HYDROCHLORIDE 10 MG/ML
0.1 INJECTION, SOLUTION EPIDURAL; INFILTRATION; INTRACAUDAL; PERINEURAL AS NEEDED
Status: DISCONTINUED | OUTPATIENT
Start: 2019-07-18 | End: 2019-07-21 | Stop reason: HOSPADM

## 2019-07-18 RX ORDER — SODIUM CHLORIDE, SODIUM LACTATE, POTASSIUM CHLORIDE, CALCIUM CHLORIDE 600; 310; 30; 20 MG/100ML; MG/100ML; MG/100ML; MG/100ML
125 INJECTION, SOLUTION INTRAVENOUS CONTINUOUS
Status: DISCONTINUED | OUTPATIENT
Start: 2019-07-18 | End: 2019-07-18

## 2019-07-18 RX ORDER — MIDAZOLAM HYDROCHLORIDE 1 MG/ML
0.5 INJECTION, SOLUTION INTRAMUSCULAR; INTRAVENOUS
Status: DISCONTINUED | OUTPATIENT
Start: 2019-07-18 | End: 2019-07-18

## 2019-07-18 RX ORDER — ONDANSETRON 2 MG/ML
4 INJECTION INTRAMUSCULAR; INTRAVENOUS AS NEEDED
Status: DISCONTINUED | OUTPATIENT
Start: 2019-07-18 | End: 2019-07-18

## 2019-07-18 RX ORDER — HYDROMORPHONE HYDROCHLORIDE 2 MG/1
1 TABLET ORAL
Status: DISCONTINUED | OUTPATIENT
Start: 2019-07-18 | End: 2019-07-19

## 2019-07-18 RX ORDER — SODIUM CHLORIDE 0.9 % (FLUSH) 0.9 %
5-40 SYRINGE (ML) INJECTION EVERY 8 HOURS
Status: DISCONTINUED | OUTPATIENT
Start: 2019-07-18 | End: 2019-07-21 | Stop reason: HOSPADM

## 2019-07-18 RX ORDER — FENTANYL CITRATE 50 UG/ML
INJECTION, SOLUTION INTRAMUSCULAR; INTRAVENOUS
Status: DISPENSED
Start: 2019-07-18 | End: 2019-07-18

## 2019-07-18 RX ORDER — ONDANSETRON 2 MG/ML
INJECTION INTRAMUSCULAR; INTRAVENOUS AS NEEDED
Status: DISCONTINUED | OUTPATIENT
Start: 2019-07-18 | End: 2019-07-18 | Stop reason: HOSPADM

## 2019-07-18 RX ORDER — ROPIVACAINE HYDROCHLORIDE 5 MG/ML
30 INJECTION, SOLUTION EPIDURAL; INFILTRATION; PERINEURAL ONCE
Status: ACTIVE | OUTPATIENT
Start: 2019-07-18 | End: 2019-07-18

## 2019-07-18 RX ORDER — PROPOFOL 10 MG/ML
INJECTION, EMULSION INTRAVENOUS AS NEEDED
Status: DISCONTINUED | OUTPATIENT
Start: 2019-07-18 | End: 2019-07-18 | Stop reason: HOSPADM

## 2019-07-18 RX ORDER — SODIUM CHLORIDE 0.9 % (FLUSH) 0.9 %
5-40 SYRINGE (ML) INJECTION EVERY 8 HOURS
Status: DISCONTINUED | OUTPATIENT
Start: 2019-07-18 | End: 2019-07-18

## 2019-07-18 RX ORDER — FENTANYL CITRATE 50 UG/ML
25 INJECTION, SOLUTION INTRAMUSCULAR; INTRAVENOUS
Status: COMPLETED | OUTPATIENT
Start: 2019-07-18 | End: 2019-07-18

## 2019-07-18 RX ORDER — HYDROMORPHONE HYDROCHLORIDE 1 MG/ML
INJECTION, SOLUTION INTRAMUSCULAR; INTRAVENOUS; SUBCUTANEOUS
Status: COMPLETED
Start: 2019-07-18 | End: 2019-07-18

## 2019-07-18 RX ORDER — HYDROMORPHONE HYDROCHLORIDE 1 MG/ML
0.2 INJECTION, SOLUTION INTRAMUSCULAR; INTRAVENOUS; SUBCUTANEOUS
Status: DISCONTINUED | OUTPATIENT
Start: 2019-07-18 | End: 2019-07-18

## 2019-07-18 RX ORDER — SODIUM CHLORIDE 0.9 % (FLUSH) 0.9 %
5-40 SYRINGE (ML) INJECTION AS NEEDED
Status: DISCONTINUED | OUTPATIENT
Start: 2019-07-18 | End: 2019-07-21 | Stop reason: HOSPADM

## 2019-07-18 RX ORDER — LIDOCAINE HYDROCHLORIDE 20 MG/ML
INJECTION, SOLUTION EPIDURAL; INFILTRATION; INTRACAUDAL; PERINEURAL AS NEEDED
Status: DISCONTINUED | OUTPATIENT
Start: 2019-07-18 | End: 2019-07-18 | Stop reason: HOSPADM

## 2019-07-18 RX ORDER — MORPHINE SULFATE 10 MG/ML
2 INJECTION, SOLUTION INTRAMUSCULAR; INTRAVENOUS
Status: DISCONTINUED | OUTPATIENT
Start: 2019-07-18 | End: 2019-07-18

## 2019-07-18 RX ORDER — FENTANYL CITRATE 50 UG/ML
50 INJECTION, SOLUTION INTRAMUSCULAR; INTRAVENOUS AS NEEDED
Status: DISCONTINUED | OUTPATIENT
Start: 2019-07-18 | End: 2019-07-18

## 2019-07-18 RX ORDER — SODIUM CHLORIDE, SODIUM LACTATE, POTASSIUM CHLORIDE, CALCIUM CHLORIDE 600; 310; 30; 20 MG/100ML; MG/100ML; MG/100ML; MG/100ML
INJECTION, SOLUTION INTRAVENOUS
Status: DISCONTINUED | OUTPATIENT
Start: 2019-07-18 | End: 2019-07-18 | Stop reason: HOSPADM

## 2019-07-18 RX ORDER — DIPHENHYDRAMINE HYDROCHLORIDE 50 MG/ML
12.5 INJECTION, SOLUTION INTRAMUSCULAR; INTRAVENOUS AS NEEDED
Status: DISCONTINUED | OUTPATIENT
Start: 2019-07-18 | End: 2019-07-18

## 2019-07-18 RX ORDER — PHENYLEPHRINE HCL IN 0.9% NACL 0.4MG/10ML
SYRINGE (ML) INTRAVENOUS AS NEEDED
Status: DISCONTINUED | OUTPATIENT
Start: 2019-07-18 | End: 2019-07-18 | Stop reason: HOSPADM

## 2019-07-18 RX ORDER — FENTANYL CITRATE 50 UG/ML
INJECTION, SOLUTION INTRAMUSCULAR; INTRAVENOUS AS NEEDED
Status: DISCONTINUED | OUTPATIENT
Start: 2019-07-18 | End: 2019-07-18 | Stop reason: HOSPADM

## 2019-07-18 RX ADMIN — FERROUS SULFATE TAB 325 MG (65 MG ELEMENTAL FE) 325 MG: 325 (65 FE) TAB at 10:31

## 2019-07-18 RX ADMIN — METOPROLOL TARTRATE 25 MG: 25 TABLET ORAL at 10:31

## 2019-07-18 RX ADMIN — FENTANYL CITRATE 25 MCG: 50 INJECTION, SOLUTION INTRAMUSCULAR; INTRAVENOUS at 07:43

## 2019-07-18 RX ADMIN — Medication 80 MCG: at 08:03

## 2019-07-18 RX ADMIN — Medication 10 ML: at 14:35

## 2019-07-18 RX ADMIN — PROPOFOL 50 MG: 10 INJECTION, EMULSION INTRAVENOUS at 08:19

## 2019-07-18 RX ADMIN — ACETAMINOPHEN 650 MG: 325 TABLET ORAL at 20:12

## 2019-07-18 RX ADMIN — MIDAZOLAM 0.5 MG: 1 INJECTION INTRAMUSCULAR; INTRAVENOUS at 09:17

## 2019-07-18 RX ADMIN — LIDOCAINE HYDROCHLORIDE 60 MG: 20 INJECTION, SOLUTION EPIDURAL; INFILTRATION; INTRACAUDAL; PERINEURAL at 07:52

## 2019-07-18 RX ADMIN — SODIUM CHLORIDE, SODIUM LACTATE, POTASSIUM CHLORIDE, AND CALCIUM CHLORIDE 125 ML/HR: 600; 310; 30; 20 INJECTION, SOLUTION INTRAVENOUS at 07:43

## 2019-07-18 RX ADMIN — FENTANYL CITRATE 25 MCG: 50 INJECTION, SOLUTION INTRAMUSCULAR; INTRAVENOUS at 07:49

## 2019-07-18 RX ADMIN — ONDANSETRON 4 MG: 2 INJECTION INTRAMUSCULAR; INTRAVENOUS at 08:05

## 2019-07-18 RX ADMIN — PROPOFOL 80 MG: 10 INJECTION, EMULSION INTRAVENOUS at 07:53

## 2019-07-18 RX ADMIN — HYDROMORPHONE HYDROCHLORIDE 0.2 MG: 1 INJECTION, SOLUTION INTRAMUSCULAR; INTRAVENOUS; SUBCUTANEOUS at 09:48

## 2019-07-18 RX ADMIN — HYDROMORPHONE HYDROCHLORIDE 1 MG: 1 INJECTION, SOLUTION INTRAMUSCULAR; INTRAVENOUS; SUBCUTANEOUS at 14:35

## 2019-07-18 RX ADMIN — FENTANYL CITRATE 25 MCG: 50 INJECTION, SOLUTION INTRAMUSCULAR; INTRAVENOUS at 09:16

## 2019-07-18 RX ADMIN — SODIUM CHLORIDE, SODIUM LACTATE, POTASSIUM CHLORIDE, CALCIUM CHLORIDE: 600; 310; 30; 20 INJECTION, SOLUTION INTRAVENOUS at 07:37

## 2019-07-18 RX ADMIN — MIDAZOLAM HYDROCHLORIDE 2 MG: 1 INJECTION, SOLUTION INTRAMUSCULAR; INTRAVENOUS at 07:37

## 2019-07-18 RX ADMIN — PROPOFOL 50 MG: 10 INJECTION, EMULSION INTRAVENOUS at 08:29

## 2019-07-18 RX ADMIN — PIPERACILLIN SODIUM,TAZOBACTAM SODIUM 3.38 G: 3; .375 INJECTION, POWDER, FOR SOLUTION INTRAVENOUS at 10:30

## 2019-07-18 RX ADMIN — FENTANYL CITRATE 25 MCG: 50 INJECTION, SOLUTION INTRAMUSCULAR; INTRAVENOUS at 08:01

## 2019-07-18 RX ADMIN — FENTANYL CITRATE 25 MCG: 50 INJECTION, SOLUTION INTRAMUSCULAR; INTRAVENOUS at 09:21

## 2019-07-18 RX ADMIN — FERROUS SULFATE TAB 325 MG (65 MG ELEMENTAL FE) 325 MG: 325 (65 FE) TAB at 18:13

## 2019-07-18 RX ADMIN — ASPIRIN 81 MG 81 MG: 81 TABLET ORAL at 10:31

## 2019-07-18 RX ADMIN — FENTANYL CITRATE 25 MCG: 50 INJECTION, SOLUTION INTRAMUSCULAR; INTRAVENOUS at 07:37

## 2019-07-18 RX ADMIN — MIRTAZAPINE 15 MG: 15 TABLET, FILM COATED ORAL at 22:21

## 2019-07-18 RX ADMIN — METOPROLOL TARTRATE 25 MG: 25 TABLET ORAL at 18:13

## 2019-07-18 RX ADMIN — ALLOPURINOL 100 MG: 100 TABLET ORAL at 10:31

## 2019-07-18 RX ADMIN — FENTANYL CITRATE 25 MCG: 50 INJECTION, SOLUTION INTRAMUSCULAR; INTRAVENOUS at 09:05

## 2019-07-18 RX ADMIN — MIDAZOLAM 0.5 MG: 1 INJECTION INTRAMUSCULAR; INTRAVENOUS at 09:27

## 2019-07-18 RX ADMIN — FENTANYL CITRATE 25 MCG: 50 INJECTION, SOLUTION INTRAMUSCULAR; INTRAVENOUS at 09:11

## 2019-07-18 RX ADMIN — Medication 10 ML: at 22:00

## 2019-07-18 RX ADMIN — PROPOFOL 50 MG: 10 INJECTION, EMULSION INTRAVENOUS at 08:26

## 2019-07-18 RX ADMIN — PROPOFOL 30 MG: 10 INJECTION, EMULSION INTRAVENOUS at 08:12

## 2019-07-18 RX ADMIN — DEXTROSE MONOHYDRATE: 5 INJECTION, SOLUTION INTRAVENOUS at 16:31

## 2019-07-18 RX ADMIN — HYDROMORPHONE HYDROCHLORIDE 1 MG: 2 TABLET ORAL at 18:12

## 2019-07-18 RX ADMIN — MIDAZOLAM HYDROCHLORIDE 1 MG: 1 INJECTION, SOLUTION INTRAMUSCULAR; INTRAVENOUS at 19:13

## 2019-07-18 NOTE — OP NOTES
[address]   OPERATIVE REPORT    Name:  Rodolfo Aponte  MR#:  [id number]  :  1940  ACCOUNT #:  [de-identified]  DATE OF SERVICE:  2019      PREOPERATIVE DIAGNOSES:  History of metastatic adenocarcinoma of the prostate with bilateral ureteral obstruction and renal atrophy with hydronephrosis. POSTOPERATIVE DIAGNOSES:  History of metastatic adenocarcinoma of the prostate with bilateral ureteral obstruction and renal atrophy with hydronephrosis. PROCEDURES PERFORMED:  Cystoscopy and bilateral ureteral stent exchange, complicated. SURGEON:  Anatoly Hills MD    ASSISTANT:  Staff. ANESTHESIA:  General.    COMPLICATIONS:  None. SPECIMENS REMOVED:  None. IMPLANTS:  Ureteral stents (2)    ESTIMATED BLOOD LOSS:  50 mL. OPERATIVE PROCEDURE:  Following satisfactory premedication, the patient was induced under general anesthetic and placed in the dorsal lithotomy position. A proper time-out was carried out. His genitalia was prepped and draped with Betadine solution. Cystoscopy was then carried out with a 21 panendoscope under camera control demonstrating a normal urethra and chronic friable and fibrotic-appearing prostatic fossa compatible with his advanced prostate cancer with extreme capillary fragility. Simply touching the area makes it bleeds substantially. I was able to visualize 2 stents, neither one had string. Grasping one the stents by the end with a grasping forceps and brought it out of urinary meatus. This turned out to be the right ureteral stent. I then placed a 0.35 Sensor wire up into the right renal pelvis and then placed a 28-cm 6-[place] citizen Contour stent with a 1-inch string attached to the distal end. I then attempted to do the same thing on the left side and went in and grasped the left side of the stent and brought it out through the urinary meatus.   I placed the guidewire up into the left renal pelvis and then for some odd reason, the stent was noted to be extremely difficult to pass over the guidewire, it was quite tight. After series of manipulations, it was really obvious that the stent was jammed up at the end of the scope. I, therefore, had to remove the entire system on the left side and then redo his cystoscopic exam with great effort because of all the bloody urine, I was subsequently able to find the orifice on the left side and after 10 minutes of looking and was able to cannulate it with a guidewire. The Sensor wire was placed up into the renal pelvis, which 28-cm 6-[place]ra[place] stent was placed and properly positioned. I then washed the bladder out. Urine was somewhat pink to chris-tinged, but I did not leave the catheter in because the patient is completely incontinent. The patient tolerated the procedure well and returned to the recovery area in satisfactory condition.         Deondre Irvingly, MD MORRELL/[id number]  D:  07/18/2019 8:42  T:  07/18/2019 10:22  JOB #:  [id number]

## 2019-07-18 NOTE — PROGRESS NOTES
Hospitalist Progress Note  Vinny Arias NP  Answering service: [phone] from in house phone  Cell: [phone]   Date of Service:  2019  NAME:  Ortega Mayberry. :  1940  MRN:  [id number]    Admission Summary:   Pt originally went to his PCP due to painful urination. His UA was negative so was prescribed tramadol for pain. He took the first dose of 50 mg tramadol the night before admit, felt dizzy and lightheaded and then associated with nausea and 2-3 episodes of vomiting. He came to the hospital for further evaluation.     Interval history / Subjective:   Pt in bed, having some pain (dilaudid ordered IV x 1 now) in his flanks and abdomen. Hungry and wants to eat - he has spoken with kitchen about his tray and preferences. Assessment & Plan:     Hyperkalemia: resolved  - s/p kayexalate  - takes veltassa (missed dose PTA), this has been resumed    JASIEL on CKD stage 5, Metabolic acidosis:  - Cr: 5.42 today which was 4.96 in 2019.  Likely has progression of kidney disease  - on bicarb drip   - Nephrology consulted and following  - check labs again in am, pt s/p stent replacement today     UTI  Hx of b/l hydronephrosis due to prostate cancer with stent placement in 2018:  - patient complaints of burning and pain while urination in the last few days PTA  - UA shows >100 WBCs with 2+ bacteria, he has ureteral stents  - continue with zosyn  - urine cultures with no indication of infection  - blood cultures with no growth to date  - : s/p bilateral ureteral stent replacement (difficult) and clot removal     Dizziness/lightheadedness: Resolved for now    Nausea/vomiting: Resolved  - side effects due to tramadol, discussed with the patient  - prn zofran     Hx of metastatic prostate cancer: on casodex  - palliative care consulted, family meeting scheduled for tomorrow     Hx of CAD s/p CABG: resume aspirin and metoprolol     Hx HTN: resume home meds    Code status: Full  DVT prophylaxis: SCDs  Care Plan discussed with: patient, nurse, attending MD  Disposition: Home when able     Hospital Problems  Date Reviewed: 7/16/2018          Codes Class Noted POA    * (Principal) JASIEL (acute kidney injury) ([facility] ICD-10-CM: N17.9  ICD-9-CM: 584.9  7/17/2019 Unknown        Coronary artery disease involving native coronary artery of native heart without angina pectoris ICD-10-CM: I25.10  ICD-9-CM: 414.01  8/31/2018 Yes        Essential hypertension ICD-10-CM: I10  ICD-9-CM: 401.9  8/31/2018 Yes        Prostate cancer ([facility] ICD-10-CM: C61  ICD-9-CM: 185  8/31/2018 Yes        Hyperkalemia ICD-10-CM: E87.5  ICD-9-CM: 276.7  6/5/2018 Yes            Review of Systems:   Denies HA. No chest pain or pressure. No respiratory complaints. No Nausea/Vomiting and is hungry. Vital Signs:    Last 24hrs VS reviewed since prior progress note. Most recent are:  Visit Vitals  BP (!) 178/107 (BP 1 Location: Left arm, BP Patient Position: At rest;Supine)   Pulse 76   Temp 98.3 °F (36.8 °C)   Resp 18   Ht 5' 11\" (1.803 m)   Wt 104 kg (229 lb 4.5 oz)   SpO2 95%   BMI 31.98 kg/m²       Intake/Output Summary (Last 24 hours) at 7/18/2019 1655  Last data filed at 7/18/2019 1430  Gross per 24 hour   Intake 700 ml   Output 1625 ml   Net -925 ml     Physical Examination:         Constitutional:  No acute distress, cooperative, pleasant    ENT:  Oral MM dry. Resp:  CTA bilaterally. CV:  Regular rhythm, normal rate, no murmurs    GI:  Soft, non distended, tender. Normoactive bowel sounds. Musculoskeletal:  No edema, warm, 2+ pulses throughout    Neurologic:  Moves all extremities. AAOx3.    Psych: Calm and cooperative       Data Review:   Review and/or order of clinical lab test  Review and/or order of tests in the radiology section of CPT  Review and/or order of tests in the medicine section of CPT    Labs:     Recent Labs     07/18/19  1986 07/17/19  0554   WBC 4.7 7.6   HGB 9.3* 11.4*   HCT 31.4* 36.5*    208     Recent Labs     07/18/19  0426 07/17/19  1238 07/17/19  0644   * 138 134*   K 4.7 5.1 6.0*    106 108   CO2 19* 19* 18*   BUN 70* 81* 78*   CREA 5.42* 5.50* 5.48*    222* 103*   CA 7.3* 7.4* 7.8*     Recent Labs     07/17/19  0644   SGOT 10*   ALT 12   AP 83   TBILI 0.4   TP 7.8   ALB 3.1*   GLOB 4.7*     No results for input(s): INR, PTP, APTT in the last 72 hours. No lab exists for component: INREXT   No results for input(s): FE, TIBC, PSAT, FERR in the last 72 hours. No results found for: FOL, RBCF   No results for input(s): PH, PCO2, PO2 in the last 72 hours. No results for input(s): CPK, CKNDX, TROIQ in the last 72 hours.     No lab exists for component: CPKMB  Lab Results   Component Value Date/Time    Cholesterol, total 85 10/07/2013 12:00 AM    HDL Cholesterol 5 10/07/2013 12:00 AM    LDL, calculated 39.6 10/07/2013 12:00 AM    Triglyceride 202 (H) 10/07/2013 12:00 AM    CHOL/HDL Ratio 17.0 (H) 10/07/2013 12:00 AM     Lab Results   Component Value Date/Time    Glucose (POC) 105 (H) 07/17/2019 06:48 AM    Glucose (POC) 105 (H) 07/17/2019 05:56 AM    Glucose (POC) 137 (H) 07/18/2018 05:06 PM    Glucose (POC) 100 07/17/2018 06:07 AM    Glucose (POC) 59 (L) 06/20/2018 05:03 PM    Glucose (POC) 101 (H) 06/20/2018 03:14 PM    Glucose (POC) 83 06/10/2018 06:26 AM    Glucose (POC) 108 (H) 06/09/2018 10:08 PM    Glucose (POC) 138 (H) 06/09/2018 06:30 PM     Lab Results   Component Value Date/Time    Color YELLOW/STRAW 07/17/2019 07:21 AM    Appearance TURBID (A) 07/17/2019 07:21 AM    Specific gravity 1.011 07/17/2019 07:21 AM    Specific gravity 1.015 02/15/2019 04:01 PM    pH (UA) 6.0 07/17/2019 07:21 AM    Protein 100 (A) 07/17/2019 07:21 AM    Glucose NEGATIVE  07/17/2019 07:21 AM    Ketone NEGATIVE  07/17/2019 07:21 AM    Bilirubin NEGATIVE  07/17/2019 07:21 AM    Urobilinogen 0.2 07/17/2019 07:21 AM Nitrites NEGATIVE  07/17/2019 07:21 AM    Leukocyte Esterase LARGE (A) 07/17/2019 07:21 AM    Epithelial cells FEW 07/17/2019 07:21 AM    Bacteria 2+ (A) 07/17/2019 07:21 AM    WBC >100 (H) 07/17/2019 07:21 AM    RBC 10-20 07/17/2019 07:21 AM     Medications Reviewed:     Current Facility-Administered Medications   Medication Dose Route Frequency    sodium chloride (NS) flush 5-40 mL  5-40 mL IntraVENous Q8H    sodium chloride (NS) flush 5-40 mL  5-40 mL IntraVENous PRN    lidocaine (PF) (XYLOCAINE) 10 mg/mL (1 %) injection 0.1 mL  0.1 mL SubCUTAneous PRN    midazolam (VERSED) injection 1 mg  1 mg IntraVENous PRN    midazolam (VERSED) injection 1 mg  1 mg IntraVENous PRN    acetaminophen (TYLENOL) tablet 650 mg  650 mg Oral ONCE    ropivacaine (PF) (NAROPIN) 5 mg/mL (0.5 %) injection 150 mg  30 mL Peripheral Nerve Block ONCE    fentaNYL citrate (PF) 50 mcg/mL injection        sodium bicarbonate (8.4%) 150 mEq in dextrose 5% 1,000 mL infusion   IntraVENous CONTINUOUS    HYDROmorphone (DILAUDID) tablet 1 mg  1 mg Oral Q4H PRN    piperacillin-tazobactam (ZOSYN) 3.375 g in 0.9% sodium chloride (MBP/ADV) 100 mL  3.375 g IntraVENous Q12H    allopurinol (ZYLOPRIM) tablet 100 mg  100 mg Oral DAILY    aspirin chewable tablet 81 mg  81 mg Oral DAILY    ferrous sulfate tablet 325 mg  325 mg Oral ACB&D    mirtazapine (REMERON) tablet 15 mg  15 mg Oral QHS    patiromer calcium sorbitex (VELTASSA) powder 8.4 g  8.4 g Oral DAILY    metoprolol tartrate (LOPRESSOR) tablet 25 mg  25 mg Oral BID    ondansetron (ZOFRAN) injection 4 mg  4 mg IntraVENous Q6H PRN    acetaminophen (TYLENOL) tablet 650 mg  650 mg Oral Q6H PRN    lidocaine (LIDODERM) 5 % patch 1 Patch  1 Patch TransDERmal Q24H   ______________________________________________________________________  EXPECTED LENGTH OF STAY: 3d 4h  ACTUAL LENGTH OF STAY:          1               Ashish White NP

## 2019-07-18 NOTE — PROGRESS NOTES
Problem: Risk for Spread of Infection  Goal: Prevent transmission of infectious organism to others  Description  Prevent the transmission of infectious organisms to other patients, staff members, and visitors.   Outcome: Progressing Towards Goal     Problem: Patient Education:  Go to Education Activity  Goal: Patient/Family Education  Outcome: Progressing Towards Goal     Problem: Hypertension  Goal: *Blood pressure within specified parameters  Outcome: Progressing Towards Goal  Goal: *Fluid volume balance  Outcome: Progressing Towards Goal  Goal: *Labs within defined limits  Outcome: Progressing Towards Goal     Problem: Fluid Volume - Risk of, Imbalanced  Goal: *Balanced intake and output  Outcome: Progressing Towards Goal

## 2019-07-18 NOTE — BRIEF OP NOTE
BRIEF OPERATIVE NOTE    Date of Procedure: 7/18/2019   Preoperative Diagnosis: HYDRONEPHROSIS  Postoperative Diagnosis: HYDRONEPHROSIS    Procedure(s):  CYSTOSCOPY, DIFFICULT BILATERAL URETERAL STENT EXCHANGE, CLOT EVACUATION  Surgeon(s) and Role:     * Leidy Larsen MD - Primary         Surgical Assistant: staff    Surgical Staff:  Circ-1: Radha Pedersen  Scrub RN-1: Alexandra Arias RN  Event Time In Time Out   Incision Start 07/18/2019 0804    Incision Close 07/18/2019 0835      Anesthesia: General   Estimated Blood Loss: 50cc  Specimens: * No specimens in log *   Findings: difficult exchange due to oozing from friable prostate vessels and wire problems   Complications: none  Implants: * No implants in log *

## 2019-07-18 NOTE — PROGRESS NOTES
Rounded on [facility] patients and provided Communion and Anointing of the Sick  at request of patient    Fr. Supa Ewing

## 2019-07-18 NOTE — CONSULTS
Palliative Medicine Consult  Juan Jose: [phone] ([phone])    Patient Name: Lise Carvalho. YOB: 1940    Date of Initial Consult: July 18, 2019  Reason for Consult: Care Decisions  Requesting Provider: Dr. Trinh   Primary Care Physician: Portia Alegre MD     SUMMARY:   Lise Crocker is a 70 y.o. with a past history of metastatic adenocarcinoma of the prostate with bilateral urethral obstruction, ,celieac disease, hydronephrosis, HTN,CAD s/p CABG,  Mitral incompetence, CKD who was admitted on 7/17/2019 from [address] due to vomiting, dysuria with a diagnosis of hyperkalemia, JASIEL on CKD5, metabolic acidosis, UTI, dizziness. Concerns of stent occulusion of the urethra. confirmed and pt now s/p cystoscopy with difficult bilateral ureteral stent exchange and clot evacuation 7/18/19. Concepcion Romero No emergent need for dialysis. Current medical issues leading to Palliative Medicine involvement include: support with care decisions and ACP. Pt has an AMD on file    Social: lives In [place], 2 children, able to complete all ADL's     PALLIATIVE DIAGNOSES:   1. Dysuria  2. Vomiting~ resolved  3. Hypoalbuminemia   4. Physical debility     PLAN:   1. Chart reviewed  2. Introduced services to the pt. Discussed wish to have a family meeting to simply review pt and family's understanding, discuss goals, questions and concerns. 3. Pt s/p procedure and having some painful urination (burning). He has pain medication that is wearing off,  Fentanyl iv and iv dilaudid has been admisnstered. Pt says he is feeling a little \"loopy\" from it. 4. Spoke with daughter who is interested in speaking with our team and a family meeting has been scheduled at 12pm Friday 7/19/19  5. Goals are for recovery at this time. Pt did not share any specific concerns  6. Initial consult note routed to primary continuity provider and/or primary health care team members  7.  Communicated plan of care with: Palliative IDT, Hospital Health Care Team     GOALS OF CARE / TREATMENT PREFERENCES:     GOALS OF CARE:  Patient/Health Care Proxy Stated Goals: Prolong life    TREATMENT PREFERENCES:   Code Status: Full Code    Advance Care Planning:  [x] [facility] Interdisciplinary Team has updated the ACP Navigator with Health Care Decision Maker and Patient Capacity     Ankur Howard daughter    Advance Care Planning 7/17/2019   Patient's Healthcare Decision Maker is: -   Confirm Advance Directive Yes, on file       Medical Interventions: Full interventions     Other Instructions: Other:    As far as possible, the palliative care team has discussed with patient / health care proxy about goals of care / treatment preferences for patient. HISTORY:     History obtained from: chart, pt, team    CHIEF COMPLAINT: Pt admitted with aforementioned issues. Shauna Miller HPI/SUBJECTIVE:    The patient is:   [x] Verbal and participatory  [] Non-participatory due to:   C/o dysuria    Clinical Pain Assessment (nonverbal scale for severity on nonverbal patients):   Clinical Pain Assessment  Severity: 5          Duration: for how long has pt been experiencing pain (e.g., 2 days, 1 month, years)  Frequency: how often pain is an issue (e.g., several times per day, once every few days, constant)     FUNCTIONAL ASSESSMENT:     Palliative Performance Scale (PPS):  PPS: 60       PSYCHOSOCIAL/SPIRITUAL SCREENING:     Palliative IDT has assessed this patient for cultural preferences / practices and a referral made as appropriate to needs (Cultural Services, Patient Advocacy, Ethics, etc.)    Any spiritual / Episcopal concerns:  [] Yes /  [x] No    Caregiver Burnout:  [] Yes /  [x] No /  [] No Caregiver Present      Anticipatory grief assessment:   [x] Normal  / [] Maladaptive       ESAS Anxiety: Anxiety: 0    ESAS Depression:          REVIEW OF SYSTEMS:     Positive and pertinent negative findings in ROS are noted above in HPI.   The following systems were [x] reviewed / [] unable to be reviewed as noted in HPI  Other findings are noted below. Systems: constitutional, ears/nose/mouth/throat, respiratory, gastrointestinal, genitourinary, musculoskeletal, integumentary, neurologic, psychiatric, endocrine. Positive findings noted below. Modified ESAS Completed by: provider   Fatigue: 3 Drowsiness: 0     Pain: 5   Anxiety: 0 Nausea: 0   Anorexia: 0 Dyspnea: 0                    PHYSICAL EXAM:     From RN flowsheet:  Wt Readings from Last 3 Encounters:   07/18/19 229 lb 4.5 oz (104 kg)   02/15/19 231 lb (104.8 kg)   07/21/18 225 lb (102.1 kg)     Blood pressure 143/78, pulse 80, temperature 98.1 °F (36.7 °C), resp. rate 18, height 5' 11\" (1.803 m), weight 229 lb 4.5 oz (104 kg), SpO2 96 %.     Pain Scale 1: Numeric (0 - 10)  Pain Intensity 1: 0  Pain Onset 1: post op  Pain Location 1: Penis  Pain Orientation 1: Anterior  Pain Description 1: Aching  Pain Intervention(s) 1: Medication (see MAR)  Last bowel movement, if known:     Constitutional: alert, conversant, uncomfortable  Eyes: pupils equal, anicteric  ENMT: no nasal discharge, moist mucous membranes  Cardiovascular: regular rhythm, distal pulses intact  Respiratory: breathing not labored, symmetric  Gastrointestinal: gross, soft non-tender, +bowel sounds  Musculoskeletal: no deformity, no tenderness to palpation  Skin: warm, dry  Neurologic: following commands, moving all extremities  Psychiatric: full affect, no hallucinations  Other:       HISTORY:     Principal Problem:    JASIEL (acute kidney injury) ([facility] (7/17/2019)    Active Problems:    Hyperkalemia (6/5/2018)      Coronary artery disease involving native coronary artery of native heart without angina pectoris (8/31/2018)      Essential hypertension (8/31/2018)      Prostate cancer ([facility] (8/31/2018)      Past Medical History:   Diagnosis Date    Celiac disease     Chronic kidney disease     Hypertension     MI (mitral incompetence)     Prostate cancer [facility])       Past Surgical History:   Procedure Laterality Date    CARDIAC SURG PROCEDURE UNLIST      qaudruple bipass    HX CHOLECYSTECTOMY      HX ORTHOPAEDIC Right 2015    suni in femur from pathological fracture      History reviewed. No pertinent family history. History reviewed, no pertinent family history.   Social History     Tobacco Use    Smoking status: Former Smoker     Packs/day: 1.50     Years: 10.00     Pack years: 15.00    Smokeless tobacco: Never Used    Tobacco comment: quit smoking age 31's   Substance Use Topics    Alcohol use: Yes     Comment: rarely     Allergies   Allergen Reactions    Bactrim [Sulfamethoxazole-Trimethoprim] Anaphylaxis and Swelling     Patient reported that his \"tongue swelled up and it was hard to breath\"    Enzalutamide Nausea and Vomiting    Gluten Other (comments)     Abdominal pain    Morphine Other (comments)     AMS; \"goes crazy\"      Current Facility-Administered Medications   Medication Dose Route Frequency    sodium chloride (NS) flush 5-40 mL  5-40 mL IntraVENous Q8H    sodium chloride (NS) flush 5-40 mL  5-40 mL IntraVENous PRN    lidocaine (PF) (XYLOCAINE) 10 mg/mL (1 %) injection 0.1 mL  0.1 mL SubCUTAneous PRN    midazolam (VERSED) injection 1 mg  1 mg IntraVENous PRN    midazolam (VERSED) injection 1 mg  1 mg IntraVENous PRN    acetaminophen (TYLENOL) tablet 650 mg  650 mg Oral ONCE    ropivacaine (PF) (NAROPIN) 5 mg/mL (0.5 %) injection 150 mg  30 mL Peripheral Nerve Block ONCE    fentaNYL citrate (PF) 50 mcg/mL injection        sodium bicarbonate (8.4%) 150 mEq in dextrose 5% 1,000 mL infusion   IntraVENous CONTINUOUS    HYDROmorphone (DILAUDID) tablet 1 mg  1 mg Oral Q4H PRN    piperacillin-tazobactam (ZOSYN) 3.375 g in 0.9% sodium chloride (MBP/ADV) 100 mL  3.375 g IntraVENous Q12H    allopurinol (ZYLOPRIM) tablet 100 mg  100 mg Oral DAILY    aspirin chewable tablet 81 mg  81 mg Oral DAILY    ferrous sulfate tablet 325 mg  325 mg Oral ACB&D    mirtazapine (REMERON) tablet 15 mg  15 mg Oral QHS    patiromer calcium sorbitex (VELTASSA) powder 8.4 g  8.4 g Oral DAILY    metoprolol tartrate (LOPRESSOR) tablet 25 mg  25 mg Oral BID    ondansetron (ZOFRAN) injection 4 mg  4 mg IntraVENous Q6H PRN    acetaminophen (TYLENOL) tablet 650 mg  650 mg Oral Q6H PRN    lidocaine (LIDODERM) 5 % patch 1 Patch  1 Patch TransDERmal Q24H          LAB AND IMAGING FINDINGS:     Lab Results   Component Value Date/Time    WBC 4.7 07/18/2019 04:26 AM    HGB 9.3 (L) 07/18/2019 04:26 AM    PLATELET 639 29/30/1249 04:26 AM     Lab Results   Component Value Date/Time    Sodium 133 (L) 07/18/2019 04:26 AM    Potassium 4.7 07/18/2019 04:26 AM    Chloride 108 07/18/2019 04:26 AM    CO2 19 (L) 07/18/2019 04:26 AM    BUN 70 (H) 07/18/2019 04:26 AM    Creatinine 5.42 (H) 07/18/2019 04:26 AM    Calcium 7.3 (L) 07/18/2019 04:26 AM    Magnesium 1.3 (L) 07/20/2018 12:46 AM    Phosphorus 4.3 07/17/2018 01:25 AM      Lab Results   Component Value Date/Time    AST (SGOT) 10 (L) 07/17/2019 06:44 AM    Alk. phosphatase 83 07/17/2019 06:44 AM    Protein, total 7.8 07/17/2019 06:44 AM    Albumin 3.1 (L) 07/17/2019 06:44 AM    Globulin 4.7 (H) 07/17/2019 06:44 AM     No results found for: INR, PTMR, PTP, PT1, PT2, APTT   Lab Results   Component Value Date/Time    Iron 61 06/08/2018 04:30 AM    TIBC 144 (L) 06/08/2018 04:30 AM    Iron % saturation 42 06/08/2018 04:30 AM    Ferritin 685 (H) 06/08/2018 04:30 AM      No results found for: PH, PCO2, PO2  No components found for: [place]   Lab Results   Component Value Date/Time    CK 33 (L) 07/21/2018 05:42 AM                Total time: 70 minutes  Counseling / coordination time, spent as noted above: 60 minutes  > 50% counseling / coordination?: y    Prolonged service was provided for  []30 min   []75 min in face to face time in the presence of the patient, spent as noted above.   Time Start:   Time End:   Note: this can only be billed with 92005 (initial) or 49920 (follow up). If multiple start / stop times, list each separately.

## 2019-07-18 NOTE — ROUTINE PROCESS
Patient: Samantha Masters. MRN: [id number]  SSN: [id number]   YOB: 1940  Age: 70 y.o. Sex: male     Patient is status post Procedure(s):  CYSTOSCOPY, DIFFICULT BILATERAL URETERAL STENT EXCHANGE, CLOT EVACUATION. Surgeon(s) and Role:     * Wilmer Romero MD - Primary    Local/Dose/Irrigation:                    Peripheral IV 07/17/19 Left Antecubital (Active)   Site Assessment Clean, dry, & intact 7/17/2019 11:51 PM   Phlebitis Assessment 0 7/17/2019 11:51 PM   Infiltration Assessment 0 7/17/2019 11:51 PM   Dressing Status Clean, dry, & intact 7/17/2019 11:51 PM   Dressing Type Transparent 7/17/2019 11:51 PM   Hub Color/Line Status Pink; Infusing 7/17/2019 11:51 PM   Alcohol Cap Used Yes 7/17/2019 11:51 PM            Airway - Endotracheal Tube 07/18/19 (Active)                   Dressing/Packing:       Splint/Cast:  ]    Other:

## 2019-07-18 NOTE — PROGRESS NOTES
TRANSFER - IN REPORT:    Verbal report received from [place] [facility], RN (name) on [place].  being received from PACU (unit) for routine progression of care      Report consisted of patients Situation, Background, Assessment and Recommendations(SBAR). Information from the following report(s) OR Summary, Procedure Summary, Intake/Output and MAR was reviewed with the receiving nurse. Opportunity for questions and clarification was provided. Assessment completed upon patients arrival to unit and care assumed.

## 2019-07-18 NOTE — PROGRESS NOTES
Care Management Interventions  PCP Verified by CM: Yes(Dr HERRERA)  Last Visit to PCP: 05/16/19  Palliative Care Criteria Met (RRAT>21 & CHF Dx)?: No  Mode of Transport at Discharge: ([facility]  [phone])  Transition of Care Consult (CM Consult): (f/u with Urology if needed)  Discharge Durable Medical Equipment: No  Physical Therapy Consult: No  Occupational Therapy Consult: No  Speech Therapy Consult: No  Confirm Follow Up Transport: (per patient daughter )  Plan discussed with Pt/Family/Caregiver: Yes(met with patient who did not wish to engage in a goal oriented conversation at this time)  [employer] Provided?: No  Discharge Location  Discharge Placement: Home    attempted to meet with patient who was s/p ureteral stint replacement and clot evac. Patient was off the unit late this am and he voiced concerns about not having his eyeglasses returned. I did follow up with patient's nurse Audrene Cabot and she has located them. Patient resides at [facility] in the [place] [facility] and has lived there for some time. He does have a daughter Roni Guadalupe phone [phone] who helps him and lives in the area. Patient states he did not feel it was necessary to talk to anyone at this time and declined any questions within the realm of the cm one stop. I was unable to find out if he has a pharmacy he routinely uses. Patient does have an AMD on file.

## 2019-07-18 NOTE — ANESTHESIA PREPROCEDURE EVALUATION
Relevant Problems   No relevant active problems       Anesthetic History   No history of anesthetic complications            Review of Systems / Medical History  Patient summary reviewed, nursing notes reviewed and pertinent labs reviewed    Pulmonary  Within defined limits                 Neuro/Psych   Within defined limits           Cardiovascular    Hypertension          CAD         GI/Hepatic/Renal  Within defined limits              Endo/Other        Morbid obesity     Other Findings                   Anesthetic Plan    ASA: 3  Anesthesia type: general          Induction: Intravenous  Anesthetic plan and risks discussed with: Patient

## 2019-07-18 NOTE — PROGRESS NOTES
Patient AOX4, lower back pain at the end of his spine no other complaints at this time. 2330 lower back pain 2/10     0500 blood drawn and sent to lab    0730 Bedside and Verbal shift change report given to Venkatesh Pettit RN  (oncoming nurse) by Pal Martinez RN  (offgoing nurse). Report included the following information SBAR, MAR, Recent Results and Med Rec Status.

## 2019-07-18 NOTE — PROGRESS NOTES
Problem: Risk for Spread of Infection  Goal: Prevent transmission of infectious organism to others  Description  Prevent the transmission of infectious organisms to other patients, staff members, and visitors.   7/18/2019 0144 by Amita Arellano RN  Outcome: Progressing Towards Goal  7/18/2019 0142 by Amita Arellano RN  Outcome: Progressing Towards Goal     Problem: Patient Education:  Go to Education Activity  Goal: Patient/Family Education  7/18/2019 0144 by Amita Arellano RN  Outcome: Progressing Towards Goal  7/18/2019 0142 by Amita Arellano RN  Outcome: Progressing Towards Goal     Problem: Hypertension  Goal: *Blood pressure within specified parameters  7/18/2019 0144 by Amita Arellano RN  Outcome: Progressing Towards Goal  7/18/2019 0142 by Amita Arellano RN  Outcome: Progressing Towards Goal  Goal: *Fluid volume balance  7/18/2019 0144 by Amita Arellano RN  Outcome: Progressing Towards Goal  7/18/2019 0142 by Amita Arellano RN  Outcome: Progressing Towards Goal  Goal: *Labs within defined limits  7/18/2019 0144 by Amita Arellano RN  Outcome: Progressing Towards Goal  7/18/2019 0142 by Amita Arellano RN  Outcome: Progressing Towards Goal     Problem: Fluid Volume - Risk of, Imbalanced  Goal: *Balanced intake and output  7/18/2019 0144 by Amita Arellano RN  Outcome: Progressing Towards Goal  7/18/2019 0142 by Amita Arellano RN  Outcome: Progressing Towards Goal

## 2019-07-18 NOTE — PROGRESS NOTES
[facility]   [address] [facility], [facility] [place], [facility]  Phone: [phone]   [phone]       Nephrology Progress Note  Staci Pay     1940     [id number]  Date of Admission : 7/17/2019 07/18/19    CC:  Follow up for JASIEL     Assessment and Plan   JASIEL on CKD   - Progressive Obstructive nephropathy   - s/p Ureteral stent change   - No emergent need for dialysis  - continue IVF and stop tomorrow due to risk for volume overload   - hold colchicine, lasix       CKD Stage V  - 2/2 chronic obstruction/ fsgs  - baseline Cr ~ 4 mg/dl at best   - f/b Dr Darlene Molina      Hx of MEDINA with ongoing b/l hydro despite stent placement:  - s/p stent change in March  By Dr Lourdes Mcclure   - s/p Stent change today      Hyperkalemia:  - continue daily  Valtessa     Metabolic Acidosis:  - bicarb drip for now  - continue oral Bicarb      Anemia of CKD:  -serial H/H     CAD s/p CABG     Hx of prostate cancer    Care Plan discussed with: pt     Interval History:  Seen and examined   He had his ureteral stent changed today and looks like it was difficult and needed clot evacuation from bladder   Cr about the same  Reports pain in penis     Review of Systems: A comprehensive review of systems was negative.     Current Medications:   Current Facility-Administered Medications   Medication Dose Route Frequency    lactated Ringers infusion  125 mL/hr IntraVENous CONTINUOUS    lactated Ringers infusion  125 mL/hr IntraVENous CONTINUOUS    0.9% sodium chloride infusion  25 mL/hr IntraVENous CONTINUOUS    sodium chloride (NS) flush 5-40 mL  5-40 mL IntraVENous Q8H    sodium chloride (NS) flush 5-40 mL  5-40 mL IntraVENous PRN    lidocaine (PF) (XYLOCAINE) 10 mg/mL (1 %) injection 0.1 mL  0.1 mL SubCUTAneous PRN    fentaNYL citrate (PF) injection 50 mcg  50 mcg IntraVENous PRN    midazolam (VERSED) injection 1 mg  1 mg IntraVENous PRN    midazolam (VERSED) injection 1 mg  1 mg IntraVENous PRN    acetaminophen (TYLENOL) tablet 650 mg  650 mg Oral ONCE    ropivacaine (PF) (NAROPIN) 5 mg/mL (0.5 %) injection 150 mg  30 mL Peripheral Nerve Block ONCE    sodium bicarbonate (8.4%) 150 mEq in dextrose 5% 1,000 mL infusion   IntraVENous CONTINUOUS    piperacillin-tazobactam (ZOSYN) 3.375 g in 0.9% sodium chloride (MBP/ADV) 100 mL  3.375 g IntraVENous Q12H    allopurinol (ZYLOPRIM) tablet 100 mg  100 mg Oral DAILY    aspirin chewable tablet 81 mg  81 mg Oral DAILY    ferrous sulfate tablet 325 mg  325 mg Oral ACB&D    mirtazapine (REMERON) tablet 15 mg  15 mg Oral QHS    patiromer calcium sorbitex (VELTASSA) powder 8.4 g  8.4 g Oral DAILY    metoprolol tartrate (LOPRESSOR) tablet 25 mg  25 mg Oral BID    ondansetron (ZOFRAN) injection 4 mg  4 mg IntraVENous Q6H PRN    acetaminophen (TYLENOL) tablet 650 mg  650 mg Oral Q6H PRN    lidocaine (LIDODERM) 5 % patch 1 Patch  1 Patch TransDERmal Q24H      Allergies   Allergen Reactions    Bactrim [Sulfamethoxazole-Trimethoprim] Anaphylaxis and Swelling     Patient reported that his \"tongue swelled up and it was hard to breath\"    Enzalutamide Nausea and Vomiting    Gluten Other (comments)     Abdominal pain    Morphine Other (comments)     AMS; \"goes crazy\"       Objective:  Vitals:    Vitals:    07/18/19 0900 07/18/19 0915 07/18/19 0930 07/18/19 1009   BP: 143/74  156/89 143/78   Pulse: 86 80 86 82   Resp: 18 15 18 18   Temp:    98.1 °F (36.7 °C)   SpO2: 100% 100% 100% 96%   Weight:       Height:         Intake and Output:  07/18 0701 - 07/18 1900  In: 700 [I.V.:700]  Out: -   07/16 1901 - 07/18 0700  In: 700 [I.V.:700]  Out: 1025 [Urine:1025]    Physical Examination:    General: NAD,Conversant   Neck:  Supple, no mass  Resp:  Lungs CTA B/L, no wheezing , normal respiratory effort  CV:  RRR,  no murmur or rub, trace LE edema  GI:  Soft, NT, + Bowel sounds, no hepatosplenomegaly  Neurologic:  Non focal  Psych:             AAO x 3 appropriate affect   Skin:  No Rash  :  No goldberg     []    High complexity decision making was performed  []    Patient is at high-risk of decompensation with multiple organ involvement    Lab Data Personally Reviewed: I have reviewed all the pertinent labs, microbiology data and radiology studies during assessment.     Recent Labs     07/18/19  0426 07/17/19  1238 07/17/19  0644   * 138 134*   K 4.7 5.1 6.0*    106 108   CO2 19* 19* 18*    222* 103*   BUN 70* 81* 78*   CREA 5.42* 5.50* 5.48*   CA 7.3* 7.4* 7.8*   ALB  --   --  3.1*   SGOT  --   --  10*   ALT  --   --  12     Recent Labs     07/18/19  0426 07/17/19  0554   WBC 4.7 7.6   HGB 9.3* 11.4*   HCT 31.4* 36.5*    208     Lab Results   Component Value Date/Time    Specimen Description: URINE 10/07/2013 01:36 AM    Specimen Description: BLOOD 10/06/2013 04:13 PM     Lab Results   Component Value Date/Time    Culture result: NO GROWTH AFTER 13 HOURS 07/17/2019 02:48 PM    Culture result: CANDIDA ALBICANS (A) 02/15/2019 04:01 PM    Culture result: NO GROWTH 5 DAYS 07/17/2018 01:25 AM    Culture result: (A) 07/16/2018 04:00 PM     PROBABLE ALPHA STREPTOCOCCUS (>100,000 COLONIES/mL)    Culture result: YEAST (>100,000 COLONIES/mL) (A) 07/16/2018 04:00 PM    Culture result: GRAM NEGATIVE RODS (2,000 COLONIES/mL) (A) 07/16/2018 04:00 PM     Recent Results (from the past 24 hour(s))   METABOLIC PANEL, BASIC    Collection Time: 07/17/19 12:38 PM   Result Value Ref Range    Sodium 138 136 - 145 mmol/L    Potassium 5.1 3.5 - 5.1 mmol/L    Chloride 106 97 - 108 mmol/L    CO2 19 (L) 21 - 32 mmol/L    Anion gap 13 5 - 15 mmol/L    Glucose 222 (H) 65 - 100 mg/dL    BUN 81 (H) 6 - 20 MG/DL    Creatinine 5.50 (H) 0.70 - 1.30 MG/DL    BUN/Creatinine ratio 15 12 - 20      GFR est AA 12 (L) >60 ml/min/1.73m2    GFR est non-AA 10 (L) >60 ml/min/1.73m2    Calcium 7.4 (L) 8.5 - 10.1 MG/DL   CULTURE, BLOOD, PAIRED    Collection Time: 07/17/19  2:48 PM   Result Value Ref Range    Special Requests: NO SPECIAL REQUESTS      Culture result: NO GROWTH AFTER 13 HOURS     CBC WITH AUTOMATED DIFF    Collection Time: 07/18/19  4:26 AM   Result Value Ref Range    WBC 4.7 4.1 - 11.1 K/uL    RBC 3.05 (L) 4.10 - 5.70 M/uL    HGB 9.3 (L) 12.1 - 17.0 g/dL    HCT 31.4 (L) 36.6 - 50.3 %    .0 (H) 80.0 - 99.0 FL    MCH 30.5 26.0 - 34.0 PG    MCHC 29.6 (L) 30.0 - 36.5 g/dL    RDW 15.2 (H) 11.5 - 14.5 %    PLATELET 240 689 - 271 K/uL    MPV 9.1 8.9 - 12.9 FL    NRBC 0.0 0  WBC    ABSOLUTE NRBC 0.00 0.00 - 0.01 K/uL    NEUTROPHILS 58 32 - 75 %    LYMPHOCYTES 28 12 - 49 %    MONOCYTES 10 5 - 13 %    EOSINOPHILS 4 0 - 7 %    BASOPHILS 0 0 - 1 %    IMMATURE GRANULOCYTES 0 0.0 - 0.5 %    ABS. NEUTROPHILS 2.7 1.8 - 8.0 K/UL    ABS. LYMPHOCYTES 1.3 0.8 - 3.5 K/UL    ABS. MONOCYTES 0.5 0.0 - 1.0 K/UL    ABS. EOSINOPHILS 0.2 0.0 - 0.4 K/UL    ABS. BASOPHILS 0.0 0.0 - 0.1 K/UL    ABS. IMM. GRANS. 0.0 0.00 - 0.04 K/UL    DF AUTOMATED     METABOLIC PANEL, BASIC    Collection Time: 07/18/19  4:26 AM   Result Value Ref Range    Sodium 133 (L) 136 - 145 mmol/L    Potassium 4.7 3.5 - 5.1 mmol/L    Chloride 108 97 - 108 mmol/L    CO2 19 (L) 21 - 32 mmol/L    Anion gap 6 5 - 15 mmol/L    Glucose 100 65 - 100 mg/dL    BUN 70 (H) 6 - 20 MG/DL    Creatinine 5.42 (H) 0.70 - 1.30 MG/DL    BUN/Creatinine ratio 13 12 - 20      GFR est AA 13 (L) >60 ml/min/1.73m2    GFR est non-AA 10 (L) >60 ml/min/1.73m2    Calcium 7.3 (L) 8.5 - 10.1 MG/DL           Total time spent with patient:  xxx   min. Care Plan discussed with:  Patient     Family      RN      Consulting Physician [address] [facility],         I have reviewed the flowsheets. Chart and Pertinent Notes have been reviewed. No change in PMH ,family and social history from Consult note.       Eusebio Menon MD

## 2019-07-18 NOTE — ANESTHESIA POSTPROCEDURE EVALUATION
Post-Anesthesia Evaluation and Assessment    Patient: Birtney Coleman MRN: [id number]  SSN: [id number]    YOB: 1940  Age: 70 y.o. Sex: male      I have evaluated the patient and they are stable and ready for discharge from the PACU. Cardiovascular Function/Vital Signs  Visit Vitals  /74   Pulse 80   Temp 36.8 °C (98.2 °F)   Resp 15   Ht 5' 11\" (1.803 m)   Wt 104 kg (229 lb 4.5 oz)   SpO2 100%   BMI 31.98 kg/m²       Patient is status post General anesthesia for Procedure(s):  CYSTOSCOPY, DIFFICULT BILATERAL URETERAL STENT EXCHANGE, CLOT EVACUATION. Nausea/Vomiting: None    Postoperative hydration reviewed and adequate. Pain:  Pain Scale 1: Numeric (0 - 10) (07/18/19 0911)  Pain Intensity 1: 7 (07/18/19 0911)   Managed    Neurological Status:   Neuro (WDL): Within Defined Limits (07/18/19 3841)  Neuro  Neurologic State: Alert; Appropriate for age (07/17/19 2001)  Orientation Level: Oriented X4 (07/17/19 2001)  Cognition: Appropriate decision making; Appropriate safety awareness; Follows commands (07/17/19 2001)  LUE Motor Response: Purposeful (07/18/19 0851)  LLE Motor Response: Purposeful (07/18/19 0851)  RUE Motor Response: Purposeful (07/18/19 0851)  RLE Motor Response: Purposeful (07/18/19 0851)   At baseline    Mental Status, Level of Consciousness: Alert and  oriented to person, place, and time    Pulmonary Status:   O2 Device: Nasal cannula (07/18/19 0851)   Adequate oxygenation and airway patent    Complications related to anesthesia: None    Post-anesthesia assessment completed. No concerns    Signed By: Christiano Ridley MD     July 18, 2019              Procedure(s):  CYSTOSCOPY, DIFFICULT BILATERAL URETERAL STENT EXCHANGE, CLOT EVACUATION.     general    <BSHSIANPOST>    Vitals Value Taken Time   /74 7/18/2019  9:15 AM   Temp 36.8 °C (98.2 °F) 7/18/2019  8:50 AM   Pulse 80 7/18/2019  9:23 AM   Resp 10 7/18/2019  9:23 AM   SpO2 100 % 7/18/2019  9:23 AM   Vitals shown include unvalidated device data.

## 2019-07-18 NOTE — PROGRESS NOTES
Spiritual Care Assessment/Progress Note  [address]      NAME: Lise Carvalho. MRN: [id number]  AGE: 70 y.o. SEX: male  Evangelical Affiliation: Presybeterian   Language: English     7/18/2019     Total Time (in minutes): 10     Spiritual Assessment begun in [facility] TELEMETRY through conversation with:         []Patient        [] Family    [] Friend(s)        Reason for Consult: Palliative Care, Initial/Spiritual Assessment     Spiritual beliefs: (Please include comment if needed)     [x] Identifies with a lolis tradition:  Presybeterian (per chart)       [] Supported by a lolis community:            [] Claims no spiritual orientation:           [] Seeking spiritual identity:                [] Adheres to an individual form of spirituality:           [] Not able to assess:                           Identified resources for coping:      [] Prayer                               [] Music                  [] Guided Imagery     [] Family/friends                 [] Pet visits     [] Devotional reading                         [x] Unknown     [] Other                                            Interventions offered during this visit: (See comments for more details)    Patient Interventions: Initial/Spiritual assessment, patient floor           Plan of Care:     [x] Support spiritual and/or cultural needs    [] Support AMD and/or advance care planning process      [] Support grieving process   [] Coordinate Rites and/or Rituals    [] Coordination with community clergy   [] No spiritual needs identified at this time   [] Detailed Plan of Care below (See Comments)  [] Make referral to Music Therapy  [] Make referral to Pet Therapy     [] Make referral to Addiction services  [] Make referral to [facility]  [] Make referral to Spiritual Care Partner  [] No future visits requested        [] Follow up visits as needed     Comments: Attempted visit with Mr. Emilia Sheth. Pt appeared to be resting; did not disturb.   Per chart review, pt's lolis tradition. Placed request for Fr Agee to visit today as able. Chaplains remain available for support as needed; please page at [phone].     Melanie Shirley, Palliative

## 2019-07-19 ENCOUNTER — APPOINTMENT (OUTPATIENT)
Dept: ULTRASOUND IMAGING | Age: 79
DRG: 854 | End: 2019-07-19
Attending: INTERNAL MEDICINE
Payer: MEDICARE

## 2019-07-19 LAB
ALBUMIN SERPL-MCNC: 2.8 G/DL (ref 3.5–5)
ANION GAP SERPL CALC-SCNC: 11 MMOL/L (ref 5–15)
ANION GAP SERPL CALC-SCNC: 9 MMOL/L (ref 5–15)
BUN SERPL-MCNC: 69 MG/DL (ref 6–20)
BUN SERPL-MCNC: 70 MG/DL (ref 6–20)
BUN/CREAT SERPL: 11 (ref 12–20)
BUN/CREAT SERPL: 12 (ref 12–20)
CALCIUM SERPL-MCNC: 7.1 MG/DL (ref 8.5–10.1)
CALCIUM SERPL-MCNC: 7.3 MG/DL (ref 8.5–10.1)
CHLORIDE SERPL-SCNC: 101 MMOL/L (ref 97–108)
CHLORIDE SERPL-SCNC: 101 MMOL/L (ref 97–108)
CO2 SERPL-SCNC: 24 MMOL/L (ref 21–32)
CO2 SERPL-SCNC: 25 MMOL/L (ref 21–32)
CREAT SERPL-MCNC: 6.02 MG/DL (ref 0.7–1.3)
CREAT SERPL-MCNC: 6.1 MG/DL (ref 0.7–1.3)
ERYTHROCYTE [DISTWIDTH] IN BLOOD BY AUTOMATED COUNT: 15.1 % (ref 11.5–14.5)
GLUCOSE SERPL-MCNC: 81 MG/DL (ref 65–100)
GLUCOSE SERPL-MCNC: 87 MG/DL (ref 65–100)
HCT VFR BLD AUTO: 33.1 % (ref 36.6–50.3)
HGB BLD-MCNC: 10.4 G/DL (ref 12.1–17)
MCH RBC QN AUTO: 31 PG (ref 26–34)
MCHC RBC AUTO-ENTMCNC: 31.4 G/DL (ref 30–36.5)
MCV RBC AUTO: 98.8 FL (ref 80–99)
NRBC # BLD: 0 K/UL (ref 0–0.01)
NRBC BLD-RTO: 0 PER 100 WBC
PHOSPHATE SERPL-MCNC: 4.3 MG/DL (ref 2.6–4.7)
PLATELET # BLD AUTO: 182 K/UL (ref 150–400)
PMV BLD AUTO: 9.2 FL (ref 8.9–12.9)
POTASSIUM SERPL-SCNC: 4.9 MMOL/L (ref 3.5–5.1)
POTASSIUM SERPL-SCNC: 5 MMOL/L (ref 3.5–5.1)
RBC # BLD AUTO: 3.35 M/UL (ref 4.1–5.7)
SODIUM SERPL-SCNC: 135 MMOL/L (ref 136–145)
SODIUM SERPL-SCNC: 136 MMOL/L (ref 136–145)
WBC # BLD AUTO: 7.9 K/UL (ref 4.1–11.1)

## 2019-07-19 PROCEDURE — 74011250637 HC RX REV CODE- 250/637: Performed by: UROLOGY

## 2019-07-19 PROCEDURE — 85027 COMPLETE CBC AUTOMATED: CPT

## 2019-07-19 PROCEDURE — 74011000250 HC RX REV CODE- 250: Performed by: UROLOGY

## 2019-07-19 PROCEDURE — 76775 US EXAM ABDO BACK WALL LIM: CPT

## 2019-07-19 PROCEDURE — 80048 BASIC METABOLIC PNL TOTAL CA: CPT

## 2019-07-19 PROCEDURE — 74011250637 HC RX REV CODE- 250/637: Performed by: NURSE PRACTITIONER

## 2019-07-19 PROCEDURE — 80069 RENAL FUNCTION PANEL: CPT

## 2019-07-19 PROCEDURE — 76770 US EXAM ABDO BACK WALL COMP: CPT

## 2019-07-19 PROCEDURE — 74011250636 HC RX REV CODE- 250/636: Performed by: UROLOGY

## 2019-07-19 PROCEDURE — 65660000000 HC RM CCU STEPDOWN

## 2019-07-19 PROCEDURE — 36415 COLL VENOUS BLD VENIPUNCTURE: CPT

## 2019-07-19 PROCEDURE — 74011250636 HC RX REV CODE- 250/636: Performed by: NURSE PRACTITIONER

## 2019-07-19 PROCEDURE — 74011000258 HC RX REV CODE- 258: Performed by: UROLOGY

## 2019-07-19 RX ORDER — POLYETHYLENE GLYCOL 3350 17 G/17G
17 POWDER, FOR SOLUTION ORAL DAILY
Status: DISCONTINUED | OUTPATIENT
Start: 2019-07-20 | End: 2019-07-29 | Stop reason: HOSPADM

## 2019-07-19 RX ORDER — HYDROMORPHONE HYDROCHLORIDE 1 MG/ML
1 INJECTION, SOLUTION INTRAMUSCULAR; INTRAVENOUS; SUBCUTANEOUS
Status: DISCONTINUED | OUTPATIENT
Start: 2019-07-19 | End: 2019-07-19

## 2019-07-19 RX ORDER — HYDROMORPHONE HYDROCHLORIDE 4 MG/1
4 TABLET ORAL
Status: DISCONTINUED | OUTPATIENT
Start: 2019-07-19 | End: 2019-07-22

## 2019-07-19 RX ORDER — HYDROMORPHONE HYDROCHLORIDE 2 MG/ML
2 INJECTION, SOLUTION INTRAMUSCULAR; INTRAVENOUS; SUBCUTANEOUS
Status: DISCONTINUED | OUTPATIENT
Start: 2019-07-19 | End: 2019-07-21

## 2019-07-19 RX ORDER — HYDROMORPHONE HYDROCHLORIDE 1 MG/ML
1 INJECTION, SOLUTION INTRAMUSCULAR; INTRAVENOUS; SUBCUTANEOUS ONCE
Status: COMPLETED | OUTPATIENT
Start: 2019-07-19 | End: 2019-07-19

## 2019-07-19 RX ORDER — NALOXONE HYDROCHLORIDE 0.4 MG/ML
0.4 INJECTION, SOLUTION INTRAMUSCULAR; INTRAVENOUS; SUBCUTANEOUS
Status: DISCONTINUED | OUTPATIENT
Start: 2019-07-19 | End: 2019-07-29 | Stop reason: HOSPADM

## 2019-07-19 RX ADMIN — PIPERACILLIN SODIUM,TAZOBACTAM SODIUM 3.38 G: 3; .375 INJECTION, POWDER, FOR SOLUTION INTRAVENOUS at 05:22

## 2019-07-19 RX ADMIN — ONDANSETRON 4 MG: 2 INJECTION INTRAMUSCULAR; INTRAVENOUS at 08:44

## 2019-07-19 RX ADMIN — FERROUS SULFATE TAB 325 MG (65 MG ELEMENTAL FE) 325 MG: 325 (65 FE) TAB at 17:03

## 2019-07-19 RX ADMIN — MIRTAZAPINE 15 MG: 15 TABLET, FILM COATED ORAL at 21:29

## 2019-07-19 RX ADMIN — ACETAMINOPHEN 650 MG: 325 TABLET ORAL at 23:11

## 2019-07-19 RX ADMIN — METOPROLOL TARTRATE 25 MG: 25 TABLET ORAL at 08:44

## 2019-07-19 RX ADMIN — PATIROMER 8.4 G: 8.4 POWDER, FOR SUSPENSION ORAL at 09:13

## 2019-07-19 RX ADMIN — ALLOPURINOL 100 MG: 100 TABLET ORAL at 08:44

## 2019-07-19 RX ADMIN — METOPROLOL TARTRATE 25 MG: 25 TABLET ORAL at 17:03

## 2019-07-19 RX ADMIN — ASPIRIN 81 MG 81 MG: 81 TABLET ORAL at 08:44

## 2019-07-19 RX ADMIN — HYDROMORPHONE HYDROCHLORIDE 1 MG: 1 INJECTION, SOLUTION INTRAMUSCULAR; INTRAVENOUS; SUBCUTANEOUS at 08:43

## 2019-07-19 RX ADMIN — HYDROMORPHONE HYDROCHLORIDE 2 MG: 2 INJECTION, SOLUTION INTRAMUSCULAR; INTRAVENOUS; SUBCUTANEOUS at 17:03

## 2019-07-19 RX ADMIN — HYDROMORPHONE HYDROCHLORIDE 1 MG: 1 INJECTION, SOLUTION INTRAMUSCULAR; INTRAVENOUS; SUBCUTANEOUS at 06:04

## 2019-07-19 RX ADMIN — DEXTROSE MONOHYDRATE: 5 INJECTION, SOLUTION INTRAVENOUS at 16:42

## 2019-07-19 RX ADMIN — Medication 10 ML: at 21:30

## 2019-07-19 RX ADMIN — HYDROMORPHONE HYDROCHLORIDE 1 MG: 2 TABLET ORAL at 00:30

## 2019-07-19 RX ADMIN — HYDROMORPHONE HYDROCHLORIDE 2 MG: 2 INJECTION, SOLUTION INTRAMUSCULAR; INTRAVENOUS; SUBCUTANEOUS at 13:10

## 2019-07-19 RX ADMIN — Medication 10 ML: at 06:00

## 2019-07-19 RX ADMIN — HYDROMORPHONE HYDROCHLORIDE 4 MG: 2 TABLET ORAL at 21:29

## 2019-07-19 RX ADMIN — DEXTROSE MONOHYDRATE: 5 INJECTION, SOLUTION INTRAVENOUS at 06:02

## 2019-07-19 RX ADMIN — FERROUS SULFATE TAB 325 MG (65 MG ELEMENTAL FE) 325 MG: 325 (65 FE) TAB at 06:04

## 2019-07-19 RX ADMIN — HYDROMORPHONE HYDROCHLORIDE 1 MG: 1 INJECTION, SOLUTION INTRAMUSCULAR; INTRAVENOUS; SUBCUTANEOUS at 02:02

## 2019-07-19 NOTE — PROGRESS NOTES
Patient AOX4, he has pain in his left hip, leg and back very uncomfortable Tylenol to be given. 2220 lidocaine patch was applied to his left hip     0030 patient rang out pain has increased Dilaudid given    0140 paged Yolanda Dixon patient's pain has increased he can't lay still increased Dilaudid PO didn't help. Call back orders to be given     0210 IV dilaudid given patient assisted to chair and then back to bed repositioned patient for comfort. He turned off his IV when he calms down I will try to restart it. 0530 blood drawn and sent to lab. 0638 pagemandeep Dixon for additional pain med patient's left hip, leg, and lower back has increased back again the IV Dilaudid seems to work the best and it last about 3 1/2 hours. He called back and orders to to given. 0730 Bedside and Verbal shift change report given to [id number] Humberto Barrios RN (oncoming nurse) by Shawn Patel RN  (offgoing nurse). Report included the following information SBAR, MAR, Recent Results and Med Rec Status.

## 2019-07-19 NOTE — PROGRESS NOTES
Bedside and Verbal shift change report given to [facility] (oncoming nurse) by Gage Reyes (offgoing nurse). Report included the following information SBAR, MAR and Recent Results.

## 2019-07-19 NOTE — PROGRESS NOTES
Patient and family in palliative meeting. Will follow up in the AM for evaluation as appropriate. Thanks!     Gudelia Chapa PT, DPT  Geriatric Clinical Specialist

## 2019-07-19 NOTE — PROGRESS NOTES
Hospitalist Progress Note  Rajani Naylor NP  Answering service: [phone] from in house phone  Cell: [phone]   Date of Service:  2019  NAME:  Jenifer Hurtado. :  1940  MRN:  [id number]    Admission Summary:   Pt originally went to his PCP due to painful urination. His UA was negative so was prescribed tramadol for pain. He took the first dose of 50 mg tramadol the night before admit, felt dizzy and lightheaded and then associated with nausea and 2-3 episodes of vomiting. He came to the hospital for further evaluation.     Interval history / Subjective:   Pt in bed, had increased pain overnight, especially in his left leg and hip. He has tried repositioning, getting OOB and lidoderm patch - all have not helps. Feel the pain is throbbing. Also having urinary burning causing significant discomfort. Assessment & Plan:     Pain:   - pain in left hip: will increase dilaudid dose  - back pain: controlled at present  - renal colicky pain: controlled at present  - urinary burning: consult with nephrologist about options    Hyperkalemia: resolved  - s/p kayexalate  - takes veltassa (missed dose PTA), this has been resumed    JASIEL on CKD stage 5, Metabolic acidosis:  - Cr: rising, 6.02 today,  was 4.96 in 2019. Likely has progression of kidney disease  - on bicarb drip though acidosis appears to be resolving  - Nephrology consulted and following  - check labs again in am, pt s/p stent replacement   - having some urinary burning, likely due to urinary procedure, however, have to take precaution giving pyridium in renal failure.  Will discuss with nephrology.     UTI  Hx of b/l hydronephrosis due to prostate cancer with stent placement in 2018:  - patient complaints of burning and pain while urination in the last few days PTA  - UA shows >100 WBCs with 2+ bacteria, he has ureteral stents  - urine cultures with no indication of infection. Stop Zosyn. - blood cultures with no growth to date  - 7/18: s/p bilateral ureteral stent replacement (difficult) and clot removal     Dizziness/lightheadedness: Resolved for now    Nausea/vomiting: Resolved  - side effects due to tramadol, discussed with the patient  - prn zofran     Hx of metastatic prostate cancer: on casodex  - palliative care consulted, family meeting scheduled for today     Hx of CAD s/p CABG: resume aspirin and metoprolol     Hx HTN: on home metoprolol  - pain could be factor in recent elevations    Code status: Full  DVT prophylaxis: SCDs  Care Plan discussed with: patient, nurse, attending MD and nephrology  Disposition: Home when able     Hospital Problems  Date Reviewed: 7/16/2018          Codes Class Noted POA    * (Principal) JASIEL (acute kidney injury) ([facility] ICD-10-CM: N17.9  ICD-9-CM: 584.9  7/17/2019 Unknown        Coronary artery disease involving native coronary artery of native heart without angina pectoris ICD-10-CM: I25.10  ICD-9-CM: 414.01  8/31/2018 Yes        Essential hypertension ICD-10-CM: I10  ICD-9-CM: 401.9  8/31/2018 Yes        Prostate cancer ([facility].) ICD-10-CM: C61  ICD-9-CM: 185  8/31/2018 Yes        Hyperkalemia ICD-10-CM: E87.5  ICD-9-CM: 276.7  6/5/2018 Yes            Review of Systems:   Denies HA. No chest pain or pressure. No respiratory complaints. No Nausea/Vomiting. + left hip pain and significant urinary burning. Vital Signs:    Last 24hrs VS reviewed since prior progress note. Most recent are:  Visit Vitals  BP (!) 158/94 (BP 1 Location: Right arm, BP Patient Position: At rest)   Pulse 93   Temp 98.6 °F (37 °C)   Resp 18   Ht 5' 11\" (1.803 m)   Wt 105.1 kg (231 lb 11.3 oz)   SpO2 96%   BMI 32.32 kg/m²       Intake/Output Summary (Last 24 hours) at 7/19/2019 7062  Last data filed at 7/18/2019 1917  Gross per 24 hour   Intake 700 ml   Output 1000 ml   Net -300 ml     Physical Examination:         Constitutional:  Cooperative, pleasant.  + pain this am in his left leg    ENT:  Oral MM dry. Resp:  CTA bilaterally. On RA   CV:  Regular rhythm, normal rate, no murmurs. SR with 1 degree AVB    GI:  Soft, non distended, tender. Normoactive bowel sounds. :  Urine pink tinged (had ~ 150 mL in urinal this am)    Musculoskeletal:  No edema, warm, 2+ pulses throughout    Neurologic:  Moves all extremities. AAOx3. Psych: Calm and cooperative       Data Review:   Review and/or order of clinical lab test  Review and/or order of tests in the radiology section of CPT  Review and/or order of tests in the medicine section of [facility]    Labs:     Recent Labs     07/19/19 0531 07/18/19  0426   WBC 7.9 4.7   HGB 10.4* 9.3*   HCT 33.1* 31.4*    158     Recent Labs     07/19/19 0531 07/18/19  0426 07/17/19  1238     135* 133* 138   K 5.0  4.9 4.7 5.1     101 108 106   CO2 24  25 19* 19*   BUN 70*  69* 70* 81*   CREA 6.02*  6.10* 5.42* 5.50*   GLU 81  87 100 222*   CA 7.1*  7.3* 7.3* 7.4*   PHOS 4.3  --   --      Recent Labs     07/19/19  0531 07/17/19  0644   SGOT  --  10*   ALT  --  12   AP  --  83   TBILI  --  0.4   TP  --  7.8   ALB 2.8* 3.1*   GLOB  --  4.7*     No results for input(s): INR, PTP, APTT in the last 72 hours. No lab exists for component: INREXT, INREXT   No results for input(s): FE, TIBC, PSAT, FERR in the last 72 hours. No results found for: FOL, RBCF   No results for input(s): PH, PCO2, PO2 in the last 72 hours. No results for input(s): CPK, CKNDX, TROIQ in the last 72 hours.     No lab exists for component: CPKMB  Lab Results   Component Value Date/Time    Cholesterol, total 85 10/07/2013 12:00 AM    HDL Cholesterol 5 10/07/2013 12:00 AM    LDL, calculated 39.6 10/07/2013 12:00 AM    Triglyceride 202 (H) 10/07/2013 12:00 AM    CHOL/HDL Ratio 17.0 (H) 10/07/2013 12:00 AM     Lab Results   Component Value Date/Time    Glucose (POC) 105 (H) 07/17/2019 06:48 AM    Glucose (POC) 105 (H) 07/17/2019 05:56 AM    Glucose (POC) 137 (H) 07/18/2018 05:06 PM    Glucose (POC) 100 07/17/2018 06:07 AM    Glucose (POC) 59 (L) 06/20/2018 05:03 PM    Glucose (POC) 101 (H) 06/20/2018 03:14 PM    Glucose (POC) 83 06/10/2018 06:26 AM    Glucose (POC) 108 (H) 06/09/2018 10:08 PM    Glucose (POC) 138 (H) 06/09/2018 06:30 PM     Lab Results   Component Value Date/Time    Color YELLOW/STRAW 07/17/2019 07:21 AM    Appearance TURBID (A) 07/17/2019 07:21 AM    Specific gravity 1.011 07/17/2019 07:21 AM    Specific gravity 1.015 02/15/2019 04:01 PM    pH (UA) 6.0 07/17/2019 07:21 AM    Protein 100 (A) 07/17/2019 07:21 AM    Glucose NEGATIVE  07/17/2019 07:21 AM    Ketone NEGATIVE  07/17/2019 07:21 AM    Bilirubin NEGATIVE  07/17/2019 07:21 AM    Urobilinogen 0.2 07/17/2019 07:21 AM    Nitrites NEGATIVE  07/17/2019 07:21 AM    Leukocyte Esterase LARGE (A) 07/17/2019 07:21 AM    Epithelial cells FEW 07/17/2019 07:21 AM    Bacteria 2+ (A) 07/17/2019 07:21 AM    WBC >100 (H) 07/17/2019 07:21 AM    RBC 10-20 07/17/2019 07:21 AM     Medications Reviewed:     Current Facility-Administered Medications   Medication Dose Route Frequency    naloxone (NARCAN) injection 0.4 mg  0.4 mg IntraVENous EVERY 2 MINUTES AS NEEDED    HYDROmorphone (PF) (DILAUDID) injection 2 mg  2 mg IntraVENous Q4H PRN    sodium chloride (NS) flush 5-40 mL  5-40 mL IntraVENous Q8H    sodium chloride (NS) flush 5-40 mL  5-40 mL IntraVENous PRN    lidocaine (PF) (XYLOCAINE) 10 mg/mL (1 %) injection 0.1 mL  0.1 mL SubCUTAneous PRN    midazolam (VERSED) injection 1 mg  1 mg IntraVENous PRN    midazolam (VERSED) injection 1 mg  1 mg IntraVENous PRN    sodium bicarbonate (8.4%) 150 mEq in dextrose 5% 1,000 mL infusion   IntraVENous CONTINUOUS    [Held by provider] HYDROmorphone (DILAUDID) tablet 1 mg  1 mg Oral Q4H PRN    piperacillin-tazobactam (ZOSYN) 3.375 g in 0.9% sodium chloride (MBP/ADV) 100 mL  3.375 g IntraVENous Q12H    allopurinol (ZYLOPRIM) tablet 100 mg  100 mg Oral DAILY    aspirin chewable tablet 81 mg  81 mg Oral DAILY    ferrous sulfate tablet 325 mg  325 mg Oral ACB&D    mirtazapine (REMERON) tablet 15 mg  15 mg Oral QHS    patiromer calcium sorbitex (VELTASSA) powder 8.4 g  8.4 g Oral DAILY    metoprolol tartrate (LOPRESSOR) tablet 25 mg  25 mg Oral BID    ondansetron (ZOFRAN) injection 4 mg  4 mg IntraVENous Q6H PRN    acetaminophen (TYLENOL) tablet 650 mg  650 mg Oral Q6H PRN    lidocaine (LIDODERM) 5 % patch 1 Patch  1 Patch TransDERmal Q24H   ______________________________________________________________________  EXPECTED LENGTH OF STAY: 3d 4h  ACTUAL LENGTH OF STAY:          2               Carla Lorenzo NP

## 2019-07-19 NOTE — PROGRESS NOTES
JOSE LUIS:  1. Received call from Taiwo Reyes palliative NP who is meeting with pt and daughter at 17noon. Daughter to be provided this  information for discharge planning. 2.  Expect pt may not be able to return currently to [address] apartment at [facility]. 3.  Spoke with attending team who will place PT/OT for evaluation. 4. Pt may need SNF for discharge. Need choices from pt/family.     ZENIA Colby

## 2019-07-19 NOTE — PROGRESS NOTES
Problem: Risk for Spread of Infection  Goal: Prevent transmission of infectious organism to others  Description  Prevent the transmission of infectious organisms to other patients, staff members, and visitors. Outcome: Progressing Towards Goal     Problem: Patient Education:  Go to Education Activity  Goal: Patient/Family Education  Outcome: Progressing Towards Goal     Problem: Hypertension  Goal: *Blood pressure within specified parameters  Outcome: Progressing Towards Goal  Goal: *Fluid volume balance  Outcome: Progressing Towards Goal  Goal: *Labs within defined limits  Outcome: Progressing Towards Goal     Problem: Fluid Volume - Risk of, Imbalanced  Goal: *Balanced intake and output  Outcome: Progressing Towards Goal     Problem: Falls - Risk of  Goal: *Absence of Falls  Description  Document Jonathon Fall Risk and appropriate interventions in the flowsheet.   Outcome: Progressing Towards Goal  Note:   Fall Risk Interventions:  Mobility Interventions: Communicate number of staff needed for ambulation/transfer, Patient to call before getting OOB         Medication Interventions: Patient to call before getting OOB, Teach patient to arise slowly         History of Falls Interventions: Door open when patient unattended

## 2019-07-19 NOTE — PROGRESS NOTES
Met with pt's daughter Suresh Osorio at her request to discuss discharge planning. She has lots of concerns raised about facilities pt has been to in the past.      SNF choices now:  1. [facility]  [facility]. [facility]  3. [facility]. Referrals sent to above facilities. Discussed LTC and daughter expects he will need this level of care. Discussed finances and pt hasn't applied for medicaid. Referral sent to [address] [place] [address] Per daughter pt has $1700 social security monthly income and does have [phone]. Pt is also a  but has never enrolled in the [facility] [employer] system per the daughter as Valentinecathy Srinivas one could help me. \"      Spoke to doctor and pt will remain hospitalized over weekend. Will follow.     ZENIA Rincon

## 2019-07-19 NOTE — PROGRESS NOTES
[facility]   [address], [address], [facility]  Phone: [phone]   [phone]       Nephrology Progress Note  Norman Shen     1940     [id number]  Date of Admission : 7/17/2019 07/19/19    CC:  Follow up for JASIEL     Assessment and Plan   JASIEL on CKD   - Progressive Obstructive nephropathy   - s/p Ureteral stent change 7/18  - Now w/ clots in bladder and possibly urethra -- ordered US of bladder    - No emergent need for dialysis  - continue IVF w/ Bicarb   - hold colchicine, lasix    - daily labs      CKD Stage V  - 2/2 chronic obstruction/ fsgs  - baseline Cr ~ 4 mg/dl at best   - f/b Dr Daljit Villegas      Hx of MEDINA with ongoing b/l hydro despite stent placement:  - s/p stent change in March  By Dr Hugo Ramirez   - s/p Stent change 7/18     Hyperkalemia:  - continue daily  Valtessa     Metabolic Acidosis:  - bicarb drip for now  - continue oral Bicarb      Anemia of CKD:  -serial H/H     CAD s/p CABG     Hx of prostate cancer    Care Plan discussed with: pt     Interval History:  Seen and examined   Continues to have painful urination w/ pain localized to penis   No bladder pain / spasm   Reports passing large clot this morning   'Cr worse   UOP 1L in last 24 hrs     Review of Systems: A comprehensive review of systems was negative.     Current Medications:   Current Facility-Administered Medications   Medication Dose Route Frequency    naloxone (NARCAN) injection 0.4 mg  0.4 mg IntraVENous EVERY 2 MINUTES AS NEEDED    HYDROmorphone (PF) (DILAUDID) injection 2 mg  2 mg IntraVENous Q4H PRN    sodium chloride (NS) flush 5-40 mL  5-40 mL IntraVENous Q8H    sodium chloride (NS) flush 5-40 mL  5-40 mL IntraVENous PRN    lidocaine (PF) (XYLOCAINE) 10 mg/mL (1 %) injection 0.1 mL  0.1 mL SubCUTAneous PRN    midazolam (VERSED) injection 1 mg  1 mg IntraVENous PRN    midazolam (VERSED) injection 1 mg  1 mg IntraVENous PRN    sodium bicarbonate (8.4%) 150 mEq in dextrose 5% 1,000 mL infusion   IntraVENous CONTINUOUS    [Held by provider] HYDROmorphone (DILAUDID) tablet 1 mg  1 mg Oral Q4H PRN    allopurinol (ZYLOPRIM) tablet 100 mg  100 mg Oral DAILY    aspirin chewable tablet 81 mg  81 mg Oral DAILY    ferrous sulfate tablet 325 mg  325 mg Oral ACB&D    mirtazapine (REMERON) tablet 15 mg  15 mg Oral QHS    patiromer calcium sorbitex (VELTASSA) powder 8.4 g  8.4 g Oral DAILY    metoprolol tartrate (LOPRESSOR) tablet 25 mg  25 mg Oral BID    ondansetron (ZOFRAN) injection 4 mg  4 mg IntraVENous Q6H PRN    acetaminophen (TYLENOL) tablet 650 mg  650 mg Oral Q6H PRN    lidocaine (LIDODERM) 5 % patch 1 Patch  1 Patch TransDERmal Q24H      Allergies   Allergen Reactions    Bactrim [Sulfamethoxazole-Trimethoprim] Anaphylaxis and Swelling     Patient reported that his \"tongue swelled up and it was hard to breath\"    Enzalutamide Nausea and Vomiting    Gluten Other (comments)     Abdominal pain    Morphine Other (comments)     AMS; \"goes crazy\"       Objective:  Vitals:    Vitals:    07/18/19 2323 07/19/19 0529 07/19/19 0800 07/19/19 0909   BP: 128/77 (!) 158/94  119/81   Pulse: 95 93  92   Resp: 18 18  16   Temp: 98.4 °F (36.9 °C) 98.6 °F (37 °C)  99.4 °F (37.4 °C)   SpO2: 99% 96% 96% 90%   Weight:  105.1 kg (231 lb 11.3 oz)     Height:         Intake and Output:  07/19 0701 - 07/19 1900  In: 540 [P.O.:240;  I.V.:300]  Out: 100 [Urine:100]  07/17 1901 - 07/19 0700  In: 700 [I.V.:700]  Out: 1925 [Urine:1925]    Physical Examination:    General: NAD,Conversant   Neck:  Supple, no mass  Resp:  Lungs CTA B/L, no wheezing , normal respiratory effort  CV:  RRR,  no murmur or rub, trace LE edema  GI:  Soft, NT, + Bowel sounds, no hepatosplenomegaly  Neurologic:  Non focal  Psych:             AAO x 3 appropriate affect   Skin:  No Rash  :  No goldberg     []    High complexity decision making was performed  []    Patient is at high-risk of decompensation with multiple organ involvement    Lab Data Personally Reviewed: I have reviewed all the pertinent labs, microbiology data and radiology studies during assessment.     Recent Labs     07/19/19  0531 07/18/19  0426 07/17/19  1238 07/17/19  0644     135* 133* 138 134*   K 5.0  4.9 4.7 5.1 6.0*     101 108 106 108   CO2 24  25 19* 19* 18*   GLU 81  87 100 222* 103*   BUN 70*  69* 70* 81* 78*   CREA 6.02*  6.10* 5.42* 5.50* 5.48*   CA 7.1*  7.3* 7.3* 7.4* 7.8*   PHOS 4.3  --   --   --    ALB 2.8*  --   --  3.1*   SGOT  --   --   --  10*   ALT  --   --   --  12     Recent Labs     07/19/19  0531 07/18/19  0426 07/17/19  0554   WBC 7.9 4.7 7.6   HGB 10.4* 9.3* 11.4*   HCT 33.1* 31.4* 36.5*    158 208     Lab Results   Component Value Date/Time    Specimen Description: URINE 10/07/2013 01:36 AM    Specimen Description: BLOOD 10/06/2013 04:13 PM     Lab Results   Component Value Date/Time    Culture result: NO GROWTH 2 DAYS 07/17/2019 02:48 PM    Culture result: NO GROWTH 1 DAY 07/17/2019 07:21 AM    Culture result: CANDIDA ALBICANS (A) 02/15/2019 04:01 PM    Culture result: NO GROWTH 5 DAYS 07/17/2018 01:25 AM    Culture result: (A) 07/16/2018 04:00 PM     PROBABLE ALPHA STREPTOCOCCUS (>100,000 COLONIES/mL)    Culture result: YEAST (>100,000 COLONIES/mL) (A) 07/16/2018 04:00 PM    Culture result: GRAM NEGATIVE RODS (2,000 COLONIES/mL) (A) 07/16/2018 04:00 PM     Recent Results (from the past 24 hour(s))   CBC W/O DIFF    Collection Time: 07/19/19  5:31 AM   Result Value Ref Range    WBC 7.9 4.1 - 11.1 K/uL    RBC 3.35 (L) 4.10 - 5.70 M/uL    HGB 10.4 (L) 12.1 - 17.0 g/dL    HCT 33.1 (L) 36.6 - 50.3 %    MCV 98.8 80.0 - 99.0 FL    MCH 31.0 26.0 - 34.0 PG    MCHC 31.4 30.0 - 36.5 g/dL    RDW 15.1 (H) 11.5 - 14.5 %    PLATELET 442 370 - 307 K/uL    MPV 9.2 8.9 - 12.9 FL    NRBC 0.0 0  WBC    ABSOLUTE NRBC 0.00 0.00 - 2.03 K/uL   METABOLIC PANEL, BASIC    Collection Time: 07/19/19  5:31 AM   Result Value Ref Range    Sodium 135 (L) 136 - 145 mmol/L    Potassium 4.9 3.5 - 5.1 mmol/L    Chloride 101 97 - 108 mmol/L    CO2 25 21 - 32 mmol/L    Anion gap 9 5 - 15 mmol/L    Glucose 87 65 - 100 mg/dL    BUN 69 (H) 6 - 20 MG/DL    Creatinine 6.10 (H) 0.70 - 1.30 MG/DL    BUN/Creatinine ratio 11 (L) 12 - 20      GFR est AA 11 (L) >60 ml/min/1.73m2    GFR est non-AA 9 (L) >60 ml/min/1.73m2    Calcium 7.3 (L) 8.5 - 10.1 MG/DL   RENAL FUNCTION PANEL    Collection Time: 07/19/19  5:31 AM   Result Value Ref Range    Sodium 136 136 - 145 mmol/L    Potassium 5.0 3.5 - 5.1 mmol/L    Chloride 101 97 - 108 mmol/L    CO2 24 21 - 32 mmol/L    Anion gap 11 5 - 15 mmol/L    Glucose 81 65 - 100 mg/dL    BUN 70 (H) 6 - 20 MG/DL    Creatinine 6.02 (H) 0.70 - 1.30 MG/DL    BUN/Creatinine ratio 12 12 - 20      GFR est AA 11 (L) >60 ml/min/1.73m2    GFR est non-AA 9 (L) >60 ml/min/1.73m2    Calcium 7.1 (L) 8.5 - 10.1 MG/DL    Phosphorus 4.3 2.6 - 4.7 MG/DL    Albumin 2.8 (L) 3.5 - 5.0 g/dL           Total time spent with patient:  xxx   min. Care Plan discussed with:  Patient     Family      RN      Consulting Physician [facility]        I have reviewed the flowsheets. Chart and Pertinent Notes have been reviewed. No change in PMH ,family and social history from Consult note.       Jimy Medley MD

## 2019-07-19 NOTE — PROGRESS NOTES
Palliative Medicine Consult  [place]: [phone] ([phone])    Patient Name: Stuart Thompson. YOB: 1940    Date of Initial Consult: July 18, 2019  Reason for Consult: Care Decisions  Requesting Provider: Dr. Neil Ridley   Primary Care Physician: Miguel Chaney MD     SUMMARY:   Stuart Coleman is a 70 y.o. 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 Concerns of stent occulusion of the urethra. confirmed and pt now s/p cystoscopy with difficult bilateral ureteral stent exchange and clot evacuation 7/18/19. Kim Yarbrough No emergent need for dialysis. Current medical issues leading to Palliative Medicine involvement include: support with care decisions and ACP. Pt has an AMD on file    Social: lives In [place], 2 children, able to complete all ADL's     PALLIATIVE DIAGNOSES:   1. Dysuria  2. Vomiting~ resolved  3. Hypoalbuminemia   4. Physical debility  5. OIC     PLAN:   1. Met with patient twice today with and without daughter as she did not make it in time for noon meeting as planned  2. Pt having some left hip pain that responds to iv dilaudid 2mg but not oral dilaudid 1mg. Equianalgesic conversion is off. Will order dilaudid 4mg q 3prn.  (8mg is = to 2mg iv). Will titrate slowly as the pt is opioid naive. Suspect hip pain is due to bone mets  3. miralax ordered for OIC  4. We discussed hospice, rehab, home health, hired care  5. Daughter shared financial stressors and limitations  6. Daughter has not been able to access VA benefits because no one will help her over at the [place]. She plans to meet with CM today for some direction  7.  Pt has had a few unfortunate experiences in skilled nursing. They want to try [facility]. Will defer to CM  8. Pt may consider hospice after skilled but will still need care at home that he cannot afford  9. Will continue to follow and support as helpful    10. Goals are for recovery at this time. Pt did not share any specific concerns  11. Initial consult note routed to primary continuity provider and/or primary health care team members  12. Communicated plan of care with: Palliative [facility] Team     GOALS OF CARE / TREATMENT PREFERENCES:     GOALS OF CARE:  Patient/Health Care Proxy Stated Goals: Prolong life    TREATMENT PREFERENCES:   Code Status: Full Code    Advance Care Planning:  [x] [facility] Inter[facility] Team has updated the ACP Navigator with Health Care Decision Maker and Patient Capacity     Jennifer Salter daughter    Advance Care Planning 7/17/2019   Patient's Healthcare Decision Maker is: -   Confirm Advance Directive Yes, on file       Medical Interventions: Full interventions     Other Instructions: Other:    As far as possible, the palliative care team has discussed with patient / health care proxy about goals of care / treatment preferences for patient. HISTORY:     History obtained from: chart, pt, team    CHIEF COMPLAINT: Pt admitted with aforementioned issues. Kenroy Palacios      HPI/SUBJECTIVE:    The patient is:   [x] Verbal and participatory  [] Non-participatory due to:   C/o left hip pain, OIC    Clinical Pain Assessment (nonverbal scale for severity on nonverbal patients):   Clinical Pain Assessment  Severity: 3        Left hip pain described as bad aching, started less than 24 hours ago, pain worse with movement     Duration: for how long has pt been experiencing pain (e.g., 2 days, 1 month, years)  Frequency: how often pain is an issue (e.g., several times per day, once every few days, constant)     FUNCTIONAL ASSESSMENT:     Palliative Performance Scale (PPS):  PPS: 60       PSYCHOSOCIAL/SPIRITUAL SCREENING: Palliative IDT has assessed this patient for cultural preferences / practices and a referral made as appropriate to needs (Cultural Services, Patient Advocacy, Ethics, etc.)    Any spiritual / Mormon concerns:  [] Yes /  [x] No    Caregiver Burnout:  [] Yes /  [x] No /  [] No Caregiver Present      Anticipatory grief assessment:   [x] Normal  / [] Maladaptive       ESAS Anxiety: Anxiety: 0    ESAS Depression: Depression: 0        REVIEW OF SYSTEMS:     Positive and pertinent negative findings in ROS are noted above in HPI. The following systems were [x] reviewed / [] unable to be reviewed as noted in HPI  Other findings are noted below. Systems: constitutional, ears/nose/mouth/throat, respiratory, gastrointestinal, genitourinary, musculoskeletal, integumentary, neurologic, psychiatric, endocrine. Positive findings noted below. Modified ESAS Completed by: provider   Fatigue: 3 Drowsiness: 0   Depression: 0 Pain: 3   Anxiety: 0 Nausea: 0   Anorexia: 0 Dyspnea: 0                    PHYSICAL EXAM:     From RN flowsheet:  Wt Readings from Last 3 Encounters:   07/19/19 231 lb 11.3 oz (105.1 kg)   02/15/19 231 lb (104.8 kg)   07/21/18 225 lb (102.1 kg)     Blood pressure 135/83, pulse 82, temperature 98.6 °F (37 °C), resp. rate 16, height 5' 11\" (1.803 m), weight 231 lb 11.3 oz (105.1 kg), SpO2 94 %.     Pain Scale 1: Numeric (0 - 10)  Pain Intensity 1: 2  Pain Onset 1: post op   Pain Location 1: Flank, Penis  Pain Orientation 1: Lower  Pain Description 1: Burning  Pain Intervention(s) 1: Emotional support  Last bowel movement, if known:     Constitutional: alert, conversant, uncomfortable  Eyes: pupils equal, anicteric  ENMT: no nasal discharge, moist mucous membranes  Cardiovascular: regular rhythm, distal pulses intact  Respiratory: breathing not labored, symmetric  Gastrointestinal: gross, soft non-tender, +bowel sounds  Musculoskeletal: no deformity, no tenderness to palpation  Skin: warm, dry  Neurologic: following commands, moving all extremities  Psychiatric: full affect, no hallucinations  Other:       HISTORY:     Principal Problem:    JASIEL (acute kidney injury) ([facility] (7/17/2019)    Active Problems:    Hyperkalemia (6/5/2018)      Coronary artery disease involving native coronary artery of native heart without angina pectoris (8/31/2018)      Essential hypertension (8/31/2018)      Prostate cancer ([facility] (8/31/2018)      Past Medical History:   Diagnosis Date    Celiac disease     Chronic kidney disease     Hypertension     MI (mitral incompetence)     Prostate cancer ([facility]       Past Surgical History:   Procedure Laterality Date    CARDIAC SURG PROCEDURE UNLIST      qaudruple bipass    HX CHOLECYSTECTOMY      HX ORTHOPAEDIC Right 2015    suni in femur from pathological fracture      History reviewed. No pertinent family history. History reviewed, no pertinent family history.   Social History     Tobacco Use    Smoking status: Former Smoker     Packs/day: 1.50     Years: 10.00     Pack years: 15.00    Smokeless tobacco: Never Used    Tobacco comment: quit smoking age 31's   Substance Use Topics    Alcohol use: Yes     Comment: rarely     Allergies   Allergen Reactions    Bactrim [Sulfamethoxazole-Trimethoprim] Anaphylaxis and Swelling     Patient reported that his \"tongue swelled up and it was hard to breath\"    Enzalutamide Nausea and Vomiting    Gluten Other (comments)     Abdominal pain    Morphine Other (comments)     AMS; \"goes crazy\"      Current Facility-Administered Medications   Medication Dose Route Frequency    naloxone (NARCAN) injection 0.4 mg  0.4 mg IntraVENous EVERY 2 MINUTES AS NEEDED    HYDROmorphone (PF) (DILAUDID) injection 2 mg  2 mg IntraVENous Q4H PRN    [START ON 7/20/2019] polyethylene glycol (MIRALAX) packet 17 g  17 g Oral DAILY    HYDROmorphone (DILAUDID) tablet 4 mg  4 mg Oral Q3H PRN    sodium chloride (NS) flush 5-40 mL  5-40 mL IntraVENous Q8H    sodium chloride (NS) flush 5-40 mL  5-40 mL IntraVENous PRN    lidocaine (PF) (XYLOCAINE) 10 mg/mL (1 %) injection 0.1 mL  0.1 mL SubCUTAneous PRN    midazolam (VERSED) injection 1 mg  1 mg IntraVENous PRN    midazolam (VERSED) injection 1 mg  1 mg IntraVENous PRN    sodium bicarbonate (8.4%) 150 mEq in dextrose 5% 1,000 mL infusion   IntraVENous CONTINUOUS    allopurinol (ZYLOPRIM) tablet 100 mg  100 mg Oral DAILY    aspirin chewable tablet 81 mg  81 mg Oral DAILY    ferrous sulfate tablet 325 mg  325 mg Oral ACB&D    mirtazapine (REMERON) tablet 15 mg  15 mg Oral QHS    patiromer calcium sorbitex (VELTASSA) powder 8.4 g  8.4 g Oral DAILY    metoprolol tartrate (LOPRESSOR) tablet 25 mg  25 mg Oral BID    ondansetron (ZOFRAN) injection 4 mg  4 mg IntraVENous Q6H PRN    acetaminophen (TYLENOL) tablet 650 mg  650 mg Oral Q6H PRN    lidocaine (LIDODERM) 5 % patch 1 Patch  1 Patch TransDERmal Q24H          LAB AND IMAGING FINDINGS:     Lab Results   Component Value Date/Time    WBC 7.9 07/19/2019 05:31 AM    HGB 10.4 (L) 07/19/2019 05:31 AM    PLATELET 312 90/64/9826 05:31 AM     Lab Results   Component Value Date/Time    Sodium 135 (L) 07/19/2019 05:31 AM    Sodium 136 07/19/2019 05:31 AM    Potassium 4.9 07/19/2019 05:31 AM    Potassium 5.0 07/19/2019 05:31 AM    Chloride 101 07/19/2019 05:31 AM    Chloride 101 07/19/2019 05:31 AM    CO2 25 07/19/2019 05:31 AM    CO2 24 07/19/2019 05:31 AM    BUN 69 (H) 07/19/2019 05:31 AM    BUN 70 (H) 07/19/2019 05:31 AM    Creatinine 6.10 (H) 07/19/2019 05:31 AM    Creatinine 6.02 (H) 07/19/2019 05:31 AM    Calcium 7.3 (L) 07/19/2019 05:31 AM    Calcium 7.1 (L) 07/19/2019 05:31 AM    Magnesium 1.3 (L) 07/20/2018 12:46 AM    Phosphorus 4.3 07/19/2019 05:31 AM      Lab Results   Component Value Date/Time    AST (SGOT) 10 (L) 07/17/2019 06:44 AM    Alk.  phosphatase 83 07/17/2019 06:44 AM    Protein, total 7.8 07/17/2019 06:44 AM    Albumin 2.8 (L) 07/19/2019 05:31 AM    Globulin 4.7 (H) 07/17/2019 06:44 AM     No results found for: INR, PTMR, PTP, PT1, PT2, APTT   Lab Results   Component Value Date/Time    Iron 61 06/08/2018 04:30 AM    TIBC 144 (L) 06/08/2018 04:30 AM    Iron % saturation 42 06/08/2018 04:30 AM    Ferritin 685 (H) 06/08/2018 04:30 AM      No results found for: PH, PCO2, PO2  No components found for: [place]   Lab Results   Component Value Date/Time    CK 33 (L) 07/21/2018 05:42 AM                Total time: 70 minutes  Counseling / coordination time, spent as noted above: 60 minutes  > 50% counseling / coordination?: y    Prolonged service was provided for  []30 min   []75 min in face to face time in the presence of the patient, spent as noted above. Time Start:   Time End:   Note: this can only be billed with 17396 (initial) or 97999 (follow up). If multiple start / stop times, list each separately.

## 2019-07-20 LAB
ANION GAP SERPL CALC-SCNC: 8 MMOL/L (ref 5–15)
APPEARANCE UR: ABNORMAL
BACTERIA URNS QL MICRO: ABNORMAL /HPF
BILIRUB UR QL: NEGATIVE
BUN SERPL-MCNC: 65 MG/DL (ref 6–20)
BUN/CREAT SERPL: 10 (ref 12–20)
CALCIUM SERPL-MCNC: 7 MG/DL (ref 8.5–10.1)
CHLORIDE SERPL-SCNC: 95 MMOL/L (ref 97–108)
CO2 SERPL-SCNC: 32 MMOL/L (ref 21–32)
COLOR UR: ABNORMAL
CREAT SERPL-MCNC: 6.66 MG/DL (ref 0.7–1.3)
EPITH CASTS URNS QL MICRO: ABNORMAL /LPF
GLUCOSE SERPL-MCNC: 127 MG/DL (ref 65–100)
GLUCOSE UR STRIP.AUTO-MCNC: NEGATIVE MG/DL
HGB UR QL STRIP: ABNORMAL
KETONES UR QL STRIP.AUTO: NEGATIVE MG/DL
LEUKOCYTE ESTERASE UR QL STRIP.AUTO: ABNORMAL
NITRITE UR QL STRIP.AUTO: NEGATIVE
PH UR STRIP: 8 [PH] (ref 5–8)
POTASSIUM SERPL-SCNC: 4.3 MMOL/L (ref 3.5–5.1)
PROCALCITONIN SERPL-MCNC: 1 NG/ML
PROT UR STRIP-MCNC: 100 MG/DL
RBC #/AREA URNS HPF: ABNORMAL /HPF (ref 0–5)
SODIUM SERPL-SCNC: 135 MMOL/L (ref 136–145)
SP GR UR REFRACTOMETRY: 1.01 (ref 1–1.03)
UR CULT HOLD, URHOLD: NORMAL
UROBILINOGEN UR QL STRIP.AUTO: 0.2 EU/DL (ref 0.2–1)
WBC URNS QL MICRO: >100 /HPF (ref 0–4)

## 2019-07-20 PROCEDURE — 74011250636 HC RX REV CODE- 250/636: Performed by: INTERNAL MEDICINE

## 2019-07-20 PROCEDURE — 74011250636 HC RX REV CODE- 250/636: Performed by: UROLOGY

## 2019-07-20 PROCEDURE — 74011250637 HC RX REV CODE- 250/637: Performed by: UROLOGY

## 2019-07-20 PROCEDURE — 36415 COLL VENOUS BLD VENIPUNCTURE: CPT

## 2019-07-20 PROCEDURE — 81001 URINALYSIS AUTO W/SCOPE: CPT

## 2019-07-20 PROCEDURE — 80048 BASIC METABOLIC PNL TOTAL CA: CPT

## 2019-07-20 PROCEDURE — 87040 BLOOD CULTURE FOR BACTERIA: CPT

## 2019-07-20 PROCEDURE — 74011000250 HC RX REV CODE- 250: Performed by: UROLOGY

## 2019-07-20 PROCEDURE — 97161 PT EVAL LOW COMPLEX 20 MIN: CPT

## 2019-07-20 PROCEDURE — 97530 THERAPEUTIC ACTIVITIES: CPT

## 2019-07-20 PROCEDURE — 74011250637 HC RX REV CODE- 250/637: Performed by: NURSE PRACTITIONER

## 2019-07-20 PROCEDURE — 84145 PROCALCITONIN (PCT): CPT

## 2019-07-20 PROCEDURE — 87086 URINE CULTURE/COLONY COUNT: CPT

## 2019-07-20 PROCEDURE — 65660000000 HC RM CCU STEPDOWN

## 2019-07-20 PROCEDURE — 74011250636 HC RX REV CODE- 250/636: Performed by: NURSE PRACTITIONER

## 2019-07-20 RX ORDER — LEVOFLOXACIN 5 MG/ML
500 INJECTION, SOLUTION INTRAVENOUS
Status: DISCONTINUED | OUTPATIENT
Start: 2019-07-22 | End: 2019-07-26

## 2019-07-20 RX ORDER — LEVOFLOXACIN 5 MG/ML
750 INJECTION, SOLUTION INTRAVENOUS ONCE
Status: COMPLETED | OUTPATIENT
Start: 2019-07-20 | End: 2019-07-20

## 2019-07-20 RX ORDER — SODIUM CHLORIDE 9 MG/ML
125 INJECTION, SOLUTION INTRAVENOUS CONTINUOUS
Status: DISCONTINUED | OUTPATIENT
Start: 2019-07-20 | End: 2019-07-24

## 2019-07-20 RX ADMIN — LEVOFLOXACIN 750 MG: 5 INJECTION, SOLUTION INTRAVENOUS at 08:48

## 2019-07-20 RX ADMIN — DEXTROSE MONOHYDRATE: 5 INJECTION, SOLUTION INTRAVENOUS at 02:56

## 2019-07-20 RX ADMIN — HYDROMORPHONE HYDROCHLORIDE 4 MG: 2 TABLET ORAL at 01:50

## 2019-07-20 RX ADMIN — FERROUS SULFATE TAB 325 MG (65 MG ELEMENTAL FE) 325 MG: 325 (65 FE) TAB at 06:35

## 2019-07-20 RX ADMIN — MIRTAZAPINE 15 MG: 15 TABLET, FILM COATED ORAL at 21:15

## 2019-07-20 RX ADMIN — PATIROMER 8.4 G: 8.4 POWDER, FOR SUSPENSION ORAL at 08:48

## 2019-07-20 RX ADMIN — HYDROMORPHONE HYDROCHLORIDE 2 MG: 2 INJECTION, SOLUTION INTRAMUSCULAR; INTRAVENOUS; SUBCUTANEOUS at 03:26

## 2019-07-20 RX ADMIN — ASPIRIN 81 MG 81 MG: 81 TABLET ORAL at 08:47

## 2019-07-20 RX ADMIN — SODIUM CHLORIDE 100 ML/HR: 900 INJECTION, SOLUTION INTRAVENOUS at 12:32

## 2019-07-20 RX ADMIN — Medication 10 ML: at 06:36

## 2019-07-20 RX ADMIN — POLYETHYLENE GLYCOL 3350 17 G: 17 POWDER, FOR SOLUTION ORAL at 08:48

## 2019-07-20 RX ADMIN — HYDROMORPHONE HYDROCHLORIDE 2 MG: 2 INJECTION, SOLUTION INTRAMUSCULAR; INTRAVENOUS; SUBCUTANEOUS at 12:34

## 2019-07-20 RX ADMIN — METOPROLOL TARTRATE 25 MG: 25 TABLET ORAL at 08:47

## 2019-07-20 RX ADMIN — FERROUS SULFATE TAB 325 MG (65 MG ELEMENTAL FE) 325 MG: 325 (65 FE) TAB at 16:59

## 2019-07-20 RX ADMIN — ALLOPURINOL 100 MG: 100 TABLET ORAL at 09:00

## 2019-07-20 RX ADMIN — METOPROLOL TARTRATE 25 MG: 25 TABLET ORAL at 17:01

## 2019-07-20 RX ADMIN — HYDROMORPHONE HYDROCHLORIDE 2 MG: 2 INJECTION, SOLUTION INTRAMUSCULAR; INTRAVENOUS; SUBCUTANEOUS at 20:33

## 2019-07-20 NOTE — PROGRESS NOTES
Chart reviewed and nursing stating patient just received pain medicine and is extremely drowsy but may try for therapy. He did sit up in the chair and work with PT earlier, adding to fatigue. Upon entering patient's room, patient briefly opening eyes and drowsy, unable to complete conversation without falling back to sleep. Will follow up with OT eval as able and appropriate.        Thank you,   Yamilex Crow, OTR/L

## 2019-07-20 NOTE — PROGRESS NOTES
Day #1 of levofloxacin  Indication:  sepsis  Current regimen:  750 mg q24H  Abx regimen: levofloxacin monotherapy  Recent Labs     19  0531 19  0426 19  1238   WBC 7.9 4.7  --    CREA 6.02*  6.10* 5.42* 5.50*   BUN 70*  69* 70* 81*     Est CrCl: 11 ml/min; UO: 0.2 ml/kg/hr  Temp (24hrs), Av.6 °F (37.6 °C), Min:98.4 °F (36.9 °C), Max:102.6 °F (39.2 °C)    Cultures:    urine- NGTD   blood, NGTD- prelim    Plan: Changed to 750 mg x 1, then 500 mg Q48H per renal dosing policy

## 2019-07-20 NOTE — ACP (ADVANCE CARE PLANNING)
Advance Care Planning Note    Name: Mony Jones. YOB: 1940  MRN: [id number]  Admission Date: 7/17/2019  5:20 AM    Date of discussion: 7/20/2019    Active Diagnoses:    Hospital Problems  Date Reviewed: 7/16/2018          Codes Class Noted POA    * (Principal) JASIEL (acute kidney injury) ([facility] ICD-10-CM: N17.9  ICD-9-CM: 584.9  7/17/2019 Unknown        Coronary artery disease involving native coronary artery of native heart without angina pectoris ICD-10-CM: I25.10  ICD-9-CM: 414.01  8/31/2018 Yes        Essential hypertension ICD-10-CM: I10  ICD-9-CM: 401.9  8/31/2018 Yes        Prostate cancer ([facility] ICD-10-CM: C61  ICD-9-CM: 185  8/31/2018 Yes        Hyperkalemia ICD-10-CM: E87.5  ICD-9-CM: 276.7  6/5/2018 Yes               These active diagnoses are of sufficient risk that focused discussion on advance care planning is indicated in order to allow the patient to thoughtfully consider personal goals of care, and if situations arise that prevent the ability to personally give input, to ensure appropriate representation of their personal desires for different levels and aggressiveness of care. Discussion:     Persons present and participating in discussion: Mony Jones., Mounika Renner MD,     Discussion:   Had a long discussion today regarding his medical issues, and philosophy of hospice. Discussed that renal function does continue to worsen. Discussed that he would unlikely be a good candidate for dialysis, and I am not sure he would actually want to commit to that at this point. Discussed the general philosophy of hospice, and shifting focus towards comfort. Discussed code status. Reviewed poor chance of success of CPR given underlying medical conditions, which are not reversible. Pt now wishes to be DNR/DNI. He confirmed that his daughter and his brother are his mPOA if he is unable to make decisions for himself. Time Spent:     Total time spent face-to-face in education and discussion: 16 minutes.      Maryanne Castro MD  7/20/2019  12:49 PM

## 2019-07-20 NOTE — PROGRESS NOTES
[facility]   [address] [facility], [address] [place] [address], [facility]  Phone: [phone]   [phone]       Nephrology Progress Note  Jolie Hassan     1940     [id number]  Date of Admission : 7/17/2019 07/20/19    CC:  Follow up for JASIEL     Assessment and Plan   JASIEL on CKD   - Progressive Obstructive nephropathy   - s/p Ureteral stent change 7/18  - cont with IVF - change to NS today  - No emergent need for dialysis  - daily labs and strict I/Os     CKD Stage V  - 2/2 chronic obstruction/ fsgs  - baseline Cr ~ 4 mg/dl at best   - f/b Dr Nicky Aguilar      Hx of MEDINA with ongoing b/l hydro despite stent placement:  - s/p stent change in March  By Dr Darrin Justice   - s/p Stent change 7/18     Hyperkalemia:  - resolving  - continue daily  Valtessa     Metabolic Acidosis:  - d/c bicarb drip     Anemia of CKD:  -serial H/H     CAD s/p CABG     Hx of prostate cancer    Care Plan discussed with: pt     Interval History:  Seen and examined. Urine has cleared up. Claims he is voiding but not recorded. No cp, sob, n/v/d. Cr up today. Review of Systems: A comprehensive review of systems was negative.     Current Medications:   Current Facility-Administered Medications   Medication Dose Route Frequency    [START ON 7/22/2019] levoFLOXacin (LEVAQUIN) 500 mg in D5W IVPB  500 mg IntraVENous Q48H    naloxone (NARCAN) injection 0.4 mg  0.4 mg IntraVENous EVERY 2 MINUTES AS NEEDED    HYDROmorphone (PF) (DILAUDID) injection 2 mg  2 mg IntraVENous Q4H PRN    polyethylene glycol (MIRALAX) packet 17 g  17 g Oral DAILY    HYDROmorphone (DILAUDID) tablet 4 mg  4 mg Oral Q3H PRN    sodium chloride (NS) flush 5-40 mL  5-40 mL IntraVENous Q8H    sodium chloride (NS) flush 5-40 mL  5-40 mL IntraVENous PRN    lidocaine (PF) (XYLOCAINE) 10 mg/mL (1 %) injection 0.1 mL  0.1 mL SubCUTAneous PRN    midazolam (VERSED) injection 1 mg  1 mg IntraVENous PRN    midazolam (VERSED) injection 1 mg  1 mg IntraVENous PRN    sodium bicarbonate (8.4%) 150 mEq in dextrose 5% 1,000 mL infusion   IntraVENous CONTINUOUS    allopurinol (ZYLOPRIM) tablet 100 mg  100 mg Oral DAILY    aspirin chewable tablet 81 mg  81 mg Oral DAILY    ferrous sulfate tablet 325 mg  325 mg Oral ACB&D    mirtazapine (REMERON) tablet 15 mg  15 mg Oral QHS    patiromer calcium sorbitex (VELTASSA) powder 8.4 g  8.4 g Oral DAILY    metoprolol tartrate (LOPRESSOR) tablet 25 mg  25 mg Oral BID    ondansetron (ZOFRAN) injection 4 mg  4 mg IntraVENous Q6H PRN    acetaminophen (TYLENOL) tablet 650 mg  650 mg Oral Q6H PRN    lidocaine (LIDODERM) 5 % patch 1 Patch  1 Patch TransDERmal Q24H      Allergies   Allergen Reactions    Bactrim [Sulfamethoxazole-Trimethoprim] Anaphylaxis and Swelling     Patient reported that his \"tongue swelled up and it was hard to breath\"    Enzalutamide Nausea and Vomiting    Gluten Other (comments)     Abdominal pain    Morphine Other (comments)     AMS; \"goes crazy\"       Objective:  Vitals:    Vitals:    07/19/19 2305 07/20/19 0300 07/20/19 0721 07/20/19 0800   BP: 150/85 147/89 139/85    Pulse: (!) 110 98 (!) 103    Resp: 18 18 18    Temp: (!) 102.6 °F (39.2 °C) 100.1 °F (37.8 °C) 99.3 °F (37.4 °C)    SpO2: 93% 96% 95% 96%   Weight:  105.5 kg (232 lb 9.4 oz)     Height:         Intake and Output:  07/20 0701 - 07/20 1900  In: 1131 [P.O.:240;  I.V.:1600]  Out: -   07/18 1901 - 07/20 0700  In: 3238.3 [P.O.:590; I.V.:2648.3]  Out: 650 [Urine:650]    Physical Examination:    General: NAD,Conversant   Neck:  Supple, no mass  Resp:  Lungs CTA B/L, no wheezing , normal respiratory effort  CV:  RRR,  no murmur or rub, trace LE edema  GI:  Soft, NT, + Bowel sounds, no hepatosplenomegaly  Neurologic:  Non focal  Psych:             AAO x 3 appropriate affect   Skin:  No Rash  :  No goldberg     []    High complexity decision making was performed  []    Patient is at high-risk of decompensation with multiple organ involvement    Lab Data Personally Reviewed: I have reviewed all the pertinent labs, microbiology data and radiology studies during assessment.     Recent Labs     07/20/19  0637 07/19/19  0531 07/18/19  0426 07/17/19  1238   * 136  135* 133* 138   K 4.3 5.0  4.9 4.7 5.1   CL 95* 101  101 108 106   CO2 32 24  25 19* 19*   * 81  87 100 222*   BUN 65* 70*  69* 70* 81*   CREA 6.66* 6.02*  6.10* 5.42* 5.50*   CA 7.0* 7.1*  7.3* 7.3* 7.4*   PHOS  --  4.3  --   --    ALB  --  2.8*  --   --      Recent Labs     07/19/19 0531 07/18/19 0426   WBC 7.9 4.7   HGB 10.4* 9.3*   HCT 33.1* 31.4*    158     Lab Results   Component Value Date/Time    Specimen Description: URINE 10/07/2013 01:36 AM    Specimen Description: BLOOD 10/06/2013 04:13 PM     Lab Results   Component Value Date/Time    Culture result: NO GROWTH 3 DAYS 07/17/2019 02:48 PM    Culture result: NO GROWTH 1 DAY 07/17/2019 07:21 AM    Culture result: CANDIDA ALBICANS (A) 02/15/2019 04:01 PM    Culture result: NO GROWTH 5 DAYS 07/17/2018 01:25 AM    Culture result: (A) 07/16/2018 04:00 PM     PROBABLE ALPHA STREPTOCOCCUS (>100,000 COLONIES/mL)    Culture result: YEAST (>100,000 COLONIES/mL) (A) 07/16/2018 04:00 PM    Culture result: GRAM NEGATIVE RODS (2,000 COLONIES/mL) (A) 07/16/2018 04:00 PM     Recent Results (from the past 24 hour(s))   METABOLIC PANEL, BASIC    Collection Time: 07/20/19  6:37 AM   Result Value Ref Range    Sodium 135 (L) 136 - 145 mmol/L    Potassium 4.3 3.5 - 5.1 mmol/L    Chloride 95 (L) 97 - 108 mmol/L    CO2 32 21 - 32 mmol/L    Anion gap 8 5 - 15 mmol/L    Glucose 127 (H) 65 - 100 mg/dL    BUN 65 (H) 6 - 20 MG/DL    Creatinine 6.66 (H) 0.70 - 1.30 MG/DL    BUN/Creatinine ratio 10 (L) 12 - 20      GFR est AA 10 (L) >60 ml/min/1.73m2    GFR est non-AA 8 (L) >60 ml/min/1.73m2    Calcium 7.0 (L) 8.5 - 10.1 MG/DL   URINALYSIS W/MICROSCOPIC    Collection Time: 07/20/19 10:40 AM   Result Value Ref Range    Color YELLOW/STRAW      Appearance CLOUDY (A) CLEAR      Specific gravity 1.007 1.003 - 1.030      pH (UA) 8.0 5.0 - 8.0      Protein 100 (A) NEG mg/dL    Glucose NEGATIVE  NEG mg/dL    Ketone NEGATIVE  NEG mg/dL    Bilirubin NEGATIVE  NEG      Blood LARGE (A) NEG      Urobilinogen 0.2 0.2 - 1.0 EU/dL    Nitrites NEGATIVE  NEG      Leukocyte Esterase LARGE (A) NEG      WBC >100 (H) 0 - 4 /hpf    RBC 20-50 0 - 5 /hpf    Epithelial cells FEW FEW /lpf    Bacteria 1+ (A) NEG /hpf   URINE CULTURE HOLD SAMPLE    Collection Time: 07/20/19 10:40 AM   Result Value Ref Range    Urine culture hold PENDING            Total time spent with patient:  xxx   min. Care Plan discussed with:  Patient     Family      RN      Consulting Physician [facility],         I have reviewed the flowsheets. Chart and Pertinent Notes have been reviewed. No change in PMH ,family and social history from Consult note.       Candice Anguiano MD

## 2019-07-20 NOTE — PROGRESS NOTES
Bedside and Verbal shift change report given to Vicente Munoz (oncoming nurse) by Campbell Lloyd (offgoing nurse). Report included the following information SBAR, Kardex, Intake/Output, MAR, Cardiac Rhythm NSR and Quality Measures.

## 2019-07-20 NOTE — PROGRESS NOTES
Hospitalist Progress Note  Yamile Jay MD  Answering service: [phone] from in house phone  Cell: [phone]   Date of Service:  2019  NAME:  Acacia Zamudio. :  1940  MRN:  [id number]    Admission Summary:   Pt originally went to his PCP due to painful urination. His UA was negative so was prescribed tramadol for pain. He took the first dose of 50 mg tramadol the night before admit, felt dizzy and lightheaded and then associated with nausea and 2-3 episodes of vomiting. He came to the hospital for further evaluation.     Interval history / Subjective:   Complaining of L hip pain, which improved after receiving pain medications. He currently does not have any pain as long as he is not moving around. Continues to have dysuria with urination. Assessment & Plan:     Sepsis (POA, now recurred) - Fever, HR, suspect urinary source from recent instrumentation vs. Malignancy. WBC normal  - check UA, blood culture  - check procalcitonin  - start levofloxacin for potential easy transition to PO. Pain: Likely secondary to metastatic disease, L hip pain. Ongoing   - pain in left hip: PO dilaudid 4mg, IV dilaudid 2mg as needed. - back pain: controlled at present  - urinary burning: urology, nephrology following. Hyperkalemia: resolved  - s/p kayexalate  - takes veltassa (missed dose PTA), this has been resumed    JASIEL on CKD stage 5, Metabolic acidosis:  - Cr: rising, 6.02 today,  was 4.96 in 2019.  Likely has progression of kidney disease  - s/p bicarb gtt   - Nephrology consulted and following  - check labs again in am, pt s/p stent replacement   - having some urinary burning, likely due to urinary procedure, however, have to take precaution giving pyridium in renal failure.      UTI - bladder outlet obstruction  Hx of b/l hydronephrosis due to prostate cancer with stent placement in 2018:  - patient complaints of burning and pain while urination in the last few days PTA  - UA shows >100 WBCs with 2+ bacteria, he has ureteral stents  - 7/18: s/p bilateral ureteral stent replacement (difficult) and clot removal     Dizziness/lightheadedness: Resolved for now    Nausea/vomiting: Resolved  - side effects due to tramadol, discussed with the patient  - prn zofran     Hx of metastatic prostate cancer: on casodex  - palliative care consulted, family meeting scheduled for today     Hx of CAD s/p CABG: resume aspirin and metoprolol     Hx HTN: on home metoprolol  - pain could be factor in recent elevations    Code status: Full  DVT prophylaxis: SCDs  Care Plan discussed with: patient, nurse, attending MD and nephrology  Disposition: Home when able     Hospital Problems  Date Reviewed: 7/16/2018          Codes Class Noted POA    * (Principal) JASIEL (acute kidney injury) ([facility] ICD-10-CM: N17.9  ICD-9-CM: 584.9  7/17/2019 Unknown        Coronary artery disease involving native coronary artery of native heart without angina pectoris ICD-10-CM: I25.10  ICD-9-CM: 414.01  8/31/2018 Yes        Essential hypertension ICD-10-CM: I10  ICD-9-CM: 401.9  8/31/2018 Yes        Prostate cancer ([facility] ICD-10-CM: C61  ICD-9-CM: 185  8/31/2018 Yes        Hyperkalemia ICD-10-CM: E87.5  ICD-9-CM: 276.7  6/5/2018 Yes            Review of Systems:   Denies HA. No chest pain or pressure. No respiratory complaints. No Nausea/Vomiting. + left hip pain and significant urinary burning. Vital Signs:    Last 24hrs VS reviewed since prior progress note.  Most recent are:  Visit Vitals  /72 (BP 1 Location: Right arm, BP Patient Position: At rest)   Pulse 97   Temp 99.9 °F (37.7 °C)   Resp 17   Ht 5' 11\" (1.803 m)   Wt 105.5 kg (232 lb 9.4 oz)   SpO2 93%   BMI 32.44 kg/m²       Intake/Output Summary (Last 24 hours) at 7/20/2019 1251  Last data filed at 7/20/2019 0800  Gross per 24 hour   Intake 4338.33 ml   Output 200 ml   Net 4138.33 ml Physical Examination:         Constitutional:  Cooperative, pleasant. NAD   ENT:  MMM     Resp:  CTA bilaterally. On RA   CV:  Regular rhythm, normal rate, no murmurs. GI:  Soft, non distended, tender. Normoactive bowel sounds. :  Urine clear this morning. Musculoskeletal:  No edema, warm, 2+ pulses throughout    Neurologic:  Moves all extremities. AAOx3. Psych: Calm and cooperative       Data Review:   Review and/or order of clinical lab test  Review and/or order of tests in the radiology section of [facility]  Review and/or order of tests in the medicine section of [facility]    Labs:     Recent Labs     07/19/19  0531 07/18/19  0426   WBC 7.9 4.7   HGB 10.4* 9.3*   HCT 33.1* 31.4*    158     Recent Labs     07/20/19  0637 07/19/19 0531 07/18/19  0426   * 136  135* 133*   K 4.3 5.0  4.9 4.7   CL 95* 101  101 108   CO2 32 24  25 19*   BUN 65* 70*  69* 70*   CREA 6.66* 6.02*  6.10* 5.42*   * 81  87 100   CA 7.0* 7.1*  7.3* 7.3*   PHOS  --  4.3  --      Recent Labs     07/19/19 0531   ALB 2.8*     No results for input(s): INR, PTP, APTT in the last 72 hours. No lab exists for component: INREXT, INREXT   No results for input(s): FE, TIBC, PSAT, FERR in the last 72 hours. No results found for: FOL, RBCF   No results for input(s): PH, PCO2, PO2 in the last 72 hours. No results for input(s): CPK, CKNDX, TROIQ in the last 72 hours.     No lab exists for component: CPKMB  Lab Results   Component Value Date/Time    Cholesterol, total 85 10/07/2013 12:00 AM    HDL Cholesterol 5 10/07/2013 12:00 AM    LDL, calculated 39.6 10/07/2013 12:00 AM    Triglyceride 202 (H) 10/07/2013 12:00 AM    CHOL/HDL Ratio 17.0 (H) 10/07/2013 12:00 AM     Lab Results   Component Value Date/Time    Glucose (POC) 105 (H) 07/17/2019 06:48 AM    Glucose (POC) 105 (H) 07/17/2019 05:56 AM    Glucose (POC) 137 (H) 07/18/2018 05:06 PM    Glucose (POC) 100 07/17/2018 06:07 AM    Glucose (POC) 59 (L) 06/20/2018 05:03 PM    Glucose (POC) 101 (H) 06/20/2018 03:14 PM    Glucose (POC) 83 06/10/2018 06:26 AM    Glucose (POC) 108 (H) 06/09/2018 10:08 PM    Glucose (POC) 138 (H) 06/09/2018 06:30 PM     Lab Results   Component Value Date/Time    Color YELLOW/STRAW 07/20/2019 10:40 AM    Appearance CLOUDY (A) 07/20/2019 10:40 AM    Specific gravity 1.007 07/20/2019 10:40 AM    Specific gravity 1.015 02/15/2019 04:01 PM    pH (UA) 8.0 07/20/2019 10:40 AM    Protein 100 (A) 07/20/2019 10:40 AM    Glucose NEGATIVE  07/20/2019 10:40 AM    Ketone NEGATIVE  07/20/2019 10:40 AM    Bilirubin NEGATIVE  07/20/2019 10:40 AM    Urobilinogen 0.2 07/20/2019 10:40 AM    Nitrites NEGATIVE  07/20/2019 10:40 AM    Leukocyte Esterase LARGE (A) 07/20/2019 10:40 AM    Epithelial cells FEW 07/20/2019 10:40 AM    Bacteria 1+ (A) 07/20/2019 10:40 AM    WBC >100 (H) 07/20/2019 10:40 AM    RBC 20-50 07/20/2019 10:40 AM     Medications Reviewed:     Current Facility-Administered Medications   Medication Dose Route Frequency    [START ON 7/22/2019] levoFLOXacin (LEVAQUIN) 500 mg in D5W IVPB  500 mg IntraVENous Q48H    0.9% sodium chloride infusion  100 mL/hr IntraVENous CONTINUOUS    naloxone (NARCAN) injection 0.4 mg  0.4 mg IntraVENous EVERY 2 MINUTES AS NEEDED    HYDROmorphone (PF) (DILAUDID) injection 2 mg  2 mg IntraVENous Q4H PRN    polyethylene glycol (MIRALAX) packet 17 g  17 g Oral DAILY    HYDROmorphone (DILAUDID) tablet 4 mg  4 mg Oral Q3H PRN    sodium chloride (NS) flush 5-40 mL  5-40 mL IntraVENous Q8H    sodium chloride (NS) flush 5-40 mL  5-40 mL IntraVENous PRN    lidocaine (PF) (XYLOCAINE) 10 mg/mL (1 %) injection 0.1 mL  0.1 mL SubCUTAneous PRN    midazolam (VERSED) injection 1 mg  1 mg IntraVENous PRN    midazolam (VERSED) injection 1 mg  1 mg IntraVENous PRN    allopurinol (ZYLOPRIM) tablet 100 mg  100 mg Oral DAILY    aspirin chewable tablet 81 mg  81 mg Oral DAILY    ferrous sulfate tablet 325 mg  325 mg Oral ACB&D    mirtazapine (REMERON) tablet 15 mg  15 mg Oral QHS    patiromer calcium sorbitex (VELTASSA) powder 8.4 g  8.4 g Oral DAILY    metoprolol tartrate (LOPRESSOR) tablet 25 mg  25 mg Oral BID    ondansetron (ZOFRAN) injection 4 mg  4 mg IntraVENous Q6H PRN    acetaminophen (TYLENOL) tablet 650 mg  650 mg Oral Q6H PRN    lidocaine (LIDODERM) 5 % patch 1 Patch  1 Patch TransDERmal Q24H   ______________________________________________________________________  EXPECTED LENGTH OF STAY: 3d 4h  ACTUAL LENGTH OF STAY:          3               Paula Rodriguez MD

## 2019-07-20 NOTE — PROGRESS NOTES
Problem: Mobility Impaired (Adult and Pediatric)  Goal: *Acute Goals and Plan of Care (Insert Text)  Description  Physical Therapy Goals  Initiated 7/20/2019  1. Patient will move from supine to sit and sit to supine , scoot up and down and roll side to side in bed with independence within 7 day(s). 2.  Patient will transfer from bed to chair and chair to bed with independence using the least restrictive device within 7 day(s). 3.  Patient will perform sit to stand with independence within 7 day(s). 4.  Patient will ambulate with minimal assistance/contact guard assist for 100 feet with the least restrictive device within 7 day(s). Outcome: Progressing Towards Goal  PHYSICAL THERAPY EVALUATION  Patient: Jhon Hardy (79 y.o. male)  Date: 7/20/2019  Primary Diagnosis: JASIEL (acute kidney injury) ([facility] [N17.9]  Procedure(s) (LRB):  CYSTOSCOPY, DIFFICULT BILATERAL URETERAL STENT EXCHANGE, CLOT EVACUATION (Bilateral) 2 Days Post-Op   Precautions: fall     ASSESSMENT :   Based on the objective data described below, patient presents with generalized weakness, decline in functional mobility, new left hip pain - presumed bony mets - requiring Minimum assistance overall for functional mobility. Gait training completed at Contact guard assistance, 3 feet and using a gait belt and rolling walker. Pt's left hip pain increased from 3 to 6 with OOB - then decreased to 3/10 up in chair. Prior level of function - Indep living at [facility], indep amb with rolling walker/cane, indep ADL's; pt stating he is aware he will not be able to return to indep living.   The following are barriers to independence while in acute care:   -Cognitive and/or behavioral:     -Medical condition: strength, functional endurance, pain tolerance and functional mobility     -Other:       The patient will benefit from skilled acute intervention to address the above impairments and their rehabilitation potential is considered to be Fair    Discharge recommendations: Rehab at skilled nursing facility (SNF)   (to regain functional baseline patient requires rehab)     Equipment recommendations for successful discharge (if) home:n/a       PLAN :  Recommendations and Planned Interventions: bed mobility training, transfer training, gait training, therapeutic exercises, patient and family training/education and therapeutic activities      Frequency/Duration: Patient will be followed by physical therapy  5 times a week to address goals. SUBJECTIVE:   Patient stated this left hip pain started when I came to the hospital.    OBJECTIVE DATA SUMMARY:   HISTORY:    Past Medical History:   Diagnosis Date    Celiac disease     Chronic kidney disease     Hypertension     MI (mitral incompetence)     Prostate cancer ([facility]      Past Surgical History:   Procedure Laterality Date    CARDIAC SURG PROCEDURE UNLIST      qaudruple bipass    HX CHOLECYSTECTOMY      HX ORTHOPAEDIC Right 2015    suin in femur from pathological fracture     Prior Level of Function/Home Situation: Independent Living/ 5th floor, elevator  Personal factors and/or comorbidities impacting plan of care: lived alone prior to admission    Home Situation  Home Environment: Apartment  One/Two Story Residence: Other (Comment)(5th floor)  Living Alone: Yes  Support Systems: Child(patti)  Patient Expects to be Discharged to[de-identified] Skilled nursing facility  Current DME Used/Available at Home: kezia Page, [employer], straight    EXAMINATION/PRESENTATION/DECISION MAKING:   Critical Behavior:  Neurologic State: Alert  Orientation Level: Oriented X4  Cognition: Appropriate decision making, Follows commands, Appropriate safety awareness  Safety/Judgement: Awareness of environment, Good awareness of safety precautions, Insight into deficits  Hearing:   Auditory  Auditory Impairment: Hard of hearing, bilateral  Range Of Motion:  AROM: Generally decreased, functional                       Strength:    Strength: Generally decreased, functional                    Tone & Sensation:   Tone: Normal              Sensation: Intact               Coordination:  Coordination: Within functional limits  Functional Mobility:  Bed Mobility:     Supine to Sit: Moderate assistance(to assist to move and support left leg lowering off bed)     Scooting: Minimum assistance  Transfers:  Sit to Stand: Contact guard assistance  Stand to Sit: Contact guard assistance        Bed to Chair: Contact guard assistance              Balance:   Sitting: Intact; Without support  Standing: Intact; With support  Ambulation/Gait Training:  Distance (ft): 3 Feet (ft)  Assistive Device: Walker, rolling;Gait belt  Ambulation - Level of Assistance: Contact guard assistance        Gait Abnormalities: Antalgic;Decreased step clearance(decreased weight shift to left)        Base of Support: Widened;Shift to right  Stance: Left decreased  Speed/Park: Slow  Step Length: Right shortened;Left shortened             Functional Measure:  Tinetti test:    Sitting Balance: 1  Arises: 1  Attempts to Rise: 1  Immediate Standing Balance: 1  Standing Balance: 1  Nudged: 0  Eyes Closed: 0  Turn 360 Degrees - Continuous/Discontinuous: 0  Turn 360 Degrees - Steady/Unsteady: 1  Sitting Down: 1  Balance Score: 7  Indication of Gait: 0  R Step Length/Height: 1  L Step Length/Height: 1  R Foot Clearance: 1  L Foot Clearance: 1  Step Symmetry: 1  Step Continuity: 1  Path: 1  Trunk: 0  Walking Time: 0  Gait Score: 7  Total Score: 14         Tinetti Tool Score Risk of Falls  <19 = High Fall Risk  19-24 = Moderate Fall Risk  25-28 = Low Fall Risk  Tinetti ME. Performance-Oriented Assessment of Mobility Problems in Elderly Patients. [facility]; [id number].  (Scoring Description: PT Bulletin Feb. 10, 1993)    Older adults: John Templeton et al, 2009; n = 1601 [facility] [employer] elderly evaluated with ABC, ELMER, ADL, and IADL)  · Mean ELMER score for males aged 69-68 years = 26.21(3.40)  · Mean ELMER score for females age 69-68 years = 25.16(4.30)  · Mean ELMER score for males over 80 years = 23.29(6.02)  · Mean ELMER score for females over 80 years = 17.20(8.32)           Physical Therapy Evaluation Charge Determination   History Examination Presentation Decision-Making   LOW Complexity : Zero comorbidities / personal factors that will impact the outcome / POC LOW Complexity : 1-2 Standardized tests and measures addressing body structure, function, activity limitation and / or participation in recreation  LOW Complexity : Stable, uncomplicated  LOW Complexity : FOTO score of       Based on the above components, the patient evaluation is determined to be of the following complexity level: LOW     Pain:  Pre treatment: 3 /10   During treatment: 6/10  Post treatment:  3/10   Location: left hip    Description:aching   Aggravating factors: movement    Activity Tolerance:   Fair  Please refer to the flowsheet for vital signs taken during this treatment. After treatment patient left:   Up in chair  Call light within reach  RN notified     COMMUNICATION/EDUCATION:   The patients plan of care was discussed with: Registered Nurse. Fall prevention education was provided and the patient/caregiver indicated understanding., Patient/family have participated as able in goal setting and plan of care. and Patient/family agree to work toward stated goals and plan of care.     Thank you for this referral.  Glenroy Gardner, PT   Time Calculation: 30 mins

## 2019-07-21 LAB
ALBUMIN SERPL-MCNC: 2.3 G/DL (ref 3.5–5)
ANION GAP SERPL CALC-SCNC: 10 MMOL/L (ref 5–15)
BASOPHILS # BLD: 0 K/UL (ref 0–0.1)
BASOPHILS NFR BLD: 0 % (ref 0–1)
BUN SERPL-MCNC: 65 MG/DL (ref 6–20)
BUN/CREAT SERPL: 9 (ref 12–20)
CALCIUM SERPL-MCNC: 6.3 MG/DL (ref 8.5–10.1)
CHLORIDE SERPL-SCNC: 99 MMOL/L (ref 97–108)
CO2 SERPL-SCNC: 28 MMOL/L (ref 21–32)
CREAT SERPL-MCNC: 6.87 MG/DL (ref 0.7–1.3)
DIFFERENTIAL METHOD BLD: ABNORMAL
EOSINOPHIL # BLD: 0.4 K/UL (ref 0–0.4)
EOSINOPHIL NFR BLD: 4 % (ref 0–7)
ERYTHROCYTE [DISTWIDTH] IN BLOOD BY AUTOMATED COUNT: 15 % (ref 11.5–14.5)
GLUCOSE SERPL-MCNC: 83 MG/DL (ref 65–100)
HCT VFR BLD AUTO: 30.8 % (ref 36.6–50.3)
HGB BLD-MCNC: 9.5 G/DL (ref 12.1–17)
IMM GRANULOCYTES # BLD AUTO: 0.1 K/UL (ref 0–0.04)
IMM GRANULOCYTES NFR BLD AUTO: 1 % (ref 0–0.5)
LYMPHOCYTES # BLD: 1.3 K/UL (ref 0.8–3.5)
LYMPHOCYTES NFR BLD: 14 % (ref 12–49)
MAGNESIUM SERPL-MCNC: 1.6 MG/DL (ref 1.6–2.4)
MCH RBC QN AUTO: 31.1 PG (ref 26–34)
MCHC RBC AUTO-ENTMCNC: 30.8 G/DL (ref 30–36.5)
MCV RBC AUTO: 101 FL (ref 80–99)
MONOCYTES # BLD: 0.8 K/UL (ref 0–1)
MONOCYTES NFR BLD: 8 % (ref 5–13)
NEUTS SEG # BLD: 7 K/UL (ref 1.8–8)
NEUTS SEG NFR BLD: 73 % (ref 32–75)
NRBC # BLD: 0 K/UL (ref 0–0.01)
NRBC BLD-RTO: 0 PER 100 WBC
PHOSPHATE SERPL-MCNC: 5.3 MG/DL (ref 2.6–4.7)
PLATELET # BLD AUTO: 174 K/UL (ref 150–400)
PMV BLD AUTO: 9.6 FL (ref 8.9–12.9)
POTASSIUM SERPL-SCNC: 4.1 MMOL/L (ref 3.5–5.1)
RBC # BLD AUTO: 3.05 M/UL (ref 4.1–5.7)
SODIUM SERPL-SCNC: 137 MMOL/L (ref 136–145)
WBC # BLD AUTO: 9.6 K/UL (ref 4.1–11.1)

## 2019-07-21 PROCEDURE — 65660000000 HC RM CCU STEPDOWN

## 2019-07-21 PROCEDURE — 85025 COMPLETE CBC W/AUTO DIFF WBC: CPT

## 2019-07-21 PROCEDURE — 74011250637 HC RX REV CODE- 250/637: Performed by: UROLOGY

## 2019-07-21 PROCEDURE — 97165 OT EVAL LOW COMPLEX 30 MIN: CPT

## 2019-07-21 PROCEDURE — 74011250636 HC RX REV CODE- 250/636: Performed by: INTERNAL MEDICINE

## 2019-07-21 PROCEDURE — 36415 COLL VENOUS BLD VENIPUNCTURE: CPT

## 2019-07-21 PROCEDURE — 74011000250 HC RX REV CODE- 250: Performed by: UROLOGY

## 2019-07-21 PROCEDURE — 74011250637 HC RX REV CODE- 250/637: Performed by: NURSE PRACTITIONER

## 2019-07-21 PROCEDURE — 74011250636 HC RX REV CODE- 250/636: Performed by: NURSE PRACTITIONER

## 2019-07-21 PROCEDURE — 80069 RENAL FUNCTION PANEL: CPT

## 2019-07-21 PROCEDURE — 97535 SELF CARE MNGMENT TRAINING: CPT

## 2019-07-21 PROCEDURE — 83735 ASSAY OF MAGNESIUM: CPT

## 2019-07-21 PROCEDURE — 74011636637 HC RX REV CODE- 636/637: Performed by: INTERNAL MEDICINE

## 2019-07-21 RX ORDER — PREDNISONE 20 MG/1
40 TABLET ORAL
Status: DISCONTINUED | OUTPATIENT
Start: 2019-07-21 | End: 2019-07-24

## 2019-07-21 RX ORDER — HYDROMORPHONE HYDROCHLORIDE 1 MG/ML
1 INJECTION, SOLUTION INTRAMUSCULAR; INTRAVENOUS; SUBCUTANEOUS
Status: DISCONTINUED | OUTPATIENT
Start: 2019-07-21 | End: 2019-07-22

## 2019-07-21 RX ADMIN — METOPROLOL TARTRATE 25 MG: 25 TABLET ORAL at 08:56

## 2019-07-21 RX ADMIN — FERROUS SULFATE TAB 325 MG (65 MG ELEMENTAL FE) 325 MG: 325 (65 FE) TAB at 16:00

## 2019-07-21 RX ADMIN — PATIROMER 8.4 G: 8.4 POWDER, FOR SUSPENSION ORAL at 10:05

## 2019-07-21 RX ADMIN — HYDROMORPHONE HYDROCHLORIDE 1 MG: 1 INJECTION, SOLUTION INTRAMUSCULAR; INTRAVENOUS; SUBCUTANEOUS at 20:36

## 2019-07-21 RX ADMIN — MIRTAZAPINE 15 MG: 15 TABLET, FILM COATED ORAL at 20:35

## 2019-07-21 RX ADMIN — PREDNISONE 40 MG: 20 TABLET ORAL at 11:37

## 2019-07-21 RX ADMIN — Medication 10 ML: at 08:56

## 2019-07-21 RX ADMIN — ALLOPURINOL 100 MG: 100 TABLET ORAL at 08:56

## 2019-07-21 RX ADMIN — POLYETHYLENE GLYCOL 3350 17 G: 17 POWDER, FOR SOLUTION ORAL at 08:56

## 2019-07-21 RX ADMIN — METOPROLOL TARTRATE 25 MG: 25 TABLET ORAL at 18:41

## 2019-07-21 RX ADMIN — SODIUM CHLORIDE 100 ML/HR: 900 INJECTION, SOLUTION INTRAVENOUS at 00:37

## 2019-07-21 RX ADMIN — HYDROMORPHONE HYDROCHLORIDE 2 MG: 2 INJECTION, SOLUTION INTRAMUSCULAR; INTRAVENOUS; SUBCUTANEOUS at 05:28

## 2019-07-21 RX ADMIN — SODIUM CHLORIDE 100 ML/HR: 900 INJECTION, SOLUTION INTRAVENOUS at 10:06

## 2019-07-21 RX ADMIN — HYDROMORPHONE HYDROCHLORIDE 2 MG: 2 INJECTION, SOLUTION INTRAMUSCULAR; INTRAVENOUS; SUBCUTANEOUS at 00:37

## 2019-07-21 RX ADMIN — HYDROMORPHONE HYDROCHLORIDE 1 MG: 1 INJECTION, SOLUTION INTRAMUSCULAR; INTRAVENOUS; SUBCUTANEOUS at 23:51

## 2019-07-21 RX ADMIN — ASPIRIN 81 MG 81 MG: 81 TABLET ORAL at 08:56

## 2019-07-21 RX ADMIN — HYDROMORPHONE HYDROCHLORIDE 2 MG: 2 INJECTION, SOLUTION INTRAMUSCULAR; INTRAVENOUS; SUBCUTANEOUS at 08:56

## 2019-07-21 RX ADMIN — FERROUS SULFATE TAB 325 MG (65 MG ELEMENTAL FE) 325 MG: 325 (65 FE) TAB at 07:19

## 2019-07-21 RX ADMIN — HYDROMORPHONE HYDROCHLORIDE 1 MG: 1 INJECTION, SOLUTION INTRAMUSCULAR; INTRAVENOUS; SUBCUTANEOUS at 16:00

## 2019-07-21 NOTE — ROUTINE PROCESS
TRANSFER - OUT REPORT:    Verbal report given to Angle (name) on [employer].  being transferred to (unit) for routine progression of care       Report consisted of patients Situation, Background, Assessment and   Recommendations(SBAR). Information from the following report(s) SBAR, Kardex, Procedure Summary, Intake/Output, MAR, Accordion, Recent Results, Med Rec Status and Cardiac Rhythm NSR; Sinus Tach was reviewed with the receiving nurse. Lines:   Peripheral IV 07/18/19 Left Antecubital (Active)   Site Assessment Clean, dry, & intact 7/21/2019  6:40 AM   Phlebitis Assessment 0 7/21/2019  6:40 AM   Infiltration Assessment 0 7/21/2019  6:40 AM   Dressing Status Clean, dry, & intact 7/21/2019  6:40 AM   Dressing Type Transparent;Tape 7/20/2019  8:48 PM   Hub Color/Line Status Infusing 7/20/2019  8:48 PM   Action Taken Open ports on tubing capped 7/20/2019  8:48 PM   Alcohol Cap Used Yes 7/20/2019  8:48 PM       Peripheral IV 07/18/19 Right Antecubital (Active)   Site Assessment Clean, dry, & intact 7/21/2019  6:40 AM   Phlebitis Assessment 0 7/21/2019  6:40 AM   Infiltration Assessment 0 7/21/2019  6:40 AM   Dressing Status Clean, dry, & intact 7/21/2019  6:40 AM   Dressing Type Transparent;Tape 7/20/2019  8:48 PM   Hub Color/Line Status Capped 7/20/2019  8:48 PM   Action Taken Open ports on tubing capped 7/20/2019  8:48 PM   Alcohol Cap Used Yes 7/20/2019  8:48 PM        Opportunity for questions and clarification was provided.       Patient transported with:   Patient's medications from home

## 2019-07-21 NOTE — PROGRESS NOTES
TRANSFER - IN REPORT:    Verbal report received from Elisabet Rose (name) on [employer].  being received from 3n(unit) for routine progression of care      Report consisted of patients Situation, Background, Assessment and   Recommendations(SBAR). Information from the following report(s) SBAR was reviewed with the receiving nurse. Opportunity for questions and clarification was provided. Assessment completed upon patients arrival to unit and care assumed.

## 2019-07-21 NOTE — CDMP QUERY
#1    Pt admitted with UTI/Pt noted to have Sepsis POA and replacement of urinary stents. If possible, please clarify in progress notes and d/c summary the relationship, if any, between renal stents and Sepsis      Are the conditions:   Sepsis due to urinary stents POA   Sepsis due to UTI only   Other, please specify   Unable to determine     The medical record reflects the following:    Risk Factors: UTI, hx of urinary stents    Clinical Indicators:   H/P  History of b/l hydronephrosis due to prostate cancer with stent placement in 2018:   UA shows >100 WBCs with 2+ bacteria, he has ureteral Stents   -will give zosyn for now   -Urine cultures -de escalate based on cultures. 7/21-progress note  Sepsis (POA, now recurred) improved. - Fever, HR, suspect urinary source from recent instrumentation vs. Malignancy.  WBC normal    s/p Ureteral stent change 7/18      Treatment: stent removal and replacement, levaquin IV, zosn IV      Thank you,         [place], [place]

## 2019-07-21 NOTE — PROGRESS NOTES
[facility]   [address] [facility], [facility] [address], [facility] [place]  Phone: [phone]   [phone]       Nephrology Progress Note  Juana Milligan     1940     [id number]  Date of Admission : 7/17/2019 07/21/19    CC:  Follow up for JASIEL     Assessment and Plan   JASIEL on CKD   - Progressive Obstructive nephropathy   - s/p Ureteral stent change 7/18  - cont with IVF - reduce rate  - No emergent need for dialysis - not a good candidate and he is not in favor of doing HD again if needed  - daily labs and strict I/Os     CKD Stage V  - 2/2 chronic obstruction/ fsgs  - baseline Cr ~ 4 mg/dl at best   - f/b Dr Jefe Baer      Hx of MEDINA with ongoing b/l hydro despite stent placement:  - s/p stent change in March  By Dr Mark Lr   - s/p Stent change 7/18     Hyperkalemia:  - resolving  - continue daily  Valtessa     Metabolic Acidosis:  - stable    Anemia of CKD:  -serial H/H     CAD s/p CABG     Hx of prostate cancer    Care Plan discussed with: pt and Dr. Tammy Quigley     Interval History:  Seen and examined. Voiding well, no cp or sob. Cr up today. Lytes stable. IVF running at 100/hr. Review of Systems: A comprehensive review of systems was negative.     Current Medications:   Current Facility-Administered Medications   Medication Dose Route Frequency    [START ON 7/22/2019] levoFLOXacin (LEVAQUIN) 500 mg in D5W IVPB  500 mg IntraVENous Q48H    0.9% sodium chloride infusion  100 mL/hr IntraVENous CONTINUOUS    naloxone (NARCAN) injection 0.4 mg  0.4 mg IntraVENous EVERY 2 MINUTES AS NEEDED    HYDROmorphone (PF) (DILAUDID) injection 2 mg  2 mg IntraVENous Q4H PRN    polyethylene glycol (MIRALAX) packet 17 g  17 g Oral DAILY    HYDROmorphone (DILAUDID) tablet 4 mg  4 mg Oral Q3H PRN    sodium chloride (NS) flush 5-40 mL  5-40 mL IntraVENous Q8H    sodium chloride (NS) flush 5-40 mL  5-40 mL IntraVENous PRN    lidocaine (PF) (XYLOCAINE) 10 mg/mL (1 %) injection 0.1 mL  0.1 mL SubCUTAneous PRN  midazolam (VERSED) injection 1 mg  1 mg IntraVENous PRN    midazolam (VERSED) injection 1 mg  1 mg IntraVENous PRN    allopurinol (ZYLOPRIM) tablet 100 mg  100 mg Oral DAILY    aspirin chewable tablet 81 mg  81 mg Oral DAILY    ferrous sulfate tablet 325 mg  325 mg Oral ACB&D    mirtazapine (REMERON) tablet 15 mg  15 mg Oral QHS    patiromer calcium sorbitex (VELTASSA) powder 8.4 g  8.4 g Oral DAILY    metoprolol tartrate (LOPRESSOR) tablet 25 mg  25 mg Oral BID    ondansetron (ZOFRAN) injection 4 mg  4 mg IntraVENous Q6H PRN    acetaminophen (TYLENOL) tablet 650 mg  650 mg Oral Q6H PRN    lidocaine (LIDODERM) 5 % patch 1 Patch  1 Patch TransDERmal Q24H      Allergies   Allergen Reactions    Bactrim [Sulfamethoxazole-Trimethoprim] Anaphylaxis and Swelling     Patient reported that his \"tongue swelled up and it was hard to breath\"    Enzalutamide Nausea and Vomiting    Gluten Other (comments)     Abdominal pain    Morphine Other (comments)     AMS; \"goes crazy\"       Objective:  Vitals:    Vitals:    07/20/19 2336 07/21/19 0348 07/21/19 0800 07/21/19 0817   BP: 97/80 (!) 127/91  142/85   Pulse: (!) 126 93 (!) 101 (!) 101   Resp: 20 22 20   Temp: 99 °F (37.2 °C) 98.7 °F (37.1 °C)  98.6 °F (37 °C)   SpO2: 95% 94%  92%   Weight:  107.1 kg (236 lb 1.8 oz)     Height:         Intake and Output:  07/21 0701 - 07/21 1900  In: -   Out: 600 [Urine:600]  07/19 1901 - 07/21 0700  In: 3393.3 [P.O.:440; I.V.:2953.3]  Out: 850 [Urine:850]    Physical Examination:    General: NAD,Conversant   Neck:  Supple, no mass  Resp:  Lungs CTA B/L, no wheezing , normal respiratory effort  CV:  RRR,  no murmur or rub, trace LE edema  GI:  Soft, NT, + Bowel sounds, no hepatosplenomegaly  Neurologic:  Non focal  Psych:             AAO x 3 appropriate affect   Skin:  No Rash  :  No goldberg     []    High complexity decision making was performed  []    Patient is at high-risk of decompensation with multiple organ involvement    Lab Data Personally Reviewed: I have reviewed all the pertinent labs, microbiology data and radiology studies during assessment.     Recent Labs     07/21/19  0420 07/20/19  0637 07/19/19  0531    135* 136  135*   K 4.1 4.3 5.0  4.9   CL 99 95* 101  101   CO2 28 32 24  25   GLU 83 127* 81  87   BUN 65* 65* 70*  69*   CREA 6.87* 6.66* 6.02*  6.10*   CA 6.3* 7.0* 7.1*  7.3*   MG 1.6  --   --    PHOS 5.3*  --  4.3   ALB 2.3*  --  2.8*     Recent Labs     07/21/19 0420 07/19/19  0531   WBC 9.6 7.9   HGB 9.5* 10.4*   HCT 30.8* 33.1*    182     Lab Results   Component Value Date/Time    Specimen Description: URINE 10/07/2013 01:36 AM    Specimen Description: BLOOD 10/06/2013 04:13 PM     Lab Results   Component Value Date/Time    Culture result: NO GROWTH AFTER 17 HOURS 07/20/2019 10:40 AM    Culture result: NO GROWTH 4 DAYS 07/17/2019 02:48 PM    Culture result: NO GROWTH 1 DAY 07/17/2019 07:21 AM    Culture result: CANDIDA ALBICANS (A) 02/15/2019 04:01 PM    Culture result: NO GROWTH 5 DAYS 07/17/2018 01:25 AM     Recent Results (from the past 24 hour(s))   CULTURE, BLOOD    Collection Time: 07/20/19 10:40 AM   Result Value Ref Range    Special Requests: NO SPECIAL REQUESTS      Culture result: NO GROWTH AFTER 17 HOURS     URINALYSIS W/MICROSCOPIC    Collection Time: 07/20/19 10:40 AM   Result Value Ref Range    Color YELLOW/STRAW      Appearance CLOUDY (A) CLEAR      Specific gravity 1.007 1.003 - 1.030      pH (UA) 8.0 5.0 - 8.0      Protein 100 (A) NEG mg/dL    Glucose NEGATIVE  NEG mg/dL    Ketone NEGATIVE  NEG mg/dL    Bilirubin NEGATIVE  NEG      Blood LARGE (A) NEG      Urobilinogen 0.2 0.2 - 1.0 EU/dL    Nitrites NEGATIVE  NEG      Leukocyte Esterase LARGE (A) NEG      WBC >100 (H) 0 - 4 /hpf    RBC 20-50 0 - 5 /hpf    Epithelial cells FEW FEW /lpf    Bacteria 1+ (A) NEG /hpf   URINE CULTURE HOLD SAMPLE    Collection Time: 07/20/19 10:40 AM   Result Value Ref Range    Urine culture hold        Add-on orders for these samples will be processed based on acceptable specimen integrity and analyte stability, which may vary by analyte. PROCALCITONIN    Collection Time: 07/20/19  1:42 PM   Result Value Ref Range    Procalcitonin 1.0 ng/mL   RENAL FUNCTION PANEL    Collection Time: 07/21/19  4:20 AM   Result Value Ref Range    Sodium 137 136 - 145 mmol/L    Potassium 4.1 3.5 - 5.1 mmol/L    Chloride 99 97 - 108 mmol/L    CO2 28 21 - 32 mmol/L    Anion gap 10 5 - 15 mmol/L    Glucose 83 65 - 100 mg/dL    BUN 65 (H) 6 - 20 MG/DL    Creatinine 6.87 (H) 0.70 - 1.30 MG/DL    BUN/Creatinine ratio 9 (L) 12 - 20      GFR est AA 10 (L) >60 ml/min/1.73m2    GFR est non-AA 8 (L) >60 ml/min/1.73m2    Calcium 6.3 (LL) 8.5 - 10.1 MG/DL    Phosphorus 5.3 (H) 2.6 - 4.7 MG/DL    Albumin 2.3 (L) 3.5 - 5.0 g/dL   CBC WITH AUTOMATED DIFF    Collection Time: 07/21/19  4:20 AM   Result Value Ref Range    WBC 9.6 4.1 - 11.1 K/uL    RBC 3.05 (L) 4.10 - 5.70 M/uL    HGB 9.5 (L) 12.1 - 17.0 g/dL    HCT 30.8 (L) 36.6 - 50.3 %    .0 (H) 80.0 - 99.0 FL    MCH 31.1 26.0 - 34.0 PG    MCHC 30.8 30.0 - 36.5 g/dL    RDW 15.0 (H) 11.5 - 14.5 %    PLATELET 565 938 - 231 K/uL    MPV 9.6 8.9 - 12.9 FL    NRBC 0.0 0  WBC    ABSOLUTE NRBC 0.00 0.00 - 0.01 K/uL    NEUTROPHILS 73 32 - 75 %    LYMPHOCYTES 14 12 - 49 %    MONOCYTES 8 5 - 13 %    EOSINOPHILS 4 0 - 7 %    BASOPHILS 0 0 - 1 %    IMMATURE GRANULOCYTES 1 (H) 0.0 - 0.5 %    ABS. NEUTROPHILS 7.0 1.8 - 8.0 K/UL    ABS. LYMPHOCYTES 1.3 0.8 - 3.5 K/UL    ABS. MONOCYTES 0.8 0.0 - 1.0 K/UL    ABS. EOSINOPHILS 0.4 0.0 - 0.4 K/UL    ABS. BASOPHILS 0.0 0.0 - 0.1 K/UL    ABS. IMM. GRANS. 0.1 (H) 0.00 - 0.04 K/UL    DF AUTOMATED     MAGNESIUM    Collection Time: 07/21/19  4:20 AM   Result Value Ref Range    Magnesium 1.6 1.6 - 2.4 mg/dL           Total time spent with patient:  xxx   min.                                Care Plan discussed with:  Patient     Family      RN Consulting Physician [facility]        I have reviewed the flowsheets. Chart and Pertinent Notes have been reviewed. No change in PMH ,family and social history from Consult note.       Candice Anguiano MD

## 2019-07-21 NOTE — PROGRESS NOTES
Bedside and Verbal shift change report given to Maryam (oncoming nurse) by Jacquie Fernandes (offgoing nurse). Report included the following information SBAR, Kardex, Intake/Output, MAR, Alarm Parameters  and Quality Measures.

## 2019-07-21 NOTE — PROGRESS NOTES
Hospitalist Progress Note  Radha Schmidt MD  Answering service: [phone] from in house phone  Cell: [phone]   Date of Service:  2019  NAME:  Opal Joy. :  1940  MRN:  [id number]    Admission Summary:   Pt originally went to his PCP due to painful urination. His UA was negative so was prescribed tramadol for pain. He took the first dose of 50 mg tramadol the night before admit, felt dizzy and lightheaded and then associated with nausea and 2-3 episodes of vomiting. He came to the hospital for further evaluation.     Interval history / Subjective:   Having pain, mostly in the L hip. Pain medication IV works, but he says it just \"knocks him out\". He says his pain is about 5/10 right now, but earlier this am was 8-9/10. Assessment & Plan:     Sepsis (POA, now recurred) improved. - Fever, HR, suspect urinary source from recent instrumentation vs. Malignancy. WBC normal  - check UA, blood culture, add on urine culture to UA from lab  - procalcitonin negative   - continue levofloxacin for potential easy transition to PO. Pain: Likely secondary to metastatic disease, L hip pain. Ongoing   -  PO dilaudid 4mg, IV dilaudid 1mg as needed. Dose reduced, as patient has been too sedated with 2mg IV. Hyperkalemia: resolved  - s/p kayexalate  - takes veltassa (missed dose PTA), this has been resumed    JASIEL on CKD stage 5, Metabolic acidosis:  - Cr: rising, 6.02 today,  was 4.96 in 2019. Likely has progression of kidney disease  - s/p bicarb gtt   - Nephrology consulted and following  -  s/p stent replacement   - monitor daily labs.    - Poor dialysis candidate long term, and unclear if patient would even be agreeable.       UTI - bladder outlet obstruction  Hx of b/l hydronephrosis due to prostate cancer with stent placement in 2018:  - UA shows >100 WBCs with 2+ bacteria, he has ureteral stents  - : s/p bilateral ureteral stent replacement (difficult) and clot removal  - Urine culture pending      Dizziness/lightheadedness: Resolved for now    Nausea/vomiting: ongoing, seems exacerbated by pain medications. - side effects due to tramadol, discussed with the patient  - prn zofran     Hx of metastatic prostate cancer: on casodex  - palliative care consulted, family meeting scheduled for today     Hx of CAD s/p CABG: resume aspirin and metoprolol     Hx HTN: on home metoprolol  - pain could be factor in recent elevations    Code status: Full  DVT prophylaxis: SCDs  Care Plan discussed with: patient, nurse, attending MD and nephrology  Disposition: SNF pending, potential hospice after ongoing family discussions. Hospital Problems  Date Reviewed: 7/16/2018          Codes Class Noted POA    * (Principal) JASIEL (acute kidney injury) ([facility].) ICD-10-CM: N17.9  ICD-9-CM: 584.9  7/17/2019 Unknown        Coronary artery disease involving native coronary artery of native heart without angina pectoris ICD-10-CM: I25.10  ICD-9-CM: 414.01  8/31/2018 Yes        Essential hypertension ICD-10-CM: I10  ICD-9-CM: 401.9  8/31/2018 Yes        Pro[facility] cancer ([facility] ICD-10-CM: C61  ICD-9-CM: 185  8/31/2018 Yes        Hyperkalemia ICD-10-CM: E87.5  ICD-9-CM: 276.7  6/5/2018 Yes            Review of Systems:   ROS negative except per HPI    Vital Signs:    Last 24hrs VS reviewed since prior progress note. Most recent are:  Visit Vitals  /85 (BP 1 Location: Right arm, BP Patient Position: Supine)   Pulse (!) 101   Temp 98.6 °F (37 °C)   Resp 20   Ht 5' 11\" (1.803 m)   Wt 107.1 kg (236 lb 1.8 oz)   SpO2 92%   BMI 32.93 kg/m²       Intake/Output Summary (Last 24 hours) at 7/21/2019 1132  Last data filed at 7/21/2019 1036  Gross per 24 hour   Intake 1553.33 ml   Output 2200 ml   Net -646.67 ml     Physical Examination:         Constitutional:  Cooperative, pleasant. NAD   ENT:  MMM     Resp:  CTA bilaterally.  On RA   CV:  Regular rhythm, normal rate, no murmurs. GI:  Soft, non distended, tender. Normoactive bowel sounds. :  Urine clear this morning. Musculoskeletal:  No edema, warm, 2+ pulses throughout    Neurologic:  Moves all extremities. AAOx3. Psych: Calm and cooperative       Data Review:   Review and/or order of clinical lab test  Review and/or order of tests in the radiology section of [facility]  Review and/or order of tests in the medicine section of [facility]    Labs:     Recent Labs     07/21/19 0420 07/19/19  0531   WBC 9.6 7.9   HGB 9.5* 10.4*   HCT 30.8* 33.1*    182     Recent Labs     07/21/19  0420 07/20/19  0637 07/19/19  0531    135* 136  135*   K 4.1 4.3 5.0  4.9   CL 99 95* 101  101   CO2 28 32 24  25   BUN 65* 65* 70*  69*   CREA 6.87* 6.66* 6.02*  6.10*   GLU 83 127* 81  87   CA 6.3* 7.0* 7.1*  7.3*   MG 1.6  --   --    PHOS 5.3*  --  4.3     Recent Labs     07/21/19 0420 07/19/19  0531   ALB 2.3* 2.8*     No results for input(s): INR, PTP, APTT in the last 72 hours. No lab exists for component: INREXT, INREXT   No results for input(s): FE, TIBC, PSAT, FERR in the last 72 hours. No results found for: FOL, RBCF   No results for input(s): PH, PCO2, PO2 in the last 72 hours. No results for input(s): CPK, CKNDX, TROIQ in the last 72 hours.     No lab exists for component: CPKMB  Lab Results   Component Value Date/Time    Cholesterol, total 85 10/07/2013 12:00 AM    HDL Cholesterol 5 10/07/2013 12:00 AM    LDL, calculated 39.6 10/07/2013 12:00 AM    Triglyceride 202 (H) 10/07/2013 12:00 AM    CHOL/HDL Ratio 17.0 (H) 10/07/2013 12:00 AM     Lab Results   Component Value Date/Time    Glucose (POC) 105 (H) 07/17/2019 06:48 AM    Glucose (POC) 105 (H) 07/17/2019 05:56 AM    Glucose (POC) 137 (H) 07/18/2018 05:06 PM    Glucose (POC) 100 07/17/2018 06:07 AM    Glucose (POC) 59 (L) 06/20/2018 05:03 PM    Glucose (POC) 101 (H) 06/20/2018 03:14 PM    Glucose (POC) 83 06/10/2018 06:26 AM    Glucose (POC) 108 (H) 06/09/2018 10:08 PM    Glucose (POC) 138 (H) 06/09/2018 06:30 PM     Lab Results   Component Value Date/Time    Color YELLOW/STRAW 07/20/2019 10:40 AM    Appearance CLOUDY (A) 07/20/2019 10:40 AM    Specific gravity 1.007 07/20/2019 10:40 AM    Specific gravity 1.015 02/15/2019 04:01 PM    pH (UA) 8.0 07/20/2019 10:40 AM    Protein 100 (A) 07/20/2019 10:40 AM    Glucose NEGATIVE  07/20/2019 10:40 AM    Ketone NEGATIVE  07/20/2019 10:40 AM    Bilirubin NEGATIVE  07/20/2019 10:40 AM    Urobilinogen 0.2 07/20/2019 10:40 AM    Nitrites NEGATIVE  07/20/2019 10:40 AM    Leukocyte Esterase LARGE (A) 07/20/2019 10:40 AM    Epithelial cells FEW 07/20/2019 10:40 AM    Bacteria 1+ (A) 07/20/2019 10:40 AM    WBC >100 (H) 07/20/2019 10:40 AM    RBC 20-50 07/20/2019 10:40 AM     Medications Reviewed:     Current Facility-Administered Medications   Medication Dose Route Frequency    HYDROmorphone (PF) (DILAUDID) injection 1 mg  1 mg IntraVENous Q4H PRN    predniSONE (DELTASONE) tablet 40 mg  40 mg Oral DAILY WITH BREAKFAST    [START ON 7/22/2019] levoFLOXacin (LEVAQUIN) 500 mg in D5W IVPB  500 mg IntraVENous Q48H    0.9% sodium chloride infusion  50 mL/hr IntraVENous CONTINUOUS    naloxone (NARCAN) injection 0.4 mg  0.4 mg IntraVENous EVERY 2 MINUTES AS NEEDED    polyethylene glycol (MIRALAX) packet 17 g  17 g Oral DAILY    HYDROmorphone (DILAUDID) tablet 4 mg  4 mg Oral Q3H PRN    allopurinol (ZYLOPRIM) tablet 100 mg  100 mg Oral DAILY    aspirin chewable tablet 81 mg  81 mg Oral DAILY    ferrous sulfate tablet 325 mg  325 mg Oral ACB&D    mirtazapine (REMERON) tablet 15 mg  15 mg Oral QHS    patiromer calcium sorbitex (VELTASSA) powder 8.4 g  8.4 g Oral DAILY    metoprolol tartrate (LOPRESSOR) tablet 25 mg  25 mg Oral BID    ondansetron (ZOFRAN) injection 4 mg  4 mg IntraVENous Q6H PRN    acetaminophen (TYLENOL) tablet 650 mg  650 mg Oral Q6H PRN    lidocaine (LIDODERM) 5 % patch 1 Patch  1 Patch TransDERmal Q24H   ______________________________________________________________________  EXPECTED LENGTH OF STAY: 3d 4h  ACTUAL LENGTH OF STAY:          4               Annabel Silva MD

## 2019-07-21 NOTE — PROGRESS NOTES
Problem: Self Care Deficits Care Plan (Adult)  Goal: *Acute Goals and Plan of Care (Insert Text)  Description  Occupational Therapy Goals  Initiated 7/21/2019  1. Patient will perform grooming with modified independence within 7 day(s). 2.  Patient will perform lower body dressing with supervision/set-up within 7 day(s). 3.  Patient will perform toilet transfer with modified independence within 7 day(s). 4.  Patient will participate in upper extremity therapeutic exercise/activities with supervision/set-upwithin 7 day(s). 7.  Patient will utilize energy conservation techniques during functional activities with verbal cues within 7 day(s). Outcome: Progressing Towards Goal   OCCUPATIONAL THERAPY EVALUATION  Patient: Liz Cai (79 y.o. male)  Date: 7/21/2019  Primary Diagnosis: JASIEL (acute kidney injury) ([facility] [N17.9]  Procedure(s) (LRB):  CYSTOSCOPY, DIFFICULT BILATERAL URETERAL STENT EXCHANGE, CLOT EVACUATION (Bilateral) 3 Days Post-Op   Precautions: fall       ASSESSMENT :  Based on the objective data described below, the patient presents with decreased ROM, strength, and endurance following admission. Bed level evaluation today as nursing requesting and patient with b/l gout flare up. Agreeable to EOB activity and supervision/additional time for rolling and supine to sit. Once EOB, supervision/set up for grooming activity. Completed toileting routine with condom cath (baseline) and min A with nursing assist. PTA living at [address] [place] [address] but patient states that he does not believe he will be going back and patient/family looking into hospice, has palliative consult and states daughter went to look at [facility]. Pending progression and plan, recommend SNF rehab vs home health with 24 hour assist/supervision for ADL. Patient was using rollator and independent with self care PTA.  Bed level eval however noted previous PT session patient has gotten OOB to chair with assist. Recommend with nursing patient to complete as able in order to maintain strength, endurance and independence: ADLs with supervision/setup, OOB to chair with 2 assist as able. Thank you for your assistance. Patient will benefit from skilled intervention to address the above impairments. Patients rehabilitation potential is considered to be Good  Factors which may influence rehabilitation potential include:   ? None noted  ? Mental ability/status  ? Medical condition  ? Home/family situation and support systems  ? Safety awareness  ? Pain tolerance/management  ? Other:      PLAN :  Recommendations and Planned Interventions:  ?               Self Care Training                  ? Therapeutic Activities  ? Functional Mobility Training    ? Cognitive Retraining  ? Therapeutic Exercises           ? Endurance Activities  ? Balance Training                   ? Neuromuscular Re-Education  ? Visual/Perceptual Training     ? Home Safety Training  ? Patient Education                 ? Family Training/Education  ? Other (comment):    Frequency/Duration: Patient will be followed by occupational therapy 5 times a week to address goals. Discharge Recommendations: To Be Determined, SNF, HH  Further Equipment Recommendations for Discharge: TBD      SUBJECTIVE:   Patient stated \"I can't stand today.     OBJECTIVE DATA SUMMARY:   HISTORY:   Past Medical History:   Diagnosis Date    Celiac disease     Chronic kidney disease     Hypertension     MI (mitral incompetence)     Prostate cancer ([facility] [address]      Past Surgical History:   Procedure Laterality Date    CARDIAC SURG PROCEDURE UNLIST      qaudruple bipass    HX CHOLECYSTECTOMY      HX ORTHOPAEDIC Right 2015    suni in femur from pathological fracture       Prior Level of Function/Environment/Context: patient living in [address] [place] [address], independent with ADLs, tub with cutout and shower seat    Occupations in which the patient is/was successful, what are the barriers preventing that success:   Performance Patterns (routines, roles, habits, and rituals):   Personal Interests and/or values:   Expanded or extensive additional review of patient history:     Home Situation  Home Environment: Independent living  One/Two Story Residence: One story  Living Alone: Yes  Support Systems: Child(patti)  Patient Expects to be Discharged to[de-identified] Skilled nursing facility  Current DME Used/Available at Home: [employer], rollator, Marney Seal, straight, Grab bars(emergency switch)  Tub or Shower Type: Tub/Shower combination(with chair)    Hand dominance: Right    EXAMINATION OF PERFORMANCE DEFICITS:  Cognitive/Behavioral Status:  Neurologic State: Alert  Orientation Level: Appropriate for age  Cognition: Appropriate decision making             Skin: intact    Edema: none noted    Hearing: Auditory  Auditory Impairment: Hard of hearing, bilateral    Vision/Perceptual:                                     Range of Motion:    AROM: Generally decreased, functional                         Strength:    Strength: Generally decreased, functional                Coordination:  Coordination: Within functional limits            Tone & Sensation:    Tone: Normal  Sensation: Intact                      Balance:  Sitting: Intact; Without support    Functional Mobility and Transfers for ADLs:  Bed Mobility:  Rolling: Supervision; Additional time  Supine to Sit: Additional time;Supervision  Sit to Supine: Supervision; Additional time  Scooting: Contact guard assistance    Transfers: Toilet Transfer : Minimum assistance(infer)    ADL Assessment:       Oral Facial Hygiene/Grooming: Supervision;Setup(sitting)         Upper Body Dressing: Supervision; Additional time(infer)    Lower Body Dressing:  Total assistance    Toileting: Minimum assistance(condom catheter baseline)                ADL Intervention and task modifications:                                          Therapeutic Exercise:      Functional Measure:  Barthel Index:    Bathin  Bladder: 0  Bowels: 10  Groomin  Dressin  Feeding: 10  Mobility: 0  Stairs: 0  Toilet Use: 0  Transfer (Bed to Chair and Back): 5  Total: 35/100        Percentage of impairment   0%   1-19%   20-39%   40-59%   60-79%   80-99%   100%   Barthel Score 0-100 100 99-80 79-60 59-40 20-39 1-19   0     The Barthel ADL Index: Guidelines  1. The index should be used as a record of what a patient does, not as a record of what a patient could do. 2. The main aim is to establish degree of independence from any help, physical or verbal, however minor and for whatever reason. 3. The need for supervision renders the patient not independent. 4. A patient's performance should be established using the best available evidence. Asking the patient, friends/relatives and nurses are the usual sources, but direct observation and common sense are also important. However direct testing is not needed. 5. Usually the patient's performance over the preceding 24-48 hours is important, but occasionally longer periods will be relevant. 6. Middle categories imply that the patient supplies over 50 per cent of the effort. 7. Use of aids to be independent is allowed. Ilana Park., Barthel, D.W. (8728). Functional evaluation: the Barthel Index. [facility] (14)2. GLEN Martinez, Emmanuel Galicia, [facility]., [address] Measuring the change indisability after inpatient rehabilitation; comparison of the responsiveness of the Barthel Index and Functional [place] Measure. Journal of Neurology, Neurosurgery, and Psychiatry, 66(4), 360-824. Cindy Rodriguez, N.J.A, IAIN Leon.FREDDY, & Delmus Brittle, MFilipeA. (2004.) Assessment of post-stroke quality of life in cost-effectiveness studies:  The usefulness of the Barthel Index and the EuroQoL-5D. Quality of Life Research, 15, 188-30         Occupational Therapy Evaluation Charge Determination   History Examination Decision-Making   LOW Complexity : Brief history review  LOW Complexity : 1-3 performance deficits relating to physical, cognitive , or psychosocial skils that result in activity limitations and / or participation restrictions  LOW Complexity : No comorbidities that affect functional and no verbal or physical assistance needed to complete eval tasks       Based on the above components, the patient evaluation is determined to be of the following complexity level: LOW   Pain:  Pain Scale 1: Numeric (0 - 10)  Pain Intensity 1: 3  Pain Location 1: Foot  Pain Orientation 1: Anterior  Pain Description 1: Burning;Aching  Pain Intervention(s) 1: Medication (see MAR)  Activity Tolerance:   VSS  Please refer to the flowsheet for vital signs taken during this treatment. After treatment:   ? Patient left in no apparent distress sitting up in chair  ? Patient left in no apparent distress in bed  ? Call bell left within reach  ? Nursing notified- AT BEDSIDE  ? Caregiver present  ? Bed alarm activated    COMMUNICATION/EDUCATION:   The patients plan of care was discussed with: Registered Nurse.  ? Home safety education was provided and the patient/caregiver indicated understanding. ? Patient/family have participated as able in goal setting and plan of care. ? Patient/family agree to work toward stated goals and plan of care. ? Patient understands intent and goals of therapy, but is neutral about his/her participation. ? Patient is unable to participate in goal setting and plan of care. This patients plan of care is appropriate for delegation to [facility].     Thank you for this referral.  Rakesh Coffee  Time Calculation: 28 mins

## 2019-07-22 LAB
ANION GAP SERPL CALC-SCNC: 13 MMOL/L (ref 5–15)
BACTERIA SPEC CULT: NORMAL
BACTERIA SPEC CULT: NORMAL
BASOPHILS # BLD: 0 K/UL (ref 0–0.1)
BASOPHILS NFR BLD: 1 % (ref 0–1)
BUN SERPL-MCNC: 66 MG/DL (ref 6–20)
BUN/CREAT SERPL: 9 (ref 12–20)
CALCIUM SERPL-MCNC: 6.7 MG/DL (ref 8.5–10.1)
CC UR VC: NORMAL
CHLORIDE SERPL-SCNC: 104 MMOL/L (ref 97–108)
CO2 SERPL-SCNC: 22 MMOL/L (ref 21–32)
CREAT SERPL-MCNC: 7.08 MG/DL (ref 0.7–1.3)
DIFFERENTIAL METHOD BLD: ABNORMAL
EOSINOPHIL # BLD: 0.4 K/UL (ref 0–0.4)
EOSINOPHIL NFR BLD: 5 % (ref 0–7)
ERYTHROCYTE [DISTWIDTH] IN BLOOD BY AUTOMATED COUNT: 14.8 % (ref 11.5–14.5)
GLUCOSE SERPL-MCNC: 78 MG/DL (ref 65–100)
HCT VFR BLD AUTO: 30.2 % (ref 36.6–50.3)
HGB BLD-MCNC: 9.2 G/DL (ref 12.1–17)
IMM GRANULOCYTES # BLD AUTO: 0.1 K/UL (ref 0–0.04)
IMM GRANULOCYTES NFR BLD AUTO: 1 % (ref 0–0.5)
LYMPHOCYTES # BLD: 1.1 K/UL (ref 0.8–3.5)
LYMPHOCYTES NFR BLD: 13 % (ref 12–49)
MAGNESIUM SERPL-MCNC: 1.8 MG/DL (ref 1.6–2.4)
MCH RBC QN AUTO: 31 PG (ref 26–34)
MCHC RBC AUTO-ENTMCNC: 30.5 G/DL (ref 30–36.5)
MCV RBC AUTO: 101.7 FL (ref 80–99)
MONOCYTES # BLD: 0.9 K/UL (ref 0–1)
MONOCYTES NFR BLD: 10 % (ref 5–13)
NEUTS SEG # BLD: 6.1 K/UL (ref 1.8–8)
NEUTS SEG NFR BLD: 70 % (ref 32–75)
NRBC # BLD: 0 K/UL (ref 0–0.01)
NRBC BLD-RTO: 0 PER 100 WBC
PHOSPHATE SERPL-MCNC: 5.6 MG/DL (ref 2.6–4.7)
PLATELET # BLD AUTO: 153 K/UL (ref 150–400)
PMV BLD AUTO: 9.6 FL (ref 8.9–12.9)
POTASSIUM SERPL-SCNC: 4.8 MMOL/L (ref 3.5–5.1)
RBC # BLD AUTO: 2.97 M/UL (ref 4.1–5.7)
SERVICE CMNT-IMP: NORMAL
SERVICE CMNT-IMP: NORMAL
SODIUM SERPL-SCNC: 139 MMOL/L (ref 136–145)
WBC # BLD AUTO: 8.7 K/UL (ref 4.1–11.1)

## 2019-07-22 PROCEDURE — 74011250637 HC RX REV CODE- 250/637: Performed by: NURSE PRACTITIONER

## 2019-07-22 PROCEDURE — 74011250636 HC RX REV CODE- 250/636: Performed by: INTERNAL MEDICINE

## 2019-07-22 PROCEDURE — 36415 COLL VENOUS BLD VENIPUNCTURE: CPT

## 2019-07-22 PROCEDURE — 83735 ASSAY OF MAGNESIUM: CPT

## 2019-07-22 PROCEDURE — 74011636637 HC RX REV CODE- 636/637: Performed by: INTERNAL MEDICINE

## 2019-07-22 PROCEDURE — 97530 THERAPEUTIC ACTIVITIES: CPT

## 2019-07-22 PROCEDURE — 97535 SELF CARE MNGMENT TRAINING: CPT

## 2019-07-22 PROCEDURE — 65270000029 HC RM PRIVATE

## 2019-07-22 PROCEDURE — 84100 ASSAY OF PHOSPHORUS: CPT

## 2019-07-22 PROCEDURE — 85025 COMPLETE CBC W/AUTO DIFF WBC: CPT

## 2019-07-22 PROCEDURE — 80048 BASIC METABOLIC PNL TOTAL CA: CPT

## 2019-07-22 PROCEDURE — 74011250637 HC RX REV CODE- 250/637: Performed by: UROLOGY

## 2019-07-22 PROCEDURE — 74011000250 HC RX REV CODE- 250: Performed by: UROLOGY

## 2019-07-22 PROCEDURE — 74011250637 HC RX REV CODE- 250/637: Performed by: INTERNAL MEDICINE

## 2019-07-22 RX ORDER — MICONAZOLE NITRATE 20 MG/G
CREAM TOPICAL EVERY 12 HOURS
Status: DISCONTINUED | OUTPATIENT
Start: 2019-07-22 | End: 2019-07-29 | Stop reason: HOSPADM

## 2019-07-22 RX ORDER — HYDROCODONE BITARTRATE AND ACETAMINOPHEN 7.5; 325 MG/1; MG/1
1 TABLET ORAL
Status: DISCONTINUED | OUTPATIENT
Start: 2019-07-22 | End: 2019-07-29 | Stop reason: HOSPADM

## 2019-07-22 RX ADMIN — ASPIRIN 81 MG 81 MG: 81 TABLET ORAL at 10:12

## 2019-07-22 RX ADMIN — SODIUM CHLORIDE 125 ML/HR: 900 INJECTION, SOLUTION INTRAVENOUS at 19:07

## 2019-07-22 RX ADMIN — ALLOPURINOL 100 MG: 100 TABLET ORAL at 10:08

## 2019-07-22 RX ADMIN — METOPROLOL TARTRATE 25 MG: 25 TABLET ORAL at 19:05

## 2019-07-22 RX ADMIN — PREDNISONE 40 MG: 20 TABLET ORAL at 04:50

## 2019-07-22 RX ADMIN — MICONAZOLE NITRATE: 20 CREAM TOPICAL at 19:06

## 2019-07-22 RX ADMIN — METOPROLOL TARTRATE 25 MG: 25 TABLET ORAL at 10:09

## 2019-07-22 RX ADMIN — LEVOFLOXACIN 500 MG: 5 INJECTION, SOLUTION INTRAVENOUS at 06:22

## 2019-07-22 RX ADMIN — HYDROCODONE BITARTRATE AND ACETAMINOPHEN 1 TABLET: 7.5; 325 TABLET ORAL at 19:16

## 2019-07-22 RX ADMIN — FERROUS SULFATE TAB 325 MG (65 MG ELEMENTAL FE) 325 MG: 325 (65 FE) TAB at 19:05

## 2019-07-22 RX ADMIN — PATIROMER 8.4 G: 8.4 POWDER, FOR SUSPENSION ORAL at 14:39

## 2019-07-22 RX ADMIN — FERROUS SULFATE TAB 325 MG (65 MG ELEMENTAL FE) 325 MG: 325 (65 FE) TAB at 04:50

## 2019-07-22 RX ADMIN — HYDROMORPHONE HYDROCHLORIDE 1 MG: 1 INJECTION, SOLUTION INTRAMUSCULAR; INTRAVENOUS; SUBCUTANEOUS at 13:30

## 2019-07-22 RX ADMIN — POLYETHYLENE GLYCOL 3350 17 G: 17 POWDER, FOR SOLUTION ORAL at 10:12

## 2019-07-22 RX ADMIN — HYDROMORPHONE HYDROCHLORIDE 1 MG: 1 INJECTION, SOLUTION INTRAMUSCULAR; INTRAVENOUS; SUBCUTANEOUS at 09:07

## 2019-07-22 RX ADMIN — MIRTAZAPINE 15 MG: 15 TABLET, FILM COATED ORAL at 21:25

## 2019-07-22 RX ADMIN — HYDROMORPHONE HYDROCHLORIDE 1 MG: 1 INJECTION, SOLUTION INTRAMUSCULAR; INTRAVENOUS; SUBCUTANEOUS at 04:50

## 2019-07-22 NOTE — PROGRESS NOTES
Hospitalist Progress Note  Sophia Griffith NP  Answering service: [phone] from in house phone  Cell: [phone]   Date of Service:  2019  NAME:  Maria De Jesus Sears. :  1940  MRN:  [id number]    Admission Summary:   Pt originally went to his PCP due to painful urination. His UA was negative so was prescribed tramadol for pain. He took the first dose of 50 mg tramadol the night before admit, felt dizzy and lightheaded and then associated with nausea and 2-3 episodes of vomiting. He came to the hospital for further evaluation.     Interval history / Subjective:   Sitting up in chair, still having some pain. Had a lengthy conversation with , pt and pts daughter Alex Duggan). Andrew Fernandes is frustrated about the process on patient being discharged, the uncertainty of where he is going, under what service. Francine Rea () took time in explaining the process and what she has done and what could be done to assist. At present, investigating open beds at our [facility], which is where pt wants to go. Assessment & Plan:     Sepsis (POA, now recurred) improved:   - Fever, HR, suspect urinary source from recent instrumentation vs. Malignancy  - WBC normal  - repeat UA with large leuks >100 WBC 1+ bacteria. No growth on urine culture  - blood culture with no growth x 2 days  - procalcitonin negative   - continue levofloxacin (today is day 3) for potential easy transition to PO. Pharmacy dosing every 48 hours.     Pain: Likely secondary to metastatic disease, L hip pain: Ongoing   - Pt having itching with dilaudid, discussed pain regimen with pt and daughter in detail, Trying Norco with next pain medication need  - may consider fentanyl patch tomorrow     Hyperkalemia: Resolved  - s/p kayexalate  - takes veltassa (missed dose PTA), this has been resumed    JASIEL on CKD stage 5, Metabolic acidosis:  - Cr: rising, 7.08 today, was 4.96 in 2/2019. Likely has progression of kidney disease  - s/p bicarb gtt and now on NS  - Nephrology consulted and following  - Poor dialysis candidate long term, and patient does not want it   - Dr. Brianda Byers plans to discussed plan of care with pts daughter this evening.     UTI:bladder outlet obstruction  Hx of b/l hydronephrosis due to prostate cancer with stent placement in 2018:  - UA shows >100 WBCs with 2+ bacteria, he has ureteral stents  - 7/18: s/p bilateral ureteral stent replacement (difficult) and clot removal  - urine cultures x 2 negative     Dizziness/lightheadedness: Resolved    Nausea/vomiting: ongoing, seems exacerbated by pain medications. - side effects due to tramadol, discussed with the patient  - prn zofran     Hx of metastatic prostate cancer: on casodex     Hx of CAD s/p CABG: resume aspirin and metoprolol     Hx HTN: on home metoprolol, BP normal  - pain could be factor in recent elevations    Code status: Full  DVT prophylaxis: SCDs  Care Plan discussed with: patient, nurse, attending MD and nephrology  Disposition: SNF pending, potential hospice after ongoing family discussions. Hospital Problems  Date Reviewed: 7/16/2018          Codes Class Noted POA    * (Principal) JASIEL (acute kidney injury) ([facility] ICD-10-CM: N17.9  ICD-9-CM: 584.9  7/17/2019 Unknown        Coronary artery disease involving native coronary artery of native heart without angina pectoris ICD-10-CM: I25.10  ICD-9-CM: 414.01  8/31/2018 Yes        Essential hypertension ICD-10-CM: I10  ICD-9-CM: 401.9  8/31/2018 Yes        Prostate cancer ([facility] ICD-10-CM: C61  ICD-9-CM: 185  8/31/2018 Yes        Hyperkalemia ICD-10-CM: E87.5  ICD-9-CM: 276.7  6/5/2018 Yes            Review of Systems:   Denied HA. No chest pain or pressure. No respiratory or GI complaints. + urinating. + pain that has moved from left hip to feet and now up to right hip    Vital Signs:    Last 24hrs VS reviewed since prior progress note.  Most recent are:  Visit Vitals  /66 (BP 1 Location: Left arm, BP Patient Position: At rest)   Pulse 73   Temp 98 °F (36.7 °C)   Resp 16   Ht 5' 11\" (1.803 m)   Wt 107.1 kg (236 lb 1.8 oz)   SpO2 94%   BMI 32.93 kg/m²       Intake/Output Summary (Last 24 hours) at 7/22/2019 1557  Last data filed at 7/22/2019 1505  Gross per 24 hour   Intake 1410 ml   Output 1350 ml   Net 60 ml     Physical Examination:         Constitutional:  Cooperative, pleasant. NAD   ENT:  MMM     Resp:  CTA bilaterally. On RA   CV:  Regular rhythm, normal rate, no murmurs. GI:  Soft, non distended, tender. Normoactive bowel sounds. Musculoskeletal:  No edema, warm, 2+ pulses throughout    Neurologic:  Moves all extremities. AAOx3. Psych: Calm and cooperative       Data Review:   Review and/or order of clinical lab test  Review and/or order of tests in the radiology section of CPT  Review and/or order of tests in the medicine section of [facility]    Labs:     Recent Labs     07/22/19 0435 07/21/19  0420   WBC 8.7 9.6   HGB 9.2* 9.5*   HCT 30.2* 30.8*    174     Recent Labs     07/22/19  0435 07/21/19  0420 07/20/19  0637    137 135*   K 4.8 4.1 4.3    99 95*   CO2 22 28 32   BUN 66* 65* 65*   CREA 7.08* 6.87* 6.66*   GLU 78 83 127*   CA 6.7* 6.3* 7.0*   MG 1.8 1.6  --    PHOS 5.6* 5.3*  --      Recent Labs     07/21/19  0420   ALB 2.3*     No results for input(s): INR, PTP, APTT in the last 72 hours. No lab exists for component: INREXT, INREXT   No results for input(s): FE, TIBC, PSAT, FERR in the last 72 hours. No results found for: FOL, RBCF   No results for input(s): PH, PCO2, PO2 in the last 72 hours. No results for input(s): CPK, CKNDX, TROIQ in the last 72 hours.     No lab exists for component: CPKMB  Lab Results   Component Value Date/Time    Cholesterol, total 85 10/07/2013 12:00 AM    HDL Cholesterol 5 10/07/2013 12:00 AM    LDL, calculated 39.6 10/07/2013 12:00 AM    Triglyceride 202 (H) 10/07/2013 12:00 AM    CHOL/HDL Ratio 17.0 (H) 10/07/2013 12:00 AM     Lab Results   Component Value Date/Time    Glucose (POC) 105 (H) 07/17/2019 06:48 AM    Glucose (POC) 105 (H) 07/17/2019 05:56 AM    Glucose (POC) 137 (H) 07/18/2018 05:06 PM    Glucose (POC) 100 07/17/2018 06:07 AM    Glucose (POC) 59 (L) 06/20/2018 05:03 PM    Glucose (POC) 101 (H) 06/20/2018 03:14 PM    Glucose (POC) 83 06/10/2018 06:26 AM    Glucose (POC) 108 (H) 06/09/2018 10:08 PM    Glucose (POC) 138 (H) 06/09/2018 06:30 PM     Lab Results   Component Value Date/Time    Color YELLOW/STRAW 07/20/2019 10:40 AM    Appearance CLOUDY (A) 07/20/2019 10:40 AM    Specific gravity 1.007 07/20/2019 10:40 AM    Specific gravity 1.015 02/15/2019 04:01 PM    pH (UA) 8.0 07/20/2019 10:40 AM    Protein 100 (A) 07/20/2019 10:40 AM    Glucose NEGATIVE  07/20/2019 10:40 AM    Ketone NEGATIVE  07/20/2019 10:40 AM    Bilirubin NEGATIVE  07/20/2019 10:40 AM    Urobilinogen 0.2 07/20/2019 10:40 AM    Nitrites NEGATIVE  07/20/2019 10:40 AM    Leukocyte Esterase LARGE (A) 07/20/2019 10:40 AM    Epithelial cells FEW 07/20/2019 10:40 AM    Bacteria 1+ (A) 07/20/2019 10:40 AM    WBC >100 (H) 07/20/2019 10:40 AM    RBC 20-50 07/20/2019 10:40 AM     Medications Reviewed:     Current Facility-Administered Medications   Medication Dose Route Frequency    HYDROmorphone (PF) (DILAUDID) injection 1 mg  1 mg IntraVENous Q4H PRN    predniSONE (DELTASONE) tablet 40 mg  40 mg Oral DAILY WITH BREAKFAST    levoFLOXacin (LEVAQUIN) 500 mg in D5W IVPB  500 mg IntraVENous Q48H    0.9% sodium chloride infusion  125 mL/hr IntraVENous CONTINUOUS    naloxone (NARCAN) injection 0.4 mg  0.4 mg IntraVENous EVERY 2 MINUTES AS NEEDED    polyethylene glycol (MIRALAX) packet 17 g  17 g Oral DAILY    HYDROmorphone (DILAUDID) tablet 4 mg  4 mg Oral Q3H PRN    allopurinol (ZYLOPRIM) tablet 100 mg  100 mg Oral DAILY    aspirin chewable tablet 81 mg  81 mg Oral DAILY    ferrous sulfate tablet 325 mg  325 mg Oral ACB&D    mirtazapine (REMERON) tablet 15 mg  15 mg Oral QHS    patiromer calcium sorbitex (VELTASSA) powder 8.4 g  8.4 g Oral DAILY    metoprolol tartrate (LOPRESSOR) tablet 25 mg  25 mg Oral BID    ondansetron (ZOFRAN) injection 4 mg  4 mg IntraVENous Q6H PRN    acetaminophen (TYLENOL) tablet 650 mg  650 mg Oral Q6H PRN    lidocaine (LIDODERM) 5 % patch 1 Patch  1 Patch TransDERmal Q24H   ______________________________________________________________________  EXPECTED LENGTH OF STAY: 5d 16h  ACTUAL LENGTH OF STAY:          5               Mj Kirkpatrick NP

## 2019-07-22 NOTE — PROGRESS NOTES
Problem: Mobility Impaired (Adult and Pediatric)  Goal: *Acute Goals and Plan of Care (Insert Text)  Description  Physical Therapy Goals  Initiated 7/20/2019  1. Patient will move from supine to sit and sit to supine , scoot up and down and roll side to side in bed with independence within 7 day(s). 2.  Patient will transfer from bed to chair and chair to bed with independence using the least restrictive device within 7 day(s). 3.  Patient will perform sit to stand with independence within 7 day(s). 4.  Patient will ambulate with minimal assistance/contact guard assist for 100 feet with the least restrictive device within 7 day(s). Outcome: Progressing Towards Goal   PHYSICAL THERAPY TREATMENT  Patient: Aram Lacey (79 y.o. male)  Date: 7/22/2019  Diagnosis: JASIEL (acute kidney injury) ([facility] [address] [N17.9] JASIEL (acute kidney injury) ([facility] [address]  Procedure(s) (LRB):  CYSTOSCOPY, DIFFICULT BILATERAL URETERAL STENT EXCHANGE, CLOT EVACUATION (Bilateral) 4 Days Post-Op  Precautions:    Chart, physical therapy assessment, plan of care and goals were reviewed. ASSESSMENT:  Based on the objective data described below, the patient presents with Minimum assistance and Assist x2 level overall for sit to stand transfers and bed to chair transfer with rolling walker support due to posterior trunk sway. Pt stands more on his heels due to bilateral foot pain from gout flare. Pt requires extra time for all mobility. He report his feet are tender to touch and painful with standing. Pt remained up in chair at end of session.      The following are barriers to independence while in acute care:   -Cognitive and/or behavioral: needs extra time, irritable    -Medical condition: strength, functional reach, standing balance, pain tolerance, girth and medical history    -Other:   gout flare    Prior level of function: Indep living at [facility], indep amb with rolling walker/cane, indep ADL's      PLAN:  Patient continues to benefit from skilled intervention to address the above impairments. Continue treatment per established plan of care. Recommend with staff: Sera Corey for at least one meal per day  Recommend next PT session: gait training as tolerated   Discharge recommendations: Rehab at skilled nursing facility (SNF) (to regain functional baseline patient requires rehab)    Patient's barriers to discharging home, in addition to above impairments: level of physical assist required to maintain patient safety. Equipment recommendations for successful discharge (if) home: owns a rolling walker         SUBJECTIVE:   Patient stated Shelly Danica me a minute.      OBJECTIVE DATA SUMMARY:   Critical Behavior:  Neurologic State: Alert  Orientation Level: Appropriate for age  Cognition: Appropriate decision making, Appropriate for age attention/concentration, Appropriate safety awareness  Safety/Judgement: Awareness of environment, Good awareness of safety precautions, Insight into deficits  Functional Mobility Training:  Bed Mobility:     Supine to Sit: Supervision; Additional time, used bed rail      Scooting: Supervision        Transfers:  Sit to Stand: Minimum assistance; Additional time;Assist x2;Adaptive equipment  Stand to Sit: Minimum assistance;Assist x2; Additional time; Adaptive equipment        Bed to Chair: Minimum assistance;Assist x2;Adaptive equipment; Additional time, weight bearing mostly on his heels, increased posterior trunk sway, needs assistance for balance at times                     Balance:  Sitting: Intact; With support  Standing: Impaired  Standing - Static: Fair  Standing - Dynamic : Fair      Pain:  Post treatment:  5/10   Location: bilateral feet   Description:aching   Aggravating factors: standing   Pt did not c/o hip pain this date  Activity Tolerance:   Fair, limited due to bilateral foot pain  Please refer to the flowsheet for vital signs taken during this treatment.     After treatment patient left:   Up in chair  Call light within reach     COMMUNICATION/COLLABORATION:   The patients plan of care was discussed with: Registered Nurse    Melanie Sandoval   Time Calculation: 23 mins

## 2019-07-22 NOTE — PROGRESS NOTES
JOSE LUIS:     1. Referrals sent to [facility] (first choice), [facility], and [facility], they are pending. 2. Referral made to med assist for LTC medicaid application with daughter Kelechi Doll as contact for completion. 3.  [employer] prescreening to be done today by this CM. Daughter had consented for screening and need for LTC placement on Friday. [employer] Screening submitted for processing.      Update, 10:15am  1. Received message from daughter that [facility] is first choice however received NO cannot accept from [facility]. [facility] [facility] SNF can accept pending verification of benefits    1131: CM noted that [facility] can accept patient today with private room available, cm left VM for daughter Carlos Manuel Tamayo [phone].      2933:  CM received VM from patient daughter that she did not want her father going to [address] [place] [address]. CM left VM for [phone] at [facility] to see if they had a SNF bed. CM to follow.     Gypsy Ingram, [facility]

## 2019-07-22 NOTE — PROGRESS NOTES
JOSE LUIS:    1. Referrals sent to [facility] (first choice), [facility], and [facility], they are pending. 2. Referral made to [facility] assist for LTC medicaid application with daughter Kelechi Doll as contact for completion. 3.  [employer] prescreening to be done today by this CM. Daughter had consented for screening and need for LTC placement on Friday. [employer] Screening submitted for processing. Update, 10:15am  1. Received message from daughter that [facility] is first choice however received NO cannot accept from [facility]. [facility] SNF can accept pending verification of benefits.     ZENIA Gleason

## 2019-07-22 NOTE — PROGRESS NOTES
[facility]   [address], [facility] [address], [facility]  Phone: [phone]   [phone]       Nephrology Progress Note  Jolie Hassan     1940     [id number]  Date of Admission : 7/17/2019 07/22/19    CC:  Follow up for JASIEL     Assessment and Plan   JASIEL on CKD   - Progressive Obstructive nephropathy   - s/p Ureteral stent change 7/18  - Increased rate of IVF   - No emergent need for dialysis - not a good candidate and he is not in favor of doing HD again if needed  - daily labs and strict I/Os     CKD Stage V  - 2/2 chronic obstruction/ fsgs  - baseline Cr ~ 4 mg/dl at best   - f/b Dr Nicky Aguilar      Hx of MEDINA with ongoing b/l hydro despite stent placement:  - s/p stent change in March  By Dr Darrin Justice   - s/p Stent change 7/18     Hyperkalemia:  - resolving  - continue daily  Valtessa     Metabolic Acidosis:  - stable    Anemia of CKD:  -serial H/H     CAD s/p CABG     Hx of prostate cancer    Care Plan discussed with: pt and Dr. Solano Rule     Interval History:  Seen and examined. UOP > 2L , Cr pleatung , denies any SOB, leG edema , N/V/CP. Has R hip pain     Review of Systems: A comprehensive review of systems was negative.     Current Medications:   Current Facility-Administered Medications   Medication Dose Route Frequency    HYDROmorphone (PF) (DILAUDID) injection 1 mg  1 mg IntraVENous Q4H PRN    predniSONE (DELTASONE) tablet 40 mg  40 mg Oral DAILY WITH BREAKFAST    levoFLOXacin (LEVAQUIN) 500 mg in D5W IVPB  500 mg IntraVENous Q48H    0.9% sodium chloride infusion  125 mL/hr IntraVENous CONTINUOUS    naloxone (NARCAN) injection 0.4 mg  0.4 mg IntraVENous EVERY 2 MINUTES AS NEEDED    polyethylene glycol (MIRALAX) packet 17 g  17 g Oral DAILY    HYDROmorphone (DILAUDID) tablet 4 mg  4 mg Oral Q3H PRN    allopurinol (ZYLOPRIM) tablet 100 mg  100 mg Oral DAILY    aspirin chewable tablet 81 mg  81 mg Oral DAILY    ferrous sulfate tablet 325 mg  325 mg Oral ACB&D    mirtazapine (REMERON) tablet 15 mg  15 mg Oral QHS    patiromer calcium sorbitex (VELTASSA) powder 8.4 g  8.4 g Oral DAILY    metoprolol tartrate (LOPRESSOR) tablet 25 mg  25 mg Oral BID    ondansetron (ZOFRAN) injection 4 mg  4 mg IntraVENous Q6H PRN    acetaminophen (TYLENOL) tablet 650 mg  650 mg Oral Q6H PRN    lidocaine (LIDODERM) 5 % patch 1 Patch  1 Patch TransDERmal Q24H      Allergies   Allergen Reactions    Bactrim [Sulfamethoxazole-Trimethoprim] Anaphylaxis and Swelling     Patient reported that his \"tongue swelled up and it was hard to breath\"    Enzalutamide Nausea and Vomiting    Gluten Other (comments)     Abdominal pain    Morphine Other (comments)     AMS; \"goes crazy\"       Objective:  Vitals:    Vitals:    07/21/19 2042 07/22/19 0423 07/22/19 0800 07/22/19 1009   BP: 122/75 141/79 119/78 119/78   Pulse: 91 93 94 94   Resp: 14 14 16    Temp: 98.6 °F (37 °C) 98.1 °F (36.7 °C) 98.4 °F (36.9 °C)    SpO2: 94% 95% 94%    Weight:       Height:         Intake and Output:  No intake/output data recorded. 07/20 1901 - 07/22 0700  In: 150 [P.O.:150]  Out: 2875 [Urine:2425]    Physical Examination:    General: NAD,Conversant   Neck:  Supple, no mass  Resp:  Lungs CTA B/L, no wheezing , normal respiratory effort  CV:  RRR,  no murmur or rub, trace LE edema  GI:  Soft, NT, + Bowel sounds, no hepatosplenomegaly  Neurologic:  Non focal  Psych:             AAO x 3 appropriate affect   Skin:  No Rash  :  No goldberg     []    High complexity decision making was performed  []    Patient is at high-risk of decompensation with multiple organ involvement    Lab Data Personally Reviewed: I have reviewed all the pertinent labs, microbiology data and radiology studies during assessment.     Recent Labs     07/22/19  0435 07/21/19  0420 07/20/19  0637    137 135*   K 4.8 4.1 4.3    99 95*   CO2 22 28 32   GLU 78 83 127*   BUN 66* 65* 65*   CREA 7.08* 6.87* 6.66*   CA 6.7* 6.3* 7.0*   MG 1.8 1.6  -- PHOS 5.6* 5.3*  --    ALB  --  2.3*  --      Recent Labs     07/22/19  0435 07/21/19  0420   WBC 8.7 9.6   HGB 9.2* 9.5*   HCT 30.2* 30.8*    174     Lab Results   Component Value Date/Time    Specimen Description: URINE 10/07/2013 01:36 AM    Specimen Description: BLOOD 10/06/2013 04:13 PM     Lab Results   Component Value Date/Time    Culture result: NO GROWTH 2 DAYS 07/20/2019 10:40 AM    Culture result: NO GROWTH 5 DAYS 07/17/2019 02:48 PM    Culture result: NO GROWTH 1 DAY 07/17/2019 07:21 AM    Culture result: CANDIDA ALBICANS (A) 02/15/2019 04:01 PM    Culture result: NO GROWTH 5 DAYS 07/17/2018 01:25 AM     Recent Results (from the past 24 hour(s))   CBC WITH AUTOMATED DIFF    Collection Time: 07/22/19  4:35 AM   Result Value Ref Range    WBC 8.7 4.1 - 11.1 K/uL    RBC 2.97 (L) 4.10 - 5.70 M/uL    HGB 9.2 (L) 12.1 - 17.0 g/dL    HCT 30.2 (L) 36.6 - 50.3 %    .7 (H) 80.0 - 99.0 FL    MCH 31.0 26.0 - 34.0 PG    MCHC 30.5 30.0 - 36.5 g/dL    RDW 14.8 (H) 11.5 - 14.5 %    PLATELET 564 783 - 673 K/uL    MPV 9.6 8.9 - 12.9 FL    NRBC 0.0 0  WBC    ABSOLUTE NRBC 0.00 0.00 - 0.01 K/uL    NEUTROPHILS 70 32 - 75 %    LYMPHOCYTES 13 12 - 49 %    MONOCYTES 10 5 - 13 %    EOSINOPHILS 5 0 - 7 %    BASOPHILS 1 0 - 1 %    IMMATURE GRANULOCYTES 1 (H) 0.0 - 0.5 %    ABS. NEUTROPHILS 6.1 1.8 - 8.0 K/UL    ABS. LYMPHOCYTES 1.1 0.8 - 3.5 K/UL    ABS. MONOCYTES 0.9 0.0 - 1.0 K/UL    ABS. EOSINOPHILS 0.4 0.0 - 0.4 K/UL    ABS. BASOPHILS 0.0 0.0 - 0.1 K/UL    ABS. IMM.  GRANS. 0.1 (H) 0.00 - 0.04 K/UL    DF AUTOMATED     PHOSPHORUS    Collection Time: 07/22/19  4:35 AM   Result Value Ref Range    Phosphorus 5.6 (H) 2.6 - 4.7 MG/DL   MAGNESIUM    Collection Time: 07/22/19  4:35 AM   Result Value Ref Range    Magnesium 1.8 1.6 - 2.4 mg/dL   METABOLIC PANEL, BASIC    Collection Time: 07/22/19  4:35 AM   Result Value Ref Range    Sodium 139 136 - 145 mmol/L    Potassium 4.8 3.5 - 5.1 mmol/L    Chloride 104 97 - 108 mmol/L    CO2 22 21 - 32 mmol/L    Anion gap 13 5 - 15 mmol/L    Glucose 78 65 - 100 mg/dL    BUN 66 (H) 6 - 20 MG/DL    Creatinine 7.08 (H) 0.70 - 1.30 MG/DL    BUN/Creatinine ratio 9 (L) 12 - 20      GFR est AA 9 (L) >60 ml/min/1.73m2    GFR est non-AA 8 (L) >60 ml/min/1.73m2    Calcium 6.7 (L) 8.5 - 10.1 MG/DL           Total time spent with patient:  xxx   min. Care Plan discussed with:  Patient     Family      RN      Consulting Physician [facility]        I have reviewed the flowsheets. Chart and Pertinent Notes have been reviewed. No change in PMH ,family and social history from Consult note.       Gumaro Trammell MD

## 2019-07-22 NOTE — PROGRESS NOTES
Problem: Self Care Deficits Care Plan (Adult)  Goal: *Acute Goals and Plan of Care (Insert Text)  Description  Occupational Therapy Goals  Initiated 7/21/2019  1. Patient will perform grooming with modified independence within 7 day(s). 2.  Patient will perform lower body dressing with supervision/set-up within 7 day(s). 3.  Patient will perform toilet transfer with modified independence within 7 day(s). 4.  Patient will participate in upper extremity therapeutic exercise/activities with supervision/set-upwithin 7 day(s). 7.  Patient will utilize energy conservation techniques during functional activities with verbal cues within 7 day(s). Outcome: Progressing Towards Goal   OCCUPATIONAL THERAPY TREATMENT  Patient: Sruthi Mendez (79 y.o. male)  Date: 7/22/2019  Diagnosis: JASIEL (acute kidney injury) ([facility] [address] [N17.9] JASIEL (acute kidney injury) ([facility] [address]  Procedure(s) (LRB):  CYSTOSCOPY, DIFFICULT BILATERAL URETERAL STENT EXCHANGE, CLOT EVACUATION (Bilateral) 4 Days Post-Op  Precautions:  Fall  Chart, occupational therapy assessment, plan of care, and goals were reviewed. ASSESSMENT:  Nursing cleared for therapy. Limited mobility secondary to B feet gout. Completed sit> stand with min A x2 at RW level to complete transfer to chair. Toileting in standing with min Ax 2 to stand then use urinal in standing with min A for balance. Left up in chair. Recommend dc to SNF as he is not safe to return to ILF at indep level at this time. Recommend with nursing patient to complete as able in order to maintain strength, endurance and independence: ADLs with min-max A, OOB to chair 3x/day and mobilizing to the [facility] with min Ax 2 at RW level. Thank you for your assistance. Progression toward goals:  ?       Improving appropriately and progressing toward goals  x       Improving slowly and progressing toward goals  ?        Not making progress toward goals and plan of care will be adjusted     PLAN:  Patient continues to benefit from skilled intervention to address the above impairments. Continue treatment per established plan of care. Discharge Recommendations:  [facility]  Further Equipment Recommendations for Discharge:  TBD SNF     SUBJECTIVE:   Patient stated Don't touch my feet.     OBJECTIVE DATA SUMMARY:   Cognitive/Behavioral Status:  Neurologic State: Alert                   Functional Mobility and Transfers for ADLs:  Bed Mobility:  Supine to Sit: Supervision; Additional time  Scooting: Supervision    Transfers:  Sit to Stand: Minimum assistance; Additional time;Assist x2;Adaptive equipment     Bed to Chair: Minimum assistance;Assist x2;Adaptive equipment; Additional time    Balance:  Sitting: Intact; With support  Standing: Impaired  Standing - Static: Fair  Standing - Dynamic : Fair    ADL Intervention:     Provided ed on sequencing, body mechanics and proper RW use for sit <> stand, self care transfer and standing urinal use. Min A x2 for all functional mobility with RW use. Additional time needed for all aspects of mobility secondary to pain. Toileting  Bladder Hygiene: Minimum assistance(balance with RW with one UE, one UE on urinal)         Pain:  Pain Scale 1: Numeric (0 - 10)  Pain Intensity 1: 4  Pain Location 1: Foot  Pain Orientation 1: Right     Pain Intervention(s) 1: Medication (see MAR)     Standing 7/10  Rest 5/10    Activity Tolerance:   Please refer to the flowsheet for vital signs taken during this treatment. After treatment:   x Patient left in no apparent distress sitting up in chair  ? Patient left in no apparent distress in bed  x Call bell left within reach  x Nursing notified  ? Caregiver present  ?  Bed alarm activated    COMMUNICATION/COLLABORATION:   The patients plan of care was discussed with: Physical Therapist and Registered Nurse    Genaro Hairston OT  Time Calculation: 24 mins

## 2019-07-22 NOTE — WOUND CARE
Wound Care Note:     New consult placed by nurse request for sacral redness and blister to gluteal fold    Chart shows:  Past Medical History:   Diagnosis Date    Celiac disease     Chronic kidney disease     Hypertension     MI (mitral incompetence)     Prostate cancer ([address]      Admitted for acute kidney injury    WBC = 8.7  Admitted from independent section of [facility]    Assessment:   Patient is A&O x 4, communicative, continent with no assistance needed in repositioning. Bed: Advance  Diet: Renal regular    Left heel and bilateral buttocks skin intact and without erythema. Right heel with blanchable erythema; patient had his ankles crossed. 1. Maculopapular erythematous rash with satellite lesions consistent with yeast to sacrum, right sacrum with hyperkeratotic area that is white, this does not appear to be a blister, no open area. Blanchable erythema is an area of 10 cm x 7 cm. Patient moves frequently in bed independently. Secura Extra Thick Antifungal cream to be ordered. Spoke with Dr. Raúl Liu, wound care orders obtained. Patient repositioned on left side  Heels offloaded on pillow. Recommendations:    Sacrum- Every 12 hours gently cleanse away any soiled cream with soap and water, blot dry, apply a thin coat of Secura Extra Thick Antifungal.  Reapply PRN. Skin Care & Pressure Prevention:  Minimize layers of linen/pads under patient to optimize support surface. Turn/reposition approximately every 2 hours and offload heels.   Manage incontinence / promote continence     Discussed above plan with patient & Hayde Milian RN    Transition of Care: Plan to follow as needed while admitted to hospital.    GLENDA Sinha, RN, Rene Primer  office [phone]  pager [phone] or call  to page

## 2019-07-23 LAB
ALBUMIN SERPL-MCNC: 2 G/DL (ref 3.5–5)
ANION GAP SERPL CALC-SCNC: 8 MMOL/L (ref 5–15)
BUN SERPL-MCNC: 72 MG/DL (ref 6–20)
BUN/CREAT SERPL: 11 (ref 12–20)
CALCIUM SERPL-MCNC: 6.8 MG/DL (ref 8.5–10.1)
CHLORIDE SERPL-SCNC: 107 MMOL/L (ref 97–108)
CO2 SERPL-SCNC: 23 MMOL/L (ref 21–32)
CREAT SERPL-MCNC: 6.57 MG/DL (ref 0.7–1.3)
GLUCOSE SERPL-MCNC: 156 MG/DL (ref 65–100)
PHOSPHATE SERPL-MCNC: 4.1 MG/DL (ref 2.6–4.7)
POTASSIUM SERPL-SCNC: 4.5 MMOL/L (ref 3.5–5.1)
SODIUM SERPL-SCNC: 138 MMOL/L (ref 136–145)

## 2019-07-23 PROCEDURE — 74011000250 HC RX REV CODE- 250: Performed by: UROLOGY

## 2019-07-23 PROCEDURE — 74011250636 HC RX REV CODE- 250/636: Performed by: INTERNAL MEDICINE

## 2019-07-23 PROCEDURE — 74011250637 HC RX REV CODE- 250/637: Performed by: NURSE PRACTITIONER

## 2019-07-23 PROCEDURE — 97535 SELF CARE MNGMENT TRAINING: CPT

## 2019-07-23 PROCEDURE — 97530 THERAPEUTIC ACTIVITIES: CPT

## 2019-07-23 PROCEDURE — 36415 COLL VENOUS BLD VENIPUNCTURE: CPT

## 2019-07-23 PROCEDURE — 65270000029 HC RM PRIVATE

## 2019-07-23 PROCEDURE — 77030040361 HC SLV COMPR DVT MDII -B

## 2019-07-23 PROCEDURE — 74011250637 HC RX REV CODE- 250/637: Performed by: UROLOGY

## 2019-07-23 PROCEDURE — 97116 GAIT TRAINING THERAPY: CPT

## 2019-07-23 PROCEDURE — 74011636637 HC RX REV CODE- 636/637: Performed by: INTERNAL MEDICINE

## 2019-07-23 PROCEDURE — 80069 RENAL FUNCTION PANEL: CPT

## 2019-07-23 RX ADMIN — MICONAZOLE NITRATE: 20 CREAM TOPICAL at 22:52

## 2019-07-23 RX ADMIN — SODIUM CHLORIDE 125 ML/HR: 900 INJECTION, SOLUTION INTRAVENOUS at 11:38

## 2019-07-23 RX ADMIN — ALLOPURINOL 100 MG: 100 TABLET ORAL at 09:28

## 2019-07-23 RX ADMIN — POLYETHYLENE GLYCOL 3350 17 G: 17 POWDER, FOR SOLUTION ORAL at 09:27

## 2019-07-23 RX ADMIN — HYDROCODONE BITARTRATE AND ACETAMINOPHEN 1 TABLET: 7.5; 325 TABLET ORAL at 22:26

## 2019-07-23 RX ADMIN — HYDROCODONE BITARTRATE AND ACETAMINOPHEN 1 TABLET: 7.5; 325 TABLET ORAL at 09:43

## 2019-07-23 RX ADMIN — SODIUM CHLORIDE 125 ML/HR: 900 INJECTION, SOLUTION INTRAVENOUS at 18:57

## 2019-07-23 RX ADMIN — METOPROLOL TARTRATE 25 MG: 25 TABLET ORAL at 09:28

## 2019-07-23 RX ADMIN — PREDNISONE 40 MG: 20 TABLET ORAL at 09:31

## 2019-07-23 RX ADMIN — MIRTAZAPINE 15 MG: 15 TABLET, FILM COATED ORAL at 22:26

## 2019-07-23 RX ADMIN — METOPROLOL TARTRATE 25 MG: 25 TABLET ORAL at 17:19

## 2019-07-23 RX ADMIN — PATIROMER 8.4 G: 8.4 POWDER, FOR SUSPENSION ORAL at 12:30

## 2019-07-23 RX ADMIN — MICONAZOLE NITRATE: 20 CREAM TOPICAL at 09:33

## 2019-07-23 RX ADMIN — FERROUS SULFATE TAB 325 MG (65 MG ELEMENTAL FE) 325 MG: 325 (65 FE) TAB at 17:19

## 2019-07-23 RX ADMIN — HYDROCODONE BITARTRATE AND ACETAMINOPHEN 1 TABLET: 7.5; 325 TABLET ORAL at 17:19

## 2019-07-23 RX ADMIN — FERROUS SULFATE TAB 325 MG (65 MG ELEMENTAL FE) 325 MG: 325 (65 FE) TAB at 07:01

## 2019-07-23 RX ADMIN — ASPIRIN 81 MG 81 MG: 81 TABLET ORAL at 09:28

## 2019-07-23 RX ADMIN — SODIUM CHLORIDE 125 ML/HR: 900 INJECTION, SOLUTION INTRAVENOUS at 03:39

## 2019-07-23 NOTE — ROUTINE PROCESS
Bedside and Verbal shift change report given to Priscila To (oncoming nurse) by [facility] (offgoing nurse). Report included the following information SBAR.

## 2019-07-23 NOTE — PROGRESS NOTES
CM received phone call from daughter at 82 to discuss discharge planning. She expressed her frustrations to cm, stating she only wants her father to go to [address] when they have a bed. CM advised her discussion can take place when daughter arrives to hospital.    1500: CM and NP met with patient in room to discuss plan of care and transitions of care to rehab. Upon conversation, his daughter Jeff Champion arrived. CM explained Medicare guidelines for SNF, she understood. His daughter, Jeff Champion was under the impression that [facility], but she had not heard from anyone. CM contacted [employer], and the representative met with her in the patient room. Jeff Champion began to explain she is in the process of obtaining VA Benefits, however keeps getting turned away due to her father not physically being present. CM contacted the [place] and they advised the daughter to contact the Eligibility line [phone], her and her dad can complete the application over the phone. WILMER contacted Alonso Mead with [facility], she stated she will escalate to her , and have the clinical team review for referral. Patient daughter appreciated cm help and will follow up Tuesday morning.          Gabe CopelandGreeley County Hospital

## 2019-07-23 NOTE — PROGRESS NOTES
Problem: Self Care Deficits Care Plan (Adult)  Goal: *Acute Goals and Plan of Care (Insert Text)  Description  Occupational Therapy Goals  Initiated 7/21/2019  1. Patient will perform grooming with modified independence within 7 day(s). 2.  Patient will perform lower body dressing with supervision/set-up within 7 day(s). 3.  Patient will perform toilet transfer with modified independence within 7 day(s). 4.  Patient will participate in upper extremity therapeutic exercise/activities with supervision/set-upwithin 7 day(s). 7.  Patient will utilize energy conservation techniques during functional activities with verbal cues within 7 day(s). Outcome: Progressing Towards Goal     OCCUPATIONAL THERAPY TREATMENT  Patient: Tootie Armstrong. (79 y.o. male)  Date: 7/23/2019  Diagnosis: JASIEL (acute kidney injury) ([facility] [address] [N17.9] JASIEL (acute kidney injury) ([facility] [address]  Procedure(s) (LRB):  CYSTOSCOPY, DIFFICULT BILATERAL URETERAL STENT EXCHANGE, CLOT EVACUATION (Bilateral) 5 Days Post-Op  Precautions:    Chart, occupational therapy assessment, plan of care, and goals were reviewed. ASSESSMENT: Patient seen by Ot. Intervention focused on energy conservation strategies for ADL/IADL. At end of session assisted patient from bed to chair with Min A. Patient reports considering therapy versus hospice at this time. Will continue to follow. If therapy pursued would do best in subacute setting. Progression toward goals:  ?       Improving appropriately and progressing toward goals  ? Improving slowly and progressing toward goals  ? Not making progress toward goals and plan of care will be adjusted     PLAN:  Patient continues to benefit from skilled intervention to address the above impairments. Continue treatment per established plan of care.   Discharge Recommendations:  [facility]  Further Equipment Recommendations for Discharge:  none      SUBJECTIVE:   Patient stated i'm playing cribbage.     OBJECTIVE DATA SUMMARY:   Cognitive/Behavioral Status:  Neurologic State: Alert  Orientation Level: Oriented X4  Cognition: Appropriate decision making; Appropriate for age attention/concentration             Functional Mobility and Transfers for ADLs:  Bed Mobility:  Supine to Sit: Supervision; Additional time; Adaptive equipment  Sit to Supine: Supervision  Scooting: Supervision    Transfers:  Sit to Stand: Minimum assistance     Bed to Chair: Minimum assistance    Balance:  Sitting: Intact  Standing: Impaired  Standing - Static: Fair  Standing - Dynamic : Fair    ADL Intervention:  Patient instructed and indicated understanding energy conservation techniques to increase independence and safety during all ADLs for end goal of returning back to a job. Provided instruction body is like a jar of marbles, marbles represent energy, at end of day need as many marbles as possible to obtain a good night sleep, REM sleep is when the body repairs itself. This ensures a full jar of marbles, full of energy when wake up to start a new day. Use energy conservation techniques during ADLs so can increase participation in life activities patient prefers, to ensure more frequent good days. If having a bad day, evaluate tasks completed day before and re-plan how to save energy to complete same tasks, for example if going grocery shopping do not complete full bathing/dressing/grooming. Patient indicated understanding by stating tasks already completing to save energy ie sitting to don all clothing. Pain:  Pain Scale 1: Numeric (0 - 10)  Pain Intensity 1: 8  Pain Location 1: Leg  Pain Orientation 1: Left  Pain Description 1: Aching  Pain Intervention(s) 1: Medication (see MAR)  Activity Tolerance:   fair  Please refer to the flowsheet for vital signs taken during this treatment. After treatment:   ? Patient left in no apparent distress sitting up in chair  ? Patient left in no apparent distress in bed  ?  Call Lacie Gaytan left within reach  ? Nursing notified  ? Caregiver present  ?  Bed alarm activated    COMMUNICATION/COLLABORATION:   The patients plan of care was discussed with: Physical Therapist and Registered Nurse    Gideon Thibodeaux  Time Calculation: 36 mins

## 2019-07-23 NOTE — PROGRESS NOTES
JOSE LUIS: CM notified by [facility] they do not have a LTC bed for patient at this time. They reached out to the daughter, who ended the conversation with them. CM left VM to discuss options. 1402: CM received phone call from [place], she is agreeable to cm contacting local SNF to see if they have a LTC bed for patient. CM notified her to let her know the choices are [facility] and [facility]. She is requesting a private room at [address] [place] [address]. CM left voicemail for admission department of [facility].      1417: [facility] has a private room for patient when stable and can take him for SNF and transition to LTC. Attending MD and daughter aware. 1506: Daughter contacted cm advised that she is refusing discharge. CM told the daughter to discuss with NP when she calls her.       46: CM attempted to reach [facility] to see if there are any LTC beds, they do not have any at this time. Attending NP contacted daughter, she would like for cm to reach out to [facility]. However, cm advised her [facility] does not have a bed. She plans to attend [facility], and states she does not want her dad discharging. CM to follow up tomorrow morning.     Aristeo Rowell [facility]

## 2019-07-23 NOTE — PROGRESS NOTES
[facility]   [address] [facility], [facility] [address], [facility]  Phone: [phone]   [phone]       Nephrology Progress Note  Andrés Norman     1940     [id number]  Date of Admission : 7/17/2019 07/23/19    CC:  Follow up for JASIEL     Assessment and Plan   JASIEL on CKD   - Progressive Obstructive nephropathy   - s/p Ureteral stent change 7/18  - continue IVF till d/c   - It has been agreed with pt and family not to pursue dialysis   - daily labs      CKD Stage V  - 2/2 chronic obstruction/ fsgs  - baseline Cr ~ 4 mg/dl at best   - f/b Dr Marilou Taylor      Hx of MEDINA with ongoing b/l hydro despite stent placement:  - s/p stent change in March  By Dr Thanh Fowler   - s/p Stent change 7/18     Hyperkalemia:  - resolving  - continue daily  Valtessa     Metabolic Acidosis:  - stable    Anemia of CKD:  -serial H/H     CAD s/p CABG     Hx of prostate cancer    Care Plan discussed with: pt`s daughter and hospitalist team     Interval History:  Seen and examined. Looks and feels better , N/V/CP. Has R hip pain     Review of Systems: A comprehensive review of systems was negative.     Current Medications:   Current Facility-Administered Medications   Medication Dose Route Frequency    HYDROcodone-acetaminophen (NORCO) 7.5-325 mg per tablet 1 Tab  1 Tab Oral Q4H PRN    miconazole (SECURA) 2 % extra thick cream   Topical Q12H    predniSONE (DELTASONE) tablet 40 mg  40 mg Oral DAILY WITH BREAKFAST    levoFLOXacin (LEVAQUIN) 500 mg in D5W IVPB  500 mg IntraVENous Q48H    0.9% sodium chloride infusion  125 mL/hr IntraVENous CONTINUOUS    naloxone (NARCAN) injection 0.4 mg  0.4 mg IntraVENous EVERY 2 MINUTES AS NEEDED    polyethylene glycol (MIRALAX) packet 17 g  17 g Oral DAILY    allopurinol (ZYLOPRIM) tablet 100 mg  100 mg Oral DAILY    aspirin chewable tablet 81 mg  81 mg Oral DAILY    ferrous sulfate tablet 325 mg  325 mg Oral ACB&D    mirtazapine (REMERON) tablet 15 mg  15 mg Oral QHS    patiromer calcium sorbitex (VELTASSA) powder 8.4 g  8.4 g Oral DAILY    metoprolol tartrate (LOPRESSOR) tablet 25 mg  25 mg Oral BID    ondansetron (ZOFRAN) injection 4 mg  4 mg IntraVENous Q6H PRN    acetaminophen (TYLENOL) tablet 650 mg  650 mg Oral Q6H PRN    lidocaine (LIDODERM) 5 % patch 1 Patch  1 Patch TransDERmal Q24H      Allergies   Allergen Reactions    Bactrim [Sulfamethoxazole-Trimethoprim] Anaphylaxis and Swelling     Patient reported that his \"tongue swelled up and it was hard to breath\"    Enzalutamide Nausea and Vomiting    Gluten Other (comments)     Abdominal pain    Morphine Other (comments)     AMS; \"goes crazy\"       Objective:  Vitals:    Vitals:    07/22/19 1905 07/22/19 2114 07/23/19 0402 07/23/19 0928   BP: 114/68 118/84 121/66 109/68   Pulse: 78 71 64 68   Resp:  16 16    Temp:  97.8 °F (36.6 °C) 97.7 °F (36.5 °C)    SpO2:  96% 98%    Weight:   107.9 kg (237 lb 14 oz)    Height:         Intake and Output:  07/23 0701 - 07/23 1900  In: -   Out: 450 [Urine:450]  07/21 1901 - 07/23 0700  In: 6398.3 [P.O.:1185; I.V.:5213.3]  Out: 1900 [Urine:1450]    Physical Examination:    General: NAD,Conversant   Neck:  Supple, no mass  Resp:  Lungs CTA B/L, no wheezing , normal respiratory effort  CV:  RRR,  no murmur or rub, trace LE edema  GI:  Soft, NT, + Bowel sounds, no hepatosplenomegaly  Neurologic:  Non focal  Psych:             AAO x 3 appropriate affect   Skin:  No Rash  :  No goldberg     []    High complexity decision making was performed  []    Patient is at high-risk of decompensation with multiple organ involvement    Lab Data Personally Reviewed: I have reviewed all the pertinent labs, microbiology data and radiology studies during assessment.     Recent Labs     07/23/19  0345 07/22/19  0435 07/21/19  0420    139 137   K 4.5 4.8 4.1    104 99   CO2 23 22 28   * 78 83   BUN 72* 66* 65*   CREA 6.57* 7.08* 6.87*   CA 6.8* 6.7* 6.3*   MG  --  1.8 1.6   PHOS 4.1 5.6* 5.3*   ALB 2.0*  --  2.3*     Recent Labs     07/22/19  0435 07/21/19  0420   WBC 8.7 9.6   HGB 9.2* 9.5*   HCT 30.2* 30.8*    174     Lab Results   Component Value Date/Time    Specimen Description: URINE 10/07/2013 01:36 AM    Specimen Description: BLOOD 10/06/2013 04:13 PM     Lab Results   Component Value Date/Time    Culture result: NO GROWTH 3 DAYS 07/20/2019 10:40 AM    Culture result: NO GROWTH 1 DAY 07/20/2019 10:40 AM    Culture result: NO GROWTH 5 DAYS 07/17/2019 02:48 PM    Culture result: NO GROWTH 1 DAY 07/17/2019 07:21 AM    Culture result: CANDIDA ALBICANS (A) 02/15/2019 04:01 PM     Recent Results (from the past 24 hour(s))   RENAL FUNCTION PANEL    Collection Time: 07/23/19  3:45 AM   Result Value Ref Range    Sodium 138 136 - 145 mmol/L    Potassium 4.5 3.5 - 5.1 mmol/L    Chloride 107 97 - 108 mmol/L    CO2 23 21 - 32 mmol/L    Anion gap 8 5 - 15 mmol/L    Glucose 156 (H) 65 - 100 mg/dL    BUN 72 (H) 6 - 20 MG/DL    Creatinine 6.57 (H) 0.70 - 1.30 MG/DL    BUN/Creatinine ratio 11 (L) 12 - 20      GFR est AA 10 (L) >60 ml/min/1.73m2    GFR est non-AA 8 (L) >60 ml/min/1.73m2    Calcium 6.8 (L) 8.5 - 10.1 MG/DL    Phosphorus 4.1 2.6 - 4.7 MG/DL    Albumin 2.0 (L) 3.5 - 5.0 g/dL           Total time spent with patient:  xxx   min. Care Plan discussed with:  Patient     Family      RN      Consulting Physician [address] [facility],         I have reviewed the flowsheets. Chart and Pertinent Notes have been reviewed. No change in PMH ,family and social history from Consult note.       Neida Tate MD

## 2019-07-23 NOTE — PROGRESS NOTES
Bedside and Verbal shift change report given to Shonda Braxton [place] (oncoming nurse) by Catrachita Knapp RN (offgoing nurse). Report included the following information SBAR, Kardex, Intake/Output, MAR and Recent Results.

## 2019-07-23 NOTE — PROGRESS NOTES
Hospitalist Progress Note  Gagan Alcantar NP  Answering service: [phone] from in house phone  Cell: [phone]   Date of Service:  2019  NAME:  Lindsay Higuera. :  1940  MRN:  [id number]    Admission Summary:   Pt originally went to his PCP due to painful urination. His UA was negative so was prescribed tramadol for pain. He took the first dose of 50 mg tramadol the night before admit, felt dizzy and lightheaded and then associated with nausea and 2-3 episodes of vomiting. He came to the hospital for further evaluation.     Interval history / Subjective:   Pt lying in bed, looks flushed but oral temperature (taken by me) was 97.4 (orally). Reports he is feeling alright today and his pain is controlled. We had switched his pain medications from dilaudid to [address] [facility] yesterday and he is satisfied with the result. Will continue today. [employer]: Called to speak with Isa Strauss about medical updates and to discuss plan of care. At start of conversation, Isa Strauss says she is going to refuse pts discharge tomorrow. She sites many reason why she plans to refuse discharge (util at least  per her):  she is not ready for her dad to leave; she has a lot of work to catch up on at home, she needs to get all of his long term and disability paperwork done and completed before he leaves the hospital and she is assigned a different ;  and that she has not toured the facility yet (though she has been by to see the accepting facility she says it was a night and wants to go during the day). Attempted to have a discussion about medical readiness for discharge at earliest tomorrow and she adamantly refuses. In short, pt is terminal and is weak.  Hope is to discharge him to SNF to regain some strength and then transition to LTC, whether or not he will need hospice soon will be determined but as we are pursuing skilled therapy then that must be reserved for future plans. She also sites lack of care in \"nursing facilities in general\" as reason for not going until Monday. She is very emotional and labile and will be a challenge/barrier to pts discharge from hospital.  is aware of situation and is being challenged as well. Assessment & Plan:     Sepsis (POA, now recurred) improved:   - Fever, HR, suspect urinary source from recent instrumentation vs. Malignancy  - WBC normalized, afebrile  - repeat UA with large leuks >100 WBC 1+ bacteria. No growth on urine culture  - blood culture with no growth x 3 days  - procalcitonin negative   - continue levofloxacin (today is day 4) for potential easy transition to PO. Pharmacy dosing every 48 hours. Pain: Likely secondary to metastatic disease, L hip pain: Improved  - Pt having itching with dilaudid, discussed pain regimen with pt and daughter in detail, switched to [address] [place] [address] 7/22 with good results. Pt satisfied with pain control at present time. Hyperkalemia: Resolved  - s/p kayexalate  - takes veltassa (missed dose PTA), this has been resumed    JASIEL on CKD stage 5, Metabolic acidosis:  - Cr: rising, 7.08 today,  was 4.96 in 2/2019.  Likely has progression of kidney disease  - s/p bicarb gtt and now on NS (to continue through d/c)  - Nephrology consulted and following  - Poor dialysis candidate long term, and patient does not want it   - Dr. Milana Hodges discussed plan of care with pts daughter last evening  - spoke with Dr. Milana Hodges this am, clear for d/c when bed available     UTI:bladder outlet obstruction  Hx of b/l hydronephrosis due to prostate cancer with stent placement in 2018:  - UA shows >100 WBCs with 2+ bacteria, he has ureteral stents  - 7/18: s/p bilateral ureteral stent replacement (difficult) and clot removal  - urine cultures x 2 negative     Dizziness/lightheadedness: Resolved    Nausea/vomiting: resolved  - side effects due to tramadol, discussed with the patient  - prn zofran     Hx of metastatic prostate cancer: on casodex     Hx of CAD s/p CABG: on aspirin and metoprolol     Hx HTN: on home metoprolol, BP normal    Code status: Full  DVT prophylaxis: SCDs  Care Plan discussed with: patient, nurse, nephrology and   Disposition: SNF pending, will likely need LTC and possible transition to [facility] Problems  Date Reviewed: 7/16/2018          Codes Class Noted POA    * (Principal) JASIEL (acute kidney injury) ([facility] ICD-10-CM: N17.9  ICD-9-CM: 584.9  7/17/2019 Unknown        Coronary artery disease involving native coronary artery of native heart without angina pectoris ICD-10-CM: I25.10  ICD-9-CM: 414.01  8/31/2018 Yes        Essential hypertension ICD-10-CM: I10  ICD-9-CM: 401.9  8/31/2018 Yes        Prostate cancer ([facility] ICD-10-CM: C61  ICD-9-CM: 185  8/31/2018 Yes        Hyperkalemia ICD-10-CM: E87.5  ICD-9-CM: 276.7  6/5/2018 Yes            Review of Systems:   Denied HA. No chest pain or pressure. No respiratory or GI complaints. + urinating. No pain this am.     Vital Signs:    Last 24hrs VS reviewed since prior progress note. Most recent are:  Visit Vitals  /66 (BP 1 Location: Right arm, BP Patient Position: At rest)   Pulse 64   Temp 97.7 °F (36.5 °C)   Resp 16   Ht 5' 11\" (1.803 m)   Wt 107.9 kg (237 lb 14 oz)   SpO2 98%   BMI 33.18 kg/m²       Intake/Output Summary (Last 24 hours) at 7/23/2019 0904  Last data filed at 7/23/2019 7145  Gross per 24 hour   Intake 6398.34 ml   Output 1900 ml   Net 4498.34 ml     Physical Examination:         Constitutional:  Cooperative, pleasant. NAD. Looks flushed but has no fever. ENT:  MMM     Resp:  CTA bilaterally. On RA   CV:  Regular rhythm, normal rate, no murmurs. GI:  Soft, non distended, tender. Normoactive bowel sounds. +BM   :  Urine light and clear    Musculoskeletal:  Mild LE edema, warm, 2+ pulses throughout    Neurologic:  Moves all extremities. AAOx3.    Psych: Calm and cooperative       Data Review:   Review and/or order of clinical lab test  Review and/or order of tests in the radiology section of CPT  Review and/or order of tests in the medicine section of CPT    Labs:     Recent Labs     07/22/19  0435 07/21/19  0420   WBC 8.7 9.6   HGB 9.2* 9.5*   HCT 30.2* 30.8*    174     Recent Labs     07/23/19  0345 07/22/19  0435 07/21/19  0420    139 137   K 4.5 4.8 4.1    104 99   CO2 23 22 28   BUN 72* 66* 65*   CREA 6.57* 7.08* 6.87*   * 78 83   CA 6.8* 6.7* 6.3*   MG  --  1.8 1.6   PHOS 4.1 5.6* 5.3*     Recent Labs     07/23/19 0345 07/21/19  0420   ALB 2.0* 2.3*     No results for input(s): INR, PTP, APTT in the last 72 hours. No lab exists for component: INREXT, INREXT   No results for input(s): FE, TIBC, PSAT, FERR in the last 72 hours. No results found for: FOL, RBCF   No results for input(s): PH, PCO2, PO2 in the last 72 hours. No results for input(s): CPK, CKNDX, TROIQ in the last 72 hours.     No lab exists for component: CPKMB  Lab Results   Component Value Date/Time    Cholesterol, total 85 10/07/2013 12:00 AM    HDL Cholesterol 5 10/07/2013 12:00 AM    LDL, calculated 39.6 10/07/2013 12:00 AM    Triglyceride 202 (H) 10/07/2013 12:00 AM    CHOL/HDL Ratio 17.0 (H) 10/07/2013 12:00 AM     Lab Results   Component Value Date/Time    Glucose (POC) 105 (H) 07/17/2019 06:48 AM    Glucose (POC) 105 (H) 07/17/2019 05:56 AM    Glucose (POC) 137 (H) 07/18/2018 05:06 PM    Glucose (POC) 100 07/17/2018 06:07 AM    Glucose (POC) 59 (L) 06/20/2018 05:03 PM    Glucose (POC) 101 (H) 06/20/2018 03:14 PM    Glucose (POC) 83 06/10/2018 06:26 AM    Glucose (POC) 108 (H) 06/09/2018 10:08 PM    Glucose (POC) 138 (H) 06/09/2018 06:30 PM     Lab Results   Component Value Date/Time    Color YELLOW/STRAW 07/20/2019 10:40 AM    Appearance CLOUDY (A) 07/20/2019 10:40 AM    Specific gravity 1.007 07/20/2019 10:40 AM    Specific gravity 1.015 02/15/2019 04:01 PM    pH (UA) 8.0 07/20/2019 10:40 AM    Protein 100 (A) 07/20/2019 10:40 AM    Glucose NEGATIVE  07/20/2019 10:40 AM    Ketone NEGATIVE  07/20/2019 10:40 AM    Bilirubin NEGATIVE  07/20/2019 10:40 AM    Urobilinogen 0.2 07/20/2019 10:40 AM    Nitrites NEGATIVE  07/20/2019 10:40 AM    Leukocyte Esterase LARGE (A) 07/20/2019 10:40 AM    Epithelial cells FEW 07/20/2019 10:40 AM    Bacteria 1+ (A) 07/20/2019 10:40 AM    WBC >100 (H) 07/20/2019 10:40 AM    RBC 20-50 07/20/2019 10:40 AM     Medications Reviewed:     Current Facility-Administered Medications   Medication Dose Route Frequency    HYDROcodone-acetaminophen (NORCO) 7.5-325 mg per tablet 1 Tab  1 Tab Oral Q4H PRN    miconazole (SECURA) 2 % extra thick cream   Topical Q12H    predniSONE (DELTASONE) tablet 40 mg  40 mg Oral DAILY WITH BREAKFAST    levoFLOXacin (LEVAQUIN) 500 mg in D5W IVPB  500 mg IntraVENous Q48H    0.9% sodium chloride infusion  125 mL/hr IntraVENous CONTINUOUS    naloxone (NARCAN) injection 0.4 mg  0.4 mg IntraVENous EVERY 2 MINUTES AS NEEDED    polyethylene glycol (MIRALAX) packet 17 g  17 g Oral DAILY    allopurinol (ZYLOPRIM) tablet 100 mg  100 mg Oral DAILY    aspirin chewable tablet 81 mg  81 mg Oral DAILY    ferrous sulfate tablet 325 mg  325 mg Oral ACB&D    mirtazapine (REMERON) tablet 15 mg  15 mg Oral QHS    patiromer calcium sorbitex (VELTASSA) powder 8.4 g  8.4 g Oral DAILY    metoprolol tartrate (LOPRESSOR) tablet 25 mg  25 mg Oral BID    ondansetron (ZOFRAN) injection 4 mg  4 mg IntraVENous Q6H PRN    acetaminophen (TYLENOL) tablet 650 mg  650 mg Oral Q6H PRN    lidocaine (LIDODERM) 5 % patch 1 Patch  1 Patch TransDERmal Q24H   ______________________________________________________________________  EXPECTED LENGTH OF STAY: 5d 16h  ACTUAL LENGTH OF STAY:          [address], NP

## 2019-07-23 NOTE — PROGRESS NOTES
Problem: Risk for Spread of Infection  Goal: Prevent transmission of infectious organism to others  Description  Prevent the transmission of infectious organisms to other patients, staff members, and visitors. Outcome: Progressing Towards Goal     Problem: Falls - Risk of  Goal: *Absence of Falls  Description  Document Pal Smith Fall Risk and appropriate interventions in the flowsheet. Outcome: Progressing Towards Goal  Note:   Fall Risk Interventions:  Mobility Interventions: OT consult for ADLs, Patient to call before getting OOB, PT Consult for mobility concerns, PT Consult for assist device competence    Medication Interventions: Patient to call before getting OOB, Teach patient to arise slowly    Elimination Interventions: Call light in reach, Patient to call for help with toileting needs, Urinal in reach, Stay With Me (per policy)    History of Falls Interventions: Door open when patient unattended, Room close to nurse's station         Problem: Pressure Injury - Risk of  Goal: *Prevention of pressure injury  Description  Document Partha Scale and appropriate interventions in the flowsheet.     Offload heels  Turn approximately every 2 hours   Outcome: Progressing Towards Goal  Note:   Pressure Injury Interventions:  Sensory Interventions: Assess changes in LOC    Moisture Interventions: Limit adult briefs, Minimize layers    Activity Interventions: Increase time out of bed, PT/OT evaluation    Mobility Interventions: Float heels, HOB 30 degrees or less, PT/OT evaluation    Nutrition Interventions: Document food/fluid/supplement intake    Friction and Shear Interventions: HOB 30 degrees or less, Lift sheet

## 2019-07-24 PROCEDURE — 74011250636 HC RX REV CODE- 250/636: Performed by: INTERNAL MEDICINE

## 2019-07-24 PROCEDURE — 74011250637 HC RX REV CODE- 250/637: Performed by: UROLOGY

## 2019-07-24 PROCEDURE — 74011000250 HC RX REV CODE- 250: Performed by: UROLOGY

## 2019-07-24 PROCEDURE — 65270000029 HC RM PRIVATE

## 2019-07-24 PROCEDURE — 74011636637 HC RX REV CODE- 636/637: Performed by: INTERNAL MEDICINE

## 2019-07-24 PROCEDURE — 74011250637 HC RX REV CODE- 250/637: Performed by: NURSE PRACTITIONER

## 2019-07-24 RX ORDER — PREDNISONE 20 MG/1
20 TABLET ORAL
Status: DISCONTINUED | OUTPATIENT
Start: 2019-07-25 | End: 2019-07-29 | Stop reason: HOSPADM

## 2019-07-24 RX ADMIN — METOPROLOL TARTRATE 25 MG: 25 TABLET ORAL at 18:17

## 2019-07-24 RX ADMIN — HYDROCODONE BITARTRATE AND ACETAMINOPHEN 1 TABLET: 7.5; 325 TABLET ORAL at 18:16

## 2019-07-24 RX ADMIN — FERROUS SULFATE TAB 325 MG (65 MG ELEMENTAL FE) 325 MG: 325 (65 FE) TAB at 06:30

## 2019-07-24 RX ADMIN — HYDROCODONE BITARTRATE AND ACETAMINOPHEN 1 TABLET: 7.5; 325 TABLET ORAL at 09:29

## 2019-07-24 RX ADMIN — MICONAZOLE NITRATE: 20 CREAM TOPICAL at 21:36

## 2019-07-24 RX ADMIN — ASPIRIN 81 MG 81 MG: 81 TABLET ORAL at 09:19

## 2019-07-24 RX ADMIN — MICONAZOLE NITRATE: 20 CREAM TOPICAL at 09:19

## 2019-07-24 RX ADMIN — HYDROCODONE BITARTRATE AND ACETAMINOPHEN 1 TABLET: 7.5; 325 TABLET ORAL at 13:46

## 2019-07-24 RX ADMIN — SODIUM CHLORIDE 125 ML/HR: 900 INJECTION, SOLUTION INTRAVENOUS at 01:50

## 2019-07-24 RX ADMIN — MIRTAZAPINE 15 MG: 15 TABLET, FILM COATED ORAL at 21:35

## 2019-07-24 RX ADMIN — FERROUS SULFATE TAB 325 MG (65 MG ELEMENTAL FE) 325 MG: 325 (65 FE) TAB at 18:17

## 2019-07-24 RX ADMIN — POLYETHYLENE GLYCOL 3350 17 G: 17 POWDER, FOR SOLUTION ORAL at 09:20

## 2019-07-24 RX ADMIN — METOPROLOL TARTRATE 25 MG: 25 TABLET ORAL at 09:19

## 2019-07-24 RX ADMIN — LEVOFLOXACIN 500 MG: 5 INJECTION, SOLUTION INTRAVENOUS at 09:19

## 2019-07-24 RX ADMIN — HYDROCODONE BITARTRATE AND ACETAMINOPHEN 1 TABLET: 7.5; 325 TABLET ORAL at 23:23

## 2019-07-24 RX ADMIN — ALLOPURINOL 100 MG: 100 TABLET ORAL at 09:19

## 2019-07-24 RX ADMIN — PATIROMER 8.4 G: 8.4 POWDER, FOR SUSPENSION ORAL at 09:22

## 2019-07-24 RX ADMIN — PREDNISONE 40 MG: 20 TABLET ORAL at 09:20

## 2019-07-24 NOTE — PROGRESS NOTES
[facility]   [address] [facility], [address] [place] [address] [address], [facility]  Phone: [phone]   [phone]       Nephrology Progress Note  Bozena Watson     1940     [id number]  Date of Admission : 7/17/2019 07/24/19    CC:  Follow up for JASIEL     Assessment and Plan   JASIEL on CKD   - Progressive Obstructive nephropathy   - s/p Ureteral stent change 7/18  - stop IVF due to fluid overload . - It has been agreed with pt and family not to pursue dialysis when he becomes ESRF      CKD Stage V  - 2/2 chronic obstruction/ fsgs  - baseline Cr ~ 4 mg/dl at best   - f/b Dr Claudean Blocker      Hx of MEDINA with ongoing b/l hydro despite stent placement:  - s/p stent change in March  By Dr Rosio Archibald   - s/p Stent change 7/18     Hyperkalemia:  - resolving  - continue daily  Valtessa     Metabolic Acidosis:  - stable    Anemia of CKD:  -serial H/H     CAD s/p CABG     Hx of prostate cancer    Care Plan discussed with: pt`s daughter and hospitalist team     Interval History:  Seen and examined. Labs - none today. Reports good UOP. He is a little SOB w/ exertion otherwise about the same   No N/V/CP    Review of Systems: A comprehensive review of systems was negative.     Current Medications:   Current Facility-Administered Medications   Medication Dose Route Frequency    HYDROcodone-acetaminophen (NORCO) 7.5-325 mg per tablet 1 Tab  1 Tab Oral Q4H PRN    miconazole (SECURA) 2 % extra thick cream   Topical Q12H    predniSONE (DELTASONE) tablet 40 mg  40 mg Oral DAILY WITH BREAKFAST    levoFLOXacin (LEVAQUIN) 500 mg in D5W IVPB  500 mg IntraVENous Q48H    naloxone (NARCAN) injection 0.4 mg  0.4 mg IntraVENous EVERY 2 MINUTES AS NEEDED    polyethylene glycol (MIRALAX) packet 17 g  17 g Oral DAILY    allopurinol (ZYLOPRIM) tablet 100 mg  100 mg Oral DAILY    aspirin chewable tablet 81 mg  81 mg Oral DAILY    ferrous sulfate tablet 325 mg  325 mg Oral ACB&D    mirtazapine (REMERON) tablet 15 mg  15 mg Oral QHS    patiromer calcium sorbitex (VELTASSA) powder 8.4 g  8.4 g Oral DAILY    metoprolol tartrate (LOPRESSOR) tablet 25 mg  25 mg Oral BID    ondansetron (ZOFRAN) injection 4 mg  4 mg IntraVENous Q6H PRN    acetaminophen (TYLENOL) tablet 650 mg  650 mg Oral Q6H PRN    lidocaine (LIDODERM) 5 % patch 1 Patch  1 Patch TransDERmal Q24H      Allergies   Allergen Reactions    Bactrim [Sulfamethoxazole-Trimethoprim] Anaphylaxis and Swelling     Patient reported that his \"tongue swelled up and it was hard to breath\"    Enzalutamide Nausea and Vomiting    Morphine Other (comments)     AMS; \"goes crazy\"       Objective:  Vitals:    Vitals:    07/23/19 1706 07/23/19 1958 07/24/19 0240 07/24/19 0812   BP: 154/85 136/73 143/80 156/85   Pulse: 69 65 61 72   Resp:  18 18 18   Temp:  97.6 °F (36.4 °C) 97.4 °F (36.3 °C) 98.3 °F (36.8 °C)   SpO2:  96% 97% 100%   Weight:       Height:         Intake and Output:  07/24 0701 - 07/24 1900  In: -   Out: 200 [Urine:200]  07/22 1901 - 07/24 0700  In: 6468.3 [P.O.:880; I.V.:5588.3]  Out: 9833 [Urine:3250]    Physical Examination:    General: NAD,Conversant   Neck:  Supple, no mass  Resp:  Lungs CTA B/L, no wheezing , normal respiratory effort  CV:  RRR,  no murmur or rub, trace LE edema  GI:  Soft, NT, + Bowel sounds, no hepatosplenomegaly  Neurologic:  Non focal  Psych:             AAO x 3 appropriate affect   Skin:  No Rash  :  No goldberg     []    High complexity decision making was performed  []    Patient is at high-risk of decompensation with multiple organ involvement    Lab Data Personally Reviewed: I have reviewed all the pertinent labs, microbiology data and radiology studies during assessment.     Recent Labs     07/23/19  0345 07/22/19  0435    139   K 4.5 4.8    104   CO2 23 22   * 78   BUN 72* 66*   CREA 6.57* 7.08*   CA 6.8* 6.7*   MG  --  1.8   PHOS 4.1 5.6*   ALB 2.0*  --      Recent Labs     07/22/19  0435   WBC 8.7   HGB 9.2*   HCT 30.2*    Lab Results   Component Value Date/Time    Specimen Description: URINE 10/07/2013 01:36 AM    Specimen Description: BLOOD 10/06/2013 04:13 PM     Lab Results   Component Value Date/Time    Culture result: NO GROWTH 3 DAYS 07/20/2019 10:40 AM    Culture result: NO GROWTH 1 DAY 07/20/2019 10:40 AM    Culture result: NO GROWTH 5 DAYS 07/17/2019 02:48 PM    Culture result: NO GROWTH 1 DAY 07/17/2019 07:21 AM    Culture result: CANDIDA ALBICANS (A) 02/15/2019 04:01 PM     No results found for this or any previous visit (from the past 24 hour(s)). Total time spent with patient:  xxx   min. Care Plan discussed with:  Patient     Family      RN      Consulting Physician [facility],         I have reviewed the flowsheets. Chart and Pertinent Notes have been reviewed. No change in PMH ,family and social history from Consult note.       Jihan Norman MD

## 2019-07-24 NOTE — PROGRESS NOTES
Occupational Therapy    Completed chart review. Patient reports need to eat lunch as he has been sleeping most of the day. Requesting for therapy to return tomorrow as he is \"not feeling up to it. \"   Will continue to follow for OT services. Plans for dc to SNF for additional rehab. He is unsafe to dc back to ILF alone.        Thank you,    Josué Bender, OT

## 2019-07-24 NOTE — PROGRESS NOTES
Bedside and Verbal shift change report given to Matt Prasad 8 (oncoming nurse) by Beronica Dahl RN (offgoing nurse). Report included the following information SBAR, Kardex, Procedure Summary, Intake/Output and MAR.

## 2019-07-24 NOTE — PROGRESS NOTES
Unable to obtain pt lab work due to pt's rolling veins and pt jerking his arm. Two nurses attempted and missed.

## 2019-07-24 NOTE — PROGRESS NOTES
Deferred visit: Patient is in bed sleeping. Stated he had a rough night last night and declined ambulation at this time. Will check back later today.

## 2019-07-25 LAB
BACTERIA SPEC CULT: NORMAL
SERVICE CMNT-IMP: NORMAL

## 2019-07-25 PROCEDURE — 74011250637 HC RX REV CODE- 250/637: Performed by: NURSE PRACTITIONER

## 2019-07-25 PROCEDURE — 74011250637 HC RX REV CODE- 250/637: Performed by: INTERNAL MEDICINE

## 2019-07-25 PROCEDURE — 74011000250 HC RX REV CODE- 250: Performed by: UROLOGY

## 2019-07-25 PROCEDURE — 65270000029 HC RM PRIVATE

## 2019-07-25 PROCEDURE — 51798 US URINE CAPACITY MEASURE: CPT

## 2019-07-25 PROCEDURE — 74011636637 HC RX REV CODE- 636/637: Performed by: NURSE PRACTITIONER

## 2019-07-25 PROCEDURE — 97535 SELF CARE MNGMENT TRAINING: CPT

## 2019-07-25 PROCEDURE — 74011250637 HC RX REV CODE- 250/637: Performed by: UROLOGY

## 2019-07-25 RX ORDER — AMOXICILLIN 250 MG
1 CAPSULE ORAL DAILY
Status: DISCONTINUED | OUTPATIENT
Start: 2019-07-25 | End: 2019-07-29 | Stop reason: HOSPADM

## 2019-07-25 RX ADMIN — FERROUS SULFATE TAB 325 MG (65 MG ELEMENTAL FE) 325 MG: 325 (65 FE) TAB at 18:02

## 2019-07-25 RX ADMIN — FERROUS SULFATE TAB 325 MG (65 MG ELEMENTAL FE) 325 MG: 325 (65 FE) TAB at 07:11

## 2019-07-25 RX ADMIN — PATIROMER 8.4 G: 8.4 POWDER, FOR SUSPENSION ORAL at 08:23

## 2019-07-25 RX ADMIN — ALLOPURINOL 100 MG: 100 TABLET ORAL at 08:13

## 2019-07-25 RX ADMIN — PREDNISONE 20 MG: 20 TABLET ORAL at 07:11

## 2019-07-25 RX ADMIN — HYDROCODONE BITARTRATE AND ACETAMINOPHEN 1 TABLET: 7.5; 325 TABLET ORAL at 18:02

## 2019-07-25 RX ADMIN — MIRTAZAPINE 15 MG: 15 TABLET, FILM COATED ORAL at 22:01

## 2019-07-25 RX ADMIN — HYDROCODONE BITARTRATE AND ACETAMINOPHEN 1 TABLET: 7.5; 325 TABLET ORAL at 08:13

## 2019-07-25 RX ADMIN — SENNOSIDES, DOCUSATE SODIUM 1 TABLET: 50; 8.6 TABLET, FILM COATED ORAL at 13:10

## 2019-07-25 RX ADMIN — METOPROLOL TARTRATE 25 MG: 25 TABLET ORAL at 08:13

## 2019-07-25 RX ADMIN — POLYETHYLENE GLYCOL 3350 17 G: 17 POWDER, FOR SOLUTION ORAL at 08:13

## 2019-07-25 RX ADMIN — METOPROLOL TARTRATE 25 MG: 25 TABLET ORAL at 18:02

## 2019-07-25 RX ADMIN — HYDROCODONE BITARTRATE AND ACETAMINOPHEN 1 TABLET: 7.5; 325 TABLET ORAL at 22:01

## 2019-07-25 RX ADMIN — MICONAZOLE NITRATE: 20 CREAM TOPICAL at 22:01

## 2019-07-25 RX ADMIN — HYDROCODONE BITARTRATE AND ACETAMINOPHEN 1 TABLET: 7.5; 325 TABLET ORAL at 13:10

## 2019-07-25 RX ADMIN — MICONAZOLE NITRATE: 20 CREAM TOPICAL at 08:13

## 2019-07-25 RX ADMIN — ASPIRIN 81 MG 81 MG: 81 TABLET ORAL at 08:13

## 2019-07-25 NOTE — PROGRESS NOTES
[facility]   [address] [facility], [facility] [address], [facility]  Phone: [phone]   [phone]       Nephrology Progress Note  Harlan Amin     1940     [id number]  Date of Admission : 7/17/2019 07/25/19    CC:  Follow up for JASIEL     Assessment and Plan   JASIEL on CKD   - Progressive Obstructive nephropathy   - s/p Ureteral stent change 7/18  - It has been agreed with pt and family not to pursue dialysis when he becomes ESRF   - ok for d/c from renal stand point      CKD Stage V  - 2/2 chronic obstruction/ fsgs  - baseline Cr ~ 4 mg/dl at best   - f/b Dr Patrick Parson      Hx of MEDINA with ongoing b/l hydro despite stent placement:  - s/p stent change in March  By Dr Tika Gomez   - s/p Stent change 7/18     Hyperkalemia:  - resolving  - continue daily  Valtessa     Metabolic Acidosis:  - stable    Anemia of CKD:  -serial H/H     CAD s/p CABG     Hx of prostate cancer    Care Plan discussed with: pt`s daughter and hospitalist team     Interval History:  Seen and examined. Has excellent UOP and urine remains quite clear. No labs in few days but he remains asymptomatic. No complaints    No N/V/CP    Review of Systems: A comprehensive review of systems was negative.     Current Medications:   Current Facility-Administered Medications   Medication Dose Route Frequency    senna-docusate (PERICOLACE) 8.6-50 mg per tablet 1 Tab  1 Tab Oral DAILY    predniSONE (DELTASONE) tablet 20 mg  20 mg Oral DAILY WITH BREAKFAST    HYDROcodone-acetaminophen (NORCO) 7.5-325 mg per tablet 1 Tab  1 Tab Oral Q4H PRN    miconazole (SECURA) 2 % extra thick cream   Topical Q12H    levoFLOXacin (LEVAQUIN) 500 mg in D5W IVPB  500 mg IntraVENous Q48H    naloxone (NARCAN) injection 0.4 mg  0.4 mg IntraVENous EVERY 2 MINUTES AS NEEDED    polyethylene glycol (MIRALAX) packet 17 g  17 g Oral DAILY    allopurinol (ZYLOPRIM) tablet 100 mg  100 mg Oral DAILY    aspirin chewable tablet 81 mg  81 mg Oral DAILY    ferrous sulfate tablet 325 mg  325 mg Oral ACB&D    mirtazapine (REMERON) tablet 15 mg  15 mg Oral QHS    patiromer calcium sorbitex (VELTASSA) powder 8.4 g  8.4 g Oral DAILY    metoprolol tartrate (LOPRESSOR) tablet 25 mg  25 mg Oral BID    ondansetron (ZOFRAN) injection 4 mg  4 mg IntraVENous Q6H PRN    acetaminophen (TYLENOL) tablet 650 mg  650 mg Oral Q6H PRN    lidocaine (LIDODERM) 5 % patch 1 Patch  1 Patch TransDERmal Q24H      Allergies   Allergen Reactions    Bactrim [Sulfamethoxazole-Trimethoprim] Anaphylaxis and Swelling     Patient reported that his \"tongue swelled up and it was hard to breath\"    Enzalutamide Nausea and Vomiting    Morphine Other (comments)     AMS; \"goes crazy\"       Objective:  Vitals:    Vitals:    07/25/19 0320 07/25/19 0716 07/25/19 0813 07/25/19 1349   BP: 152/85  142/75 161/75   Pulse: 62  71 65   Resp: 16  17 17   Temp: 97.3 °F (36.3 °C)  98 °F (36.7 °C) 97.7 °F (36.5 °C)   SpO2: 98%  98% 97%   Weight:  110.7 kg (244 lb)     Height:         Intake and Output:  07/25 0701 - 07/25 1900  In: -   Out: 900 [Urine:900]  07/23 1901 - 07/25 0700  In: 1500 [P.O.:900; I.V.:600]  Out: 3770 [Urine:3770]    Physical Examination:    General: NAD,Conversant   Neck:  Supple, no mass  Resp:  Lungs CTA B/L, no wheezing , normal respiratory effort  CV:  RRR,  no murmur or rub, trace LE edema  GI:  Soft, NT, + Bowel sounds, no hepatosplenomegaly  Neurologic:  Non focal  Psych:             AAO x 3 appropriate affect   Skin:  No Rash  :  No goldberg     []    High complexity decision making was performed  []    Patient is at high-risk of decompensation with multiple organ involvement    Lab Data Personally Reviewed: I have reviewed all the pertinent labs, microbiology data and radiology studies during assessment.     Recent Labs     07/23/19  0345      K 4.5      CO2 23   *   BUN 72*   CREA 6.57*   CA 6.8*   PHOS 4.1   ALB 2.0*     No results for input(s): WBC, HGB, HCT, PLT, HGBEXT, HCTEXT, PLTEXT, HGBEXT, HCTEXT, PLTEXT in the last 72 hours. Lab Results   Component Value Date/Time    Specimen Description: URINE 10/07/2013 01:36 AM    Specimen Description: BLOOD 10/06/2013 04:13 PM     Lab Results   Component Value Date/Time    Culture result: NO GROWTH 5 DAYS 07/20/2019 10:40 AM    Culture result: NO GROWTH 1 DAY 07/20/2019 10:40 AM    Culture result: NO GROWTH 5 DAYS 07/17/2019 02:48 PM    Culture result: NO GROWTH 1 DAY 07/17/2019 07:21 AM    Culture result: CANDIDA ALBICANS (A) 02/15/2019 04:01 PM     No results found for this or any previous visit (from the past 24 hour(s)). Total time spent with patient:  xxx   min. Care Plan discussed with:  Patient     Family      RN      Consulting Physician [facility],         I have reviewed the flowsheets. Chart and Pertinent Notes have been reviewed. No change in PMH ,family and social history from Consult note.       Steffen Sahu MD

## 2019-07-25 NOTE — PROGRESS NOTES
Problem: Self Care Deficits Care Plan (Adult)  Goal: *Acute Goals and Plan of Care (Insert Text)  Description  Occupational Therapy Goals  Initiated 7/21/2019  1. Patient will perform grooming with modified independence within 7 day(s). 2.  Patient will perform lower body dressing with supervision/set-up within 7 day(s). 3.  Patient will perform toilet transfer with modified independence within 7 day(s). 4.  Patient will participate in upper extremity therapeutic exercise/activities with supervision/set-upwithin 7 day(s). 7.  Patient will utilize energy conservation techniques during functional activities with verbal cues within 7 day(s).         7/25/2019 1528 by Fany Cristobal  Outcome: Progressing Towards Goal  7/25/2019 1527 by Fany Cristobal  Outcome: Progressing Towards Goal   OCCUPATIONAL THERAPY TREATMENT  Patient: Lindsay Jacobson (79 y.o. male)  Date: 7/25/2019  Diagnosis: JASIEL (acute kidney injury) ([facility] [address] [N17.9] JASIEL (acute kidney injury) ([facility] [address]  Procedure(s) (LRB):  CYSTOSCOPY, DIFFICULT BILATERAL URETERAL STENT EXCHANGE, CLOT EVACUATION (Bilateral) 7 Days Post-Op  Precautions:  fall  Chart, occupational therapy assessment, plan of care, and goals were reviewed. ASSESSMENT:  Patient required largely CGA for IADL tasks in room today to include making bed and functional reach high/low in preparation for item retrieval for dressing tasks with patient requiring verbal cues for safety/sequencing with walker and for keeping walker anteriorly. Patient continues to require multiple rest breaks during functional tasks and endorsing concerns regarding household management and laundry at [address] [place] [address] Road. Continue to recommend SNF rehab to maximize patient safety and independence with ADL transfers and tasks prior to transition back to [facility] (however patient stating he is unsure if he will go back to [facility]). Progression toward goals:  ?       Improving appropriately and progressing toward goals  ? Improving slowly and progressing toward goals  ? Not making progress toward goals and plan of care will be adjusted     PLAN:  Patient continues to benefit from skilled intervention to address the above impairments. Continue treatment per established plan of care. Discharge Recommendations:  Skilled Nursing Facility  Further Equipment Recommendations for Discharge:  TBD      SUBJECTIVE:   Patient stated I have metastatic prostate cancer and I am coming to terms with it.     OBJECTIVE DATA SUMMARY:   Cognitive/Behavioral Status:  Neurologic State: Alert  Orientation Level: Oriented X4  Cognition: Appropriate for age attention/concentration  Perception: Appears intact  Perseveration: No perseveration noted  Safety/Judgement: Insight into deficits    Functional Mobility and Transfers for ADLs:  Bed Mobility:  Supine to Sit: Supervision  Sit to Supine: Supervision    Transfers:  Sit to Stand: Contact guard assistance          Balance:  Sitting: Intact  Standing: Impaired  Standing - Static: Fair  Standing - Dynamic : Fair    ADL Intervention:     IADL management; fall prevention; energy conservation techniques                                Cognitive Retraining  Safety/Judgement: Insight into deficits      Pain:  Pain Scale 1: Numeric (0 - 10)  Pain Intensity 1: 5  Pain Location 1: Knee  Pain Orientation 1: Right  Pain Description 1: Aching  Pain Intervention(s) 1: Medication (see MAR)  Activity Tolerance:   VSS (SOB with activity)  Please refer to the flowsheet for vital signs taken during this treatment. After treatment:   ? Patient left in no apparent distress sitting up in chair  ? Patient left in no apparent distress in bed  ? Call bell left within reach  ? Nursing notified  ? Caregiver present  ?  Bed alarm activated    COMMUNICATION/COLLABORATION:   The patients plan of care was discussed with: Physical Therapist and Registered Nurse    Ángel Chakraborty  Time Calculation: 28 mins

## 2019-07-25 NOTE — PROGRESS NOTES
WILMER left VM with patient daughter, Min Sher to see if she could come over and visit with attending MD.    Cade Friday, [facility]

## 2019-07-25 NOTE — PROGRESS NOTES
Problem: Risk for Spread of Infection  Goal: Prevent transmission of infectious organism to others  Description  Prevent the transmission of infectious organisms to other patients, staff members, and visitors. Outcome: Progressing Towards Goal     Problem: Patient Education:  Go to Education Activity  Goal: Patient/Family Education  Outcome: Progressing Towards Goal     Problem: Pressure Injury - Risk of  Goal: *Prevention of pressure injury  Description  Document Partha Scale and appropriate interventions in the flowsheet. Offload heels  Turn approximately every 2 hours   Outcome: Progressing Towards Goal  Note:   Pressure Injury Interventions:  Sensory Interventions: Discuss PT/OT consult with provider, Avoid rigorous massage over bony prominences, Monitor skin under medical devices, Minimize linen layers, Maintain/enhance activity level, Turn and reposition approx. every two hours (pillows and wedges if needed)    Moisture Interventions: Check for incontinence Q2 hours and as needed, Limit adult briefs, Offer toileting Q_hr, Maintain skin hydration (lotion/cream)    Activity Interventions: Increase time out of bed, Pressure redistribution bed/mattress(bed type), PT/OT evaluation    Mobility Interventions: HOB 30 degrees or less, Pressure redistribution bed/mattress (bed type), PT/OT evaluation, Turn and reposition approx.  every two hours(pillow and wedges)    Nutrition Interventions: Discuss nutritional consult with provider    Friction and Shear Interventions: HOB 30 degrees or less, Lift sheet

## 2019-07-25 NOTE — PROGRESS NOTES
Hospitalist Progress Note  Violetta Espinoza MD  Answering service: [phone] from in house phone  Cell: [phone]   Date of Service:  2019  NAME:  Ivan Rojas. :  1940  MRN:  [id number]    Admission Summary:   Pt originally went to his PCP due to painful urination. His UA was negative so was prescribed tramadol for pain. He took the first dose of 50 mg tramadol the night before admit, felt dizzy and lightheaded and then associated with nausea and 2-3 episodes of vomiting. He came to the hospital for further evaluation.     Interval history / Subjective:   Conversation 1530:   Called to speak with Abimbola Paulino about medical updates and to discuss plan of care. At start of conversation, Fani Santana says she is going to refuse pts discharge tomorrow. She sites many reason why she plans to refuse discharge (util at least  per her):  she is not ready for her dad to leave; she has a lot of work to catch up on at home, she needs to get all of his long term and disability paperwork done and completed before he leaves the hospital and she is assigned a different ;  and that she has not toured the facility yet (though she has been by to see the accepting facility she says it was a night and wants to go during the day). Attempted to have a discussion about medical readiness for discharge at earliest tomorrow and she adamantly refuses. In short, pt is terminal and is weak. Hope is to discharge him to SNF to regain some strength and then transition to LTC, whether or not he will need hospice soon will be determined but as we are pursuing skilled therapy then that must be reserved for future plans. She also sites lack of care in \"nursing facilities in general\" as reason for not going until Monday.  She is very emotional and labile and will be a challenge/barrier to pts discharge from hospital.  is aware of situation and is being challenged as well.     7/24:   0830: Pt awake, lying in bed. Feels well - woke up with a start when staff member awakened him this morning and still was feeling jittery from that. Other than that - reports he pain is controlled. Taking Norco 7.5/325 and he says he is satisfied with pain relief he has received from this. He is aware he is also on steroids for gout (been on since 7/21)    1330: Pt visited once again as he complained that his tongue felt swollen. Upon entering room, noted pt was sleeping soundly. Awoken with a small jump when name was called. MM dry and pt had been mouth breathing. Tongue and oral cavity examined. Tongue does not appear swollen, no redness or white patches noted to tongue, other MM. Speech is clear and not slurred or thick. VSS. Encouraged pt to alert nurse if he felt any differently. 7/25: pt feels ok, some pain in L hip area, no other acute events. Will set up a meeting with pt and daughter to discuss care. Assessment & Plan:     Sepsis (POA, now recurred) improved:   - Fever, HR, suspect urinary source from recent instrumentation vs. Malignancy  - WBC normalized, afebrile  - repeat UA with large leuks >100 WBC 1+ bacteria. No growth on urine culture  - blood culture with no growth x 4 days  - procalcitonin negative   - continue levofloxacin (today is day 5) for potential easy transition to PO. Pharmacy dosing every 48 hours. Pain: Likely secondary to metastatic disease, L hip pain: Improved  - Pt having itching with dilaudid, discussed pain regimen with pt and daughter in detail, switched to [facility] 7/22 with good results.    - Pt satisfied with pain control per him this am on rounds  - started on prednisone this weekend for suspected gout in feet, pt reports less pain in feet now, tapered prednisone down to 20 mg starting tomorrow (has had 4 days of 40 mg)    Hyperkalemia: Resolved  - s/p kayexalate  - takes veltassa (missed dose PTA), this has been resumed    JASIEL on CKD stage 5, Metabolic acidosis:  - Cr: rising, 6.57 7/23,  was 4.96 in 2/2019. Likely has progression of kidney disease  - s/p bicarb gtt and now on NS (to continue through d/c)  - Nephrology consulted and following  - Poor dialysis candidate long term, and patient does not want it   - Dr. Bharati Ivan discussed plan of care with pts daughter last evening  - spoke with Dr. Bharati Ivan this am, clear for d/c when bed available - and recommended  STOPPING labs     UTI:bladder outlet obstruction  Hx of b/l hydronephrosis due to prostate cancer with stent placement in 2018:  - UA shows >100 WBCs with 2+ bacteria, he has ureteral stents  - 7/18: s/p bilateral ureteral stent replacement (difficult) and clot removal  - urine cultures x 2 negative     Dizziness/lightheadedness: Resolved  Nausea/vomiting: resolved  - side effects due to tramadol, discussed with the patient  - prn zofran     Metastatic prostate cancer: on casodex  CAD s/p CABG: on aspirin and metoprolol  HTN: on home metoprolol, BP normal    Code status: Full  DVT prophylaxis: SCDs  Care Plan discussed with: patient, nurse, nephrology and   Disposition: TBD     Hospital Problems  Date Reviewed: 7/16/2018          Codes Class Noted POA    * (Principal) [facility] ICD-10-CM: N17.9  ICD-9-CM: 584.9  7/17/2019 Unknown        Coronary artery disease involving native coronary artery of native heart without angina pectoris ICD-10-CM: I25.10  ICD-9-CM: 414.01  8/31/2018 Yes        Essential hypertension ICD-10-CM: I10  ICD-9-CM: 401.9  8/31/2018 Yes        Prostate cancer ([facility] ICD-10-CM: C61  ICD-9-CM: 185  8/31/2018 Yes        Hyperkalemia ICD-10-CM: U97.8  ICD-9-CM: 276.7  6/5/2018 Yes            Review of Systems:   Denied HA. No chest pain or pressure. No respiratory or GI complaints. + urinating. No pain this am.     Vital Signs:    Last 24hrs VS reviewed since prior progress note.  Most recent are:  Visit Vitals  /75   Pulse 71   Temp 98 °F (36.7 °C)   Resp 17   Ht 5' 11\" (1.803 m)   Wt 110.7 kg (244 lb)   SpO2 98%   BMI 34.03 kg/m²       Intake/Output Summary (Last 24 hours) at 7/25/2019 1022  Last data filed at 7/25/2019 7264  Gross per 24 hour   Intake 660 ml   Output 2270 ml   Net -1610 ml     Physical Examination:         Constitutional:  Cooperative, pleasant. NAD. Resp:  CTA bilaterally. On RA        GI:  Soft, non distended, tender. :  Urine light and clear    Musculoskeletal:  Mild LE edema, warm    Neurologic:  Moves all extremities. AAOx3. Psych: Calm and cooperative       Data Review:   Review and/or order of clinical lab test  Review and/or order of tests in the radiology section of CPT  Review and/or order of tests in the medicine section of CPT    Labs:     No results for input(s): WBC, HGB, HCT, PLT, HGBEXT, HCTEXT, PLTEXT, HGBEXT, HCTEXT, PLTEXT in the last 72 hours. Recent Labs     07/23/19  0345      K 4.5      CO2 23   BUN 72*   CREA 6.57*   *   CA 6.8*   PHOS 4.1     Recent Labs     07/23/19  0345   ALB 2.0*     No results for input(s): INR, PTP, APTT in the last 72 hours. No lab exists for component: INREXT, INREXT   No results for input(s): FE, TIBC, PSAT, FERR in the last 72 hours. No results found for: FOL, RBCF   No results for input(s): PH, PCO2, PO2 in the last 72 hours. No results for input(s): CPK, CKNDX, TROIQ in the last 72 hours.     No lab exists for component: CPKMB  Lab Results   Component Value Date/Time    Cholesterol, total 85 10/07/2013 12:00 AM    HDL Cholesterol 5 10/07/2013 12:00 AM    LDL, calculated 39.6 10/07/2013 12:00 AM    Triglyceride 202 (H) 10/07/2013 12:00 AM    CHOL/HDL Ratio 17.0 (H) 10/07/2013 12:00 AM     Lab Results   Component Value Date/Time    Glucose (POC) 105 (H) 07/17/2019 06:48 AM    Glucose (POC) 105 (H) 07/17/2019 05:56 AM    Glucose (POC) 137 (H) 07/18/2018 05:06 PM    Glucose (POC) 100 07/17/2018 06:07 AM Glucose (POC) 59 (L) 06/20/2018 05:03 PM    Glucose (POC) 101 (H) 06/20/2018 03:14 PM    Glucose (POC) 83 06/10/2018 06:26 AM    Glucose (POC) 108 (H) 06/09/2018 10:08 PM    Glucose (POC) 138 (H) 06/09/2018 06:30 PM     Lab Results   Component Value Date/Time    Color YELLOW/STRAW 07/20/2019 10:40 AM    Appearance CLOUDY (A) 07/20/2019 10:40 AM    Specific gravity 1.007 07/20/2019 10:40 AM    Specific gravity 1.015 02/15/2019 04:01 PM    pH (UA) 8.0 07/20/2019 10:40 AM    Protein 100 (A) 07/20/2019 10:40 AM    Glucose NEGATIVE  07/20/2019 10:40 AM    Ketone NEGATIVE  07/20/2019 10:40 AM    Bilirubin NEGATIVE  07/20/2019 10:40 AM    Urobilinogen 0.2 07/20/2019 10:40 AM    Nitrites NEGATIVE  07/20/2019 10:40 AM    Leukocyte Esterase LARGE (A) 07/20/2019 10:40 AM    Epithelial cells FEW 07/20/2019 10:40 AM    Bacteria 1+ (A) 07/20/2019 10:40 AM    WBC >100 (H) 07/20/2019 10:40 AM    RBC 20-50 07/20/2019 10:40 AM     Medications Reviewed:     Current Facility-Administered Medications   Medication Dose Route Frequency    predniSONE (DELTASONE) tablet 20 mg  20 mg Oral DAILY WITH BREAKFAST    HYDROcodone-acetaminophen (NORCO) 7.5-325 mg per tablet 1 Tab  1 Tab Oral Q4H PRN    miconazole (SECURA) 2 % extra thick cream   Topical Q12H    levoFLOXacin (LEVAQUIN) 500 mg in D5W IVPB  500 mg IntraVENous Q48H    naloxone (NARCAN) injection 0.4 mg  0.4 mg IntraVENous EVERY 2 MINUTES AS NEEDED    polyethylene glycol (MIRALAX) packet 17 g  17 g Oral DAILY    allopurinol (ZYLOPRIM) tablet 100 mg  100 mg Oral DAILY    aspirin chewable tablet 81 mg  81 mg Oral DAILY    ferrous sulfate tablet 325 mg  325 mg Oral ACB&D    mirtazapine (REMERON) tablet 15 mg  15 mg Oral QHS    patiromer calcium sorbitex (VELTASSA) powder 8.4 g  8.4 g Oral DAILY    metoprolol tartrate (LOPRESSOR) tablet 25 mg  25 mg Oral BID    ondansetron (ZOFRAN) injection 4 mg  4 mg IntraVENous Q6H PRN    acetaminophen (TYLENOL) tablet 650 mg  650 mg Oral Q6H PRN    lidocaine (LIDODERM) 5 % patch 1 Patch  1 Patch TransDERmal Q24H   ______________________________________________________________________  EXPECTED LENGTH OF STAY: 5d 16h  ACTUAL LENGTH OF STAY:          [address] [place], [place]

## 2019-07-25 NOTE — PROGRESS NOTES
Palliative Medicine Consult  Juan Jose: [phone])    Patient Name: Tootie Seen. YOB: 1940    Date of Initial Consult: July 18, 2019  Reason for Consult: Care Decisions  Requesting Provider: Dr. Carolina Wang   Primary Care Physician: Myles Graves MD     SUMMARY:   Tootie Seen. is a 70 y.o. 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 Concerns of stent occulusion of the urethra. confirmed and pt now s/p cystoscopy with difficult bilateral ureteral stent exchange and clot evacuation 7/18/19. Remona Mellow No emergent need for dialysis. Current medical issues leading to Palliative Medicine involvement include: support with care decisions and ACP. Pt has an AMD on file    Social: lives [place], 2 children, able to complete all ADL's     PALLIATIVE DIAGNOSES:   1. Dysuria~ resolved  2. Vomiting~ resolved  3. Hypoalbuminemia   4. Knee pain  5. Physical debility  6. OIC     PLAN:   1. Pt seen without family present  2. He is conversant and alert. Feeling better today as he was able to get some sleep  3. Today his knees and thighs are hurting. Feels like his medications are adequate and do not need escalation  4. He is willing to participate in physical therapy today  5. Daughter hoping he can transition to [facility] skilled care  6. He admits to some concern about when he has to return home given he will have a new baseline function. Support provided. He would meet hospice criteria for routine level but needs hired care givers as well. He continues to live in [place]. Daughter inquiring about medicaid and VA benefits   7. All questions answered  8. Will see again if asked.   Otherwise, will sign off    9. Initial consult note routed to primary continuity provider and/or primary health care team members  10. Communicated plan of care with: Palliative [facility] Team     GOALS OF CARE / TREATMENT PREFERENCES:     GOALS OF CARE:  Patient/Health Care Proxy Stated Goals: Prolong life    TREATMENT PREFERENCES:   Code Status: DNR    Advance Care Planning:  [x] [facility] Interdisciplinary Team has updated the ACP Navigator with Health Care Decision Maker and Patient Capacity     [place] daughter    Advance Care Planning 7/17/2019   Patient's Healthcare Decision Maker is: -   Confirm Advance Directive Yes, on file       Medical Interventions: Full interventions     Other Instructions: Other:    As far as possible, the palliative care team has discussed with patient / health care proxy about goals of care / treatment preferences for patient. HISTORY:     History obtained from: chart, pt, team    CHIEF COMPLAINT: Pt admitted with aforementioned issues. Gabriel Leon      HPI/SUBJECTIVE:    The patient is:   [x] Verbal and participatory  [] Non-participatory due to:   C/o bilat knee pain and leg pain, OIC    Clinical Pain Assessment (nonverbal scale for severity on nonverbal patients):   Clinical Pain Assessment  Severity: 3        Knee and leg pain overnight  Pain worse when trying to stand better in bed  Opioids and lidoderm patches helping with pain      Duration: for how long has pt been experiencing pain (e.g., 2 days, 1 month, years)  Frequency: how often pain is an issue (e.g., several times per day, once every few days, constant)     FUNCTIONAL ASSESSMENT:     Palliative Performance Scale (PPS):  PPS: 60       PSYCHOSOCIAL/SPIRITUAL SCREENING:     Palliative IDT has assessed this patient for cultural preferences / practices and a referral made as appropriate to needs (Cultural Services, Patient Advocacy, Ethics, etc.)    Any spiritual / Catholic concerns:  [] Yes /  [x] No    Caregiver Burnout:  [] Yes /  [x] No /  [] No Caregiver Present      Anticipatory grief assessment:   [x] Normal  / [] Maladaptive       ESAS Anxiety: Anxiety: 0    ESAS Depression: Depression: 0        REVIEW OF SYSTEMS:     Positive and pertinent negative findings in ROS are noted above in HPI. The following systems were [x] reviewed / [] unable to be reviewed as noted in HPI  Other findings are noted below. Systems: constitutional, ears/nose/mouth/throat, respiratory, gastrointestinal, genitourinary, musculoskeletal, integumentary, neurologic, psychiatric, endocrine. Positive findings noted below. Modified ESAS Completed by: provider   Fatigue: 1 Drowsiness: 0   Depression: 0 Pain: 3   Anxiety: 0 Nausea: 0   Anorexia: 0 Dyspnea: 0           Stool Occurrence(s): 1        PHYSICAL EXAM:     From RN flowsheet:  Wt Readings from Last 3 Encounters:   07/25/19 244 lb (110.7 kg)   02/15/19 231 lb (104.8 kg)   07/21/18 225 lb (102.1 kg)     Blood pressure 142/75, pulse 71, temperature 98 °F (36.7 °C), resp. rate 17, height 5' 11\" (1.803 m), weight 244 lb (110.7 kg), SpO2 98 %.     Pain Scale 1: Numeric (0 - 10)  Pain Intensity 1: 5  Pain Onset 1: chronic   Pain Location 1: Knee  Pain Orientation 1: Right  Pain Description 1: Aching  Pain Intervention(s) 1: Medication (see MAR)  Last bowel movement, if known:     Constitutional: alert, conversant, nad  Eyes: pupils equal, anicteric  ENMT: no nasal discharge, moist mucous membranes  Cardiovascular: regular rhythm, distal pulses intact  Respiratory: breathing not labored, symmetric  Gastrointestinal: gross, soft non-tender, +bowel sounds  Musculoskeletal: no deformity, no tenderness to palpation  Skin: warm, dry  Neurologic: following commands, moving all extremities  Psychiatric: full affect, no hallucinations  Other:       HISTORY:     Principal Problem:    JASIEL (acute kidney injury) ([facility] [address] (7/17/2019)    Active Problems:    Hyperkalemia (6/5/2018)      Coronary artery disease involving native coronary artery of native heart without angina pectoris (8/31/2018)      Essential hypertension (8/31/2018)      Prostate cancer ([facility] [address] (8/31/2018)      Past Medical History:   Diagnosis Date    Celiac disease     Chronic kidney disease     Hypertension     MI (mitral incompetence)     Prostate cancer (HCC)       Past Surgical History:   Procedure Laterality Date    CARDIAC SURG PROCEDURE UNLIST      qaudruple bipass    HX CHOLECYSTECTOMY      HX ORTHOPAEDIC Right 2015    suni in femur from pathological fracture      History reviewed. No pertinent family history. History reviewed, no pertinent family history.   Social History     Tobacco Use    Smoking status: Former Smoker     Packs/day: 1.50     Years: 10.00     Pack years: 15.00    Smokeless tobacco: Never Used    Tobacco comment: quit smoking age 31's   Substance Use Topics    Alcohol use: Yes     Comment: rarely     Allergies   Allergen Reactions    Bactrim [Sulfamethoxazole-Trimethoprim] Anaphylaxis and Swelling     Patient reported that his \"tongue swelled up and it was hard to breath\"    Enzalutamide Nausea and Vomiting    Morphine Other (comments)     AMS; \"goes crazy\"      Current Facility-Administered Medications   Medication Dose Route Frequency    senna-docusate (PERICOLACE) 8.6-50 mg per tablet 1 Tab  1 Tab Oral DAILY    predniSONE (DELTASONE) tablet 20 mg  20 mg Oral DAILY WITH BREAKFAST    HYDROcodone-acetaminophen (NORCO) 7.5-325 mg per tablet 1 Tab  1 Tab Oral Q4H PRN    miconazole (SECURA) 2 % extra thick cream   Topical Q12H    levoFLOXacin (LEVAQUIN) 500 mg in D5W IVPB  500 mg IntraVENous Q48H    naloxone (NARCAN) injection 0.4 mg  0.4 mg IntraVENous EVERY 2 MINUTES AS NEEDED    polyethylene glycol (MIRALAX) packet 17 g  17 g Oral DAILY    allopurinol (ZYLOPRIM) tablet 100 mg  100 mg Oral DAILY    aspirin chewable tablet 81 mg  81 mg Oral DAILY    ferrous sulfate tablet 325 mg  325 mg Oral ACB&D    mirtazapine (REMERON) tablet 15 mg  15 mg Oral QHS    patiromer calcium sorbitex (VELTASSA) powder 8.4 g  8.4 g Oral DAILY    metoprolol tartrate (LOPRESSOR) tablet 25 mg  25 mg Oral BID    ondansetron (ZOFRAN) injection 4 mg  4 mg IntraVENous Q6H PRN    acetaminophen (TYLENOL) tablet 650 mg  650 mg Oral Q6H PRN    lidocaine (LIDODERM) 5 % patch 1 Patch  1 Patch TransDERmal Q24H          LAB AND IMAGING FINDINGS:     Lab Results   Component Value Date/Time    WBC 8.7 07/22/2019 04:35 AM    HGB 9.2 (L) 07/22/2019 04:35 AM    PLATELET 005 69/71/9390 04:35 AM     Lab Results   Component Value Date/Time    Sodium 138 07/23/2019 03:45 AM    Potassium 4.5 07/23/2019 03:45 AM    Chloride 107 07/23/2019 03:45 AM    CO2 23 07/23/2019 03:45 AM    BUN 72 (H) 07/23/2019 03:45 AM    Creatinine 6.57 (H) 07/23/2019 03:45 AM    Calcium 6.8 (L) 07/23/2019 03:45 AM    Magnesium 1.8 07/22/2019 04:35 AM    Phosphorus 4.1 07/23/2019 03:45 AM      Lab Results   Component Value Date/Time    AST (SGOT) 10 (L) 07/17/2019 06:44 AM    Alk. phosphatase 83 07/17/2019 06:44 AM    Protein, total 7.8 07/17/2019 06:44 AM    Albumin 2.0 (L) 07/23/2019 03:45 AM    Globulin 4.7 (H) 07/17/2019 06:44 AM     No results found for: INR, PTMR, PTP, PT1, PT2, APTT   Lab Results   Component Value Date/Time    Iron 61 06/08/2018 04:30 AM    TIBC 144 (L) 06/08/2018 04:30 AM    Iron % saturation 42 06/08/2018 04:30 AM    Ferritin 685 (H) 06/08/2018 04:30 AM      No results found for: PH, PCO2, PO2  No components found for: [place]   Lab Results   Component Value Date/Time    CK 33 (L) 07/21/2018 05:42 AM                Total time: 35 minutes  Counseling / coordination time, spent as noted above: 30 minutes  > 50% counseling / coordination?: y    Prolonged service was provided for  []30 min   []75 min in face to face time in the presence of the patient, spent as noted above.   Time Start:   Time End:   Note: this can only be billed with 99492 (initial) or 90244 (follow up). If multiple start / stop times, list each separately.

## 2019-07-26 LAB
ALBUMIN SERPL-MCNC: 2.4 G/DL (ref 3.5–5)
ANION GAP SERPL CALC-SCNC: 9 MMOL/L (ref 5–15)
BUN SERPL-MCNC: 65 MG/DL (ref 6–20)
BUN/CREAT SERPL: 14 (ref 12–20)
CALCIUM SERPL-MCNC: 7.2 MG/DL (ref 8.5–10.1)
CHLORIDE SERPL-SCNC: 113 MMOL/L (ref 97–108)
CO2 SERPL-SCNC: 19 MMOL/L (ref 21–32)
CREAT SERPL-MCNC: 4.8 MG/DL (ref 0.7–1.3)
GLUCOSE SERPL-MCNC: 92 MG/DL (ref 65–100)
PHOSPHATE SERPL-MCNC: 3.8 MG/DL (ref 2.6–4.7)
POTASSIUM SERPL-SCNC: 4.8 MMOL/L (ref 3.5–5.1)
SODIUM SERPL-SCNC: 141 MMOL/L (ref 136–145)

## 2019-07-26 PROCEDURE — 65270000029 HC RM PRIVATE

## 2019-07-26 PROCEDURE — L1902 AFO ANKLE GAUNTLET PRE OTS: HCPCS

## 2019-07-26 PROCEDURE — 74011250636 HC RX REV CODE- 250/636: Performed by: INTERNAL MEDICINE

## 2019-07-26 PROCEDURE — 74011250637 HC RX REV CODE- 250/637: Performed by: NURSE PRACTITIONER

## 2019-07-26 PROCEDURE — 74011250637 HC RX REV CODE- 250/637: Performed by: UROLOGY

## 2019-07-26 PROCEDURE — 36415 COLL VENOUS BLD VENIPUNCTURE: CPT

## 2019-07-26 PROCEDURE — 74011000250 HC RX REV CODE- 250: Performed by: UROLOGY

## 2019-07-26 PROCEDURE — 74011250637 HC RX REV CODE- 250/637: Performed by: INTERNAL MEDICINE

## 2019-07-26 PROCEDURE — 80069 RENAL FUNCTION PANEL: CPT

## 2019-07-26 PROCEDURE — 51798 US URINE CAPACITY MEASURE: CPT

## 2019-07-26 PROCEDURE — 97116 GAIT TRAINING THERAPY: CPT

## 2019-07-26 PROCEDURE — 74011636637 HC RX REV CODE- 636/637: Performed by: NURSE PRACTITIONER

## 2019-07-26 RX ADMIN — LEVOFLOXACIN 500 MG: 5 INJECTION, SOLUTION INTRAVENOUS at 08:40

## 2019-07-26 RX ADMIN — FERROUS SULFATE TAB 325 MG (65 MG ELEMENTAL FE) 325 MG: 325 (65 FE) TAB at 17:49

## 2019-07-26 RX ADMIN — HYDROCODONE BITARTRATE AND ACETAMINOPHEN 1 TABLET: 7.5; 325 TABLET ORAL at 02:29

## 2019-07-26 RX ADMIN — MICONAZOLE NITRATE: 20 CREAM TOPICAL at 09:16

## 2019-07-26 RX ADMIN — ASPIRIN 81 MG 81 MG: 81 TABLET ORAL at 09:15

## 2019-07-26 RX ADMIN — HYDROCODONE BITARTRATE AND ACETAMINOPHEN 1 TABLET: 7.5; 325 TABLET ORAL at 08:28

## 2019-07-26 RX ADMIN — PATIROMER 8.4 G: 8.4 POWDER, FOR SUSPENSION ORAL at 09:26

## 2019-07-26 RX ADMIN — METOPROLOL TARTRATE 25 MG: 25 TABLET ORAL at 17:49

## 2019-07-26 RX ADMIN — PREDNISONE 20 MG: 20 TABLET ORAL at 08:27

## 2019-07-26 RX ADMIN — HYDROCODONE BITARTRATE AND ACETAMINOPHEN 1 TABLET: 7.5; 325 TABLET ORAL at 21:57

## 2019-07-26 RX ADMIN — METOPROLOL TARTRATE 25 MG: 25 TABLET ORAL at 09:15

## 2019-07-26 RX ADMIN — FERROUS SULFATE TAB 325 MG (65 MG ELEMENTAL FE) 325 MG: 325 (65 FE) TAB at 08:27

## 2019-07-26 RX ADMIN — ALLOPURINOL 100 MG: 100 TABLET ORAL at 09:15

## 2019-07-26 RX ADMIN — MIRTAZAPINE 15 MG: 15 TABLET, FILM COATED ORAL at 21:57

## 2019-07-26 RX ADMIN — SENNOSIDES, DOCUSATE SODIUM 1 TABLET: 50; 8.6 TABLET, FILM COATED ORAL at 09:15

## 2019-07-26 RX ADMIN — MICONAZOLE NITRATE: 20 CREAM TOPICAL at 21:57

## 2019-07-26 RX ADMIN — HYDROCODONE BITARTRATE AND ACETAMINOPHEN 1 TABLET: 7.5; 325 TABLET ORAL at 17:49

## 2019-07-26 NOTE — PROGRESS NOTES
PHYSICAL THERAPY TREATMENT: WEEKLY REASSESSMENT  Patient: Jaelyn Camarena (79 y.o. male)  Date: 7/26/2019  Diagnosis: JASIEL (acute kidney injury) ([facility] [address] [N17.9] JASIEL (acute kidney injury) ([facility] [address]  Procedure(s) (LRB):  CYSTOSCOPY, DIFFICULT BILATERAL URETERAL STENT EXCHANGE, CLOT EVACUATION (Bilateral) 8 Days Post-Op  Precautions:    Chart, physical therapy assessment, plan of care and goals were reviewed. ASSESSMENT:  Patient continues to make good progress towards PT goals as demonstrated by increased walking distance with RW. Reviewed pacing techniques today and encouraged patient to walk 2-3 times in the hallway daily with nursing staff. Patient has increased Tinetti balance score from 14 to 18/28, indicating that he has progressed from a high fall risk to a moderate one. Some pain reported in R hip and leg today with walking. Had a previous fracture with suni placement. However, pain is not limiting mobility at this time. Continue to recommend SNF upon discharge. If unable to go to SNF, recommend home with HHPT. PLAN:  Goals have been updated based on progression since last assessment. Patient continues to benefit from skilled intervention to address the above impairments. Continue to follow the patient 3 times a week to address goals. Planned Interventions:  ?              Bed Mobility Training             ? Neuromuscular Re-Education  ? Transfer Training                   ? Orthotic/Prosthetic Training  ? Gait Training                         ? Modalities  ? Therapeutic Exercises           ? Edema Management/Control  ? Therapeutic Activities            ? Patient and Family Training/Education  ?               Other (comment):  Discharge Recommendations: Skilled Nursing Facility  Further Equipment Recommendations for Discharge: none if rehab      SUBJECTIVE:   Patient stated I need to be better about getting out there to walk.    OBJECTIVE DATA SUMMARY:   Critical Behavior:  Neurologic State: Alert  Orientation Level: Oriented X4  Cognition: Appropriate decision making, Appropriate for age attention/concentration, Appropriate safety awareness, Follows commands  Safety/Judgement: Insight into deficits    Strength:   Strength: Generally decreased, functional    Functional Mobility Training:  Bed Mobility:  Supine to Sit: Supervision  Sit to Supine: Supervision     Transfers:  Sit to Stand: Contact guard assistance  Stand to Sit: Contact guard assistance  Bed to Chair: Contact guard assistance; Adaptive equipment; Additional time    Balance:  Sitting: Intact  Standing: Impaired  Standing - Static: Good;Constant support  Standing - Dynamic : Fair    Ambulation/Gait Training:  Distance (ft): 75 Feet (ft)(x 2)  Assistive Device: Gait belt;Walker, rolling  Ambulation - Level of Assistance: Stand-by assistance; Adaptive equipment; Additional time  Gait Abnormalities: Decreased step clearance  Stance: Right decreased  Speed/Park: Slow  Step Length: Right shortened;Left shortened    Pain:  Pain Scale 1: Numeric (0 - 10)  Pain Intensity 1: 0  Pain Location 1: Knee  Pain Orientation 1: Left;Right  Pain Description 1: Aching  Pain Intervention(s) 1: Medication (see MAR)    Activity Tolerance:   Minimal SOB observed with gait activity. O2 stable on RA  Please refer to the flowsheet for vital signs taken during this treatment. After treatment:   ?  Patient left in no apparent distress sitting up in chair  ? Patient left in no apparent distress in bed  ? Call bell left within reach  ? Nursing notified  ? Caregiver present  ?   Bed alarm activated    COMMUNICATION/COLLABORATION:   The patients plan of care was discussed with: Registered Nurse    Mykel Youssef, PT, DPT  Geriatric Clinical Specialist     Time Calculation: 19 mins

## 2019-07-26 NOTE — PROGRESS NOTES
[facility]   [address], [address], [place] [facility]  Phone: [phone]   [phone]       Nephrology Progress Note  Nena Worley     1940     [id number]  Date of Admission : 7/17/2019 07/26/19    CC:  Follow up for JASIEL     Assessment and Plan   JASIEL on CKD   - Progressive Obstructive nephropathy   - s/p Ureteral stent change 7/18  - It has been agreed with pt and family not to pursue dialysis when he becomes ESRF   - labs today   - we will see him again on Monday      CKD Stage V  - 2/2 chronic obstruction/ fsgs  - baseline Cr ~ 4 mg/dl at best   - f/b Dr Harleen Venegas      Hx of MEDINA with ongoing b/l hydro despite stent placement:  - s/p stent change in March  By Dr Yvonne Mcknight   - s/p Stent change 7/18     Hyperkalemia:  - resolving  - continue daily  Valt[place]     Metabolic Acidosis:  - stable    Anemia of CKD:  -serial H/H     CAD s/p CABG     Hx of prostate cancer    Care Plan discussed with: pt`s daughter and hospitalist team     Interval History:  Seen and examined. Has excellent UOP and urine remains quite clear. No labs in few days but he remains asymptomatic. No complaints   Awaiting placement . He is agreeable to have labs done today  No N/V/CP    Review of Systems: A comprehensive review of systems was negative.     Current Medications:   Current Facility-Administered Medications   Medication Dose Route Frequency    senna-docusate (PERICOLACE) 8.6-50 mg per tablet 1 Tab  1 Tab Oral DAILY    predniSONE (DELTASONE) tablet 20 mg  20 mg Oral DAILY WITH BREAKFAST    HYDROcodone-acetaminophen (NORCO) 7.5-325 mg per tablet 1 Tab  1 Tab Oral Q4H PRN    miconazole (SECURA) 2 % extra thick cream   Topical Q12H    levoFLOXacin (LEVAQUIN) 500 mg in D5W IVPB  500 mg IntraVENous Q48H    naloxone (NARCAN) injection 0.4 mg  0.4 mg IntraVENous EVERY 2 MINUTES AS NEEDED    polyethylene glycol (MIRALAX) packet 17 g  17 g Oral DAILY    allopurinol (ZYLOPRIM) tablet 100 mg  100 mg Oral DAILY    aspirin chewable tablet 81 mg  81 mg Oral DAILY    ferrous sulfate tablet 325 mg  325 mg Oral ACB&D    mirtazapine (REMERON) tablet 15 mg  15 mg Oral QHS    patiromer calcium sorbitex (VELTASSA) powder 8.4 g  8.4 g Oral DAILY    metoprolol tartrate (LOPRESSOR) tablet 25 mg  25 mg Oral BID    ondansetron (ZOFRAN) injection 4 mg  4 mg IntraVENous Q6H PRN    acetaminophen (TYLENOL) tablet 650 mg  650 mg Oral Q6H PRN    lidocaine (LIDODERM) 5 % patch 1 Patch  1 Patch TransDERmal Q24H      Allergies   Allergen Reactions    Bactrim [Sulfamethoxazole-Trimethoprim] Anaphylaxis and Swelling     Patient reported that his \"tongue swelled up and it was hard to breath\"    Enzalutamide Nausea and Vomiting    Morphine Other (comments)     AMS; \"goes crazy\"       Objective:  Vitals:    Vitals:    07/25/19 1953 07/26/19 0258 07/26/19 0301 07/26/19 0812   BP: 105/62 194/74 175/77 143/89   Pulse: 68 66  68   Resp: 18 18  18   Temp: 97.8 °F (36.6 °C) 97.2 °F (36.2 °C)  98 °F (36.7 °C)   SpO2: 97% 97%  99%   Weight:       Height:         Intake and Output:  No intake/output data recorded. 07/24 1901 - 07/26 0700  In: -   Out: 4020 [Urine:4020]    Physical Examination:    General: NAD,Conversant   Neck:  Supple, no mass  Resp:  Lungs CTA B/L, no wheezing , normal respiratory effort  CV:  RRR,  no murmur or rub, trace LE edema  GI:  Soft, NT, + Bowel sounds, no hepatosplenomegaly  Neurologic:  Non focal  Psych:             AAO x 3 appropriate affect   Skin:  No Rash  :  No goldberg     []    High complexity decision making was performed  []    Patient is at high-risk of decompensation with multiple organ involvement    Lab Data Personally Reviewed: I have reviewed all the pertinent labs, microbiology data and radiology studies during assessment. No results for input(s): NA, K, CL, CO2, GLU, BUN, CREA, CA, MG, PHOS, ALB, TBIL, SGOT, ALT, INR in the last 72 hours.     No lab exists for component: INREXT, INREXT  No results for input(s): WBC, HGB, HCT, PLT, HGBEXT, HCTEXT, PLTEXT, HGBEXT, HCTEXT, PLTEXT in the last 72 hours. Lab Results   Component Value Date/Time    Specimen Description: URINE 10/07/2013 01:36 AM    Specimen Description: BLOOD 10/06/2013 04:13 PM     Lab Results   Component Value Date/Time    Culture result: NO GROWTH 5 DAYS 07/20/2019 10:40 AM    Culture result: NO GROWTH 1 DAY 07/20/2019 10:40 AM    Culture result: NO GROWTH 5 DAYS 07/17/2019 02:48 PM    Culture result: NO GROWTH 1 DAY 07/17/2019 07:21 AM    Culture result: CANDIDA ALBICANS (A) 02/15/2019 04:01 PM     No results found for this or any previous visit (from the past 24 hour(s)). Total time spent with patient:  xxx   min. Care Plan discussed with:  Patient     Family      RN      Consulting Physician [address] [place] [address]        I have reviewed the flowsheets. Chart and Pertinent Notes have been reviewed. No change in PMH ,family and social history from Consult note.       Geetha Gregory MD

## 2019-07-26 NOTE — PROGRESS NOTES
Spiritual Care Partner Volunteer visited patient in room 537/01 on 7.22.19. Documented by: : Rev. Justo Staley.  Kyra Primrose; [facility], to contact [phone] Aubrey Moran call: [phone]

## 2019-07-26 NOTE — PROGRESS NOTES
Occupational Therapy  Chart reviewed; cleared for tx; currently with other caregiver; will retry later as able.  Andreia Strickland OTR/L

## 2019-07-26 NOTE — PROGRESS NOTES
Hospitalist Progress Note  Rocío Stevens MD  Answering service: [phone] from in house phone  Cell: [phone]   Date of Service:  2019  NAME:  Jaelyn Millan. :  1940  MRN:  [id number]    Admission Summary:   Pt with a pmh of CKD stage IV/V, HTN, Mitral incompetence, prostate cancer originally went to his PCP due to painful urination. His UA was negative so was prescribed tramadol for pain. He took the first dose of 50 mg tramadol the night before admit, felt dizzy and lightheaded and then associated with nausea and 2-3 episodes of vomiting. He came to the hospital for further evaluation.     Interval history / Subjective:   Conversation 1530:   Called to speak with Lars Ware about medical updates and to discuss plan of care. At start of conversation, Alyssa Gilliam says she is going to refuse pts discharge tomorrow. She sites many reason why she plans to refuse discharge (util at least  per her):  she is not ready for her dad to leave; she has a lot of work to catch up on at home, she needs to get all of his long term and disability paperwork done and completed before he leaves the hospital and she is assigned a different ;  and that she has not toured the facility yet (though she has been by to see the accepting facility she says it was a night and wants to go during the day). Attempted to have a discussion about medical readiness for discharge at earliest tomorrow and she adamantly refuses. In short, pt is terminal and is weak. [facility] is to discharge him to SNF to regain some strength and then transition to LTC, whether or not he will need hospice soon will be determined but as we are pursuing skilled therapy then that must be reserved for future plans. She also sites lack of care in \"nursing facilities in general\" as reason for not going until Monday.  She is very emotional and labile and will be a challenge/barrier to pts discharge from hospital.  is aware of situation and is being challenged as well.     7/24:   0830: Pt awake, lying in bed. Feels well - woke up with a start when staff member awakened him this morning and still was feeling jittery from that. Other than that - reports he pain is controlled. Taking Norco 7.5/325 and he says he is satisfied with pain relief he has received from this. He is aware he is also on steroids for gout (been on since 7/21)    1330: Pt visited once again as he complained that his tongue felt swollen. Upon entering room, noted pt was sleeping soundly. Awoken with a small jump when name was called. MM dry and pt had been mouth breathing. Tongue and oral cavity examined. Tongue does not appear swollen, no redness or white patches noted to tongue, other MM. Speech is clear and not slurred or thick. VSS. Encouraged pt to alert nurse if he felt any differently. 7/25: pt feels ok, some pain in L hip area, no other acute events. Will set up a meeting with pt and daughter to discuss care. 7/26: Pain controlled per patient but when he moves his legs he still has some pain in his knees bilaterally. No nausea or vomiting. 3 BMs since am. One watery bowel movement out of the three. Assessment & Plan:     Sepsis (POA, now recurred) improved:   - Fever 7/19 at 11 pm, HR, suspect urinary source from recent instrumentation vs. Malignancy  - WBC always remained normal, afebrile  - Urine culture No growth  - blood culture with no growth   - procalcitonin negative   - On levofloxacin not sure if the patient needs that many days of antibotics, will stop antibiotics. Will add probiotics    Pain: Likely secondary to metastatic disease, L hip pain: Improved  - Pt having itching with dilaudid, discussed pain regimen with pt and daughter in detail, switched to [facility] [place] 7/22 with good results.    - Pt satisfied with pain control   - started on prednisone this weekend for suspected gout in feet, pt reports less pain in feet now, tapered prednisone down to 20 mg starting 7/25 (has had 4 days of 40 mg)    Hyperkalemia: Resolved  - s/p kayexalate  - takes veltassa (missed dose PTA), this has been resumed    JASIEL on CKD stage 5, Metabolic acidosis:  - Cr: rising, 6.57 7/23,  was 4.96 in 2/2019. Likely has progression of kidney disease  - s/p bicarb gtt and now on NS (to continue through d/c)  - Nephrology consulted and following  - Poor dialysis candidate long term, and patient does not want it   - Dr. Lucien Patino discussed plan of care with pts daughter 7/26  - now resolved     UTI:bladder outlet obstruction/Functional one kidney  Hx of b/l hydronephrosis due to prostate cancer with stent placement in 2018:  - UA shows >100 WBCs with 2+ bacteria, he has ureteral stents  - 7/18: s/p bilateral ureteral stent replacement (difficult) and clot removal  - urine cultures x 2 negative  -Will get his bilateral ureteral stents changes every three months     Dizziness/lightheadedness:   -Resolved    Nausea/vomiting: resolved  - side effects due to tramadol, discussed with the patient  - prn zofran     Metastatic prostate cancer:   -on casodex  -Outpatient follow up with Dr Shanae Rey    CAD s/p CABG:   -on aspirin and metoprolol    HTN:  - on home metoprolol, BP normal    Renal diet    PT/OT SNF, if not possible HHPT    Code status: DNR  DVT prophylaxis: SCDs    Plan: The patient is medically stable, the patient's daughter reportedly resisting against discharge. Discharge on Monday to [facility].  Discussed with the patient and the case [address] [place] [address] discussed with: patient, nurse, nephrology and   Disposition: as above     Hospital Problems  Date Reviewed: 7/16/2018          Codes Class Noted POA    * (Principal) JASIEL (acute kidney injury) ([facility] [address] ICD-10-CM: N17.9  ICD-9-CM: 584.9  7/17/2019 Unknown        Coronary artery disease involving native coronary artery of native heart without angina pectoris ICD-10-CM: I25.10  ICD-9-CM: 414.01  8/31/2018 Yes        Essential hypertension ICD-10-CM: I10  ICD-9-CM: 401.9  8/31/2018 Yes        Prostate cancer ([facility] [address] ICD-10-CM: C61  ICD-9-CM: 691  8/31/2018 Yes        Hyperkalemia ICD-10-CM: E87.5  ICD-9-CM: 276.7  6/5/2018 Yes            Review of Systems:   Denied HA. No chest pain or pressure. No respiratory or GI complaints. + urinating. No pain this am.     Vital Signs:    Last 24hrs VS reviewed since prior progress note. Most recent are:  Visit Vitals  /83 (BP 1 Location: Right arm, BP Patient Position: At rest)   Pulse 68   Temp 97.9 °F (36.6 °C)   Resp 16   Ht 5' 11\" (1.803 m)   Wt 110.7 kg (244 lb)   SpO2 96%   BMI 34.03 kg/m²       Intake/Output Summary (Last 24 hours) at 7/26/2019 1517  Last data filed at 7/26/2019 0840  Gross per 24 hour   Intake 900 ml   Output 1900 ml   Net -1000 ml     Physical Examination:         Constitutional:  Cooperative, pleasant. NAD. Resp:  CTA bilaterally. On RA        GI:  Soft, non distended, tender. :  Urine light and clear    Musculoskeletal:  Mild LE edema, warm    Neurologic:  Moves all extremities. AAOx3. Psych: Calm and cooperative       Data Review:   Review and/or order of clinical lab test  Review and/or order of tests in the radiology section of CPT  Review and/or order of tests in the medicine section of CPT    Labs:     No results for input(s): WBC, HGB, HCT, PLT, HGBEXT, HCTEXT, PLTEXT, HGBEXT, HCTEXT, PLTEXT in the last 72 hours. Recent Labs     07/26/19  0838      K 4.8   *   CO2 19*   BUN 65*   CREA 4.80*   GLU 92   CA 7.2*   PHOS 3.8     Recent Labs     07/26/19  0838   ALB 2.4*     No results for input(s): INR, PTP, APTT in the last 72 hours. No lab exists for component: INREXT, INREXT   No results for input(s): FE, TIBC, PSAT, FERR in the last 72 hours.    No results found for: FOL, RBCF   No results for input(s): PH, PCO2, PO2 in the last 72 hours. No results for input(s): CPK, CKNDX, TROIQ in the last 72 hours.     No lab exists for component: CPKMB  Lab Results   Component Value Date/Time    Cholesterol, total 85 10/07/2013 12:00 AM    HDL Cholesterol 5 10/07/2013 12:00 AM    LDL, calculated 39.6 10/07/2013 12:00 AM    Triglyceride 202 (H) 10/07/2013 12:00 AM    CHOL/HDL Ratio 17.0 (H) 10/07/2013 12:00 AM     Lab Results   Component Value Date/Time    Glucose (POC) 105 (H) 07/17/2019 06:48 AM    Glucose (POC) 105 (H) 07/17/2019 05:56 AM    Glucose (POC) 137 (H) 07/18/2018 05:06 PM    Glucose (POC) 100 07/17/2018 06:07 AM    Glucose (POC) 59 (L) 06/20/2018 05:03 PM    Glucose (POC) 101 (H) 06/20/2018 03:14 PM    Glucose (POC) 83 06/10/2018 06:26 AM    Glucose (POC) 108 (H) 06/09/2018 10:08 PM    Glucose (POC) 138 (H) 06/09/2018 06:30 PM     Lab Results   Component Value Date/Time    Color YELLOW/STRAW 07/20/2019 10:40 AM    Appearance CLOUDY (A) 07/20/2019 10:40 AM    Specific gravity 1.007 07/20/2019 10:40 AM    Specific gravity 1.015 02/15/2019 04:01 PM    pH (UA) 8.0 07/20/2019 10:40 AM    Protein 100 (A) 07/20/2019 10:40 AM    Glucose NEGATIVE  07/20/2019 10:40 AM    Ketone NEGATIVE  07/20/2019 10:40 AM    Bilirubin NEGATIVE  07/20/2019 10:40 AM    Urobilinogen 0.2 07/20/2019 10:40 AM    Nitrites NEGATIVE  07/20/2019 10:40 AM    Leukocyte Esterase LARGE (A) 07/20/2019 10:40 AM    Epithelial cells FEW 07/20/2019 10:40 AM    Bacteria 1+ (A) 07/20/2019 10:40 AM    WBC >100 (H) 07/20/2019 10:40 AM    RBC 20-50 07/20/2019 10:40 AM     Medications Reviewed:     Current Facility-Administered Medications   Medication Dose Route Frequency    senna-docusate (PERICOLACE) 8.6-50 mg per tablet 1 Tab  1 Tab Oral DAILY    predniSONE (DELTASONE) tablet 20 mg  20 mg Oral DAILY WITH BREAKFAST    HYDROcodone-acetaminophen (NORCO) 7.5-325 mg per tablet 1 Tab  1 Tab Oral Q4H PRN    miconazole (SECURA) 2 % extra thick cream   Topical Q12H    levoFLOXacin (LEVAQUIN) 500 mg in D5W IVPB  500 mg IntraVENous Q48H    naloxone (NARCAN) injection 0.4 mg  0.4 mg IntraVENous EVERY 2 MINUTES AS NEEDED    polyethylene glycol (MIRALAX) packet 17 g  17 g Oral DAILY    allopurinol (ZYLOPRIM) tablet 100 mg  100 mg Oral DAILY    aspirin chewable tablet 81 mg  81 mg Oral DAILY    ferrous sulfate tablet 325 mg  325 mg Oral ACB&D    mirtazapine (REMERON) tablet 15 mg  15 mg Oral QHS    patiromer calcium sorbitex (VELTASSA) powder 8.4 g  8.4 g Oral DAILY    metoprolol tartrate (LOPRESSOR) tablet 25 mg  25 mg Oral BID    ondansetron (ZOFRAN) injection 4 mg  4 mg IntraVENous Q6H PRN    acetaminophen (TYLENOL) tablet 650 mg  650 mg Oral Q6H PRN    lidocaine (LIDODERM) 5 % patch 1 Patch  1 Patch TransDERmal Q24H   ______________________________________________________________________  EXPECTED LENGTH OF STAY: 5d 16h  ACTUAL LENGTH OF STAY:          Isaac James MD

## 2019-07-27 PROCEDURE — 74011250637 HC RX REV CODE- 250/637: Performed by: INTERNAL MEDICINE

## 2019-07-27 PROCEDURE — 74011250637 HC RX REV CODE- 250/637: Performed by: UROLOGY

## 2019-07-27 PROCEDURE — 74011636637 HC RX REV CODE- 636/637: Performed by: NURSE PRACTITIONER

## 2019-07-27 PROCEDURE — 65270000029 HC RM PRIVATE

## 2019-07-27 PROCEDURE — 74011250637 HC RX REV CODE- 250/637: Performed by: NURSE PRACTITIONER

## 2019-07-27 PROCEDURE — 74011000250 HC RX REV CODE- 250: Performed by: UROLOGY

## 2019-07-27 PROCEDURE — 74011250637 HC RX REV CODE- 250/637: Performed by: HOSPITALIST

## 2019-07-27 RX ADMIN — MICONAZOLE NITRATE: 20 CREAM TOPICAL at 21:20

## 2019-07-27 RX ADMIN — Medication 1 CAPSULE: at 08:40

## 2019-07-27 RX ADMIN — ASPIRIN 81 MG 81 MG: 81 TABLET ORAL at 08:40

## 2019-07-27 RX ADMIN — HYDROCODONE BITARTRATE AND ACETAMINOPHEN 1 TABLET: 7.5; 325 TABLET ORAL at 22:17

## 2019-07-27 RX ADMIN — HYDROCODONE BITARTRATE AND ACETAMINOPHEN 1 TABLET: 7.5; 325 TABLET ORAL at 08:39

## 2019-07-27 RX ADMIN — HYDROCODONE BITARTRATE AND ACETAMINOPHEN 1 TABLET: 7.5; 325 TABLET ORAL at 13:44

## 2019-07-27 RX ADMIN — ALLOPURINOL 100 MG: 100 TABLET ORAL at 08:39

## 2019-07-27 RX ADMIN — PREDNISONE 20 MG: 20 TABLET ORAL at 06:58

## 2019-07-27 RX ADMIN — HYDROCODONE BITARTRATE AND ACETAMINOPHEN 1 TABLET: 7.5; 325 TABLET ORAL at 02:47

## 2019-07-27 RX ADMIN — MIRTAZAPINE 15 MG: 15 TABLET, FILM COATED ORAL at 21:20

## 2019-07-27 RX ADMIN — METOPROLOL TARTRATE 25 MG: 25 TABLET ORAL at 18:11

## 2019-07-27 RX ADMIN — FERROUS SULFATE TAB 325 MG (65 MG ELEMENTAL FE) 325 MG: 325 (65 FE) TAB at 18:11

## 2019-07-27 RX ADMIN — PATIROMER 8.4 G: 8.4 POWDER, FOR SUSPENSION ORAL at 13:52

## 2019-07-27 RX ADMIN — METOPROLOL TARTRATE 25 MG: 25 TABLET ORAL at 08:39

## 2019-07-27 RX ADMIN — MICONAZOLE NITRATE: 20 CREAM TOPICAL at 13:47

## 2019-07-27 RX ADMIN — POLYETHYLENE GLYCOL 3350 17 G: 17 POWDER, FOR SOLUTION ORAL at 08:39

## 2019-07-27 RX ADMIN — SENNOSIDES, DOCUSATE SODIUM 1 TABLET: 50; 8.6 TABLET, FILM COATED ORAL at 08:40

## 2019-07-27 RX ADMIN — HYDROCODONE BITARTRATE AND ACETAMINOPHEN 1 TABLET: 7.5; 325 TABLET ORAL at 18:11

## 2019-07-27 RX ADMIN — FERROUS SULFATE TAB 325 MG (65 MG ELEMENTAL FE) 325 MG: 325 (65 FE) TAB at 06:58

## 2019-07-27 NOTE — PROGRESS NOTES
Hospitalist Progress Note  Rodney Centeno MD  Answering service: [phone] from in house phone  Cell: [phone]   Date of Service:  2019  NAME:  Lise Carvalho. :  1940  MRN:  [id number]    Admission Summary:   Pt with a pmh of CKD stage IV/V, HTN, Mitral incompetence, prostate cancer originally went to his PCP due to painful urination. His UA was negative so was prescribed tramadol for pain. He took the first dose of 50 mg tramadol the night before admit, felt dizzy and lightheaded and then associated with nausea and 2-3 episodes of vomiting. He came to the hospital for further evaluation.     Interval history / Subjective:   Conversation 1530:   Called to speak with Leonel Palacios about medical updates and to discuss plan of care. At start of conversation, Mick Winter says she is going to refuse pts discharge tomorrow. She sites many reason why she plans to refuse discharge (util at least  per her):  she is not ready for her dad to leave; she has a lot of work to catch up on at home, she needs to get all of his long term and disability paperwork done and completed before he leaves the hospital and she is assigned a different ;  and that she has not toured the facility yet (though she has been by to see the accepting facility she says it was a night and wants to go during the day). Attempted to have a discussion about medical readiness for discharge at earliest tomorrow and she adamantly refuses. In short, pt is terminal and is weak. [facility] is to discharge him to SNF to regain some strength and then transition to LTC, whether or not he will need hospice soon will be determined but as we are pursuing skilled therapy then that must be reserved for future plans. She also sites lack of care in \"nursing facilities in general\" as reason for not going until Monday.  She is very emotional and labile and will be a challenge/barrier to pts discharge from hospital.  is aware of situation and is being challenged as well.     7/24:   0830: Pt awake, lying in bed. Feels well - woke up with a start when staff member awakened him this morning and still was feeling jittery from that. Other than that - reports he pain is controlled. Taking Norco 7.5/325 and he says he is satisfied with pain relief he has received from this. He is aware he is also on steroids for gout (been on since 7/21)    1330: Pt visited once again as he complained that his tongue felt swollen. Upon entering room, noted pt was sleeping soundly. Awoken with a small jump when name was called. MM dry and pt had been mouth breathing. Tongue and oral cavity examined. Tongue does not appear swollen, no redness or white patches noted to tongue, other MM. Speech is clear and not slurred or thick. VSS. Encouraged pt to alert nurse if he felt any differently. 7/25: pt feels ok, some pain in L hip area, no other acute events. Will set up a meeting with pt and daughter to discuss care. 7/26: Pain controlled per patient but when he moves his legs he still has some pain in his knees bilaterally. No nausea or vomiting. 3 BMs since am. One watery bowel movement out of the three. 7/27: No new issues. Comfortably sleeping on bed. Did not wake the patient up. No reported events     Assessment & Plan:     Sepsis (POA, now recurred) improved:   - Fever 7/19 at 11 pm, HR, suspect urinary source from recent instrumentation vs. Malignancy  - WBC always remained normal, afebrile  - Urine culture No growth  - blood culture with no growth   - procalcitonin negative   - Stopped LVQ 7/26. Continue probiotics for now    Pain: Likely secondary to metastatic disease, L hip pain: Improved  - Pt having itching with dilaudid, discussed pain regimen with pt and daughter in detail, switched to [facility] 7/22 with good results.    - Pt satisfied with pain control   - started on prednisone this weekend for suspected gout in feet, pt reports less pain in feet now, tapered prednisone down to 20 mg starting 7/25 (has had 4 days of 40 mg)    Hyperkalemia: Resolved  - s/p kayexalate  - takes veltassa (missed dose PTA), this has been resumed    JASIEL on CKD stage 5, Metabolic acidosis:  - Cr: rising, 6.57 7/23,  was 4.96 in 2/2019. Likely has progression of kidney disease  - s/p bicarb gtt and now on NS (to continue through d/c)  - Nephrology consulted and following  - Poor dialysis candidate long term, and patient does not want it   - Dr. Therese Vallejo discussed plan of care with pts daughter 7/26  - now resolved     UTI:bladder outlet obstruction/Functional one kidney  Hx of b/l hydronephrosis due to prostate cancer with stent placement in 2018:  - UA shows >100 WBCs with 2+ bacteria, he has ureteral stents  - 7/18: s/p bilateral ureteral stent replacement (difficult) and clot removal  - urine cultures x 2 negative  -Will get his bilateral ureteral stents changes every three months     Dizziness/lightheadedness:   -Resolved    Nausea/vomiting: resolved  - side effects due to tramadol, discussed with the patient  - prn zofran     Metastatic prostate cancer:   -on casodex  -Outpatient follow up with Dr Rickie Madison    CAD s/p CABG:   -on aspirin and metoprolol    HTN:  - on home metoprolol, BP normal    Renal diet    PT/OT SNF, if not possible HHPT    Code status: DNR  DVT prophylaxis: SCDs    Plan: The patient is medically stable, the patient's daughter reportedly resisting against discharge. Discharge on Monday to [facility].  Discussed with the patient and the case [address] [place] [address] discussed with: patient, nurse, nephrology and   Disposition: as above     Hospital Problems  Date Reviewed: 7/16/2018          Codes Class Noted POA    * (Principal) JASIEL (acute kidney injury) ([facility] [address] ICD-10-CM: N17.9  ICD-9-CM: 584.9  7/17/2019 Unknown        Coronary artery disease involving native coronary artery of native heart without angina pectoris ICD-10-CM: I25.10  ICD-9-CM: 414.01  8/31/2018 Yes        Essential hypertension ICD-10-CM: I10  ICD-9-CM: 401.9  8/31/2018 Yes        Prostate cancer ([facility] [address] ICD-10-CM: C61  ICD-9-CM: 468  8/31/2018 Yes        Hyperkalemia ICD-10-CM: E87.5  ICD-9-CM: 276.7  6/5/2018 Yes            Review of Systems:   Denied HA. No chest pain or pressure. No respiratory or GI complaints. + urinating. No pain this am.     Vital Signs:    Last 24hrs VS reviewed since prior progress note. Most recent are:  Visit Vitals  /70 (BP 1 Location: Right arm)   Pulse 75   Temp 97.5 °F (36.4 °C)   Resp 20   Ht 5' 11\" (1.803 m)   Wt 111.2 kg (245 lb 2.4 oz)   SpO2 97%   BMI 34.19 kg/m²       Intake/Output Summary (Last 24 hours) at 7/27/2019 1156  Last data filed at 7/27/2019 8584  Gross per 24 hour   Intake    Output 2025 ml   Net -2025 ml     Physical Examination:         Constitutional:  Cooperative, pleasant. NAD. Resp:  CTA bilaterally. On RA        GI:  Soft, non distended, tender. :  Urine light and clear    Musculoskeletal:  Mild LE edema, warm    Neurologic:  Moves all extremities. AAOx3. Psych: Calm and cooperative       Data Review:   Review and/or order of clinical lab test  Review and/or order of tests in the radiology section of CPT  Review and/or order of tests in the medicine section of CPT    Labs:     No results for input(s): WBC, HGB, HCT, PLT, HGBEXT, HCTEXT, PLTEXT, HGBEXT, HCTEXT, PLTEXT in the last 72 hours. Recent Labs     07/26/19  0838      K 4.8   *   CO2 19*   BUN 65*   CREA 4.80*   GLU 92   CA 7.2*   PHOS 3.8     Recent Labs     07/26/19  0838   ALB 2.4*     No results for input(s): INR, PTP, APTT in the last 72 hours. No lab exists for component: INREXT, INREXT   No results for input(s): FE, TIBC, PSAT, FERR in the last 72 hours.    No results found for: FOL, RBCF   No results for input(s): PH, PCO2, PO2 in the last 72 hours. No results for input(s): CPK, CKNDX, TROIQ in the last 72 hours.     No lab exists for component: CPKMB  Lab Results   Component Value Date/Time    Cholesterol, total 85 10/07/2013 12:00 AM    HDL Cholesterol 5 10/07/2013 12:00 AM    LDL, calculated 39.6 10/07/2013 12:00 AM    Triglyceride 202 (H) 10/07/2013 12:00 AM    CHOL/HDL Ratio 17.0 (H) 10/07/2013 12:00 AM     Lab Results   Component Value Date/Time    Glucose (POC) 105 (H) 07/17/2019 06:48 AM    Glucose (POC) 105 (H) 07/17/2019 05:56 AM    Glucose (POC) 137 (H) 07/18/2018 05:06 PM    Glucose (POC) 100 07/17/2018 06:07 AM    Glucose (POC) 59 (L) 06/20/2018 05:03 PM    Glucose (POC) 101 (H) 06/20/2018 03:14 PM    Glucose (POC) 83 06/10/2018 06:26 AM    Glucose (POC) 108 (H) 06/09/2018 10:08 PM    Glucose (POC) 138 (H) 06/09/2018 06:30 PM     Lab Results   Component Value Date/Time    Color YELLOW/STRAW 07/20/2019 10:40 AM    Appearance CLOUDY (A) 07/20/2019 10:40 AM    Specific gravity 1.007 07/20/2019 10:40 AM    Specific gravity 1.015 02/15/2019 04:01 PM    pH (UA) 8.0 07/20/2019 10:40 AM    Protein 100 (A) 07/20/2019 10:40 AM    Glucose NEGATIVE  07/20/2019 10:40 AM    Ketone NEGATIVE  07/20/2019 10:40 AM    Bilirubin NEGATIVE  07/20/2019 10:40 AM    Urobilinogen 0.2 07/20/2019 10:40 AM    Nitrites NEGATIVE  07/20/2019 10:40 AM    Leukocyte Esterase LARGE (A) 07/20/2019 10:40 AM    Epithelial cells FEW 07/20/2019 10:40 AM    Bacteria 1+ (A) 07/20/2019 10:40 AM    WBC >100 (H) 07/20/2019 10:40 AM    RBC 20-50 07/20/2019 10:40 AM     Medications Reviewed:     Current Facility-Administered Medications   Medication Dose Route Frequency    lactobac ac& pc-s.therm-b.anim (CELIO Q/RISAQUAD)  1 Cap Oral DAILY    senna-docusate (PERICOLACE) 8.6-50 mg per tablet 1 Tab  1 Tab Oral DAILY    predniSONE (DELTASONE) tablet 20 mg  20 mg Oral DAILY WITH BREAKFAST    HYDROcodone-acetaminophen (NORCO) 7.5-325 mg per tablet 1 Tab  1 Tab Oral Q4H PRN    miconazole ([facility]) 2 % extra thick cream   Topical Q12H    naloxone (NARCAN) injection 0.4 mg  0.4 mg IntraVENous EVERY 2 MINUTES AS NEEDED    polyethylene glycol (MIRALAX) packet 17 g  17 g Oral DAILY    allopurinol (ZYLOPRIM) tablet 100 mg  100 mg Oral DAILY    aspirin chewable tablet 81 mg  81 mg Oral DAILY    ferrous sulfate tablet 325 mg  325 mg Oral ACB&D    mirtazapine (REMERON) tablet 15 mg  15 mg Oral QHS    patiromer calcium sorbitex (VELTASSA) powder 8.4 g  8.4 g Oral DAILY    metoprolol tartrate (LOPRESSOR) tablet 25 mg  25 mg Oral BID    ondansetron (ZOFRAN) injection 4 mg  4 mg IntraVENous Q6H PRN    acetaminophen (TYLENOL) tablet 650 mg  650 mg Oral Q6H PRN    lidocaine (LIDODERM) 5 % patch 1 Patch  1 Patch TransDERmal Q24H   ______________________________________________________________________  EXPECTED LENGTH OF STAY: 5d 16h  ACTUAL LENGTH OF STAY:          Lottie Whitehead MD

## 2019-07-27 NOTE — PROGRESS NOTES
Problem: Risk for Spread of Infection  Goal: Prevent transmission of infectious organism to others  Description  Prevent the transmission of infectious organisms to other patients, staff members, and visitors.   Outcome: Progressing Towards Goal

## 2019-07-28 ENCOUNTER — APPOINTMENT (OUTPATIENT)
Dept: GENERAL RADIOLOGY | Age: 79
DRG: 854 | End: 2019-07-28
Attending: HOSPITALIST
Payer: MEDICARE

## 2019-07-28 LAB
ANION GAP SERPL CALC-SCNC: 9 MMOL/L (ref 5–15)
BASOPHILS # BLD: 0 K/UL (ref 0–0.1)
BASOPHILS NFR BLD: 0 % (ref 0–1)
BUN SERPL-MCNC: 66 MG/DL (ref 6–20)
BUN/CREAT SERPL: 16 (ref 12–20)
CALCIUM SERPL-MCNC: 7.4 MG/DL (ref 8.5–10.1)
CHLORIDE SERPL-SCNC: 116 MMOL/L (ref 97–108)
CO2 SERPL-SCNC: 17 MMOL/L (ref 21–32)
CREAT SERPL-MCNC: 4.02 MG/DL (ref 0.7–1.3)
DIFFERENTIAL METHOD BLD: ABNORMAL
EOSINOPHIL # BLD: 0.2 K/UL (ref 0–0.4)
EOSINOPHIL NFR BLD: 2 % (ref 0–7)
ERYTHROCYTE [DISTWIDTH] IN BLOOD BY AUTOMATED COUNT: 15 % (ref 11.5–14.5)
GLUCOSE SERPL-MCNC: 91 MG/DL (ref 65–100)
HCT VFR BLD AUTO: 27.7 % (ref 36.6–50.3)
HGB BLD-MCNC: 8.5 G/DL (ref 12.1–17)
IMM GRANULOCYTES # BLD AUTO: 0 K/UL
IMM GRANULOCYTES NFR BLD AUTO: 0 %
LYMPHOCYTES # BLD: 0.9 K/UL (ref 0.8–3.5)
LYMPHOCYTES NFR BLD: 9 % (ref 12–49)
MCH RBC QN AUTO: 31.1 PG (ref 26–34)
MCHC RBC AUTO-ENTMCNC: 30.7 G/DL (ref 30–36.5)
MCV RBC AUTO: 101.5 FL (ref 80–99)
METAMYELOCYTES NFR BLD MANUAL: 1 %
MONOCYTES # BLD: 0.6 K/UL (ref 0–1)
MONOCYTES NFR BLD: 6 % (ref 5–13)
NEUTS BAND NFR BLD MANUAL: 1 % (ref 0–6)
NEUTS SEG # BLD: 8.3 K/UL (ref 1.8–8)
NEUTS SEG NFR BLD: 81 % (ref 32–75)
NRBC # BLD: 0 K/UL (ref 0–0.01)
NRBC BLD-RTO: 0 PER 100 WBC
PLATELET # BLD AUTO: 195 K/UL (ref 150–400)
PMV BLD AUTO: 9.8 FL (ref 8.9–12.9)
POTASSIUM SERPL-SCNC: 5.1 MMOL/L (ref 3.5–5.1)
RBC # BLD AUTO: 2.73 M/UL (ref 4.1–5.7)
RBC MORPH BLD: ABNORMAL
RBC MORPH BLD: ABNORMAL
SODIUM SERPL-SCNC: 142 MMOL/L (ref 136–145)
WBC # BLD AUTO: 10.1 K/UL (ref 4.1–11.1)

## 2019-07-28 PROCEDURE — 85025 COMPLETE CBC W/AUTO DIFF WBC: CPT

## 2019-07-28 PROCEDURE — 74011250637 HC RX REV CODE- 250/637: Performed by: UROLOGY

## 2019-07-28 PROCEDURE — 74011000250 HC RX REV CODE- 250: Performed by: UROLOGY

## 2019-07-28 PROCEDURE — 74011250637 HC RX REV CODE- 250/637: Performed by: NURSE PRACTITIONER

## 2019-07-28 PROCEDURE — 74011636637 HC RX REV CODE- 636/637: Performed by: NURSE PRACTITIONER

## 2019-07-28 PROCEDURE — 74011250637 HC RX REV CODE- 250/637: Performed by: INTERNAL MEDICINE

## 2019-07-28 PROCEDURE — 74011250637 HC RX REV CODE- 250/637: Performed by: HOSPITALIST

## 2019-07-28 PROCEDURE — 65270000029 HC RM PRIVATE

## 2019-07-28 PROCEDURE — 80048 BASIC METABOLIC PNL TOTAL CA: CPT

## 2019-07-28 PROCEDURE — 71045 X-RAY EXAM CHEST 1 VIEW: CPT

## 2019-07-28 PROCEDURE — 36415 COLL VENOUS BLD VENIPUNCTURE: CPT

## 2019-07-28 PROCEDURE — 93005 ELECTROCARDIOGRAM TRACING: CPT

## 2019-07-28 RX ORDER — PANTOPRAZOLE SODIUM 40 MG/1
40 TABLET, DELAYED RELEASE ORAL DAILY
Status: DISCONTINUED | OUTPATIENT
Start: 2019-07-28 | End: 2019-07-29 | Stop reason: HOSPADM

## 2019-07-28 RX ADMIN — ASPIRIN 81 MG 81 MG: 81 TABLET ORAL at 09:12

## 2019-07-28 RX ADMIN — FERROUS SULFATE TAB 325 MG (65 MG ELEMENTAL FE) 325 MG: 325 (65 FE) TAB at 17:34

## 2019-07-28 RX ADMIN — MICONAZOLE NITRATE: 20 CREAM TOPICAL at 21:52

## 2019-07-28 RX ADMIN — PATIROMER 8.4 G: 8.4 POWDER, FOR SUSPENSION ORAL at 16:59

## 2019-07-28 RX ADMIN — MIRTAZAPINE 15 MG: 15 TABLET, FILM COATED ORAL at 21:52

## 2019-07-28 RX ADMIN — MICONAZOLE NITRATE: 20 CREAM TOPICAL at 09:12

## 2019-07-28 RX ADMIN — PREDNISONE 20 MG: 20 TABLET ORAL at 09:12

## 2019-07-28 RX ADMIN — HYDROCODONE BITARTRATE AND ACETAMINOPHEN 1 TABLET: 7.5; 325 TABLET ORAL at 07:10

## 2019-07-28 RX ADMIN — FERROUS SULFATE TAB 325 MG (65 MG ELEMENTAL FE) 325 MG: 325 (65 FE) TAB at 07:10

## 2019-07-28 RX ADMIN — SENNOSIDES, DOCUSATE SODIUM 1 TABLET: 50; 8.6 TABLET, FILM COATED ORAL at 09:12

## 2019-07-28 RX ADMIN — ALLOPURINOL 100 MG: 100 TABLET ORAL at 09:12

## 2019-07-28 RX ADMIN — METOPROLOL TARTRATE 25 MG: 25 TABLET ORAL at 09:12

## 2019-07-28 RX ADMIN — HYDROCODONE BITARTRATE AND ACETAMINOPHEN 1 TABLET: 7.5; 325 TABLET ORAL at 12:25

## 2019-07-28 RX ADMIN — POLYETHYLENE GLYCOL 3350 17 G: 17 POWDER, FOR SOLUTION ORAL at 09:12

## 2019-07-28 RX ADMIN — Medication 1 CAPSULE: at 09:12

## 2019-07-28 RX ADMIN — HYDROCODONE BITARTRATE AND ACETAMINOPHEN 1 TABLET: 7.5; 325 TABLET ORAL at 03:17

## 2019-07-28 RX ADMIN — HYDROCODONE BITARTRATE AND ACETAMINOPHEN 1 TABLET: 7.5; 325 TABLET ORAL at 19:14

## 2019-07-28 RX ADMIN — ALUMINUM HYDROXIDE AND MAGNESIUM HYDROXIDE 30 ML: 200; 200 SUSPENSION ORAL at 15:13

## 2019-07-28 RX ADMIN — PANTOPRAZOLE SODIUM 40 MG: 40 TABLET, DELAYED RELEASE ORAL at 17:34

## 2019-07-28 RX ADMIN — METOPROLOL TARTRATE 25 MG: 25 TABLET ORAL at 17:34

## 2019-07-28 NOTE — PROGRESS NOTES
Hospitalist Progress Note  Darshan Burger MD  Answering service: [phone] OR 36 from in house phone  Cell: [phone]   Date of Service:  2019  NAME:  Liz Allen. :  1940  MRN:  [id number]    Admission Summary:   Pt with a pmh of CKD stage IV/V, HTN, Mitral incompetence, prostate cancer originally went to his PCP due to painful urination. His UA was negative so was prescribed tramadol for pain. He took the first dose of 50 mg tramadol the night before admit, felt dizzy and lightheaded and then associated with nausea and 2-3 episodes of vomiting. He came to the hospital for further evaluation.     Interval history / Subjective:   Conversation 1530:   Called to speak with Valencia Mathew about medical updates and to discuss plan of care. At start of conversation, Betsy Limon says she is going to refuse pts discharge tomorrow. She sites many reason why she plans to refuse discharge (util at least  per her):  she is not ready for her dad to leave; she has a lot of work to catch up on at home, she needs to get all of his long term and disability paperwork done and completed before he leaves the hospital and she is assigned a different ;  and that she has not toured the facility yet (though she has been by to see the accepting facility she says it was a night and wants to go during the day). Attempted to have a discussion about medical readiness for discharge at earliest tomorrow and she adamantly refuses. In short, pt is terminal and is weak. Hope is to discharge him to SNF to regain some strength and then transition to LTC, whether or not he will need hospice soon will be determined but as we are pursuing skilled therapy then that must be reserved for future plans. She also sites lack of care in \"nursing facilities in general\" as reason for not going until Monday.  She is very emotional and labile and will be a challenge/barrier to pts discharge from hospital.  is aware of situation and is being challenged as well.     7/24:   0830: Pt awake, lying in bed. Feels well - woke up with a start when staff member awakened him this morning and still was feeling jittery from that. Other than that - reports he pain is controlled. Taking Norco 7.5/325 and he says he is satisfied with pain relief he has received from this. He is aware he is also on steroids for gout (been on since 7/21)    1330: Pt visited once again as he complained that his tongue felt swollen. Upon entering room, noted pt was sleeping soundly. Awoken with a small jump when name was called. MM dry and pt had been mouth breathing. Tongue and oral cavity examined. Tongue does not appear swollen, no redness or white patches noted to tongue, other MM. Speech is clear and not slurred or thick. VSS. Encouraged pt to alert nurse if he felt any differently. 7/25: pt feels ok, some pain in L hip area, no other acute events. Will set up a meeting with pt and daughter to discuss care. 7/26: Pain controlled per patient but when he moves his legs he still has some pain in his knees bilaterally. No nausea or vomiting. 3 BMs since am. One watery bowel movement out of the three. 7/27: No new issues. Comfortably sleeping on bed. Did not wake the patient up. No reported events    7/28: Continues to remain stable. No complaints. Renal function continues to improve     Assessment & Plan:     Sepsis (POA, now recurred) improved:   - Fever 7/19 at 11 pm, HR, suspect urinary source from recent instrumentation vs. Malignancy  - WBC always remained normal, afebrile  - Urine culture No growth  - blood culture with no growth   - procalcitonin negative   - Stopped LVQ 7/26.  Continue probiotics for now  -Watching off antibiotics    Pain: Likely secondary to metastatic disease, L hip pain: Improved  - Pt having itching with dilaudid, discussed pain regimen with pt and daughter in detail, switched to [address] [place] [address] 7/22 with good results. - Pt satisfied with pain control   - started on prednisone this weekend for suspected gout in feet, pt reports less pain in feet now, tapered prednisone down to 20 mg starting 7/25 (has had 4 days of 40 mg). Continue oral steroids    Hyperkalemia: Resolved  - s/p kayexalate  - takes veltassa (missed dose PTA), this has been resumed    JASIEL on CKD stage 5, Metabolic acidosis:  - Cr: rising, 6.57 7/23,  was 4.96 in 2/2019. Likely has progression of kidney disease  - s/p bicarb gtt and now on NS (to continue through d/c)  - Nephrology consulted and following  - Poor dialysis candidate long term, and patient does not want it   - Dr. Lucien Patino discussed plan of care with pts daughter 7/26  - now resolved     UTI:bladder outlet obstruction/Functional one kidney  Hx of b/l hydronephrosis due to prostate cancer with stent placement in 2018:  - UA shows >100 WBCs with 2+ bacteria, he has ureteral stents  - 7/18: s/p bilateral ureteral stent replacement (difficult) and clot removal  - urine cultures x 2 negative  -Need to get his bilateral ureteral stents changes every three months     Dizziness/lightheadedness:   -Resolved    Nausea/vomiting: resolved  - side effects due to tramadol, discussed with the patient  - prn zofran     Metastatic prostate cancer:   -on casodex  -Outpatient follow up with Dr Shanae Rey    CAD s/p CABG:   -on aspirin and metoprolol    HTN:  - on home metoprolol, BP normal    Renal diet    PT/OT SNF, if not possible HHPT    Code status: DNR  DVT prophylaxis: SCDs    Plan: The patient is medically stable, the patient's daughter reportedly resisting against discharge. Discharge on Monday [facility].  Discussed with the patient and the case [address] [place] [address] discussed with: patient, nurse, nephrology and   Disposition: as above     Hospital Problems  Date Reviewed: 7/16/2018          Codes Class Noted POA    * (Principal) JASIEL (acute kidney injury) ([facility] ICD-10-CM: N17.9  ICD-9-CM: 584.9  7/17/2019 Unknown        Coronary artery disease involving native coronary artery of native heart without angina pectoris ICD-10-CM: I25.10  ICD-9-CM: 414.01  8/31/2018 Yes        Essential hypertension ICD-10-CM: I10  ICD-9-CM: 401.9  8/31/2018 Yes        Prostate cancer ([facility] ICD-10-CM: C61  ICD-9-CM: 185  8/31/2018 Yes        Hyperkalemia ICD-10-CM: E87.5  ICD-9-CM: 276.7  6/5/2018 Yes            Review of Systems:   Denied HA. No chest pain or pressure. No respiratory or GI complaints. + urinating. No pain this am.     Vital Signs:    Last 24hrs VS reviewed since prior progress note. Most recent are:  Visit Vitals  /82 (BP 1 Location: Right arm)   Pulse 65   Temp 97.9 °F (36.6 °C)   Resp 16   Ht 5' 11\" (1.803 m)   Wt 107.6 kg (237 lb 3.2 oz)   SpO2 96%   BMI 33.08 kg/m²       Intake/Output Summary (Last 24 hours) at 7/28/2019 0756  Last data filed at 7/27/2019 1812  Gross per 24 hour   Intake 1800 ml   Output 1250 ml   Net 550 ml     Physical Examination:         Constitutional:  Cooperative, pleasant. NAD. Resp:  CTA bilaterally. On RA        GI:  Soft, non distended, tender. :  Urine light and clear    Musculoskeletal:  Mild LE edema, warm    Neurologic:  Moves all extremities. AAOx3. Psych: Calm and cooperative       Data Review:   Review and/or order of clinical lab test  Review and/or order of tests in the radiology section of CPT  Review and/or order of tests in the medicine section of CPT    Labs:     Recent Labs     07/28/19  0324   WBC 10.1   HGB 8.5*   HCT 27.7*        Recent Labs     07/28/19  0324 07/26/19  0838    141   K 5.1 4.8   * 113*   CO2 17* 19*   BUN 66* 65*   CREA 4.02* 4.80*   GLU 91 92   CA 7.4* 7.2*   PHOS  --  3.8     Recent Labs     07/26/19  0838   ALB 2.4*     No results for input(s): INR, PTP, APTT in the last 72 hours.     No lab exists for component: INREXT, INREXT No results for input(s): FE, TIBC, PSAT, FERR in the last 72 hours. No results found for: FOL, RBCF   No results for input(s): PH, PCO2, PO2 in the last 72 hours. No results for input(s): CPK, CKNDX, TROIQ in the last 72 hours.     No lab exists for component: CPKMB  Lab Results   Component Value Date/Time    Cholesterol, total 85 10/07/2013 12:00 AM    HDL Cholesterol 5 10/07/2013 12:00 AM    LDL, calculated 39.6 10/07/2013 12:00 AM    Triglyceride 202 (H) 10/07/2013 12:00 AM    CHOL/HDL Ratio 17.0 (H) 10/07/2013 12:00 AM     Lab Results   Component Value Date/Time    Glucose (POC) 105 (H) 07/17/2019 06:48 AM    Glucose (POC) 105 (H) 07/17/2019 05:56 AM    Glucose (POC) 137 (H) 07/18/2018 05:06 PM    Glucose (POC) 100 07/17/2018 06:07 AM    Glucose (POC) 59 (L) 06/20/2018 05:03 PM    Glucose (POC) 101 (H) 06/20/2018 03:14 PM    Glucose (POC) 83 06/10/2018 06:26 AM    Glucose (POC) 108 (H) 06/09/2018 10:08 PM    Glucose (POC) 138 (H) 06/09/2018 06:30 PM     Lab Results   Component Value Date/Time    Color YELLOW/STRAW 07/20/2019 10:40 AM    Appearance CLOUDY (A) 07/20/2019 10:40 AM    Specific gravity 1.007 07/20/2019 10:40 AM    Specific gravity 1.015 02/15/2019 04:01 PM    pH (UA) 8.0 07/20/2019 10:40 AM    Protein 100 (A) 07/20/2019 10:40 AM    Glucose NEGATIVE  07/20/2019 10:40 AM    Ketone NEGATIVE  07/20/2019 10:40 AM    Bilirubin NEGATIVE  07/20/2019 10:40 AM    Urobilinogen 0.2 07/20/2019 10:40 AM    Nitrites NEGATIVE  07/20/2019 10:40 AM    Leukocyte Esterase LARGE (A) 07/20/2019 10:40 AM    Epithelial cells FEW 07/20/2019 10:40 AM    Bacteria 1+ (A) 07/20/2019 10:40 AM    WBC >100 (H) 07/20/2019 10:40 AM    RBC 20-50 07/20/2019 10:40 AM     Medications Reviewed:     Current Facility-Administered Medications   Medication Dose Route Frequency    lactobac ac& pc-s.therm-b.anim (CELIO Q/RISAQUAD)  1 Cap Oral DAILY    senna-docusate (PERICOLACE) 8.6-50 mg per tablet 1 Tab  1 Tab Oral DAILY    predniSONE (DELTASONE) tablet 20 mg  20 mg Oral DAILY WITH BREAKFAST    HYDROcodone-acetaminophen (NORCO) 7.5-325 mg per tablet 1 Tab  1 Tab Oral Q4H PRN    miconazole (SECURA) 2 % extra thick cream   Topical Q12H    naloxone (NARCAN) injection 0.4 mg  0.4 mg IntraVENous EVERY 2 MINUTES AS NEEDED    polyethylene glycol (MIRALAX) packet 17 g  17 g Oral DAILY    allopurinol (ZYLOPRIM) tablet 100 mg  100 mg Oral DAILY    aspirin chewable tablet 81 mg  81 mg Oral DAILY    ferrous sulfate tablet 325 mg  325 mg Oral ACB&D    mirtazapine (REMERON) tablet 15 mg  15 mg Oral QHS    patiromer calcium sorbitex (VELTASSA) powder 8.4 g  8.4 g Oral DAILY    metoprolol tartrate (LOPRESSOR) tablet 25 mg  25 mg Oral BID    ondansetron (ZOFRAN) injection 4 mg  4 mg IntraVENous Q6H PRN    acetaminophen (TYLENOL) tablet 650 mg  650 mg Oral Q6H PRN    lidocaine (LIDODERM) 5 % patch 1 Patch  1 Patch TransDERmal Q24H   ______________________________________________________________________  EXPECTED LENGTH OF STAY: 5d 16h  ACTUAL LENGTH OF STAY:          Maddy Alfonso MD

## 2019-07-28 NOTE — PROGRESS NOTES
responded to RRT. Pt was being attended to and no family present at this time.  follow up as needed.      Maine C[place], [place] [id number] ([id number])

## 2019-07-29 VITALS
RESPIRATION RATE: 14 BRPM | WEIGHT: 237.2 LBS | DIASTOLIC BLOOD PRESSURE: 76 MMHG | TEMPERATURE: 97.7 F | HEART RATE: 66 BPM | HEIGHT: 71 IN | BODY MASS INDEX: 33.21 KG/M2 | OXYGEN SATURATION: 98 % | SYSTOLIC BLOOD PRESSURE: 147 MMHG

## 2019-07-29 LAB
ANION GAP SERPL CALC-SCNC: 8 MMOL/L (ref 5–15)
ATRIAL RATE: 71 BPM
BUN SERPL-MCNC: 69 MG/DL (ref 6–20)
BUN/CREAT SERPL: 18 (ref 12–20)
CALCIUM SERPL-MCNC: 8.1 MG/DL (ref 8.5–10.1)
CALCULATED P AXIS, ECG09: -5 DEGREES
CALCULATED R AXIS, ECG10: -2 DEGREES
CALCULATED T AXIS, ECG11: 18 DEGREES
CHLORIDE SERPL-SCNC: 112 MMOL/L (ref 97–108)
CO2 SERPL-SCNC: 18 MMOL/L (ref 21–32)
CREAT SERPL-MCNC: 3.77 MG/DL (ref 0.7–1.3)
DIAGNOSIS, 93000: NORMAL
GLUCOSE SERPL-MCNC: 102 MG/DL (ref 65–100)
P-R INTERVAL, ECG05: 210 MS
POTASSIUM SERPL-SCNC: 5.5 MMOL/L (ref 3.5–5.1)
Q-T INTERVAL, ECG07: 406 MS
QRS DURATION, ECG06: 82 MS
QTC CALCULATION (BEZET), ECG08: 441 MS
SODIUM SERPL-SCNC: 138 MMOL/L (ref 136–145)
VENTRICULAR RATE, ECG03: 71 BPM

## 2019-07-29 PROCEDURE — 74011250637 HC RX REV CODE- 250/637: Performed by: HOSPITALIST

## 2019-07-29 PROCEDURE — 74011636637 HC RX REV CODE- 636/637: Performed by: NURSE PRACTITIONER

## 2019-07-29 PROCEDURE — 74011250637 HC RX REV CODE- 250/637: Performed by: INTERNAL MEDICINE

## 2019-07-29 PROCEDURE — 74011250637 HC RX REV CODE- 250/637: Performed by: UROLOGY

## 2019-07-29 PROCEDURE — 74011250637 HC RX REV CODE- 250/637: Performed by: NURSE PRACTITIONER

## 2019-07-29 PROCEDURE — 80048 BASIC METABOLIC PNL TOTAL CA: CPT

## 2019-07-29 PROCEDURE — 36415 COLL VENOUS BLD VENIPUNCTURE: CPT

## 2019-07-29 RX ORDER — LIDOCAINE 50 MG/G
PATCH TOPICAL
Qty: 1 EACH | Refills: 0 | Status: SHIPPED | OUTPATIENT
Start: 2019-07-29

## 2019-07-29 RX ORDER — METOPROLOL TARTRATE 25 MG/1
25 TABLET, FILM COATED ORAL 2 TIMES DAILY
Qty: 60 TAB | Refills: 1 | Status: SHIPPED | OUTPATIENT
Start: 2019-07-29

## 2019-07-29 RX ORDER — HYDROCODONE BITARTRATE AND ACETAMINOPHEN 7.5; 325 MG/1; MG/1
1 TABLET ORAL
Qty: 15 TAB | Refills: 0 | Status: SHIPPED | OUTPATIENT
Start: 2019-07-29 | End: 2019-08-01

## 2019-07-29 RX ORDER — POLYETHYLENE GLYCOL 3350 17 G/17G
17 POWDER, FOR SOLUTION ORAL DAILY
Qty: 30 EACH | Refills: 1 | Status: SHIPPED | OUTPATIENT
Start: 2019-07-29

## 2019-07-29 RX ORDER — PANTOPRAZOLE SODIUM 40 MG/1
40 TABLET, DELAYED RELEASE ORAL DAILY
Qty: 30 TAB | Refills: 0 | Status: SHIPPED | OUTPATIENT
Start: 2019-07-29

## 2019-07-29 RX ADMIN — PREDNISONE 20 MG: 20 TABLET ORAL at 08:16

## 2019-07-29 RX ADMIN — HYDROCODONE BITARTRATE AND ACETAMINOPHEN 1 TABLET: 7.5; 325 TABLET ORAL at 00:48

## 2019-07-29 RX ADMIN — POLYETHYLENE GLYCOL 3350 17 G: 17 POWDER, FOR SOLUTION ORAL at 09:26

## 2019-07-29 RX ADMIN — HYDROCODONE BITARTRATE AND ACETAMINOPHEN 1 TABLET: 7.5; 325 TABLET ORAL at 08:46

## 2019-07-29 RX ADMIN — ASPIRIN 81 MG 81 MG: 81 TABLET ORAL at 09:27

## 2019-07-29 RX ADMIN — ACETAMINOPHEN 650 MG: 325 TABLET ORAL at 04:13

## 2019-07-29 RX ADMIN — METOPROLOL TARTRATE 25 MG: 25 TABLET ORAL at 09:27

## 2019-07-29 RX ADMIN — Medication 1 CAPSULE: at 09:27

## 2019-07-29 RX ADMIN — ALLOPURINOL 100 MG: 100 TABLET ORAL at 09:27

## 2019-07-29 RX ADMIN — HYDROCODONE BITARTRATE AND ACETAMINOPHEN 1 TABLET: 7.5; 325 TABLET ORAL at 04:13

## 2019-07-29 RX ADMIN — PANTOPRAZOLE SODIUM 40 MG: 40 TABLET, DELAYED RELEASE ORAL at 09:27

## 2019-07-29 RX ADMIN — SENNOSIDES, DOCUSATE SODIUM 1 TABLET: 50; 8.6 TABLET, FILM COATED ORAL at 09:26

## 2019-07-29 RX ADMIN — MICONAZOLE NITRATE: 20 CREAM TOPICAL at 09:26

## 2019-07-29 RX ADMIN — FERROUS SULFATE TAB 325 MG (65 MG ELEMENTAL FE) 325 MG: 325 (65 FE) TAB at 08:15

## 2019-07-29 NOTE — PROGRESS NOTES
CM left VM for daughter, Catina Griffith [phone] to discuss discharge planning.     Farheen Sheridan, [facility]

## 2019-07-29 NOTE — PROGRESS NOTES
Called [facility] and gave report to Mark [place]. Report included SBAR, Kardex, MAR, Accordion, ED Summary. Opportunity for questions given.

## 2019-07-29 NOTE — PROGRESS NOTES
Removed IVs with tips intact. Patient e-signed AVS. Opportunity for questions given. Copies of AVS, along with Kardex, MAR (extra copy for pt's daughter included), scripts were sent in an envelope with patient to [facility]. Patient left the floor in the care of [facility] transport, discharged for [facility].

## 2019-07-29 NOTE — DISCHARGE SUMMARY
Discharge Summary       PATIENT ID: Laine Casper MRN: [id number]   YOB: 1940    DATE OF ADMISSION: 7/17/2019  5:20 AM    DATE OF DISCHARGE: 7/29/2019   PRIMARY CARE PROVIDER: Rolanda Jarrett MD     ATTENDING PHYSICIAN: Dr Harper Hernandez  DISCHARGING PROVIDER: Harper Hernandez MD    To contact this individual call [phone] and ask the  to page. If unavailable ask to be transferred the Adult Hospitalist Department. CONSULTATIONS: IP CONSULT TO NEPHROLOGY  IP CONSULT TO UROLOGY    PROCEDURES/SURGERIES: Procedure(s):  CYSTOSCOPY, DIFFICULT BILATERAL URETERAL STENT EXCHANGE, CLOT EVACUATION    ADMITTING DIAGNOSES & HOSPITAL COURSE:   Sepsis (POA, now recurred) improved:   - Fever 7/19 at 11 pm, HR, suspect urinary source from recent instrumentation vs. Malignancy  - WBC always remained normal, afebrile  - Urine culture No growth  - blood culture with no growth   - procalcitonin negative   - Stopped LVQ 7/26. Continue probiotics for now  -Watching off antibiotics    Pain: Likely secondary to metastatic disease, L hip pain: Improved  - Pt having itching with dilaudid, discussed pain regimen with pt and daughter in detail, switched to [facility] 7/22 with good results. - Pt satisfied with pain control   - started on prednisone this weekend for suspected gout in feet, pt reports less pain in feet now, tapered prednisone down to 20 mg starting 7/25 (has had 4 days of 40 mg). Will stop steroids at discharge    Chest pain  -noted on 7/28  -EKG unremarkable  -sec to GERD  -Maalox helped  -Put the patient on Protonix    Hyperkalemia: Resolved  - s/p kayexalate  - takes veltassa (missed dose PTA), this has been resumed    JASIEL on CKD stage 5, Metabolic acidosis:  - Cr: rising, 6.57 7/23,  was 4.96 in 2/2019.  Likely has progression of kidney disease  - s/p bicarb gtt and now on NS (to continue through d/c)  - Nephrology consulted and following  - Poor dialysis candidate long term, and patient does not want it   - Dr. Bharati Ivan discussed plan of care with pts daughter 7/26  - now resolved     UTI:bladder outlet obstruction/Functional one kidney  Hx of b/l hydronephrosis due to prostate cancer with stent placement in 2018:  - UA shows >100 WBCs with 2+ bacteria, he has ureteral stents  - 7/18: s/p bilateral ureteral stent replacement (difficult) and clot removal  - urine cultures x 2 negative  -Need to get his bilateral ureteral stents changes every three months     Dizziness/lightheadedness:   -Resolved    Nausea/vomiting: resolved  - side effects due to tramadol, discussed with the patient  - prn zofran     Metastatic prostate cancer:   -on casodex  -Outpatient follow up with Dr Rodger Palmer    CAD s/p CABG:   -on aspirin and metoprolol    HTN:  - on home metoprolol, BP normal    Renal diet    PT/OT SNF, if not possible HHPT    Code status: DNR  DVT prophylaxis: SCDs            DISCHARGE DIAGNOSES / PLAN:      1. Sepsis  2. Pain       PENDING TEST RESULTS:   At the time of discharge the following test results are still pending: none    FOLLOW UP APPOINTMENTS:    Follow-up Information     Follow up With Specialties Details Why Contact Info    Miguel Chaney MD [facility] In 1 week  [address] [place] [address]  [phone]             ADDITIONAL CARE RECOMMENDATIONS:   Follow up with PMD    DIET: Renal Diet    ACTIVITY: Activity as tolerated      DISCHARGE MEDICATIONS:   See Medication Reconciliation Form      NOTIFY YOUR PHYSICIAN FOR ANY OF THE FOLLOWING:   Fever over 101 degrees for 24 hours. Chest pain, shortness of breath, fever, chills, nausea, vomiting, diarrhea, change in mentation, falling, weakness, bleeding. Severe pain or pain not relieved by medications. Or, any other signs or symptoms that you may have questions about.     DISPOSITION:    Home With:   OT  PT  HH  RN      x SNF/Inpatient Rehab/LTAC    Independent/assisted living    Hospice    Other:       PATIENT CONDITION AT DISCHARGE:     Functional status   x Poor     Deconditioned     Independent      Cognition    x Lucid     Forgetful     Dementia      Catheters/lines (plus indication)    Cespedes     PICC     PEG    x None      Code status     Full code    x DNR      PHYSICAL EXAMINATION AT DISCHARGE:  Please see progress note    CHRONIC MEDICAL DIAGNOSES:  Problem List as of 7/29/2019 Date Reviewed: 7/16/2018          Codes Class Noted - Resolved    * (Principal) JASIEL (acute kidney injury) ([facility] ICD-10-CM: N17.9  ICD-9-CM: 584.9  7/17/2019 - Present        Primary osteoarthritis of right knee ICD-10-CM: M17.11  ICD-9-CM: 715.16  9/30/2018 - Present        Bilateral hydronephrosis ICD-10-CM: N13.30  ICD-9-CM: 591  8/31/2018 - Present        CKD (chronic kidney disease) stage 3, GFR 30-59 ml/min ([facility]) ICD-10-CM: N18.3  ICD-9-CM: 585.3  8/31/2018 - Present        Coronary artery disease involving native coronary artery of native heart without angina pectoris ICD-10-CM: I25.10  ICD-9-CM: 414.01  8/31/2018 - Present        Essential hypertension ICD-10-CM: I10  ICD-9-CM: 401.9  8/31/2018 - Present        Chronic anemia ICD-10-CM: D64.9  ICD-9-CM: 285.9  8/31/2018 - Present        Prostate cancer ([facility] ICD-10-CM: C61  ICD-9-CM: 813  8/31/2018 - Present        Obesity (BMI 30.0-34.9) ICD-10-CM: E66.9  ICD-9-CM: 278.00  8/31/2018 - Present        Peripheral vertigo of both ears ICD-10-CM: H81.393  ICD-9-CM: 386.10  7/21/2018 - Present        ARF (acute renal failure) ([facility] ICD-10-CM: N17.9  ICD-9-CM: 584.9  7/16/2018 - Present        Acute worsening of stage 4 chronic kidney disease ([facility] ICD-10-CM: N18.4  ICD-9-CM: 585.4  6/5/2018 - Present        Metabolic acidosis AJG-06-UZ: E87.2  ICD-9-CM: 276.2  6/5/2018 - Present        Acute gout ICD-10-CM: M10.9  ICD-9-CM: 274.01  6/5/2018 - Present        Hyperkalemia ICD-10-CM: E87.5  ICD-9-CM: 276.7  6/5/2018 - Present        RESOLVED: Orthostatic hypotension ICD-10-CM: I95.1  ICD-9-CM: 458.0  10/6/2013 - 10/14/2013              Greater than 35 minutes were spent with the patient on counseling and coordination of care    Signed:   Desi Hutton MD  7/29/2019  9:27 AM

## 2019-07-29 NOTE — PROGRESS NOTES
[facility]   [address] [address], [facility] [address], [facility]  Phone: [phone]   [phone]       Nephrology Progress Note  Dago Almeida     1940     [id number]  Date of Admission : 7/17/2019 07/29/19    CC:  Follow up for JASIEL     Assessment and Plan   JASIEL on CKD   - Progressive Obstructive nephropathy   - s/p Ureteral stent change 7/18  - It has been agreed with pt and family not to pursue dialysis when he becomes ESRF   - cont present care     CKD Stage V  - 2/2 chronic obstruction/ fsgs  - baseline Cr ~ 4 mg/dl at best   - f/b Dr Han Lack      Hx of MEDINA with ongoing b/l hydro despite stent placement:  - s/p stent change in March  By Dr Hafsa Perez   - s/p Stent change 7/18     Hyperkalemia:  - continue daily Valtessa     Metabolic Acidosis:  - stable    Anemia of CKD:  -serial H/H     CAD s/p CABG     Hx of prostate cancer     Interval History:  Seen and examined. Doing ok. Cr down, K 5.5. Taking veltassa daily. No cp, sob, n/v/d. Awaiting for placement at rehab. Review of Systems: A comprehensive review of systems was negative.     Current Medications:   Current Facility-Administered Medications   Medication Dose Route Frequency    pantoprazole (PROTONIX) tablet 40 mg  40 mg Oral DAILY    lactobac ac& pc-s.therm-b.anim (CELIO Q/RISAQUAD)  1 Cap Oral DAILY    senna-docusate (PERICOLACE) 8.6-50 mg per tablet 1 Tab  1 Tab Oral DAILY    predniSONE (DELTASONE) tablet 20 mg  20 mg Oral DAILY WITH BREAKFAST    HYDROcodone-acetaminophen (NORCO) 7.5-325 mg per tablet 1 Tab  1 Tab Oral Q4H PRN    miconazole (SECURA) 2 % extra thick cream   Topical Q12H    naloxone (NARCAN) injection 0.4 mg  0.4 mg IntraVENous EVERY 2 MINUTES AS NEEDED    polyethylene glycol (MIRALAX) packet 17 g  17 g Oral DAILY    allopurinol (ZYLOPRIM) tablet 100 mg  100 mg Oral DAILY    aspirin chewable tablet 81 mg  81 mg Oral DAILY    ferrous sulfate tablet 325 mg  325 mg Oral ACB&D    mirtazapine (REMERON) tablet 15 mg  15 mg Oral QHS    patiromer calcium sorbitex (VELTASSA) powder 8.4 g  8.4 g Oral DAILY    metoprolol tartrate (LOPRESSOR) tablet 25 mg  25 mg Oral BID    ondansetron (ZOFRAN) injection 4 mg  4 mg IntraVENous Q6H PRN    acetaminophen (TYLENOL) tablet 650 mg  650 mg Oral Q6H PRN    lidocaine (LIDODERM) 5 % patch 1 Patch  1 Patch TransDERmal Q24H      Allergies   Allergen Reactions    Bactrim [Sulfamethoxazole-Trimethoprim] Anaphylaxis and Swelling     Patient reported that his \"tongue swelled up and it was hard to breath\"    Enzalutamide Nausea and Vomiting    Morphine Other (comments)     AMS; \"goes crazy\"       Objective:  Vitals:    Vitals:    07/28/19 1504 07/28/19 2000 07/29/19 0354 07/29/19 0921   BP: 160/82 132/71 133/80 147/76   Pulse: 68 61 (!) 58 66   Resp: 12 16 14 14   Temp: 97.9 °F (36.6 °C) 98.1 °F (36.7 °C) 97.7 °F (36.5 °C) 97.7 °F (36.5 °C)   SpO2: 99% 96% 97% 98%   Weight:       Height:         Intake and Output:  07/29 0701 - 07/29 1900  In: -   Out: 975 [Urine:975]  07/27 1901 - 07/29 0700  In: 1660 [P.O.:1660]  Out: 2713 [Urine:2200]    Physical Examination:    General: NAD,Conversant   Neck:  Supple, no mass  Resp:  Lungs CTA B/L, no wheezing , normal respiratory effort  CV:  RRR,  no murmur or rub, trace LE edema  GI:  Soft, NT, + Bowel sounds, no hepatosplenomegaly  Neurologic:  Non focal  Psych:             AAO x 3 appropriate affect   Skin:  No Rash  :  No goldberg     []    High complexity decision making was performed  []    Patient is at high-risk of decompensation with multiple organ involvement    Lab Data Personally Reviewed: I have reviewed all the pertinent labs, microbiology data and radiology studies during assessment.     Recent Labs     07/29/19  0405 07/28/19  0324    142   K 5.5* 5.1   * 116*   CO2 18* 17*   * 91   BUN 69* 66*   CREA 3.77* 4.02*   CA 8.1* 7.4*     Recent Labs     07/28/19  0324   WBC 10.1   HGB 8.5*   HCT 27.7*      Lab Results   Component Value Date/Time    Specimen Description: URINE 10/07/2013 01:36 AM    Specimen Description: BLOOD 10/06/2013 04:13 PM     Lab Results   Component Value Date/Time    Culture result: NO GROWTH 5 DAYS 07/20/2019 10:40 AM    Culture result: NO GROWTH 1 DAY 07/20/2019 10:40 AM    Culture result: NO GROWTH 5 DAYS 07/17/2019 02:48 PM    Culture result: NO GROWTH 1 DAY 07/17/2019 07:21 AM    Culture result: CANDIDA ALBICANS (A) 02/15/2019 04:01 PM     Recent Results (from the past 24 hour(s))   EKG, 12 LEAD, INITIAL    Collection Time: 07/28/19  3:06 PM   Result Value Ref Range    Ventricular Rate 71 BPM    Atrial Rate 71 BPM    P-R Interval 210 ms    QRS Duration 82 ms    Q-T Interval 406 ms    QTC Calculation (Bezet) 441 ms    Calculated P Axis -5 degrees    Calculated R Axis -2 degrees    Calculated T Axis 18 degrees    Diagnosis       Sinus rhythm with 1st degree AV block  When compared with ECG of 17-JUL-2019 05:32,  No significant change was found  Confirmed by Johanne Carey ([id number]) on 7/29/2019 4:32:76 AM     METABOLIC PANEL, BASIC    Collection Time: 07/29/19  4:05 AM   Result Value Ref Range    Sodium 138 136 - 145 mmol/L    Potassium 5.5 (H) 3.5 - 5.1 mmol/L    Chloride 112 (H) 97 - 108 mmol/L    CO2 18 (L) 21 - 32 mmol/L    Anion gap 8 5 - 15 mmol/L    Glucose 102 (H) 65 - 100 mg/dL    BUN 69 (H) 6 - 20 MG/DL    Creatinine 3.77 (H) 0.70 - 1.30 MG/DL    BUN/Creatinine ratio 18 12 - 20      GFR est AA 19 (L) >60 ml/min/1.73m2    GFR est non-AA 16 (L) >60 ml/min/1.73m2    Calcium 8.1 (L) 8.5 - 10.1 MG/DL           Total time spent with patient:  xxx   min. Care Plan discussed with:  Patient     Family      RN      Consulting Physician [id number],         I have reviewed the flowsheets. Chart and Pertinent Notes have been reviewed. No change in PMH ,family and social history from Consult note.       Frederick Negron MD

## 2019-07-29 NOTE — PROGRESS NOTES
Problem: Risk for Spread of Infection  Goal: Prevent transmission of infectious organism to others  Description  Prevent the transmission of infectious organisms to other patients, staff members, and visitors. Outcome: Progressing Towards Goal     Problem: Patient Education:  Go to Education Activity  Goal: Patient/Family Education  Outcome: Progressing Towards Goal     Problem: Falls - Risk of  Goal: *Absence of Falls  Description  Document Lamont Bangura Fall Risk and appropriate interventions in the flowsheet. Outcome: Progressing Towards Goal  Note:   Fall Risk Interventions:  Mobility Interventions: Communicate number of staff needed for ambulation/transfer, Patient to call before getting OOB    Mentation Interventions: Adequate sleep, hydration, pain control    Medication Interventions: Patient to call before getting OOB    Elimination Interventions: Call light in reach, Patient to call for help with toileting needs    History of Falls Interventions: Consult care management for discharge planning         Problem: Patient Education: Go to Patient Education Activity  Goal: Patient/Family Education  Outcome: Progressing Towards Goal     Problem: Pressure Injury - Risk of  Goal: *Prevention of pressure injury  Description  Document Partha Scale and appropriate interventions in the flowsheet.     Offload heels  Turn approximately every 2 hours   Outcome: Progressing Towards Goal  Note:   Pressure Injury Interventions:  Sensory Interventions: Assess changes in LOC    Moisture Interventions: Apply protective barrier, creams and emollients    Activity Interventions: Pressure redistribution bed/mattress(bed type), Increase time out of bed    Mobility Interventions: Pressure redistribution bed/mattress (bed type)    Nutrition Interventions: Offer support with meals,snacks and hydration, Document food/fluid/supplement intake    Friction and Shear Interventions: HOB 30 degrees or less                Problem: Patient Education: Go to Patient Education Activity  Goal: Patient/Family Education  Outcome: Progressing Towards Goal

## 2019-07-29 NOTE — WOUND CARE
Wound Care Note:     Follow-up visit for sacral redness     Chart shows:  Admitted for JASIEL  Past Medical History:   Diagnosis Date    Celiac disease     Chronic kidney disease     Hypertension     MI (mitral incompetence)     Prostate cancer ([address]      WBC = 10.1 on 7/28/19  Admitted from independent section of [address]:   Patient is A&O x 4, communicative, incontinent with no assistance needed in repositioning. Bed: Advance  Diet: Renal Regular  Patient reports no pain. Bilateral heels and buttocks skin intact and without erythema. 1. POA maculopapular erythematous rash with satellite lesions consistent with yeast to sacrum has resolved, there are two crusted area on the left and superficial crusting on the right, no open area, reanna-wound intact. Secura Extra Thick Antifungal applied. Patient is probably being discharged to SNF today. Patient repositioned on left side   Heels offloaded on pillow. Recommendations:    Would continue with Secura for a few more days if patient is not discharged. Sacrum- Every 12 hours gently cleanse away any soiled cream with soap and water, blot dry, apply a thin coat of Secura Extra Thick Antifungal.  Reapply PRN. Skin Care & Pressure Prevention:  Minimize layers of linen/pads under patient to optimize support surface. Turn/reposition approximately every 2 hours and offload heels.   Manage incontinence / promote continence     Discussed above plan with patient & Jaclyn Lopez RN    Transition of Care: Plan to follow as needed while admitted to hospital.    LAUREN GuptaN, RN, [facility], [facility].  office [phone]  pager [phone] or call  to page

## 2019-07-29 NOTE — PROGRESS NOTES
Hospitalist Progress Note  Peña Pickens MD  Answering service: [phone] from in house phone  Cell: [phone]   Date of Service:  2019  NAME:  [address] [place] [address] :  1940  MRN:  [id number]    Admission Summary:   Pt with a pmh of CKD stage IV/V, HTN, Mitral incompetence, prostate cancer originally went to his PCP due to painful urination. His UA was negative so was prescribed tramadol for pain. He took the first dose of 50 mg tramadol the night before admit, felt dizzy and lightheaded and then associated with nausea and 2-3 episodes of vomiting. He came to the hospital for further evaluation.     Interval history / Subjective:   Conversation 1530:   Called to speak with Rakesh Muhammad about medical updates and to discuss plan of care. At start of conversation, Shawn says she is going to refuse pts discharge tomorrow. She sites many reason why she plans to refuse discharge (util at least  per her):  she is not ready for her dad to leave; she has a lot of work to catch up on at home, she needs to get all of his long term and disability paperwork done and completed before he leaves the hospital and she is assigned a different ;  and that she has not toured the facility yet (though she has been by to see the accepting facility she says it was a night and wants to go during the day). Attempted to have a discussion about medical readiness for discharge at earliest tomorrow and she adamantly refuses. In short, pt is terminal and is weak. Hope is to discharge him to SNF to regain some strength and then transition to LTC, whether or not he will need hospice soon will be determined but as we are pursuing skilled therapy then that must be reserved for future plans. She also sites lack of care in \"nursing facilities in general\" as reason for not going until Monday.  She is very emotional and labile and will be a challenge/barrier to pts discharge from hospital.  is aware of situation and is being challenged as well.     7/24:   0830: Pt awake, lying in bed. Feels well - woke up with a start when staff member awakened him this morning and still was feeling jittery from that. Other than that - reports he pain is controlled. Taking Norco 7.5/325 and he says he is satisfied with pain relief he has received from this. He is aware he is also on steroids for gout (been on since 7/21)    1330: Pt visited once again as he complained that his tongue felt swollen. Upon entering room, noted pt was sleeping soundly. Awoken with a small jump when name was called. MM dry and pt had been mouth breathing. Tongue and oral cavity examined. Tongue does not appear swollen, no redness or white patches noted to tongue, other MM. Speech is clear and not slurred or thick. VSS. Encouraged pt to alert nurse if he felt any differently. 7/25: pt feels ok, some pain in L hip area, no other acute events. Will set up a meeting with pt and daughter to discuss care. 7/26: Pain controlled per patient but when he moves his legs he still has some pain in his knees bilaterally. No nausea or vomiting. 3 BMs since am. One watery bowel movement out of the three. 7/27: No new issues. Comfortably sleeping on bed. Did not wake the patient up. No reported events    7/28: Continues to remain stable. No complaints. Renal function continues to improve  Chest pain in the afternoon    7/29: Continues to remain stable. Pain controlled. No complaints. Assessment & Plan:     Sepsis (POA, now recurred) improved:   - Fever 7/19 at 11 pm, HR, suspect urinary source from recent instrumentation vs. Malignancy  - WBC always remained normal, afebrile  - Urine culture No growth  - blood culture with no growth   - procalcitonin negative   - Stopped LVQ 7/26.  Continue probiotics for now  -Watching off antibiotics    Pain: Likely secondary to metastatic disease, L hip pain: Improved  - Pt having itching with dilaudid, discussed pain regimen with pt and daughter in detail, switched to [facility] 7/22 with good results. - Pt satisfied with pain control   - started on prednisone this weekend for suspected gout in feet, pt reports less pain in feet now, tapered prednisone down to 20 mg starting 7/25 (has had 4 days of 40 mg). Will stop steroids at discharge    Chest pain  -noted on 7/28  -EKG unremarkable  -sec to GERD  -Maalox helped  -Put the patient on Protonix    Hyperkalemia: Resolved  - s/p kayexalate  - takes veltassa (missed dose PTA), this has been resumed    JASIEL on CKD stage 5, Metabolic acidosis:  - Cr: rising, 6.57 7/23,  was 4.96 in 2/2019.  Likely has progression of kidney disease  - s/p bicarb gtt and now on NS (to continue through d/c)  - Nephrology consulted and following  - Poor dialysis candidate long term, and patient does not want it   - Dr. Remberto Kiran discussed plan of care with pts daughter 7/26  - now resolved     UTI:bladder outlet obstruction/Functional one kidney  Hx of b/l hydronephrosis due to prostate cancer with stent placement in 2018:  - UA shows >100 WBCs with 2+ bacteria, he has ureteral stents  - 7/18: s/p bilateral ureteral stent replacement (difficult) and clot removal  - urine cultures x 2 negative  -Need to get his bilateral ureteral stents changes every three months     Dizziness/lightheadedness:   -Resolved    Nausea/vomiting: resolved  - side effects due to tramadol, discussed with the patient  - prn zofran     Metastatic prostate cancer:   -on casodex  -Outpatient follow up with Dr Hafsa BLUM s/p CABG:   -on aspirin and metoprolol    HTN:  - on home metoprolol, BP normal    Renal diet    PT/OT SNF, if not possible HHPT    Code status: DNR  DVT prophylaxis: SCDs    Plan: Discharge to [facility] discussed with: patient, nurse, nephrology and   Disposition: as above     Hospital Problems  Date Reviewed: 7/16/2018          Codes Class Noted POA    * (Principal) JASIEL (acute kidney injury) ([facility] ICD-10-CM: N17.9  ICD-9-CM: 584.9  7/17/2019 Unknown        Coronary artery disease involving native coronary artery of native heart without angina pectoris ICD-10-CM: I25.10  ICD-9-CM: 414.01  8/31/2018 Yes        Essential hypertension ICD-10-CM: I10  ICD-9-CM: 401.9  8/31/2018 Yes        Prostate cancer ([facility] ICD-10-CM: C61  ICD-9-CM: 185  8/31/2018 Yes        Hyperkalemia ICD-10-CM: E87.5  ICD-9-CM: 276.7  6/5/2018 Yes            Review of Systems:   Denied HA. No chest pain or pressure. No respiratory or GI complaints. + urinating. No pain this am.     Vital Signs:    Last 24hrs VS reviewed since prior progress note. Most recent are:  Visit Vitals  /80   Pulse (!) 58   Temp 97.7 °F (36.5 °C)   Resp 14   Ht 5' 11\" (1.803 m)   Wt 107.6 kg (237 lb 3.2 oz)   SpO2 97%   BMI 33.08 kg/m²       Intake/Output Summary (Last 24 hours) at 7/29/2019 0916  Last data filed at 7/29/2019 0848  Gross per 24 hour   Intake 1240 ml   Output 3675 ml   Net -2435 ml     Physical Examination:         Constitutional:  Cooperative, pleasant. NAD. Resp:  CTA bilaterally. On RA        GI:  Soft, non distended, tender. :  Urine light and clear    Musculoskeletal:  Mild LE edema, warm    Neurologic:  Moves all extremities. AAOx3. Psych: Calm and cooperative       Data Review:   Review and/or order of clinical lab test  Review and/or order of tests in the radiology section of CPT  Review and/or order of tests in the medicine section of CPT    Labs:     Recent Labs     07/28/19  0324   WBC 10.1   HGB 8.5*   HCT 27.7*        Recent Labs     07/29/19  0405 07/28/19  0324    142   K 5.5* 5.1   * 116*   CO2 18* 17*   BUN 69* 66*   CREA 3.77* 4.02*   * 91   CA 8.1* 7.4*     No results for input(s): SGOT, GPT, ALT, AP, TBIL, TBILI, TP, ALB, GLOB, GGT, AML, LPSE in the last 72 hours.     No lab exists for component: AMYP, HLPSE  No results for input(s): INR, PTP, APTT in the last 72 hours. No lab exists for component: INREXT, INREXT   No results for input(s): FE, TIBC, PSAT, FERR in the last 72 hours. No results found for: FOL, RBCF   No results for input(s): PH, PCO2, PO2 in the last 72 hours. No results for input(s): CPK, CKNDX, TROIQ in the last 72 hours.     No lab exists for component: CPKMB  Lab Results   Component Value Date/Time    Cholesterol, total 85 10/07/2013 12:00 AM    HDL Cholesterol 5 10/07/2013 12:00 AM    LDL, calculated 39.6 10/07/2013 12:00 AM    Triglyceride 202 (H) 10/07/2013 12:00 AM    CHOL/HDL Ratio 17.0 (H) 10/07/2013 12:00 AM     Lab Results   Component Value Date/Time    Glucose (POC) 105 (H) 07/17/2019 06:48 AM    Glucose (POC) 105 (H) 07/17/2019 05:56 AM    Glucose (POC) 137 (H) 07/18/2018 05:06 PM    Glucose (POC) 100 07/17/2018 06:07 AM    Glucose (POC) 59 (L) 06/20/2018 05:03 PM    Glucose (POC) 101 (H) 06/20/2018 03:14 PM    Glucose (POC) 83 06/10/2018 06:26 AM    Glucose (POC) 108 (H) 06/09/2018 10:08 PM    Glucose (POC) 138 (H) 06/09/2018 06:30 PM     Lab Results   Component Value Date/Time    Color YELLOW/STRAW 07/20/2019 10:40 AM    Appearance CLOUDY (A) 07/20/2019 10:40 AM    Specific gravity 1.007 07/20/2019 10:40 AM    Specific gravity 1.015 02/15/2019 04:01 PM    pH (UA) 8.0 07/20/2019 10:40 AM    Protein 100 (A) 07/20/2019 10:40 AM    Glucose NEGATIVE  07/20/2019 10:40 AM    Ketone NEGATIVE  07/20/2019 10:40 AM    Bilirubin NEGATIVE  07/20/2019 10:40 AM    Urobilinogen 0.2 07/20/2019 10:40 AM    Nitrites NEGATIVE  07/20/2019 10:40 AM    Leukocyte Esterase LARGE (A) 07/20/2019 10:40 AM    Epithelial cells FEW 07/20/2019 10:40 AM    Bacteria 1+ (A) 07/20/2019 10:40 AM    WBC >100 (H) 07/20/2019 10:40 AM    RBC 20-50 07/20/2019 10:40 AM     Medications Reviewed:     Current Facility-Administered Medications   Medication Dose Route Frequency    pantoprazole (PROTONIX) tablet 40 mg 40 mg Oral DAILY    lactobac ac& pc-s.therm-b.anim (CELIO Q/RISAQUAD)  1 Cap Oral DAILY    senna-docusate (PERICOLACE) 8.6-50 mg per tablet 1 Tab  1 Tab Oral DAILY    predniSONE (DELTASONE) tablet 20 mg  20 mg Oral DAILY WITH BREAKFAST    HYDROcodone-acetaminophen (NORCO) 7.5-325 mg per tablet 1 Tab  1 Tab Oral Q4H PRN    miconazole (SECURA) 2 % extra thick cream   Topical Q12H    naloxone (NARCAN) injection 0.4 mg  0.4 mg IntraVENous EVERY 2 MINUTES AS NEEDED    polyethylene glycol (MIRALAX) packet 17 g  17 g Oral DAILY    allopurinol (ZYLOPRIM) tablet 100 mg  100 mg Oral DAILY    aspirin chewable tablet 81 mg  81 mg Oral DAILY    ferrous sulfate tablet 325 mg  325 mg Oral ACB&D    mirtazapine (REMERON) tablet 15 mg  15 mg Oral QHS    patiromer calcium sorbitex (VELTASSA) powder 8.4 g  8.4 g Oral DAILY    metoprolol tartrate (LOPRESSOR) tablet 25 mg  25 mg Oral BID    ondansetron (ZOFRAN) injection 4 mg  4 mg IntraVENous Q6H PRN    acetaminophen (TYLENOL) tablet 650 mg  650 mg Oral Q6H PRN    lidocaine (LIDODERM) 5 % patch 1 Patch  1 Patch TransDERmal Q24H   ______________________________________________________________________  EXPECTED LENGTH OF STAY: 5d 16h  ACTUAL LENGTH OF STAY:          Catarina Lafleur MD

## 2019-07-29 NOTE — DISCHARGE INSTRUCTIONS
Discharge SNF/Rehab Instructions/LTAC       PATIENT ID: Shine Lenz MRN: [id number]   YOB: 1940    DATE OF ADMISSION: 7/17/2019  5:20 AM    DATE OF DISCHARGE: 7/29/2019    PRIMARY CARE PROVIDER: Angelina Magaña MD       ATTENDING PHYSICIAN: Gracy Ko MD  DISCHARGING PROVIDER: Octavio Pyle MD     To contact this individual call [phone] and ask the  to page. If unavailable ask to be transferred the Adult Hospitalist Department. CONSULTATIONS: IP CONSULT TO NEPHROLOGY  IP CONSULT TO UROLOGY    PROCEDURES/SURGERIES: Procedure(s):  CYSTOSCOPY, DIFFICULT BILATERAL URETERAL STENT EXCHANGE, CLOT EVACUATION    ADMITTING DIAGNOSES & HOSPITAL COURSE:   Sepsis (POA, now recurred) improved:   - Fever 7/19 at 11 pm, HR, suspect urinary source from recent instrumentation vs. Malignancy  - WBC always remained normal, afebrile  - Urine culture No growth  - blood culture with no growth   - procalcitonin negative   - Stopped LVQ 7/26. Continue probiotics for now  -Watching off antibiotics    Pain: Likely secondary to metastatic disease, L hip pain: Improved  - Pt having itching with dilaudid, discussed pain regimen with pt and daughter in detail, switched to [address] [place] [address] 7/22 with good results. - Pt satisfied with pain control   - started on prednisone this weekend for suspected gout in feet, pt reports less pain in feet now, tapered prednisone down to 20 mg starting 7/25 (has had 4 days of 40 mg). Will stop steroids at discharge    Chest pain  -noted on 7/28  -EKG unremarkable  -sec to GERD  -Maalox helped  -Put the patient on Protonix    Hyperkalemia: Resolved  - s/p kayexalate  - takes veltassa (missed dose PTA), this has been resumed    JASIEL on CKD stage 5, Metabolic acidosis:  - Cr: rising, 6.57 7/23,  was 4.96 in 2/2019.  Likely has progression of kidney disease  - s/p bicarb gtt and now on NS (to continue through d/c)  - Nephrology consulted and following  - Poor dialysis candidate long term, and patient does not want it   - Dr. Andrew Jones discussed plan of care with pts daughter 7/26  - now resolved     UTI:bladder outlet obstruction/Functional one kidney  Hx of b/l hydronephrosis due to prostate cancer with stent placement in 2018:  - UA shows >100 WBCs with 2+ bacteria, he has ureteral stents  - 7/18: s/p bilateral ureteral stent replacement (difficult) and clot removal  - urine cultures x 2 negative  -Need to get his bilateral ureteral stents changes every three months     Dizziness/lightheadedness:   -Resolved    Nausea/vomiting: resolved  - side effects due to tramadol, discussed with the patient  - prn zofran     Metastatic prostate cancer:   -on casodex  -Outpatient follow up with Dr Shanon BLUM s/p CABG:   -on aspirin and metoprolol    HTN:  - on home metoprolol, BP normal    Renal diet    PT/OT SNF, if not possible HHPT    Code status: DNR  DVT prophylaxis: SCDs            DISCHARGE DIAGNOSES / PLAN:      1. Sepsis  2. Pain       PENDING TEST RESULTS:   At the time of discharge the following test results are still pending: none    FOLLOW UP APPOINTMENTS:    Follow-up Information     Follow up With Specialties Details Why Contact Info    French Uriarte MD [facility] In 1 week  [address] [place] [address]  [phone]             ADDITIONAL CARE RECOMMENDATIONS:   Follow up with PMD    DIET: Renal Diet    ACTIVITY: Activity as tolerated      DISCHARGE MEDICATIONS:   See Medication Reconciliation Form      NOTIFY YOUR PHYSICIAN FOR ANY OF THE FOLLOWING:   Fever over 101 degrees for 24 hours. Chest pain, shortness of breath, fever, chills, nausea, vomiting, diarrhea, change in mentation, falling, weakness, bleeding. Severe pain or pain not relieved by medications. Or, any other signs or symptoms that you may have questions about.     DISPOSITION:    Home With:   OT  PT  HH  RN      x SNF/Inpatient Rehab/LTAC    Independent/assisted living Hospice    Other:       PATIENT CONDITION AT DISCHARGE:     Functional status   x Poor     Deconditioned     Independent      Cognition    x Lucid     Forgetful     Dementia      Catheters/lines (plus indication)    Cespedes     PICC     PEG    x None      Code status     Full code    x DNR      PHYSICAL EXAMINATION AT DISCHARGE:  Please see progress note      CHRONIC MEDICAL DIAGNOSES:  Problem List as of 7/29/2019 Date Reviewed: 7/16/2018          Codes Class Noted - Resolved    * (Principal) JASIEL (acute kidney injury) ([facility] [facility] ICD-10-CM: N17.9  ICD-9-CM: 584.9  7/17/2019 - Present        Primary osteoarthritis of right knee ICD-10-CM: M17.11  ICD-9-CM: 715.16  9/30/2018 - Present        Bilateral hydronephrosis ICD-10-CM: N13.30  ICD-9-CM: 591  8/31/2018 - Present        CKD (chronic kidney disease) stage 3, GFR 30-59 ml/min ([facility]) ICD-10-CM: N18.3  ICD-9-CM: 585.3  8/31/2018 - Present        Coronary artery disease involving native coronary artery of native heart without angina pectoris ICD-10-CM: I25.10  ICD-9-CM: 414.01  8/31/2018 - Present        Essential hypertension ICD-10-CM: I10  ICD-9-CM: 401.9  8/31/2018 - Present        Chronic anemia ICD-10-CM: D64.9  ICD-9-CM: 285.9  8/31/2018 - Present        Prostate cancer ([facility] ICD-10-CM: C61  ICD-9-CM: 396  8/31/2018 - Present        Obesity (BMI 30.0-34.9) ICD-10-CM: E66.9  ICD-9-CM: 278.00  8/31/2018 - Present        Peripheral vertigo of both ears ICD-10-CM: H81.393  ICD-9-CM: 386.10  7/21/2018 - Present        ARF (acute renal failure) ([facility] ICD-10-CM: N17.9  ICD-9-CM: 584.9  7/16/2018 - Present        Acute worsening of stage 4 chronic kidney disease ([facility] ICD-10-CM: N18.4  ICD-9-CM: 585.4  6/5/2018 - Present        Metabolic acidosis TXT-22-UW: E87.2  ICD-9-CM: 276.2  6/5/2018 - Present        Acute gout ICD-10-CM: M10.9  ICD-9-CM: 274.01  6/5/2018 - Present        Hyperkalemia ICD-10-CM: E87.5  ICD-9-CM: 276.7  6/5/2018 - Present        RESOLVED: Orthostatic hypotension ICD-10-CM: I95.1  ICD-9-CM: 458.0  10/6/2013 - 10/14/2013                CDMP Checked:   Yes x     PROBLEM LIST Updated:  Yes x         Signed:   Lalitha Garcia MD  7/29/2019  9:25 AM

## 2019-07-29 NOTE — PROGRESS NOTES
Cm spoke with patients daughter, Laurie Mckeon, via phone. CM explained discharge process and medicaid process to daughter. CM informed her that [facility] does not have a bed available. She is agreement with discharging to [facility] at 11am.  CM notified Unit CM that daughter is in agreement with discharge plan.      LAUREN ChambersN RN [address] [place] [address]  Coordinator of Care Management  [phone]

## 2019-07-29 NOTE — PROGRESS NOTES
Reviewed chart for transitions of care, and discussed in rounds. CM noted of patient discharge today to [address] [place] [address]. He will transport via [facility] at 11:00 a.m. Folder placed on chart, and cm left voicemail for her regarding the above. CM delivered patient an IM letter, he reviewed and signed a copy for the chart. He contacted his daughter and she spoke with staff RN to discuss her wanting the number for patient advocate and a case management as this discharge she \" was not aware of\". There are no other discharge needs, and report can be called [phone] Room 117.     Karma Agrawal, [facility]

## 2021-08-03 PROBLEM — N17.9 ARF (ACUTE RENAL FAILURE) (HCC): Status: RESOLVED | Noted: 2018-07-16 | Resolved: 2021-08-03

## 2024-06-05 NOTE — PROGRESS NOTES
Rapid response called at 1504 in response to patient complaint of \"heartburn\" and \"shortness of breath\". Dr. Laury Bone ordered stat ekg and stat portal cxr. Ekg normal.  New orders for Maalox po one time now. details…

## 2025-01-24 NOTE — ED NOTES
Dr. Marita Beck at bedside Gayla, nurse from [facility], called to review patient's current medication list. Information provided.

## (undated) DEVICE — GUIDEWIRE URO L150CM DIA0.035IN TAPR 3CM NIT HYDRPHLC ANG

## (undated) DEVICE — CYSTO/BLADDER IRRIGATION SET, REGULATING CLAMP

## (undated) DEVICE — MARKER,SKIN,WI/RULER AND LABELS: Brand: MEDLINE

## (undated) DEVICE — GOWN,SIRUS,FABRNF,XL,20/CS: Brand: MEDLINE

## (undated) DEVICE — GUIDEWIRE ENDOSCP L150CM DIA0.038IN INTRO 14FR URIN TRACT

## (undated) DEVICE — Z DISCONTINUEDSOLUTION PREP 2OZ 10% POVIDONE IOD SCR CAP BTL

## (undated) DEVICE — SOLUTION IRRIGATION H2O 0797305] ICU MEDICAL INC]

## (undated) DEVICE — CYSTOSCOPY PACK: Brand: CONVERTORS

## (undated) DEVICE — TIDISHIELD UROLOGY DRAIN BAGS FROSTY VINYL STERILE FITS SIEMENS UROSKOP ACCESS 20 PER CASE: Brand: TIDISHIELD

## (undated) DEVICE — SYR 10ML LUER LOK 1/5ML GRAD --

## (undated) DEVICE — INFECTION CONTROL KIT SYS

## (undated) DEVICE — PACK,CYSTOSCOPY,PK III,SIRUS: Brand: MEDLINE

## (undated) DEVICE — GDWIRE UROL STR 150CM FLX TP -- BX/5 SENSOR

## (undated) DEVICE — SKIN MARKER,REGULAR TIP WITH RULER AND LABELS: Brand: DEVON

## (undated) DEVICE — JELLY,LUBE,STERILE,FLIP TOP,TUBE,4-OZ: Brand: MEDLINE

## (undated) DEVICE — STERILE POLYISOPRENE POWDER-FREE SURGICAL GLOVES WITH EMOLLIENT COATING: Brand: PROTEXIS

## (undated) DEVICE — OPEN-END URETERAL CATHETER: Brand: COOK

## (undated) DEVICE — Z INACTIVE USE 2527070 DRAPE SURG W40XL44IN UNDERBUTTOCK SMS POLYPR W/ PCH BK DISP

## (undated) DEVICE — PAD,ABDOMINAL,5"X9",ST,LF,25/BX: Brand: MEDLINE INDUSTRIES, INC.

## (undated) DEVICE — 1200 GUARD II KIT W/5MM TUBE W/O VAC TUBE: Brand: GUARDIAN

## (undated) DEVICE — SOLUTION IRRIG 1000ML H2O STRL BLT